# Patient Record
Sex: FEMALE | Race: WHITE | NOT HISPANIC OR LATINO | Employment: OTHER | URBAN - METROPOLITAN AREA
[De-identification: names, ages, dates, MRNs, and addresses within clinical notes are randomized per-mention and may not be internally consistent; named-entity substitution may affect disease eponyms.]

---

## 2017-01-12 ENCOUNTER — TELEPHONE (OUTPATIENT)
Dept: CARDIOLOGY | Facility: MEDICAL CENTER | Age: 82
End: 2017-01-12

## 2017-01-12 DIAGNOSIS — I25.119 CORONARY ARTERY DISEASE INVOLVING NATIVE CORONARY ARTERY OF NATIVE HEART WITH ANGINA PECTORIS (HCC): ICD-10-CM

## 2017-01-12 DIAGNOSIS — Z95.1 S/P CABG X 3: ICD-10-CM

## 2017-01-12 RX ORDER — METOPROLOL TARTRATE 50 MG/1
50 TABLET, FILM COATED ORAL 2 TIMES DAILY
Qty: 135 TAB | Refills: 3 | OUTPATIENT
Start: 2017-01-12 | End: 2017-09-24

## 2017-01-12 NOTE — TELEPHONE ENCOUNTER
Called and s/w pt to clarify mychart message sent about metoprolol. She needs the correct Rx sent into [facility]. It appears that [facility] did not get the refill from [facility] 11/21 and pts PCP had given her a 7 day supply. Clarified with pt that she is taking  (25mg) .5 tabs in the AM and (50mg) 1 tab at night. Called in rx to [facility]

## 2017-01-15 RX ORDER — ALLOPURINOL 100 MG/1
200 TABLET ORAL DAILY
Qty: 180 TAB | Refills: 3 | Status: SHIPPED | OUTPATIENT
Start: 2017-01-15 | End: 2018-03-23 | Stop reason: SDUPTHER

## 2017-01-25 ENCOUNTER — RX ONLY (OUTPATIENT)
Age: 82
Setting detail: RX ONLY
End: 2017-01-25

## 2017-02-27 ENCOUNTER — TELEPHONE (OUTPATIENT)
Dept: MEDICAL GROUP | Facility: MEDICAL CENTER | Age: 82
End: 2017-02-27

## 2017-02-27 NOTE — TELEPHONE ENCOUNTER
Pt called stating she needs rx for allopurinol 2 tabs po qday. Pt notified this was done on 1/15/17. Confirmed with pharmacy.

## 2017-03-07 ENCOUNTER — TELEPHONE (OUTPATIENT)
Dept: CARDIOLOGY | Facility: MEDICAL CENTER | Age: 82
End: 2017-03-07

## 2017-03-07 NOTE — TELEPHONE ENCOUNTER
Advised Kiersten that pt does not need pre-meds. 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

## 2017-03-07 NOTE — TELEPHONE ENCOUNTER
----- Message from Eileen Price sent at 3/7/2017 11:04 AM PST -----  Regarding: Patient in chair, needs to know if needs pre-meds  SC/Rory Burch @ Dr. Atwood's office called and wants to know about pre-meds. Patient is in the chair now. She can be reached at [phone].

## 2017-03-09 NOTE — TELEPHONE ENCOUNTER
Notified pt. She understands and wants to do this the right way. She hasn't been able to s/w the dentist office yet as they were gone on lunch when she called but says she know what to tell them now.

## 2017-03-09 NOTE — TELEPHONE ENCOUNTER
Pt states she s/w SC about stopping her plavix and SC advised she needs to take the plavix for at least 6 months after the occlusion of her graft. However, pt wants to make sure with [facility] she is not able to stop it as she really needs the crown. Advised pt to also discuss this with her dentist whether or not they would do a crown on plavix.   She was in for a cleaning yesterday and that is why Kiersten called about pre-med but Kiersten did not mention pt needing a crown (see below).     To [facility].     Question about stopping Plavix for dental work  Received: Today       Angy Tripp, RRULA.                     SC/Rory     Patient needs to find out about stopping her Plavix for her dental work (crown). She can be reached at [phone].

## 2017-03-09 NOTE — TELEPHONE ENCOUNTER
I would just clarify with dentist if they need to stop plavix for crown. If so, she has to wait 6 months minimum. The longer the better, otherwise she risks the chance of the stent closing up. SC

## 2017-03-14 DIAGNOSIS — Z95.1 S/P CABG X 3: ICD-10-CM

## 2017-03-14 RX ORDER — CLOPIDOGREL BISULFATE 75 MG/1
75 TABLET ORAL DAILY
Qty: 90 TAB | Refills: 1 | Status: SHIPPED | OUTPATIENT
Start: 2017-03-14 | End: 2017-03-15

## 2017-03-15 RX ORDER — CLOPIDOGREL BISULFATE 75 MG/1
75 TABLET ORAL DAILY
Qty: 30 TAB | Refills: 6 | Status: SHIPPED | OUTPATIENT
Start: 2017-03-15 | End: 2017-03-30 | Stop reason: SDUPTHER

## 2017-03-23 ENCOUNTER — PATIENT OUTREACH (OUTPATIENT)
Dept: HEALTH INFORMATION MANAGEMENT | Facility: OTHER | Age: 82
End: 2017-03-23

## 2017-03-23 NOTE — PROGRESS NOTES
Attempt #:1    Verify PCP: yes    Communication Preference Obtained: yes     Annual Wellness Visit Scheduling  1. Scheduling Status:Scheduled          Care Gap Scheduling (Attempt to Schedule EACH Overdue Care Gap!)     There are no preventive care reminders to display for this patient.      [employer] Activation: already active  Hipt[employer] Basilia: no  Virtual Visits: no  Opt In to Text Messages: no

## 2017-03-26 PROBLEM — I10 HYPERTENSION: Chronic | Status: ACTIVE | Noted: 2017-03-26

## 2017-03-30 ENCOUNTER — OFFICE VISIT (OUTPATIENT)
Dept: MEDICAL GROUP | Facility: MEDICAL CENTER | Age: 82
End: 2017-03-30
Payer: MEDICARE

## 2017-03-30 VITALS
TEMPERATURE: 97.2 F | DIASTOLIC BLOOD PRESSURE: 78 MMHG | HEART RATE: 68 BPM | OXYGEN SATURATION: 96 % | WEIGHT: 124 LBS | HEIGHT: 61 IN | SYSTOLIC BLOOD PRESSURE: 106 MMHG | BODY MASS INDEX: 23.41 KG/M2

## 2017-03-30 DIAGNOSIS — Z90.710 S/P TAH (TOTAL ABDOMINAL HYSTERECTOMY): Chronic | ICD-10-CM

## 2017-03-30 DIAGNOSIS — Z95.1 S/P CABG X 3: ICD-10-CM

## 2017-03-30 DIAGNOSIS — Z87.39 HISTORY OF GOUT: Chronic | ICD-10-CM

## 2017-03-30 DIAGNOSIS — I10 ESSENTIAL HYPERTENSION: Chronic | ICD-10-CM

## 2017-03-30 DIAGNOSIS — I44.7 LBBB (LEFT BUNDLE BRANCH BLOCK): Chronic | ICD-10-CM

## 2017-03-30 DIAGNOSIS — D70.9 NEUTROPENIA, UNSPECIFIED TYPE (HCC): ICD-10-CM

## 2017-03-30 DIAGNOSIS — Z00.00 MEDICARE ANNUAL WELLNESS VISIT, SUBSEQUENT: ICD-10-CM

## 2017-03-30 DIAGNOSIS — E78.5 DYSLIPIDEMIA: Chronic | ICD-10-CM

## 2017-03-30 DIAGNOSIS — Z86.73 HX OF TRANSIENT ISCHEMIC ATTACK (TIA): Chronic | ICD-10-CM

## 2017-03-30 PROCEDURE — G0439 PPPS, SUBSEQ VISIT: HCPCS | Performed by: INTERNAL MEDICINE

## 2017-03-30 PROCEDURE — 1036F TOBACCO NON-USER: CPT | Performed by: INTERNAL MEDICINE

## 2017-03-30 RX ORDER — CLOPIDOGREL BISULFATE 75 MG/1
75 TABLET ORAL DAILY
Qty: 90 TAB | Refills: 3
Start: 2017-03-30 | End: 2017-04-10

## 2017-03-30 ASSESSMENT — PATIENT HEALTH QUESTIONNAIRE - PHQ9: CLINICAL INTERPRETATION OF PHQ2 SCORE: 0

## 2017-03-30 NOTE — MR AVS SNAPSHOT
"        Susie Rodebosch   3/30/2017 2:00 PM   Office Visit   MRN: [id number]    Department:  [facility]   Dept Phone:  [phone]    Description:  Female : 1935   Provider:  Ant Bronson M.D.; [facility]            Allergies as of 3/30/2017     Allergen Noted Reactions    Lipitor [Atorvastatin Calcium] 2011   Unspecified    Elevated LFT  RXN=long time ago      You were diagnosed with     S/P CABG x 3   [032806]       Dyslipidemia   [798064]       History of gout   [199513]       Hx of transient ischemic attack (TIA)   [816141]       LBBB (left bundle branch block)   [513451]       Essential hypertension   [5893804]       Neutropenia, unspecified type (CMS-HCC)   [6335394]       S/P KATY (total abdominal hysterectomy)   [619356]       Medicare annual wellness visit, subsequent   [117476]         Vital Signs     Blood Pressure Pulse Temperature Height Weight Body Mass Index    106/78 mmHg 68 36.2 °C (97.2 °F) 1.537 m (5' 0.5\") 56.246 kg (124 lb) 23.81 kg/m2    Oxygen Saturation Last Menstrual Period Smoking Status             96% 2011 Former Smoker         Basic Information     Date Of Birth Sex Race Ethnicity Preferred Language    1935 Female White Non- English      Your appointments     2017  9:30 AM   Adult Draw/Collection with LAB ATTILA   [facility] (--)    [address]  [place] [address]   [phone]            2017  1:30 PM   FOLLOW UP with Carla Goode M.D.   [facility] (--)    [address]  [place] [id number] [phone]              Problem List              ICD-10-CM Priority Class Noted - Resolved    S/P CABG x 3 Z95.1   2011 - Present    S/P KATY (total abdominal hysterectomy) (Chronic) Z90.710   2011 - Present    History of gout (Chronic) Z87.39   2011 - Present    Dyslipidemia (Chronic) E78.5   2011 - Present    Hx of transient " ischemic attack (TIA) (Chronic) Z86.73   7/26/2011 - Present    History of cataract Z86.69   7/26/2011 - Present    Preventative health care (Chronic) Z00.00   7/26/2011 - Present    LBBB (left bundle branch block) (Chronic) I44.7   7/27/2011 - Present    History of osteopenia Z87.39   9/10/2011 - Present    Insomnia (Chronic) G47.00   6/4/2012 - Present    MEDICAL HOME  Low  Unknown - Present    Neutropenia (HCC) D70.9   11/5/2015 - Present    Decreased GFR R94.4   11/5/2015 - Present    Hypertension (Chronic) I10   3/26/2017 - Present      Health Maintenance        Date Due Completion Dates    IMM PNEUMOCOCCAL 65+ (ADULT) LOW/MEDIUM RISK SERIES (2 of 2 - PPSV23) 3/23/2018 (Originally 5/8/2016) 5/8/2015    IMM DTaP/Tdap/Td Vaccine (1 - Tdap) 3/23/2018 (Originally 9/27/2011) 9/26/2011    COLONOSCOPY 5/27/2018 5/27/2008    BONE DENSITY 8/14/2018 8/14/2013, 9/9/2011, 1/18/2008            Current Immunizations     13-VALENT PCV PREVNAR 5/8/2015    Influenza TIV (IM) 12/5/2012    Influenza Vaccine Adult HD 10/15/2016, 10/11/2016  9:32 AM, 10/21/2014    Influenza Vaccine Quad Inj (Preserved) 11/5/2015    SHINGLES VACCINE 6/4/2012    TD Vaccine 9/26/2011    Tuberculin Skin Test 10/2/2013 12:35 PM, 11/6/2012  9:35 AM, 10/30/2012  9:35 AM      Below and/or attached are the medications your provider expects you to take. Review all of your home medications and newly ordered medications with your provider and/or pharmacist. Follow medication instructions as directed by your provider and/or pharmacist. Please keep your medication list with you and share with your provider. Update the information when medications are discontinued, doses are changed, or new medications (including over-the-counter products) are added; and carry medication information at all times in the event of emergency situations     Allergies:  LIPITOR - Unspecified               Medications  Valid as of: March 30, 2017 -  4:26 PM    Generic Name Brand Name  Tablet Size Instructions for use    Allopurinol (Tab) ZYLOPRIM 100 MG Take 2 Tabs by mouth every day.        Aspirin (Tablet Delayed Response) ECOTRIN 81 MG Take 81 mg by mouth every evening.        Clopidogrel Bisulfate (Tab) PLAVIX 75 MG Take 1 Tab by mouth every day.        Lisinopril (Tab) PRINIVIL 5 MG Take 1 Tab by mouth every day.        Metoprolol Tartrate (Tab) LOPRESSOR 50 MG Take 1 Tab by mouth 2 times a day.        Rosuvastatin Calcium (Tab) CRESTOR 40 MG Take 1 Tab by mouth every day.        Zolpidem Tartrate (Tab) AMBIEN 5 MG Take 0.5 Tabs by mouth at bedtime as needed for Sleep.        .                 Medicines prescribed today were sent to:     [id number] - [place], [place] - [address] [place] [address]    Phone: [phone] Fax: [phone]    Open 24 Hours?: No      Medication refill instructions:       If your prescription bottle indicates you have medication refills left, it is not necessary to call your provider’s office. Please contact your pharmacy and they will refill your medication.    If your prescription bottle indicates you do not have any refills left, you may request refills at any time through one of the following ways: The online TouchLocal system (except Urgent Care), by calling your provider’s office, or by asking your pharmacy to contact your provider’s office with a refill request. Medication refills are processed only during regular business hours and may not be available until the next business day. Your provider may request additional information or to have a follow-up visit with you prior to refilling your medication.   *Please Note: Medication refills are assigned a new Rx number when refilled electronically. Your pharmacy may indicate that no refills were authorized even though a new prescription for the same medication is available at the pharmacy. Please request the medicine by name with the pharmacy before contacting your provider  for a refill.        Your To Do List     Future Labs/Procedures Complete By Expires    COMP METABOLIC PANEL  As directed 3/31/2018    CREATINE KINASE  As directed 3/31/2018    LIPID PROFILE  As directed 3/31/2018      Other Notes About Your Plan     ?pneumovax               MyChart Access Code: Activation code not generated  Current MyChart Status: Active

## 2017-03-30 NOTE — PROGRESS NOTES
Wellness     HPI:  Susie is a 81 y.o. female here for Medicare Annual Wellness Visit  Sees cardiology  Eye exam annually  Teeth cleaning twice per year  No hearing loss  No falls      Patient Active Problem List    Diagnosis Date Noted   • MEDICAL HOME      Priority: Low   • Hypertension 03/26/2017   • Neutropenia (HCC) 11/05/2015   • Decreased GFR 11/05/2015   • Insomnia 06/04/2012   • History of osteopenia 09/10/2011   • LBBB (left bundle branch block) 07/27/2011   • S/P KATY (total abdominal hysterectomy) 07/26/2011   • History of gout 07/26/2011   • Dyslipidemia 07/26/2011   • Hx of transient ischemic attack (TIA) 07/26/2011   • History of cataract 07/26/2011   • Preventative health care 07/26/2011   • S/P CABG x 3 07/06/2011       Current Outpatient Prescriptions   Medication Sig Dispense Refill   • clopidogrel (PLAVIX) 75 MG Tab Take 1 Tab by mouth every day. 30 Tab 6   • allopurinol (ZYLOPRIM) 100 MG Tab Take 2 Tabs by mouth every day. 180 Tab 3   • metoprolol (LOPRESSOR) 50 MG Tab Take 1 Tab by mouth 2 times a day. 135 Tab 3   • zolpidem (AMBIEN) 5 MG Tab Take 0.5 Tabs by mouth at bedtime as needed for Sleep. 30 Tab 5   • rosuvastatin (CRESTOR) 40 MG tablet Take 1 Tab by mouth every day. 30 Tab 11   • lisinopril (PRINIVIL) 5 MG Tab Take 1 Tab by mouth every day. 90 Tab 3   • aspirin EC (ECOTRIN) 81 MG TBEC Take 81 mg by mouth every evening.     • rosuvastatin (CRESTOR) 20 MG Tab        No current facility-administered medications for this visit.        Patient is taking medications as noted in medication list.  Current supplements as per medication list.   Chronic narcotic pain medicines: no    Allergies: Lipitor    Current social contact/activities: goes to friends house for dinner, out to dinner,volunteer      Is patient current with immunizations?  Yes.     She  reports that she quit smoking about 28 years ago. Her smoking use included Cigarettes. She has a 16 pack-year smoking history. She has never used  smokeless tobacco. She reports that she drinks about 0.5 oz of alcohol per week. She reports that she does not use illicit drugs.  Counseling given: Yes        Status post KATY  Started premarin in her 50s; now on estradiol 0.5 mg daily  9/26/11 on estradiol 0.5 mg qod  6/4/12 on estradiol rec trying to go to MWF  7/16/13 on estadiol MW  7/18/14 taper estradiol down to two days per week if possible  4/24/15 estradiol down to 1/2 tab three days per week    Status post CABG  1985 PTCA  1997 PTCA  7/11 EKG LBBB  9/30/14 echo normal LV function, EF 70%, mild MR, mild to moderate tricuspid regurgitation, RVSP 43 to 48 mild to moderate pulmonary hypertension  10/17/14 holter monitor normal sinus rhythm with episode of sinus tachycardia, no symptoms documented, no arrhythmia or pauses, minimal heart rate 48, maximum heart rate 135, one PVC, supraventricular ectopic activity 20 beats, mostly all single PACs  11/29/15 echo normal LV function, septal hypokinesis and paradoxical motion, grade 1 diastolic dysfunction, mild MR and TR  9/12/16 persantine thallium small area of ischemia inferolateral apex, possible nontransmural distal anterior wall myocardial infarct, normal ejection fraction  10/25/16 cardiology note discussed medical therapy versus coronary arteriography, patient elects to proceed with cardiac catheterization  11/2/16 cardiac catheterization proximal ostial LAD appears to have stent, high-grade 95% stenosis, beyond stent bifurcation diagonal branch LAD focal 95% stenosis, remainder widely patent, circumflex first marginal branch diffuse 75% stenosis, RCA distal smooth eccentric 60-70% stenosis, EF 68%, 3 vessel CAD with diffuse ostial, proximal and bifurcation LAD disease, referred to cardiac surgery for consideration CABG  11/5/16  cardiovascular surgery, CABG ×3 LIMA to LAD, reverse saphenous to second diagonal and first obtuse marginal  11/16/16 cardiac catheterization ejection fraction 56%, mid  anterior wall hypokinesis/akinesis, left main normal, LAD ostial 50% long calcific stenosis, proximal LAD and diagonal bifurcation 95% stenosis, diagonal ostium 95% stenosis, mid LAD has 2 serial 50% stenosis with kinking and tenting from retraction of LIMA bypass graft anastomosis, complete MARCIN 2 antegrade flow in the distal LAD, circumflex mid OMB one insertion site saphenous vein graft to this vessel 90% stenosis with MARCIN 3 antegrade flow, second marginal widely patent, RCA distal 75% calcified stenosis,MARCIN 3 flow, this vessel was not bypassed, LIMA bypass graft angiography markedly tapering distal anastomosis, no additional stenosis; saphenous vein bypass graft angiography widely patent; conservative therapy recommended  11/21/16 cardiology note, CAD with recent CABG and SVG occlusion, continue aspirin, plavix, crestor, metoprolol (increase to 25 mg qam and 50 mg apm, lisinopril, Lasix as needed plus potassium for edema, referral cardiac rehabilitation, follow-up 3 months  12/14/16 echo mildly reduced left ventricular systolic function, EF 50%, grade 2 diastolic dysfunction  12/22/16 cardiology note stable on metoprolol and lisinopril, crestor, aspirin and plavix follow-up 3 months     Right shoulder pain  8/4/15 decrease crestor to 20 mg and repeat labs    8/4/15 right shoulder pain x-ray and physical therapy pending, no benefit consider injection or MRI  8/5/15 xray right shoulders no acute fracture identified,mild degenerative changes  9/3/15 wbc 3.6(42%N,43%L)  9/3/15 bun 24,creat 0.9,GFR 54  9/3/15 ESR 13,CRP 0.1,CPK 81  9/3/15 completed physical therapy with symptoms improved    Preventative health  5/27/08 colon DHA told normal repeat 10 yrs  2009 pneumovax no webiz  9/26/11 td vaccine  6/4/12 zoster vaccine    8/14/13 dexa LS +1.2,hip -0.7  8/14/13 mammogram  7/29/14 vit d 28 increase to 4000 umits  10/10/14 declines mammogram  5/8/15 prevnar    Neutropenia  10/26/11 wbc 3.2 (47%N,38%L)  6/22/12 wbc  4.5(53%N,34%L)  9/23/13 wbc 4.5(53%N,35%L)  7/29/14 wbc 3.9(48%N,37%L)  4/30/15 wbc 4.3  9/3/15 wbc 3.6(42%N,43%L)  11/5/15 wbc 3.7(47%N,40%L)  12/9/16 wbc 5.0    Left bundle-branch block  7/11 EKG LBBB  9/30/14 echo normal LV function, EF 70%, mild MR, mild to moderate tricuspid regurgitation, RVSP 43 to 48 mild to moderate pulmonary hypertension  10/7/14 holter monitor normal sinus rhythm with episode of sinus tachycardia, no symptoms documented, no arrhythmia or pauses,minimal rate 48, maximum heart rate 135, one PVC, supraventricular ectopic activity 20 beats, mostly all single PACs  11/29/15 echo normal LV function, septal hypokinesis and paradoxical motion, grade 1 diastolic dysfunction, mild MR and TR  12/14/16 echo mildly reduced left ventricular systolic function, EF 50%, grade 2 diastolic dysfunction    Insomnia on Ambien    Hypertension  1985 PTCA  1997 PTCA  10/06 persantine thallium no ischemia, EF > 70%  10/08 urine mac 1.0  1/09 off hctz due to gout  7/11 EKG LBBB  8/11 [facility] carotid u/s left and right <40%  8/11 on metoprolol 50 am and 25 mg pm,and lisinopril 10 bid  8/12  cardiology note  7/29/14 urine mac 28, on metoprolol 50 mg qam and 25 mg qpm and lisinopril 10 mg bid  9/22/14 decrease metoprolol to 25 mg bid and cont lisinopril 10 mg bid due to postural lightheadedness  9/25/14 decrease lisinopril to 10 mg qday and continue metoprolol 50 mg 1/2 bid (sbp 97-99 when stand, sbp 110-120 sitting)  9/30/14 echo normal LV function, EF 70%, mild MR, mild to moderate tricuspid regurgitation, RVSP 43 to 48 mild to moderate pulmonary hypertension  10/2/14 stop metoprolol due to sbp  at times, continue lisinopril 10 mg    10/3/14 restart metoprolol 50 mg 1/2 tab per day and cont lisinopril 10 mg (HR )  10/6/14 seen ER lightheadedness workup negative more orthostatic symptoms, continue metorpolol 50 mg 1/2 qday (tapering off caused HR to increase) and decrease lisinopril to 10 mg 1/2 tab  per day  10/17/14 holter monitor normal sinus rhythm with episode of sinus tachycardia, no symptoms documented, no arrhythmia or pauses, minimal rate 48, maximum heart rate 135, one PVC, supraventricular ectopic activity 20 beats, mostly all single PACs  4/24/15 on metoprolol 50 mg 1/2 tab per day, lisinopril 10 mg 1/2 tab per day,asa  11/29/15 echo normal LV function, septal hypokinesis and paradoxical motion, grade 1 diastolic dysfunction, mild MR and TR  12/14/16 echo mildly reduced left ventricular systolic function, EF 50%, grade 2 diastolic dysfunction    History TIA  7/11 seen in ER [place] ; EKG LBBB, CT head non contrast negative  7/11 started on plavix  8/11 [facility] carotid ultrasound left and right <40%  6/12 on aspirin a day, intolerant to plavix  9/30/14 echo normal LV function, EF 70%, mild MR, mild to moderate tricuspid regurgitation, RVSP 43 to 48 mild to moderate pulmonary hypertension  8/2/15 on asa    11/29/15 CT head negative  11/9/15 MRI brain without; age-appropriate atrophy mild chronic microvascular ischemic disease  11/29/15 carotid ultrasound negative  12/14/16 echo mildly reduced left ventricular systolic function, EF 50%, grade 2 diastolic dysfunction    History osteopenia  1/08 dexa LS +0.2,hip +1.1  9/11 dexa LS +0.5, hip -1.0  10/11 vit d 30    8/14/13 dexa LS +1.2,hip -0.7  7/29/14 vit d 28    History of gout  1/09 started on allopurinol for uric acid 8.3, off hctz  1/11 uric acid 3.7 on allopurinol 200 mg daily  10/11 uric acid 4.6 on allopurinol 200 mg  3/12 colchicine prn gout flare up (do not take crestor with colchicine)  3/13 uric 4.6 on allopurinol 200 mg  7/29/14 uric 3.7 on allopurinol 200 mg  11/5/15 uric 3.9 on allopurinol 200 mg    History of cataract    Dyslipidemia  intolerant of lipitor  4/11 chol 200,trig 79,hdl 82,ldl 102 on lipitor 80 with elevated LFT (,, hep panel negative)  5/11 chol 280,trig 137,hdl 70,ldl 183 off statin (AST 21,ALT 26)    10/11 chol  219,trig 65,hdl 65,ldl 141 on zocor 40 mg; monitored by [facility] (AST 24,ALT 24)  11/2/11 [facility] change zocor 40 mg to crestor 40 mg  1/12 chol 161,trig 80,hdl 70,ldl 75 on crestor 40 mg  8/12 chol 170,trig 57,hdl 66,ldl 93 on crestor 40 mg per [facility], consider zetia in the future (intolerant lipitor)  3/13 chol 193,trig 46,hdl 90,ldl 94,LDL-P 923,HDL-P 51,LP-IR 37 on crestor 40 mg  9/23/13 chol 180,trig 92,hdl 70,ldl 92 on crestor 40 mg    10/1/13 samples zetia 10 mg, cont crestor 40 mg repeat labs 4 weeks  7/29/14 chol 186,trig 76,hdl 71,ldl 100; on crestor 40 mg consider decreasing dose  4/30/15 chol 182,trig 69,hdl 76,ldl 92 on crestor 40 mg  8/4/15 decrease crestor to 20 mg and repeat labs    9/3/15 ESR 13,CRP 0.1,CPK 81, crestor has been decreased to 20 mg  11/5/15 chol 191,trig 77,hdl 71,ldl 105 on crestor 20 mg (40 mg tab 1/2 per day)  12/29/16 chol 136,trig 82,hdl 46,ldl 74,cpk 53 on crestor 40 mg 1/2 per day   (intolerant of lipitor)    Decreased GFR  10/26/11 bun 12,creat 0.8,GFR>60  6/22/12 bun 17,creat 0.9,GFR 59  3/28/13 bun 18,creat 1.3,GFR 39  9/23/13 bun 19,creat 1.0,GFR 51  7/29/14 bun 24,creat 1.2,GFR 42  9/3/15 bun 24,creat 0.9,GFR 54  11/29/15 bun 16,creat 1.0,GFR 52  12/29/16 bun 15,creat 0.8,GFR 55          DPA/Advanced Directive:  Patient has Advanced Directive, but it is not on file. Instructed to bring in a copy to scan into their chart.    ROS:    Gait: Uses no assistive device    Ostomy: no    Other tubes: no    Amputations: no    Chronic oxygen use no    Last eye exam 2015    Wears hearing aids: no    : Denies incontinence     Depression Screening    Little interest or pleasure in doing things?  0 - not at all  Feeling down, depressed, or hopeless?  0 - not at all  Patient Health Questionnaire Score: 0  If depressive symptoms identified deferred to follow up visit unless specifically addressed in assessment and plan.    Screening for Cognitive Impairment    Three Minute Recall (banana,  sunrise, fence)  2/3 Apple, ancelmo, table  Draw clock face with all 12 numbers set to the hand to show 10 minutes past 11 o'clock  1 1/5  If cognitive concerns identified deferred to follow up visit unless specifically addressed in assessment and plan.    Fall Risk Assessment    Has the patient had two or more falls in the last year or any fall with injury in the last year?  No  If Fall Risk identified deferred to follow up visit unless specifically addressed in assessment and plan.    Safety Assessment    Throw rugs on floor.  Yes  Handrails on all stairs.  Yes  Good lighting in all hallways.  Yes  Difficulty hearing.  No  Patient counseled about all safety risks that were identified.    Functional Assessment ADLs    Are there any barriers preventing you from cooking for yourself or meeting nutritional needs?  No.    Are there any barriers preventing you from driving safely or obtaining transportation?  No.    Are there any barriers preventing you from using a telephone or calling for help?  No.    Are there any barriers preventing you from shopping?  No.    Are there any barriers preventing you from taking care of your own finances?  No.  takes care of finances.  Are there any barriers preventing you from managing your medications?  No.    Are currently engaging any exercise or physical activity?  Yes.  Care for . Goes for walks.    Health Maintenance Summary                IMM PNEUMOCOCCAL 65+ (ADULT) LOW/MEDIUM RISK SERIES Postponed 3/23/2018 Originally 5/8/2016. Patient Refused     Done 5/8/2015 Imm Admin: Pneumococcal Conjugate Vaccine (Prevnar/PCV-13)    IMM DTaP/Tdap/Td Vaccine Postponed 3/23/2018 Originally 9/27/2011. Patient Refused     Done 9/26/2011 Imm Admin: TD Vaccine    Annual Wellness Visit Next Due 8/24/2017      Done 8/23/2016 Visit Dx: Medicare annual wellness visit, subsequent     Patient has more history with this topic...    COLONOSCOPY Next Due 5/27/2018      Done 5/27/2008 AMB  REFERRAL TO GI FOR COLONOSCOPY    BONE DENSITY Next Due 2018      Done 2013 DS-BONE DENSITY STUDY (DEXA)     Patient has more history with this topic...          Patient Care Team:  Ant Bronson M.D. as PCP - General  BERRY Grider as Mid Level Provider (Cardiology)  Carla Goode M.D. as Consulting Physician (Cardiology)  [facility] as Consulting Physician (Optometry)    Social History   Substance Use Topics   • Smoking status: Former Smoker -- 0.50 packs/day for 32 years     Types: Cigarettes     Quit date: 1988   • Smokeless tobacco: Never Used      Comment: quit , approx 30pyh   • Alcohol Use: 0.5 oz/week     1 Glasses of wine per week     Family History   Problem Relation Age of Onset   • Cancer Mother      uterine   • Arthritis Mother      gout   • Heart Disease Father      MI age 50s   • Heart Disease Brother       40 MI   • Hyperlipidemia Sister      She  has a past medical history of Indigestion; Arthritis; Hypertension; MEDICAL HOME; Heart burn; High cholesterol; Breath shortness; Cataract; Gout; Back pain; CAD (coronary artery disease); and Stroke (CMS-[facility]).   Past Surgical History   Procedure Laterality Date   • Angioplasty  85, 97   • Other       c-sections x4   • Cataract phaco with iol  2009     Performed by SOLOMON THOMAS at SURGERY SAME DAY [facility] ORS   • Cataract phaco with iol  2009     Performed by SOLOMON THOMAS at SURGERY SAME DAY [facility] ORS   • Gyn surgery       C Section x4   • Gyn surgery       Hysterectomy during last C Section    • Multiple coronary artery bypass endo vein harvest  2016     Procedure: MULTIPLE CORONARY ARTERY BYPASS ENDO VEIN HARVEST;  Surgeon: Jeff Naranjo M.D.;  Location: SURGERY [facility];  Service:    • Reza  2016     Procedure: REZA;  Surgeon: Jeff Naranjo M.D.;  Location: SURGERY [facility];  Service:    • Cardiac cath             Exam:     Blood pressure  "106/78, pulse 68, temperature 36.2 °C (97.2 °F), height 1.537 m (5' 0.5\"), weight 56.246 kg (124 lb), last menstrual period 07/06/2011, SpO2 96 %. Body mass index is 23.81 kg/(m^2).    Hearing fair  Dentition fair  Alert, oriented in no acute distress.  Eye contact is good, speech goal directed, affect calm  Lungs clear  Cv s1s2  Extremities no edema    Assessment and Plan. The following treatment and monitoring plan is recommended:     Assessment  #1 wellness assessment    #2 Status post CABG performed on 11/5/16  cardiovascular surgery, CABG ×3 LIMA to LAD, reverse saphenous to second diagonal and first obtuse marginal, no recurrent chest pain, shortness of breath, lightheadedness, most recent cardiology follow-up appointment 12/22/16 cardiology note stable on metoprolol and lisinopril, crestor, aspirin and plavix follow-up 3 months     #3 hypertension stable on metoprolol and lisinopril with no lightheadedness or dizziness, no orthostatic symptoms or changes with position    #4 dyslipidemia on Crestor 40 mg per day no muscle pain or muscle aches most recent lipid panel which I believe was on half a pill a day 12/29/16 chol 136,trig 82,hdl 46,ldl 74,cpk 53 on crestor 40 mg 1/2 per day    #5 history neutropenia with no recurrent infections, most recent WBC 5.0 normal differential    #6 left bundle branch block 12/14/16 echo mildly reduced left ventricular systolic function, EF 50%, grade 2 diastolic dysfunction    #7 Insomnia on Ambien without daytime drowsiness or hangover effect    #8 history of TIA 2011 no history of stroke, no recurrent symptoms, remains on Plavix    #9 Preventative health  5/27/08 colon DHA told normal repeat 10 yrs  2009 pneumovax no webiz  9/26/11 td vaccine  6/4/12 zoster vaccine    8/14/13 dexa LS +1.2,hip -0.7  8/14/13 mammogram  7/29/14 vit d 28 increase to 4000 umits  10/10/14 declines mammogram  5/8/15 prevnar    #10 history of gout on allopurinol no flareup      Services " suggested:    Health Care Screening recommendations as per orders if indicated.  Referrals offered: PT/OT/Nutrition counseling/Behavioral Health/Smoking cessation as per orders if indicated.    Discussion today about general wellness and lifestyle habits:    · Prevent falls and reduce trip hazards; Cautioned about securing or removing rugs.  · Have a working fire alarm and carbon monoxide detector;   · Engage in regular physical activity and social activities       Follow-up:   Plan  #! Health maintenance reviewed and updated    #2 no mammogram, patient declines further mammograms screening    #3 dexa ordered    #4 no records, has received pneumococcal 23 previously, no records of that, has also received pneumococcal 13, shingles, tetanus vaccine    #5 declines hearing test, physical therapy, nutrition assessment    #6 declines cardiac rehab due to cost    #7 follow up cardiology     #8 blood pressure periodically and record    #9 sleep hygiene discussed, limited use of Ambien if possible, long-term use may contribute to drowsiness, sedation, memory loss, confusion, falls    #10 exercise 30 minutes daily    #11 annual influenza vaccine    #12 recheck labs on higher dose Crestor for cardiology appointment, lab slip provided fasting    #13 follow up with myself 6 months

## 2017-04-03 ENCOUNTER — HOSPITAL ENCOUNTER (OUTPATIENT)
Dept: LAB | Facility: MEDICAL CENTER | Age: 82
End: 2017-04-03
Attending: INTERNAL MEDICINE
Payer: MEDICARE

## 2017-04-03 ENCOUNTER — TELEPHONE (OUTPATIENT)
Dept: MEDICAL GROUP | Facility: MEDICAL CENTER | Age: 82
End: 2017-04-03

## 2017-04-03 DIAGNOSIS — E78.5 DYSLIPIDEMIA: Chronic | ICD-10-CM

## 2017-04-03 LAB
ALBUMIN SERPL BCP-MCNC: 4.4 G/DL (ref 3.2–4.9)
ALBUMIN/GLOB SERPL: 1.4 G/DL
ALP SERPL-CCNC: 63 U/L (ref 30–99)
ALT SERPL-CCNC: 20 U/L (ref 2–50)
ANION GAP SERPL CALC-SCNC: 8 MMOL/L (ref 0–11.9)
AST SERPL-CCNC: 20 U/L (ref 12–45)
BILIRUB SERPL-MCNC: 0.6 MG/DL (ref 0.1–1.5)
BUN SERPL-MCNC: 20 MG/DL (ref 8–22)
CALCIUM SERPL-MCNC: 9.7 MG/DL (ref 8.5–10.5)
CHLORIDE SERPL-SCNC: 106 MMOL/L (ref 96–112)
CHOLEST SERPL-MCNC: 152 MG/DL (ref 100–199)
CK SERPL-CCNC: 53 U/L (ref 0–154)
CO2 SERPL-SCNC: 25 MMOL/L (ref 20–33)
CREAT SERPL-MCNC: 1.13 MG/DL (ref 0.5–1.4)
GFR SERPL CREATININE-BSD FRML MDRD: 46 ML/MIN/1.73 M 2
GLOBULIN SER CALC-MCNC: 3.2 G/DL (ref 1.9–3.5)
GLUCOSE SERPL-MCNC: 97 MG/DL (ref 65–99)
HDLC SERPL-MCNC: 62 MG/DL
LDLC SERPL CALC-MCNC: 73 MG/DL
POTASSIUM SERPL-SCNC: 4.5 MMOL/L (ref 3.6–5.5)
PROT SERPL-MCNC: 7.6 G/DL (ref 6–8.2)
SODIUM SERPL-SCNC: 139 MMOL/L (ref 135–145)
TRIGL SERPL-MCNC: 86 MG/DL (ref 0–149)

## 2017-04-03 PROCEDURE — 80061 LIPID PANEL: CPT

## 2017-04-03 PROCEDURE — 80053 COMPREHEN METABOLIC PANEL: CPT

## 2017-04-03 PROCEDURE — 36415 COLL VENOUS BLD VENIPUNCTURE: CPT

## 2017-04-03 PROCEDURE — 82550 ASSAY OF CK (CPK): CPT

## 2017-04-06 ENCOUNTER — OFFICE VISIT (OUTPATIENT)
Dept: CARDIOLOGY | Facility: MEDICAL CENTER | Age: 82
End: 2017-04-06
Payer: MEDICARE

## 2017-04-06 VITALS
HEIGHT: 60 IN | OXYGEN SATURATION: 94 % | BODY MASS INDEX: 24.15 KG/M2 | DIASTOLIC BLOOD PRESSURE: 66 MMHG | SYSTOLIC BLOOD PRESSURE: 114 MMHG | WEIGHT: 123 LBS | HEART RATE: 66 BPM

## 2017-04-06 DIAGNOSIS — Z95.1 S/P CABG X 3: ICD-10-CM

## 2017-04-06 PROCEDURE — G8420 CALC BMI NORM PARAMETERS: HCPCS | Performed by: INTERNAL MEDICINE

## 2017-04-06 PROCEDURE — G8432 DEP SCR NOT DOC, RNG: HCPCS | Performed by: INTERNAL MEDICINE

## 2017-04-06 PROCEDURE — 1101F PT FALLS ASSESS-DOCD LE1/YR: CPT | Performed by: INTERNAL MEDICINE

## 2017-04-06 PROCEDURE — 1036F TOBACCO NON-USER: CPT | Performed by: INTERNAL MEDICINE

## 2017-04-06 PROCEDURE — G8598 ASA/ANTIPLAT THER USED: HCPCS | Performed by: INTERNAL MEDICINE

## 2017-04-06 PROCEDURE — 99214 OFFICE O/P EST MOD 30 MIN: CPT | Performed by: INTERNAL MEDICINE

## 2017-04-06 PROCEDURE — 4040F PNEUMOC VAC/ADMIN/RCVD: CPT | Performed by: INTERNAL MEDICINE

## 2017-04-06 ASSESSMENT — ENCOUNTER SYMPTOMS
SHORTNESS OF BREATH: 0
EYES NEGATIVE: 1
FALLS: 0
COUGH: 0
NERVOUS/ANXIOUS: 1
DIZZINESS: 0
MEMORY LOSS: 1
DEPRESSION: 0
GASTROINTESTINAL NEGATIVE: 1
PALPITATIONS: 0
INSOMNIA: 1
MUSCULOSKELETAL NEGATIVE: 1
WEIGHT LOSS: 0
LOSS OF CONSCIOUSNESS: 0

## 2017-04-06 NOTE — MR AVS SNAPSHOT
Susie Kendalloscelvis   2017 1:30 PM   Office Visit   MRN: [id number]    Department:  [facility]   Dept Phone:  [phone]    Description:  Female : 1935   Provider:  Carla Goode M.D.           Reason for Visit     Follow-Up           Allergies as of 2017     Allergen Noted Reactions    Lipitor [Atorvastatin Calcium] 2011   Unspecified    Elevated LFT  RXN=long time ago      You were diagnosed with     S/P CABG x 3   [832398]         Vital Signs     Blood Pressure Pulse Height Weight Body Mass Index Oxygen Saturation    114/66 mmHg 66 1.524 m (5') 55.792 kg (123 lb) 24.02 kg/m2 94%    Last Menstrual Period Smoking Status                2011 Former Smoker          Basic Information     Date Of Birth Sex Race Ethnicity Preferred Language    1935 Female White Non- English      Your appointments     Oct 11, 2017 10:45 AM   FOLLOW UP with Carla Goode M.D.   [facility] (--)    [address], [address]  [facility] [address]   [phone]              Problem List              ICD-10-CM Priority Class Noted - Resolved    S/P CABG x 3 Z95.1   2011 - Present    S/P KATY (total abdominal hysterectomy) (Chronic) Z90.710   2011 - Present    History of gout (Chronic) Z87.39   2011 - Present    Dyslipidemia (Chronic) E78.5   2011 - Present    Hx of transient ischemic attack (TIA) (Chronic) Z86.73   2011 - Present    History of cataract Z86.69   2011 - Present    Preventative health care (Chronic) Z00.00   2011 - Present    LBBB (left bundle branch block) (Chronic) I44.7   2011 - Present    History of osteopenia Z87.39   9/10/2011 - Present    Insomnia (Chronic) G47.00   2012 - Present    MEDICAL HOME  Low  Unknown - Present    Neutropenia (HCC) D70.9   2015 - Present    Decreased GFR R94.4   2015 - Present    Hypertension (Chronic) I10   3/26/2017 - Present      Health  Maintenance        Date Due Completion Dates    IMM PNEUMOCOCCAL 65+ (ADULT) LOW/MEDIUM RISK SERIES (2 of 2 - PPSV23) 3/23/2018 (Originally 5/8/2016) 5/8/2015    IMM DTaP/Tdap/Td Vaccine (1 - Tdap) 3/23/2018 (Originally 9/27/2011) 9/26/2011    COLONOSCOPY 5/27/2018 5/27/2008    BONE DENSITY 8/14/2018 8/14/2013, 9/9/2011, 1/18/2008            Current Immunizations     13-VALENT PCV PREVNAR 5/8/2015    Influenza TIV (IM) 12/5/2012    Influenza Vaccine Adult HD 10/15/2016, 10/11/2016  9:32 AM, 10/21/2014    Influenza Vaccine Quad Inj (Preserved) 11/5/2015    SHINGLES VACCINE 6/4/2012    TD Vaccine 9/26/2011    Tuberculin Skin Test 10/2/2013 12:35 PM, 11/6/2012  9:35 AM, 10/30/2012  9:35 AM      Below and/or attached are the medications your provider expects you to take. Review all of your home medications and newly ordered medications with your provider and/or pharmacist. Follow medication instructions as directed by your provider and/or pharmacist. Please keep your medication list with you and share with your provider. Update the information when medications are discontinued, doses are changed, or new medications (including over-the-counter products) are added; and carry medication information at all times in the event of emergency situations     Allergies:  LIPITOR - Unspecified               Medications  Valid as of: April 06, 2017 -  2:17 PM    Generic Name Brand Name Tablet Size Instructions for use    Allopurinol (Tab) ZYLOPRIM 100 MG Take 2 Tabs by mouth every day.        Aspirin (Tablet Delayed Response) ECOTRIN 81 MG Take 81 mg by mouth every evening.        Clopidogrel Bisulfate (Tab) PLAVIX 75 MG Take 1 Tab by mouth every day.        Lisinopril (Tab) PRINIVIL 5 MG Take 1 Tab by mouth every day.        Metoprolol Tartrate (Tab) LOPRESSOR 50 MG Take 1 Tab by mouth 2 times a day.        Rosuvastatin Calcium (Tab) CRESTOR 40 MG Take 1 Tab by mouth every day.        Zolpidem Tartrate (Tab) AMBIEN 5 MG Take 0.5  Tabs by mouth at bedtime as needed for Sleep.        .                 Medicines prescribed today were sent to:     [id number] - [place], [place] - [address]    [address] [place] [address]    Phone: [phone] Fax: [phone]    Open 24 Hours?: No      Medication refill instructions:       If your prescription bottle indicates you have medication refills left, it is not necessary to call your provider’s office. Please contact your pharmacy and they will refill your medication.    If your prescription bottle indicates you do not have any refills left, you may request refills at any time through one of the following ways: The online [email or web address] system (except Urgent Care), by calling your provider’s office, or by asking your pharmacy to contact your provider’s office with a refill request. Medication refills are processed only during regular business hours and may not be available until the next business day. Your provider may request additional information or to have a follow-up visit with you prior to refilling your medication.   *Please Note: Medication refills are assigned a new Rx number when refilled electronically. Your pharmacy may indicate that no refills were authorized even though a new prescription for the same medication is available at the pharmacy. Please request the medicine by name with the pharmacy before contacting your provider for a refill.        Other Notes About Your Plan     ?pneumovax               [employer] Access Code: Activation code not generated  Current [email or web address] Status: Active

## 2017-04-06 NOTE — PROGRESS NOTES
Subjective:   Susie Craig is a 81 y.o. female who presents today in follow-up in regards to her coronary arterial bypass surgery last fall in the setting of hypertension and hyperlipidemia     She is switching her care from another cardiologist who is my partner  She's had a tough time with her 's memory loss and falling. She is really working hard. She has no chest discomfort or symptoms of early exertion she does. She cares for her own house as well    She admits she needs to be more relaxed and tries to do meditation. She is taking her Crestor as directed.    Past Medical History   Diagnosis Date   • Indigestion    • Arthritis    • Hypertension    • MEDICAL HOME    • Heart burn    • High cholesterol    • Breath shortness      with exertion   • Cataract      bilat IOL    • Gout    • Back pain    • CAD (coronary artery disease)      CABG X3 in '16 with graft occlusion post-op   • Stroke (CMS-HCC)      TIA      Past Surgical History   Procedure Laterality Date   • Angioplasty  85, 97   • Other       c-sections x4   • Cataract phaco with iol  2009     Performed by SOLOMON THOMAS at SURGERY SAME DAY [facility]   • Cataract phaco with iol  2009     Performed by SOLOMON THOMAS at SURGERY SAME DAY [facility]   • Gyn surgery       C Section x4   • Gyn surgery       Hysterectomy during last C Section    • Multiple coronary artery bypass endo vein harvest  2016     Procedure: MULTIPLE CORONARY ARTERY BYPASS ENDO VEIN HARVEST;  Surgeon: Jeff Naranjo M.D.;  Location: SURGERY [facility];  Service:    • Reza  2016     Procedure: REZA;  Surgeon: Jeff Naranjo M.D.;  Location: SURGERY [facility];  Service:    • Cardiac cath       Family History   Problem Relation Age of Onset   • Cancer Mother      uterine   • Arthritis Mother      gout   • Heart Disease Father      MI age 50s   • Heart Disease Brother       40 MI   • Hyperlipidemia Sister      History    Smoking status   • Former Smoker -- 0.50 packs/day for 32 years   • Types: Cigarettes   • Quit date: 08/31/1988   Smokeless tobacco   • Never Used     Comment: quit 1988, approx 30pyh     Allergies   Allergen Reactions   • Lipitor [Atorvastatin Calcium] Unspecified     Elevated LFT  RXN=long time ago     Outpatient Encounter Prescriptions as of 4/6/2017   Medication Sig Dispense Refill   • clopidogrel (PLAVIX) 75 MG Tab Take 1 Tab by mouth every day. 90 Tab 3   • allopurinol (ZYLOPRIM) 100 MG Tab Take 2 Tabs by mouth every day. 180 Tab 3   • metoprolol (LOPRESSOR) 50 MG Tab Take 1 Tab by mouth 2 times a day. 135 Tab 3   • zolpidem (AMBIEN) 5 MG Tab Take 0.5 Tabs by mouth at bedtime as needed for Sleep. 30 Tab 5   • rosuvastatin (CRESTOR) 40 MG tablet Take 1 Tab by mouth every day. 30 Tab 11   • lisinopril (PRINIVIL) 5 MG Tab Take 1 Tab by mouth every day. 90 Tab 3   • aspirin EC (ECOTRIN) 81 MG TBEC Take 81 mg by mouth every evening.       No facility-administered encounter medications on file as of 4/6/2017.     Review of Systems   Constitutional: Negative for weight loss and malaise/fatigue.   Eyes: Negative.    Respiratory: Negative for cough and shortness of breath.    Cardiovascular: Negative for chest pain, palpitations and leg swelling.   Gastrointestinal: Negative.    Genitourinary: Negative.    Musculoskeletal: Negative.  Negative for falls.   Neurological: Negative for dizziness and loss of consciousness.   Endo/Heme/Allergies: Negative.    Psychiatric/Behavioral: Positive for memory loss. Negative for depression. The patient is nervous/anxious and has insomnia.    All other systems reviewed and are negative.       Objective:   /66 mmHg  Pulse 66  Ht 1.524 m (5')  Wt 55.792 kg (123 lb)  BMI 24.02 kg/m2  SpO2 94%  LMP 07/06/2011    Physical Exam   Constitutional: She is oriented to person, place, and time. She appears well-nourished.   HENT:   Head: Normocephalic.   Mouth/Throat: No  oropharyngeal exudate.   Eyes: EOM are normal. Pupils are equal, round, and reactive to light. No scleral icterus.   Neck: No JVD present. No thyromegaly present.   Cardiovascular: Regular rhythm and intact distal pulses.  Exam reveals no friction rub.    No murmur heard.  Well-healed sternal scar, approximated   Pulmonary/Chest: Breath sounds normal.   Abdominal: Soft. Bowel sounds are normal.   Musculoskeletal: She exhibits no edema.   Neurological: She is alert and oriented to person, place, and time. She exhibits normal muscle tone.   Skin: Skin is dry. No rash noted.       Assessment:     1. S/P CABG x 3         Medical Decision Making:  Today's Assessment / Status / Plan:     Coronary artery disease, recent open-heart surgery. She is on Plavix and aspirin. No side effects. The usual course of therapy would be a year, I will confirm this with her surgeons because recently they have started doing this for 3 months. Statin and aspirin. Labs reviewed which are excellent and at guidelines    Anxiety and situational stress. Talked at length about relaxation and therapy. She will continue to work on this. She feels safe at home    Lipids as above  Hypertension, good control and we reviewed her logs, no complaints    RTC 6 months, lab work at that time, echocardiogram in one calendar year

## 2017-05-03 RX ORDER — ZOLPIDEM TARTRATE 5 MG/1
2.5 TABLET ORAL NIGHTLY PRN
Qty: 30 TAB | Refills: 5 | Status: SHIPPED
Start: 2017-05-03 | End: 2017-09-24

## 2017-05-12 ENCOUNTER — PATIENT MESSAGE (OUTPATIENT)
Dept: MEDICAL GROUP | Facility: MEDICAL CENTER | Age: 82
End: 2017-05-12

## 2017-05-12 ENCOUNTER — HOSPITAL ENCOUNTER (EMERGENCY)
Facility: MEDICAL CENTER | Age: 82
End: 2017-05-12
Attending: EMERGENCY MEDICINE
Payer: MEDICARE

## 2017-05-12 ENCOUNTER — TELEPHONE (OUTPATIENT)
Dept: MEDICAL GROUP | Facility: MEDICAL CENTER | Age: 82
End: 2017-05-12

## 2017-05-12 ENCOUNTER — APPOINTMENT (OUTPATIENT)
Dept: RADIOLOGY | Facility: MEDICAL CENTER | Age: 82
End: 2017-05-12
Attending: EMERGENCY MEDICINE
Payer: MEDICARE

## 2017-05-12 VITALS
OXYGEN SATURATION: 88 % | RESPIRATION RATE: 18 BRPM | DIASTOLIC BLOOD PRESSURE: 76 MMHG | TEMPERATURE: 97.3 F | BODY MASS INDEX: 23.75 KG/M2 | HEART RATE: 62 BPM | WEIGHT: 121 LBS | HEIGHT: 60 IN | SYSTOLIC BLOOD PRESSURE: 155 MMHG

## 2017-05-12 DIAGNOSIS — R91.8 LUNG MASS: ICD-10-CM

## 2017-05-12 DIAGNOSIS — R51.9 NONINTRACTABLE HEADACHE, UNSPECIFIED CHRONICITY PATTERN, UNSPECIFIED HEADACHE TYPE: ICD-10-CM

## 2017-05-12 LAB
ALBUMIN SERPL BCP-MCNC: 4.1 G/DL (ref 3.2–4.9)
ALBUMIN/GLOB SERPL: 1.6 G/DL
ALP SERPL-CCNC: 60 U/L (ref 30–99)
ALT SERPL-CCNC: 28 U/L (ref 2–50)
ANION GAP SERPL CALC-SCNC: 9 MMOL/L (ref 0–11.9)
APPEARANCE UR: CLEAR
AST SERPL-CCNC: 22 U/L (ref 12–45)
BASOPHILS # BLD AUTO: 0.5 % (ref 0–1.8)
BASOPHILS # BLD: 0.02 K/UL (ref 0–0.12)
BILIRUB SERPL-MCNC: 0.6 MG/DL (ref 0.1–1.5)
BUN SERPL-MCNC: 17 MG/DL (ref 8–22)
CALCIUM SERPL-MCNC: 9.4 MG/DL (ref 8.5–10.5)
CHLORIDE SERPL-SCNC: 111 MMOL/L (ref 96–112)
CO2 SERPL-SCNC: 22 MMOL/L (ref 20–33)
COLOR UR AUTO: YELLOW
CREAT SERPL-MCNC: 0.97 MG/DL (ref 0.5–1.4)
EOSINOPHIL # BLD AUTO: 0.17 K/UL (ref 0–0.51)
EOSINOPHIL NFR BLD: 4.3 % (ref 0–6.9)
ERYTHROCYTE [DISTWIDTH] IN BLOOD BY AUTOMATED COUNT: 52.5 FL (ref 35.9–50)
ERYTHROCYTE [SEDIMENTATION RATE] IN BLOOD BY WESTERGREN METHOD: 16 MM/HOUR (ref 0–30)
GFR SERPL CREATININE-BSD FRML MDRD: 55 ML/MIN/1.73 M 2
GLOBULIN SER CALC-MCNC: 2.5 G/DL (ref 1.9–3.5)
GLUCOSE SERPL-MCNC: 98 MG/DL (ref 65–99)
GLUCOSE UR QL STRIP.AUTO: NEGATIVE MG/DL
HCT VFR BLD AUTO: 39.3 % (ref 37–47)
HGB BLD-MCNC: 13 G/DL (ref 12–16)
IMM GRANULOCYTES # BLD AUTO: 0.01 K/UL (ref 0–0.11)
IMM GRANULOCYTES NFR BLD AUTO: 0.3 % (ref 0–0.9)
INR PPP: 1.01 (ref 0.87–1.13)
KETONES UR QL STRIP.AUTO: NEGATIVE MG/DL
LEUKOCYTE ESTERASE UR QL STRIP.AUTO: ABNORMAL
LYMPHOCYTES # BLD AUTO: 1.38 K/UL (ref 1–4.8)
LYMPHOCYTES NFR BLD: 34.9 % (ref 22–41)
MCH RBC QN AUTO: 30.9 PG (ref 27–33)
MCHC RBC AUTO-ENTMCNC: 33.1 G/DL (ref 33.6–35)
MCV RBC AUTO: 93.3 FL (ref 81.4–97.8)
MONOCYTES # BLD AUTO: 0.34 K/UL (ref 0–0.85)
MONOCYTES NFR BLD AUTO: 8.6 % (ref 0–13.4)
NEUTROPHILS # BLD AUTO: 2.03 K/UL (ref 2–7.15)
NEUTROPHILS NFR BLD: 51.4 % (ref 44–72)
NITRITE UR QL STRIP.AUTO: NEGATIVE
NRBC # BLD AUTO: 0.02 K/UL
NRBC BLD AUTO-RTO: 0.5 /100 WBC
PH UR STRIP.AUTO: 7 [PH]
PLATELET # BLD AUTO: 141 K/UL (ref 164–446)
PMV BLD AUTO: 10.1 FL (ref 9–12.9)
POTASSIUM SERPL-SCNC: 4.1 MMOL/L (ref 3.6–5.5)
PROT SERPL-MCNC: 6.6 G/DL (ref 6–8.2)
PROT UR QL STRIP: ABNORMAL MG/DL
PROTHROMBIN TIME: 13.6 SEC (ref 12–14.6)
RBC # BLD AUTO: 4.21 M/UL (ref 4.2–5.4)
RBC UR QL AUTO: ABNORMAL
SODIUM SERPL-SCNC: 142 MMOL/L (ref 135–145)
SP GR UR: 1.01
WBC # BLD AUTO: 4 K/UL (ref 4.8–10.8)

## 2017-05-12 PROCEDURE — 70496 CT ANGIOGRAPHY HEAD: CPT

## 2017-05-12 PROCEDURE — 80053 COMPREHEN METABOLIC PANEL: CPT

## 2017-05-12 PROCEDURE — 85025 COMPLETE CBC W/AUTO DIFF WBC: CPT

## 2017-05-12 PROCEDURE — 36415 COLL VENOUS BLD VENIPUNCTURE: CPT

## 2017-05-12 PROCEDURE — 93005 ELECTROCARDIOGRAM TRACING: CPT | Performed by: EMERGENCY MEDICINE

## 2017-05-12 PROCEDURE — 81002 URINALYSIS NONAUTO W/O SCOPE: CPT

## 2017-05-12 PROCEDURE — 700117 HCHG RX CONTRAST REV CODE 255: Performed by: EMERGENCY MEDICINE

## 2017-05-12 PROCEDURE — 70498 CT ANGIOGRAPHY NECK: CPT

## 2017-05-12 PROCEDURE — 99284 EMERGENCY DEPT VISIT MOD MDM: CPT

## 2017-05-12 PROCEDURE — 85652 RBC SED RATE AUTOMATED: CPT

## 2017-05-12 PROCEDURE — 85610 PROTHROMBIN TIME: CPT

## 2017-05-12 PROCEDURE — 71010 DX-CHEST-PORTABLE (1 VIEW): CPT

## 2017-05-12 PROCEDURE — 700105 HCHG RX REV CODE 258: Performed by: EMERGENCY MEDICINE

## 2017-05-12 RX ORDER — SODIUM CHLORIDE 9 MG/ML
INJECTION, SOLUTION INTRAVENOUS CONTINUOUS
Status: DISCONTINUED | OUTPATIENT
Start: 2017-05-12 | End: 2017-05-12 | Stop reason: HOSPADM

## 2017-05-12 RX ORDER — HYDROCODONE BITARTRATE AND ACETAMINOPHEN 5; 325 MG/1; MG/1
1-2 TABLET ORAL EVERY 6 HOURS PRN
Qty: 10 TAB | Refills: 0 | Status: SHIPPED | OUTPATIENT
Start: 2017-05-12 | End: 2017-05-16

## 2017-05-12 RX ADMIN — SODIUM CHLORIDE: 9 INJECTION, SOLUTION INTRAVENOUS at 09:30

## 2017-05-12 RX ADMIN — IOHEXOL 100 ML: 350 INJECTION, SOLUTION INTRAVENOUS at 10:36

## 2017-05-12 ASSESSMENT — LIFESTYLE VARIABLES
HAVE PEOPLE ANNOYED YOU BY CRITICIZING YOUR DRINKING: NO
CONSUMPTION TOTAL: INCOMPLETE
TOTAL SCORE: 0
EVER HAD A DRINK FIRST THING IN THE MORNING TO STEADY YOUR NERVES TO GET RID OF A HANGOVER: NO
TOTAL SCORE: 0
HAVE YOU EVER FELT YOU SHOULD CUT DOWN ON YOUR DRINKING: NO
EVER FELT BAD OR GUILTY ABOUT YOUR DRINKING: NO
TOTAL SCORE: 0
DO YOU DRINK ALCOHOL: YES

## 2017-05-12 NOTE — ED AVS SNAPSHOT
5/12/2017    Susie Kendalloscelvis  [address]  [facility] [address]    Dear Susie:    [facility] wants to ensure your discharge home is safe and you or your loved ones have had all of your questions answered regarding your care after you leave the hospital.    Below is a list of resources and contact information should you have any questions regarding your hospital stay, follow-up instructions, or active medical symptoms.    Questions or Concerns Regarding… Contact   Medical Questions Related to Your Discharge  (7 days a week, 8am-5pm) Contact a Nurse Care Coordinator   [phone]   Medical Questions Not Related to Your Discharge  (24 hours a day / 7 days a week)  Contact the Nurse Health Line   [phone]    Medications or Discharge Instructions Refer to your discharge packet   or contact your [facility] Primary Care Provider   [phone]   Follow-up Appointment(s) Schedule your appointment via [employer]   or contact Scheduling [phone]   Billing Review your statement via [employer]  or contact Billing [phone]   Medical Records Review your records via [employer]   or contact Medical Records [phone]     You may receive a telephone call within two days of discharge. This call is to make certain you understand your discharge instructions and have the opportunity to have any questions answered. You can also easily access your medical information, test results and upcoming appointments via the [employer] free online health management tool. You can learn more and sign up at [employer]. For assistance setting up your [employer] account, please call [phone].    Once again, we want to ensure your discharge home is safe and that you have a clear understanding of any next steps in your care. If you have any questions or concerns, please do not hesitate to contact us, we are here for you. Thank you for choosing [facility] for your healthcare needs.    Sincerely,    Your [facility] Healthcare Team

## 2017-05-12 NOTE — ED NOTES
Pt c/o headache to left side of head since yesterday. Pt with hx of TIA. No neuro deficits noted. Pt concerned that pain has increased since yesterday.

## 2017-05-12 NOTE — DISCHARGE INSTRUCTIONS
"Headaches, Frequently Asked Questions  MIGRAINE HEADACHES  Q: What is migraine? What causes it? How can I treat it?  A: Generally, migraine headaches begin as a dull ache. Then they develop into a constant, throbbing, and pulsating pain. You may experience pain at the temples. You may experience pain at the front or back of one or both sides of the head. The pain is usually accompanied by a combination of:  · Nausea.   · Vomiting.   · Sensitivity to light and noise.   Some people (about 15%) experience an aura (see below) before an attack. The cause of migraine is believed to be chemical reactions in the brain. Treatment for migraine may include over-the-counter or prescription medications. It may also include self-help techniques. These include relaxation training and biofeedback.   Q: What is an aura?  A: About 15% of people with migraine get an \"aura\". This is a sign of neurological symptoms that occur before a migraine headache. You may see wavy or jagged lines, dots, or flashing lights. You might experience tunnel vision or blind spots in one or both eyes. The aura can include visual or auditory hallucinations (something imagined). It may include disruptions in smell (such as strange odors), taste or touch. Other symptoms include:  · Numbness.   · A \"pins and needles\" sensation.   · Difficulty in recalling or speaking the correct word.   These neurological events may last as long as 60 minutes. These symptoms will fade as the headache begins.  Q: What is a trigger?  A: Certain physical or environmental factors can lead to or \"trigger\" a migraine. These include:  · Foods.   · Hormonal changes.   · Weather.   · Stress.   It is important to remember that triggers are different for everyone. To help prevent migraine attacks, you need to figure out which triggers affect you. Keep a headache diary. This is a good way to track triggers. The diary will help you talk to your healthcare professional about your " "condition.  Q: Does weather affect migraines?  A: Bright sunshine, hot, humid conditions, and drastic changes in barometric pressure may lead to, or \"trigger,\" a migraine attack in some people. But studies have shown that weather does not act as a trigger for everyone with migraines.  Q: What is the link between migraine and hormones?  A: Hormones start and regulate many of your body's functions. Hormones keep your body in balance within a constantly changing environment. The levels of hormones in your body are unbalanced at times. Examples are during menstruation, pregnancy, or menopause. That can lead to a migraine attack. In fact, about three quarters of all women with migraine report that their attacks are related to the menstrual cycle.   Q: Is there an increased risk of stroke for migraine sufferers?  A: The likelihood of a migraine attack causing a stroke is very remote. That is not to say that migraine sufferers cannot have a stroke associated with their migraines. In persons under age 40, the most common associated factor for stroke is migraine headache. But over the course of a person's normal life span, the occurrence of migraine headache may actually be associated with a reduced risk of dying from cerebrovascular disease due to stroke.   Q: What are acute medications for migraine?  A: Acute medications are used to treat the pain of the headache after it has started. Examples over-the-counter medications, NSAIDs, ergots, and triptans.   Q: What are the triptans?  A: Triptans are the newest class of abortive medications. They are specifically targeted to treat migraine. Triptans are vasoconstrictors. They moderate some chemical reactions in the brain. The triptans work on receptors in your brain. Triptans help to restore the balance of a neurotransmitter called serotonin. Fluctuations in levels of serotonin are thought to be a main cause of migraine.   Q: Are over-the-counter medications for migraine " "effective?  A: Over-the-counter, or \"OTC,\" medications may be effective in relieving mild to moderate pain and associated symptoms of migraine. But you should see your caregiver before beginning any treatment regimen for migraine.   Q: What are preventive medications for migraine?  A: Preventive medications for migraine are sometimes referred to as \"prophylactic\" treatments. They are used to reduce the frequency, severity, and length of migraine attacks. Examples of preventive medications include antiepileptic medications, antidepressants, beta-blockers, calcium channel blockers, and NSAIDs (nonsteroidal anti-inflammatory drugs).  Q: Why are anticonvulsants used to treat migraine?  A: During the past few years, there has been an increased interest in antiepileptic drugs for the prevention of migraine. They are sometimes referred to as \"anticonvulsants\". Both epilepsy and migraine may be caused by similar reactions in the brain.   Q: Why are antidepressants used to treat migraine?  A: Antidepressants are typically used to treat people with depression. They may reduce migraine frequency by regulating chemical levels, such as serotonin, in the brain.   Q: What alternative therapies are used to treat migraine?  A: The term \"alternative therapies\" is often used to describe treatments considered outside the scope of conventional Western medicine. Examples of alternative therapy include acupuncture, acupressure, and yoga. Another common alternative treatment is herbal therapy. Some herbs are believed to relieve headache pain. Always discuss alternative therapies with your caregiver before proceeding. Some herbal products contain arsenic and other toxins.  TENSION HEADACHES  Q: What is a tension-type headache? What causes it? How can I treat it?  A: Tension-type headaches occur randomly. They are often the result of temporary stress, anxiety, fatigue, or anger. Symptoms include soreness in your temples, a tightening " "band-like sensation around your head (a \"vice-like\" ache). Symptoms can also include a pulling feeling, pressure sensations, and karissa head and neck muscles. The headache begins in your forehead, temples, or the back of your head and neck. Treatment for tension-type headache may include over-the-counter or prescription medications. Treatment may also include self-help techniques such as relaxation training and biofeedback.  CLUSTER HEADACHES  Q: What is a cluster headache? What causes it? How can I treat it?  A: Cluster headache gets its name because the attacks come in groups. The pain arrives with little, if any, warning. It is usually on one side of the head. A tearing or bloodshot eye and a runny nose on the same side of the headache may also accompany the pain. Cluster headaches are believed to be caused by chemical reactions in the brain. They have been described as the most severe and intense of any headache type. Treatment for cluster headache includes prescription medication and oxygen.  SINUS HEADACHES  Q: What is a sinus headache? What causes it? How can I treat it?  A: When a cavity in the bones of the face and skull (a sinus) becomes inflamed, the inflammation will cause localized pain. This condition is usually the result of an allergic reaction, a tumor, or an infection. If your headache is caused by a sinus blockage, such as an infection, you will probably have a fever. An x-ray will confirm a sinus blockage. Your caregiver's treatment might include antibiotics for the infection, as well as antihistamines or decongestants.   REBOUND HEADACHES  Q: What is a rebound headache? What causes it? How can I treat it?  A: A pattern of taking acute headache medications too often can lead to a condition known as \"rebound headache.\" A pattern of taking too much headache medication includes taking it more than 2 days per week or in excessive amounts. That means more than the label or a caregiver advises. " With rebound headaches, your medications not only stop relieving pain, they actually begin to cause headaches. Doctors treat rebound headache by tapering the medication that is being overused. Sometimes your caregiver will gradually substitute a different type of treatment or medication. Stopping may be a challenge. Regularly overusing a medication increases the potential for serious side effects. Consult a caregiver if you regularly use headache medications more than 2 days per week or more than the label advises.  ADDITIONAL QUESTIONS AND ANSWERS  Q: What is biofeedback?  A: Biofeedback is a self-help treatment. Biofeedback uses special equipment to monitor your body's involuntary physical responses. Biofeedback monitors:  · Breathing.   · Pulse.   · Heart rate.   · Temperature.   · Muscle tension.   · Brain activity.   Biofeedback helps you refine and perfect your relaxation exercises. You learn to control the physical responses that are related to stress. Once the technique has been mastered, you do not need the equipment any more.  Q: Are headaches hereditary?  A: Four out of five (80%) of people that suffer report a family history of migraine. Scientists are not sure if this is genetic or a family predisposition. Despite the uncertainty, a child has a 50% chance of having migraine if one parent suffers. The child has a 75% chance if both parents suffer.   Q: Can children get headaches?  A: By the time they reach high school, most young people have experienced some type of headache. Many safe and effective approaches or medications can prevent a headache from occurring or stop it after it has begun.   Q: What type of doctor should I see to diagnose and treat my headache?  A: Start with your primary caregiver. Discuss his or her experience and approach to headaches. Discuss methods of classification, diagnosis, and treatment. Your caregiver may decide to recommend you to a headache specialist, depending upon  your symptoms or other physical conditions. Having diabetes, allergies, etc., may require a more comprehensive and inclusive approach to your headache. The [employer] will provide, upon request, a list of NHF physician members in your state.  Document Released: 03/09/2005 Document Revised: 03/11/2013 Document Reviewed: 08/17/2009  [employer]® Patient Information ©2013 [employer].  Pulmonary Nodule  A pulmonary nodule is a small, round growth of tissue in the lung. Pulmonary nodules can range in size from less than 1/5 inch (4 mm) to a little bigger than an inch (25 mm). Most pulmonary nodules are detected when imaging tests of the lung are being performed for a different problem. Pulmonary nodules are usually not cancerous (benign). However, some pulmonary nodules are cancerous (malignant). Follow-up treatment or testing is based on the size of the pulmonary nodule and your risk of getting lung cancer.   CAUSES  Benign pulmonary nodules can be caused by various things. Some of the causes include:   · Bacterial, fungal, or viral infections. This is usually an old infection that is no longer active, but it can sometimes be a current, active infection.  · A benign mass of tissue.  · Inflammation from conditions such as rheumatoid arthritis.    · Abnormal blood vessels in the lungs.  Malignant pulmonary nodules can result from lung cancer or from cancers that spread to the lung from other places in the body.  SIGNS AND SYMPTOMS  Pulmonary nodules usually do not cause symptoms.  DIAGNOSIS  Most often, pulmonary nodules are found incidentally when an X-ray or CT scan is performed to look for some other problem in the lung area. To help determine whether a pulmonary nodule is benign or malignant, your health care provider will take a medical history and order a variety of tests. Tests done may include:   · Blood tests.  · A skin test called a tuberculin test. This test is used to determine if you have  been exposed to the germ that causes tuberculosis.    · Chest X-rays. If possible, a new X-ray may be compared with X-rays you have had in the past.     · CT scan. This test shows smaller pulmonary nodules more clearly than an X-ray.    · Positron emission tomography (PET) scan. In this test, a safe amount of a radioactive substance is injected into the bloodstream. Then, the scan takes a picture of the pulmonary nodule. The radioactive substance is eliminated from your body in your urine.    · Biopsy. A tiny piece of the pulmonary nodule is removed so it can be checked under a microscope.  TREATMENT   Pulmonary nodules that are benign normally do not require any treatment because they usually do not cause symptoms or breathing problems. Your health care provider may want to monitor the pulmonary nodule through follow-up CT scans. The frequency of these CT scans will vary based on the size of the nodule and the risk factors for lung cancer. For example, CT scans will need to be done more frequently if the pulmonary nodule is larger and if you have a history of smoking and a family history of cancer. Further testing or biopsies may be done if any follow-up CT scan shows that the size of the pulmonary nodule has increased.  HOME CARE INSTRUCTIONS  · Only take over-the-counter or prescription medicines as directed by your health care provider.  · Keep all follow-up appointments with your health care provider.  SEEK MEDICAL CARE IF:  · You have trouble breathing when you are active.    · You feel sick or unusually tired.    · You do not feel like eating.    · You lose weight without trying to.    · You develop chills or night sweats.    SEEK IMMEDIATE MEDICAL CARE IF:  · You cannot catch your breath, or you begin wheezing.    · You cannot stop coughing.    · You cough up blood.    · You become dizzy or feel like you are going to pass out.    · You have sudden chest pain.    · You have a fever or persistent symptoms for  more than 2-3 days.    · You have a fever and your symptoms suddenly get worse.  MAKE SURE YOU:  · Understand these instructions.  · Will watch your condition.  · Will get help right away if you are not doing well or get worse.     This information is not intended to replace advice given to you by your health care provider. Make sure you discuss any questions you have with your health care provider.     Document Released: 10/15/2010 Document Revised: 08/20/2014 Document Reviewed: 06/09/2014  [employer] Patient Education ©2016 [employer]  Return if fever, vomiting or if no better in 12 hours.

## 2017-05-12 NOTE — ED AVS SNAPSHOT
Home Care Instructions                                                                                                                Susie Craig   MRN: [id number]    Department:  [facility], Emergency Dept   Date of Visit:  5/12/2017            [facility], Emergency Dept    [address]    [place] [address]    Phone:  [phone]      You were seen by     Naveed Jerome M.D.      Your Diagnosis Was     Nonintractable headache, unspecified chronicity pattern, unspecified headache type     R51       These are the medications you received during your hospitalization from 05/12/2017 0746 to 05/12/2017 1328     Date/Time Order Dose Route Action    05/12/2017 0930 NS infusion   Intravenous New Bag    05/12/2017 1036 iohexol (OMNIPAQUE) 350 mg/mL 100 mL Intravenous Given      Follow-up Information     1. Follow up with Ant Bronson M.D.. Schedule an appointment as soon as possible for a visit today.    Specialty:  Internal Medicine    Contact information    [id number]  [facility] [address] [phone]        Medication Information     Review all of your home medications and newly ordered medications with your primary doctor and/or pharmacist as soon as possible. Follow medication instructions as directed by your doctor and/or pharmacist.     Please keep your complete medication list with you and share with your physician. Update the information when medications are discontinued, doses are changed, or new medications (including over-the-counter products) are added; and carry medication information at all times in the event of emergency situations.               Medication List      START taking these medications        Instructions    Morning Afternoon Evening Bedtime    hydrocodone-acetaminophen 5-325 MG Tabs per tablet   Commonly known as:  NORCO        Take 1-2 Tabs by mouth every 6 hours as needed.   Dose:  1-2 Tab                          ASK your doctor about these medications        Instructions    Morning Afternoon Evening Bedtime    allopurinol 100 MG Tabs   Commonly known as:  ZYLOPRIM        Take 2 Tabs by mouth every day.   Dose:  200 mg                        aspirin EC 81 MG Tbec   Commonly known as:  ECOTRIN        Take 81 mg by mouth every evening.   Dose:  81 mg                        lisinopril 5 MG Tabs   Commonly known as:  PRINIVIL        Take 1 Tab by mouth every day.   Dose:  5 mg                        metoprolol 50 MG Tabs   Commonly known as:  LOPRESSOR        Doctor's comments:  25 mg AM, 50 mg PM   Take 1 Tab by mouth 2 times a day.   Dose:  50 mg                        rosuvastatin 40 MG tablet   Commonly known as:  CRESTOR        Take 1 Tab by mouth every day.   Dose:  40 mg                        zolpidem 5 MG Tabs   Commonly known as:  AMBIEN        Take 0.5 Tabs by mouth at bedtime as needed for Sleep.   Dose:  2.5 mg                             Where to Get Your Medications      You can get these medications from any pharmacy     Bring a paper prescription for each of these medications    - hydrocodone-acetaminophen 5-325 MG Tabs per tablet            Procedures and tests performed during your visit     CBC WITH DIFFERENTIAL    COMP METABOLIC PANEL    CT-CTA HEAD WITH & W/O-POST PROCESS    CT-CTA NECK WITH & W/O-POST PROCESSING    DX-CHEST-PORTABLE (1 VIEW)    EKG (NOW)    ESTIMATED GFR    POC UA    PROTHROMBIN TIME    WESTERGREN SED RATE        Discharge Instructions       Headaches, Frequently Asked Questions  MIGRAINE HEADACHES  Q: What is migraine? What causes it? How can I treat it?  A: Generally, migraine headaches begin as a dull ache. Then they develop into a constant, throbbing, and pulsating pain. You may experience pain at the temples. You may experience pain at the front or back of one or both sides of the head. The pain is usually accompanied by a combination of:  · Nausea.   · Vomiting.   · Sensitivity to  "light and noise.   Some people (about 15%) experience an aura (see below) before an attack. The cause of migraine is believed to be chemical reactions in the brain. Treatment for migraine may include over-the-counter or prescription medications. It may also include self-help techniques. These include relaxation training and biofeedback.   Q: What is an aura?  A: About 15% of people with migraine get an \"aura\". This is a sign of neurological symptoms that occur before a migraine headache. You may see wavy or jagged lines, dots, or flashing lights. You might experience tunnel vision or blind spots in one or both eyes. The aura can include visual or auditory hallucinations (something imagined). It may include disruptions in smell (such as strange odors), taste or touch. Other symptoms include:  · Numbness.   · A \"pins and needles\" sensation.   · Difficulty in recalling or speaking the correct word.   These neurological events may last as long as 60 minutes. These symptoms will fade as the headache begins.  Q: What is a trigger?  A: Certain physical or environmental factors can lead to or \"trigger\" a migraine. These include:  · Foods.   · Hormonal changes.   · Weather.   · Stress.   It is important to remember that triggers are different for everyone. To help prevent migraine attacks, you need to figure out which triggers affect you. Keep a headache diary. This is a good way to track triggers. The diary will help you talk to your healthcare professional about your condition.  Q: Does weather affect migraines?  A: Bright sunshine, hot, humid conditions, and drastic changes in barometric pressure may lead to, or \"trigger,\" a migraine attack in some people. But studies have shown that weather does not act as a trigger for everyone with migraines.  Q: What is the link between migraine and hormones?  A: Hormones start and regulate many of your body's functions. Hormones keep your body in balance within a constantly changing " "environment. The levels of hormones in your body are unbalanced at times. Examples are during menstruation, pregnancy, or menopause. That can lead to a migraine attack. In fact, about three quarters of all women with migraine report that their attacks are related to the menstrual cycle.   Q: Is there an increased risk of stroke for migraine sufferers?  A: The likelihood of a migraine attack causing a stroke is very remote. That is not to say that migraine sufferers cannot have a stroke associated with their migraines. In persons under age 40, the most common associated factor for stroke is migraine headache. But over the course of a person's normal life span, the occurrence of migraine headache may actually be associated with a reduced risk of dying from cerebrovascular disease due to stroke.   Q: What are acute medications for migraine?  A: Acute medications are used to treat the pain of the headache after it has started. Examples over-the-counter medications, NSAIDs, ergots, and triptans.   Q: What are the triptans?  A: Triptans are the newest class of abortive medications. They are specifically targeted to treat migraine. Triptans are vasoconstrictors. They moderate some chemical reactions in the brain. The triptans work on receptors in your brain. Triptans help to restore the balance of a neurotransmitter called serotonin. Fluctuations in levels of serotonin are thought to be a main cause of migraine.   Q: Are over-the-counter medications for migraine effective?  A: Over-the-counter, or \"OTC,\" medications may be effective in relieving mild to moderate pain and associated symptoms of migraine. But you should see your caregiver before beginning any treatment regimen for migraine.   Q: What are preventive medications for migraine?  A: Preventive medications for migraine are sometimes referred to as \"prophylactic\" treatments. They are used to reduce the frequency, severity, and length of migraine attacks. Examples " "of preventive medications include antiepileptic medications, antidepressants, beta-blockers, calcium channel blockers, and NSAIDs (nonsteroidal anti-inflammatory drugs).  Q: Why are anticonvulsants used to treat migraine?  A: During the past few years, there has been an increased interest in antiepileptic drugs for the prevention of migraine. They are sometimes referred to as \"anticonvulsants\". Both epilepsy and migraine may be caused by similar reactions in the brain.   Q: Why are antidepressants used to treat migraine?  A: Antidepressants are typically used to treat people with depression. They may reduce migraine frequency by regulating chemical levels, such as serotonin, in the brain.   Q: What alternative therapies are used to treat migraine?  A: The term \"alternative therapies\" is often used to describe treatments considered outside the scope of conventional Western medicine. Examples of alternative therapy include acupuncture, acupressure, and yoga. Another common alternative treatment is herbal therapy. Some herbs are believed to relieve headache pain. Always discuss alternative therapies with your caregiver before proceeding. Some herbal products contain arsenic and other toxins.  TENSION HEADACHES  Q: What is a tension-type headache? What causes it? How can I treat it?  A: Tension-type headaches occur randomly. They are often the result of temporary stress, anxiety, fatigue, or anger. Symptoms include soreness in your temples, a tightening band-like sensation around your head (a \"vice-like\" ache). Symptoms can also include a pulling feeling, pressure sensations, and karissa head and neck muscles. The headache begins in your forehead, temples, or the back of your head and neck. Treatment for tension-type headache may include over-the-counter or prescription medications. Treatment may also include self-help techniques such as relaxation training and biofeedback.  CLUSTER HEADACHES  Q: What is a cluster " "headache? What causes it? How can I treat it?  A: Cluster headache gets its name because the attacks come in groups. The pain arrives with little, if any, warning. It is usually on one side of the head. A tearing or bloodshot eye and a runny nose on the same side of the headache may also accompany the pain. Cluster headaches are believed to be caused by chemical reactions in the brain. They have been described as the most severe and intense of any headache type. Treatment for cluster headache includes prescription medication and oxygen.  SINUS HEADACHES  Q: What is a sinus headache? What causes it? How can I treat it?  A: When a cavity in the bones of the face and skull (a sinus) becomes inflamed, the inflammation will cause localized pain. This condition is usually the result of an allergic reaction, a tumor, or an infection. If your headache is caused by a sinus blockage, such as an infection, you will probably have a fever. An x-ray will confirm a sinus blockage. Your caregiver's treatment might include antibiotics for the infection, as well as antihistamines or decongestants.   REBOUND HEADACHES  Q: What is a rebound headache? What causes it? How can I treat it?  A: A pattern of taking acute headache medications too often can lead to a condition known as \"rebound headache.\" A pattern of taking too much headache medication includes taking it more than 2 days per week or in excessive amounts. That means more than the label or a caregiver advises. With rebound headaches, your medications not only stop relieving pain, they actually begin to cause headaches. Doctors treat rebound headache by tapering the medication that is being overused. Sometimes your caregiver will gradually substitute a different type of treatment or medication. Stopping may be a challenge. Regularly overusing a medication increases the potential for serious side effects. Consult a caregiver if you regularly use headache medications more than 2 " days per week or more than the label advises.  ADDITIONAL QUESTIONS AND ANSWERS  Q: What is biofeedback?  A: Biofeedback is a self-help treatment. Biofeedback uses special equipment to monitor your body's involuntary physical responses. Biofeedback monitors:  · Breathing.   · Pulse.   · Heart rate.   · Temperature.   · Muscle tension.   · Brain activity.   Biofeedback helps you refine and perfect your relaxation exercises. You learn to control the physical responses that are related to stress. Once the technique has been mastered, you do not need the equipment any more.  Q: Are headaches hereditary?  A: Four out of five (80%) of people that suffer report a family history of migraine. Scientists are not sure if this is genetic or a family predisposition. Despite the uncertainty, a child has a 50% chance of having migraine if one parent suffers. The child has a 75% chance if both parents suffer.   Q: Can children get headaches?  A: By the time they reach high school, most young people have experienced some type of headache. Many safe and effective approaches or medications can prevent a headache from occurring or stop it after it has begun.   Q: What type of doctor should I see to diagnose and treat my headache?  A: Start with your primary caregiver. Discuss his or her experience and approach to headaches. Discuss methods of classification, diagnosis, and treatment. Your caregiver may decide to recommend you to a headache specialist, depending upon your symptoms or other physical conditions. Having diabetes, allergies, etc., may require a more comprehensive and inclusive approach to your headache. The National Headache Foundation will provide, upon request, a list of NHF physician members in your state.  Document Released: 03/09/2005 Document Revised: 03/11/2013 Document Reviewed: 08/17/2009  [employer]® Patient Information ©2013 [employer].  Pulmonary Nodule  A pulmonary nodule is a small, round growth of tissue in  the lung. Pulmonary nodules can range in size from less than 1/5 inch (4 mm) to a little bigger than an inch (25 mm). Most pulmonary nodules are detected when imaging tests of the lung are being performed for a different problem. Pulmonary nodules are usually not cancerous (benign). However, some pulmonary nodules are cancerous (malignant). Follow-up treatment or testing is based on the size of the pulmonary nodule and your risk of getting lung cancer.   CAUSES  Benign pulmonary nodules can be caused by various things. Some of the causes include:   · Bacterial, fungal, or viral infections. This is usually an old infection that is no longer active, but it can sometimes be a current, active infection.  · A benign mass of tissue.  · Inflammation from conditions such as rheumatoid arthritis.    · Abnormal blood vessels in the lungs.  Malignant pulmonary nodules can result from lung cancer or from cancers that spread to the lung from other places in the body.  SIGNS AND SYMPTOMS  Pulmonary nodules usually do not cause symptoms.  DIAGNOSIS  Most often, pulmonary nodules are found incidentally when an X-ray or CT scan is performed to look for some other problem in the lung area. To help determine whether a pulmonary nodule is benign or malignant, your health care provider will take a medical history and order a variety of tests. Tests done may include:   · Blood tests.  · A skin test called a tuberculin test. This test is used to determine if you have been exposed to the germ that causes tuberculosis.    · Chest X-rays. If possible, a new X-ray may be compared with X-rays you have had in the past.     · CT scan. This test shows smaller pulmonary nodules more clearly than an X-ray.    · Positron emission tomography (PET) scan. In this test, a safe amount of a radioactive substance is injected into the bloodstream. Then, the scan takes a picture of the pulmonary nodule. The radioactive substance is eliminated from your body  in your urine.    · Biopsy. A tiny piece of the pulmonary nodule is removed so it can be checked under a microscope.  TREATMENT   Pulmonary nodules that are benign normally do not require any treatment because they usually do not cause symptoms or breathing problems. Your health care provider may want to monitor the pulmonary nodule through follow-up CT scans. The frequency of these CT scans will vary based on the size of the nodule and the risk factors for lung cancer. For example, CT scans will need to be done more frequently if the pulmonary nodule is larger and if you have a history of smoking and a family history of cancer. Further testing or biopsies may be done if any follow-up CT scan shows that the size of the pulmonary nodule has increased.  HOME CARE INSTRUCTIONS  · Only take over-the-counter or prescription medicines as directed by your health care provider.  · Keep all follow-up appointments with your health care provider.  SEEK MEDICAL CARE IF:  · You have trouble breathing when you are active.    · You feel sick or unusually tired.    · You do not feel like eating.    · You lose weight without trying to.    · You develop chills or night sweats.    SEEK IMMEDIATE MEDICAL CARE IF:  · You cannot catch your breath, or you begin wheezing.    · You cannot stop coughing.    · You cough up blood.    · You become dizzy or feel like you are going to pass out.    · You have sudden chest pain.    · You have a fever or persistent symptoms for more than 2-3 days.    · You have a fever and your symptoms suddenly get worse.  MAKE SURE YOU:  · Understand these instructions.  · Will watch your condition.  · Will get help right away if you are not doing well or get worse.     This information is not intended to replace advice given to you by your health care provider. Make sure you discuss any questions you have with your health care provider.     Document Released: 10/15/2010 Document Revised: 08/20/2014 Document  Reviewed: 06/09/2014  [employer] Patient Education ©2016 [employer]  Return if fever, vomiting or if no better in 12 hours.          Patient Information     Patient Information    Following emergency treatment: all patient requiring follow-up care must return either to a private physician or a clinic if your condition worsens before you are able to obtain further medical attention, please return to the emergency room.     Billing Information    At [facility], we work to make the billing process streamlined for our patients.  Our Representatives are here to answer any questions you may have regarding your hospital bill.  If you have insurance coverage and have supplied your insurance information to us, we will submit a claim to your insurer on your behalf.  Should you have any questions regarding your bill, we can be reached online or by phone as follows:  Online: You are able pay your bills online or live chat with our representatives about any billing questions you may have. We are here to help Monday - Friday from 8:00am to 7:30pm and 9:00am - 12:00pm on Saturdays.  Please visit [email or web address]  for more information.   Phone:  [phone] or [phone]    Please note that your emergency physician, surgeon, pathologist, radiologist, anesthesiologist, and other specialists are not employed by [facility] and will therefore bill separately for their services.  Please contact them directly for any questions concerning their bills at the numbers below:     Emergency Physician Services:  [phone]  [facility]:  [phone]  [facility]:  [phone]  [facility]:  [phone]    1. Your final bill may vary from the amount quoted upon discharge if all procedures are not complete at that time, or if your doctor has additional procedures of which we are not aware. You will receive an additional bill if you return  to the Emergency Department at [facility] for suture removal regardless of the facility of which the sutures were placed.     2. Please arrange for settlement of this account at the emergency registration.    3. All self-pay accounts are due in full at the time of treatment.  If you are unable to meet this obligation then payment is expected within 4-5 days.     4. If you have had radiology studies (CT, X-ray, Ultrasound, MRI), you have received a preliminary result during your emergency department visit. Please contact the radiology department [phone] to receive a copy of your final result. Please discuss the Final result with your primary physician or with the follow up physician provided.     Crisis Hotline:  [place] Crisis Hotline:  6-528-UHAELKH or 1-979.106.4137  [place] Crisis Hotline:    1-956.562.8605 or 929-548-1786         ED Discharge Follow Up Questions    1. In order to provide you with very good care, we would like to follow up with a phone call in the next few days.  May we have your permission to contact you?     YES /  NO    2. What is the best phone number to call you? (       )_____-__________    3. What is the best time to call you?      Morning  /  Afternoon  /  Evening                   Patient Signature:  ____________________________________________________________    Date:  ____________________________________________________________      Your appointments     Oct 11, 2017 10:45 AM   FOLLOW UP with Carla Goode M.D.   [facility] (--)    [address], [address]  [place] [id number] [phone]

## 2017-05-12 NOTE — ED NOTES
".  Chief Complaint   Patient presents with   • Headache   + left sided head pain starting yesterday after short flight. Pt reports striking her head on kitchen cabinet door approximately last Friday. 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 No extremity deficits. Equal , good push/pulls.   "

## 2017-05-12 NOTE — ED PROVIDER NOTES
ED Provider Note    CHIEF COMPLAINT  Chief Complaint   Patient presents with   • Headache       HPI  Susie Craig is a 82 y.o. female who presents with pain, and her left scalp this morning, around 6 AM, pain lasts for several minutes then disappears. Pain is on the left parietal scalp, on and off, when on May last several minutes. No gait problems no speech problems no vision problems. Similar pain last night but not as severe.    REVIEW OF SYSTEMS  See HPI for further details. History of TIA, possible carotid artery disease, coronary artery bypass graft in November. Myocardial infarction cardiovascular stents, cataracts stroke hypertensionDenies other G.I., G.U.. endrocine, cardiovascular, respriatory or neurological problems.  All other systems are negative.     PAST MEDICAL HISTORY  Past Medical History   Diagnosis Date   • Indigestion    • Arthritis    • Hypertension    • MEDICAL HOME    • Heart burn    • High cholesterol    • Breath shortness      with exertion   • Cataract      bilat IOL    • Gout    • Back pain    • CAD (coronary artery disease)      CABG X3 in  with graft occlusion post-op   • Stroke (CMS-HCC)      TIA        FAMILY HISTORY  Family History   Problem Relation Age of Onset   • Cancer Mother      uterine   • Arthritis Mother      gout   • Heart Disease Father      MI age 50s   • Heart Disease Brother       40 MI   • Hyperlipidemia Sister        SOCIAL HISTORY  Social History     Social History   • Marital Status:      Spouse Name: N/A   • Number of Children: N/A   • Years of Education: N/A     Occupational History   • 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     Social History Main Topics   • Smoking status: Former Smoker -- 0.50 packs/day for 32 years     Types: Cigarettes     Quit date: 1988   • Smokeless tobacco: Never Used      Comment: quit , approx 30pyh   • Alcohol Use: 0.5 oz/week     1 Glasses of wine per week   • Drug Use: No   • Sexual Activity: No     Other  Topics Concern   • None     Social History Narrative       SURGICAL HISTORY  Past Surgical History   Procedure Laterality Date   • Angioplasty  85, 97   • Other       c-sections x4   • Cataract phaco with iol  8/20/2009     Performed by SOLOMON THOMAS at SURGERY SAME DAY [facility] ORS   • Cataract phaco with iol  8/31/2009     Performed by SOLOMON THOMAS at SURGERY SAME DAY [facility] ORS   • Gyn surgery       C Section x4   • Gyn surgery  1953     Hysterectomy during last C Section    • Multiple coronary artery bypass endo vein harvest  11/6/2016     Procedure: MULTIPLE CORONARY ARTERY BYPASS ENDO VEIN HARVEST;  Surgeon: Jeff Naranjo M.D.;  Location: SURGERY [facility];  Service:    • Reza  11/6/2016     Procedure: REZA;  Surgeon: Jeff Naranjo M.D.;  Location: SURGERY [facility];  Service:    • Cardiac cath         CURRENT MEDICATIONS  Home Medications     **Home medications have not yet been reviewed for this encounter**          ALLERGIES  Allergies   Allergen Reactions   • Lipitor [Atorvastatin Calcium] Unspecified     Elevated LFT  RXN=long time ago       PHYSICAL EXAM  VITAL SIGNS: /76 mmHg  Pulse 78  Temp(Src) 36.3 °C (97.3 °F)  Resp 14  Ht 1.524 m (5')  Wt 54.885 kg (121 lb)  BMI 23.63 kg/m2  SpO2 97%  LMP 07/06/2011  Constitutional: Well developed, Well nourished, No acute distress, Non-toxic appearance.   HENT: Normocephalic, Atraumatic, Bilateral external ears normal, Oropharynx moist, No oral exudates, Nose normal. Scalp is nontender  Eyes: PERRL, EOMI, Conjunctiva normal, No discharge.   Neck: Normal range of motion, No tenderness, Supple, No stridor.   Lymphatic: No lymphadenopathy noted.   Cardiovascular: Normal heart rate, Normal rhythm, No murmurs, No rubs, No gallops.   Thorax & Lungs: Normal breath sounds, No respiratory distress, No wheezing, No chest tenderness.   Abdomen:  No tenderness, no guarding no rigidity and the abdomen is soft.  No masses, No pulsatile  masses.  Skin: Warm, Dry, No erythema, No rash.   Back: No tenderness, No CVA tenderness.   Extremities: Intact distal pulses, No edema, No tenderness, No cyanosis, No clubbing.   Musculoskeletal: Good range of motion in all major joints. No tenderness to palpation or major deformities noted.   Neurologic: Alert & oriented x 3, Normal motor function, Normal sensory function, No focal deficits noted. Gait is normal speech is normal vision is normal  Psychiatric: Affect normal, Judgment normal, Mood normal.   EKG Interpretation    Interpreted by me    Rhythm: Sinus bradycardia     Rate: 52 Axis: normal  Ectopy: none  Conduction: Left bundle branch block  ST Segments: no acute change  T Waves: no acute change  Q Waves: none    Clinical Impression: I do have an old EKG to compare to and I do not see significant change      RADIOLOGY/PROCEDURES  CT-CTA NECK WITH & W/O-POST PROCESSING   Final Result      CT angiogram of the neck within normal limits for age.      CT-CTA HEAD WITH & W/O-POST PROCESS   Final Result      CT angiogram of the [place] within normal limits.      DX-CHEST-PORTABLE (1 VIEW)   Final Result      Stable enlargement of the cardiomediastinal silhouette without definite acute cardiopulmonary abnormality.       head scan did demonstrate some possible scarring right upper lobe, right lung however cannot rule out malignancy.    COURSE & MEDICAL DECISION MAKING  Pertinent Labs & Imaging studies reviewed. (See chart for details) urinalysis, small leukocyte esterase  , Electrolytes normal renal function and normal liver function normal white count 4.0 hematocrit normal. Platelets, 141. Rest and a sed rate negative      She has had a headache this started last night, somewhat worse this morning, on and off, does not sound like a true hemorrhage however there was some question of the pain as about carotid artery disease so extensive workup was done. She has had a TIA in the past was quite concerned  about the TIA and came to the emergency room.    Extensive workup here in the emergency department demonstrates no acute abnormalities. There is some scarring, possible tumor right upper lobe but I have told her needs to be followed up by her primary care physician. Westergren sed rate is negative, no evidence of temporal arteritis.. She does have a history of possible carotid artery disease however there is no evidence of carotid artery disease on her CTA and will be given Norco for pain. I have checked with PMD  FINAL IMPRESSION  1.   1. Nonintractable headache, unspecified chronicity pattern, unspecified headache type        2. Lung mass  3.     Disposition  Discharge instructions are understood. This patient is to return if fever vomiting or no better in 12 hours. Follow up with the [facility] clinic or private physician. Information sheets on headache and lung mass  Electronically signed by: Naveed Jerome, 5/12/2017 8:50 AM

## 2017-05-12 NOTE — ED AVS SNAPSHOT
GetAFive Access Code: Activation code not generated  Current GetAFive Status: Active    "[employer]hart  A secure, online tool to manage your health information     [employer]’s GetAFive® is a secure, online tool that connects you to your personalized health information from the privacy of your home -- day or night - making it very easy for you to manage your healthcare. Once the activation process is completed, you can even access your medical information using the GetAFive basilia, which is available for free in the [employer] Basilia store or Google Play store.     GetAFive provides the following levels of access (as shown below):   My Chart Features   [facility] Primary Care Doctor [facility]  Specialists [facility]  Urgent  Care Non-[facility]  Primary Care  Doctor   Email your healthcare team securely and privately 24/7 X X X X   Manage appointments: schedule your next appointment; view details of past/upcoming appointments X      Request prescription refills. X      View recent personal medical records, including lab and immunizations X X X X   View health record, including health history, allergies, medications X X X X   Read reports about your outpatient visits, procedures, consult and ER notes X X X X   See your discharge summary, which is a recap of your hospital and/or ER visit that includes your diagnosis, lab results, and care plan. X X       How to register for GetAFive:  1. Go to  [email or web address].  2. Click on the Sign Up Now box, which takes you to the New Member Sign Up page. You will need to provide the following information:  a. Enter your GetAFive Access Code exactly as it appears at the top of this page. (You will not need to use this code after you’ve completed the sign-up process. If you do not sign up before the expiration date, you must request a new code.)   b. Enter your date of birth.   c. Enter your home email address.   d. Click Submit, and follow the next screen’s instructions.  3. Create a GetAFive ID. This will  be your [employer] login ID and cannot be changed, so think of one that is secure and easy to remember.  4. Create a [employer] password. You can change your password at any time.  5. Enter your Password Reset Question and Answer. This can be used at a later time if you forget your password.   6. Enter your e-mail address. This allows you to receive e-mail notifications when new information is available in [employer].  7. Click Sign Up. You can now view your health information.    For assistance activating your [employer] account, call [phone]

## 2017-05-12 NOTE — TELEPHONE ENCOUNTER
1. Caller Name: Susie Craig                        Call Back Number: [phone] (home)       2. Message: Pt called to schedule a hospital follow up. I gave her a Thursday appt because that was the first 40 min appt that was avail. Pt seems to be very unhappy that she has to wait that long. Hung up on me when I offered her an appt with Dr Jon. Please advise.       3. Patient approves office to leave a detailed voicemail/MyChart message: no

## 2017-05-12 NOTE — ED NOTES
All lines and monitors disconnected.  Discharge instructions reviewed, questions answered.  Pt to [facility], escorted by RN.  Instructed not to drive after pain meds, pt verbalizes understanding.  Pt states all belongings in possession.

## 2017-05-13 NOTE — TELEPHONE ENCOUNTER
Reviewed hospital records, called patient, I will order CT thorax follow-up possible nodule seen on CT CTA neck, she agrees

## 2017-05-15 LAB — EKG IMPRESSION: NORMAL

## 2017-05-15 NOTE — TELEPHONE ENCOUNTER
Did you actually schedule an appointment for thursday? I do not see one for her. There is a Friday appointment that is scheduled.  I went ahead and scheduled a 900 tuesday appointment for her (tomorrow), please verify with the patient that the tuesday appointment at 90 will work and if so, we can cancel the Friday appointment

## 2017-05-15 NOTE — TELEPHONE ENCOUNTER
I reviewed the records and spoke with her on Friday, Thursday is fine for a follow-up appointment.

## 2017-05-16 ENCOUNTER — TELEPHONE (OUTPATIENT)
Dept: MEDICAL GROUP | Facility: MEDICAL CENTER | Age: 82
End: 2017-05-16

## 2017-05-16 ENCOUNTER — HOSPITAL ENCOUNTER (OUTPATIENT)
Dept: RADIOLOGY | Facility: MEDICAL CENTER | Age: 82
End: 2017-05-16
Attending: INTERNAL MEDICINE
Payer: MEDICARE

## 2017-05-16 ENCOUNTER — OFFICE VISIT (OUTPATIENT)
Dept: MEDICAL GROUP | Facility: MEDICAL CENTER | Age: 82
End: 2017-05-16
Payer: MEDICARE

## 2017-05-16 VITALS
OXYGEN SATURATION: 96 % | TEMPERATURE: 98.2 F | DIASTOLIC BLOOD PRESSURE: 66 MMHG | HEIGHT: 60 IN | WEIGHT: 127 LBS | BODY MASS INDEX: 24.94 KG/M2 | SYSTOLIC BLOOD PRESSURE: 102 MMHG | HEART RATE: 89 BPM

## 2017-05-16 DIAGNOSIS — Z86.73 HX OF TRANSIENT ISCHEMIC ATTACK (TIA): Chronic | ICD-10-CM

## 2017-05-16 DIAGNOSIS — R91.8 LUNG MASS: ICD-10-CM

## 2017-05-16 DIAGNOSIS — R51.9 NEW ONSET OF HEADACHES: ICD-10-CM

## 2017-05-16 DIAGNOSIS — I10 ESSENTIAL HYPERTENSION: Chronic | ICD-10-CM

## 2017-05-16 PROBLEM — R91.1 PULMONARY NODULE: Chronic | Status: ACTIVE | Noted: 2017-05-16

## 2017-05-16 PROBLEM — R91.1 PULMONARY NODULE: Status: ACTIVE | Noted: 2017-05-16

## 2017-05-16 PROCEDURE — 4040F PNEUMOC VAC/ADMIN/RCVD: CPT | Performed by: INTERNAL MEDICINE

## 2017-05-16 PROCEDURE — 99214 OFFICE O/P EST MOD 30 MIN: CPT | Performed by: INTERNAL MEDICINE

## 2017-05-16 PROCEDURE — G8598 ASA/ANTIPLAT THER USED: HCPCS | Performed by: INTERNAL MEDICINE

## 2017-05-16 PROCEDURE — 1036F TOBACCO NON-USER: CPT | Performed by: INTERNAL MEDICINE

## 2017-05-16 PROCEDURE — 1101F PT FALLS ASSESS-DOCD LE1/YR: CPT | Performed by: INTERNAL MEDICINE

## 2017-05-16 PROCEDURE — 71250 CT THORAX DX C-: CPT

## 2017-05-16 PROCEDURE — G8420 CALC BMI NORM PARAMETERS: HCPCS | Performed by: INTERNAL MEDICINE

## 2017-05-16 PROCEDURE — G8432 DEP SCR NOT DOC, RNG: HCPCS | Performed by: INTERNAL MEDICINE

## 2017-05-16 NOTE — MR AVS SNAPSHOT
Susie Kendalloscelvis   2017 9:00 AM   Office Visit   MRN: [id number]    Department:  [facility]   Dept Phone:  [phone]    Description:  Female : 1935   Provider:  Ant Bronson M.D.           Allergies as of 2017     Allergen Noted Reactions    Lipitor [Atorvastatin Calcium] 2011   Unspecified    Elevated LFT  RXN=long time ago      You were diagnosed with     Essential hypertension   [7300884]       Hx of transient ischemic attack (TIA)   [189642]       New onset of headaches   [946726]       Lung mass   [948335]         Vital Signs     Blood Pressure Pulse Temperature Height Weight Body Mass Index    102/66 mmHg 89 36.8 °C (98.2 °F) 1.524 m (5') 57.607 kg (127 lb) 24.80 kg/m2    Oxygen Saturation Last Menstrual Period Smoking Status             96% 2011 Former Smoker         Basic Information     Date Of Birth Sex Race Ethnicity Preferred Language    1935 Female White Non- English      Your appointments     May 16, 2017  3:00 PM   CT BODY WO 30 with [facility] CT 2   [facility] - [place] - [place] ([place])    [address]  [place] [id number] [phone]           Taking medications as regularly scheduled is strongly encouraged.            Oct 11, 2017 10:45 AM   FOLLOW UP with Carla Goode M.D.   [facility] (--)    [address], [facility] [address]  [place] [id number] [phone]              Problem List              ICD-10-CM Priority Class Noted - Resolved    S/P CABG x 3 Z95.1   2011 - Present    S/P KATY (total abdominal hysterectomy) (Chronic) Z90.710   2011 - Present    History of gout (Chronic) Z87.39   2011 - Present    Dyslipidemia (Chronic) E78.5   2011 - Present    Hx of transient ischemic attack (TIA) (Chronic) Z86.73   2011 - Present    History of cataract Z86.69   2011 - Present    Preventative health care (Chronic) Z00.00   2011 - Present    LBBB (left bundle branch block) (Chronic) I44.7   7/27/2011 - Present    History of osteopenia Z87.39   9/10/2011 - Present    Insomnia (Chronic) G47.00   6/4/2012 - Present    MEDICAL HOME  Low  Unknown - Present    Neutropenia (HCC) D70.9   11/5/2015 - Present    Decreased GFR R94.4   11/5/2015 - Present    Hypertension (Chronic) I10   3/26/2017 - Present      Health Maintenance        Date Due Completion Dates    IMM PNEUMOCOCCAL 65+ (ADULT) LOW/MEDIUM RISK SERIES (2 of 2 - PPSV23) 3/23/2018 (Originally 5/8/2016) 5/8/2015    IMM DTaP/Tdap/Td Vaccine (1 - Tdap) 3/23/2018 (Originally 9/27/2011) 9/26/2011    COLONOSCOPY 5/27/2018 5/27/2008    BONE DENSITY 8/14/2018 8/14/2013, 9/9/2011, 1/18/2008            Current Immunizations     13-VALENT PCV PREVNAR 5/8/2015    Influenza TIV (IM) 12/5/2012    Influenza Vaccine Adult HD 10/15/2016, 10/11/2016  9:32 AM, 10/21/2014    Influenza Vaccine Quad Inj (Preserved) 11/5/2015    SHINGLES VACCINE 6/4/2012    TD Vaccine 9/26/2011    Tuberculin Skin Test 10/2/2013 12:35 PM, 11/6/2012  9:35 AM, 10/30/2012  9:35 AM      Below and/or attached are the medications your provider expects you to take. Review all of your home medications and newly ordered medications with your provider and/or pharmacist. Follow medication instructions as directed by your provider and/or pharmacist. Please keep your medication list with you and share with your provider. Update the information when medications are discontinued, doses are changed, or new medications (including over-the-counter products) are added; and carry medication information at all times in the event of emergency situations     Allergies:  LIPITOR - Unspecified               Medications  Valid as of: May 16, 2017 -  9:43 AM    Generic Name Brand Name Tablet Size Instructions for use    Allopurinol (Tab) ZYLOPRIM 100 MG Take 2 Tabs by mouth every day.        Aspirin (Tablet Delayed Response) ECOTRIN 81 MG Take 81 mg by mouth every  evening.        Metoprolol Tartrate (Tab) LOPRESSOR 50 MG Take 1 Tab by mouth 2 times a day.        Rosuvastatin Calcium (Tab) CRESTOR 40 MG Take 1 Tab by mouth every day.        Zolpidem Tartrate (Tab) AMBIEN 5 MG Take 0.5 Tabs by mouth at bedtime as needed for Sleep.        .                 Medicines prescribed today were sent to:     [id number] - [place], [place] - [address]    [address] [place] [address]    Phone: [phone] Fax: [phone]    Open 24 Hours?: No      Medication refill instructions:       If your prescription bottle indicates you have medication refills left, it is not necessary to call your provider’s office. Please contact your pharmacy and they will refill your medication.    If your prescription bottle indicates you do not have any refills left, you may request refills at any time through one of the following ways: The online [employer] system (except Urgent Care), by calling your provider’s office, or by asking your pharmacy to contact your provider’s office with a refill request. Medication refills are processed only during regular business hours and may not be available until the next business day. Your provider may request additional information or to have a follow-up visit with you prior to refilling your medication.   *Please Note: Medication refills are assigned a new Rx number when refilled electronically. Your pharmacy may indicate that no refills were authorized even though a new prescription for the same medication is available at the pharmacy. Please request the medicine by name with the pharmacy before contacting your provider for a refill.        Other Notes About Your Plan     ?pneumovax  5/12/17 CT-CTA neck with and without postprocessing normal; incidental finding patchy nodular opacities anterior aspect right upper lobe 1.3 cm could be secondary to scarring, follow-up recommended  5/12/17 CT thorax without ordered    5/16/17 on lisinopril 5 mg and  metroprolol 50 mg taking 1 am and 1/2 pm; stop lisinopril due to low blood pressure and continue metoprolol              MyChart Access Code: Activation code not generated  Current MyChart Status: Active

## 2017-05-16 NOTE — PROGRESS NOTES
Subjective:      Susie Craig is a 82 y.o. female who presents with follow up ER       HPI     On monday in [place] noticed left temporal region and scalp tender to touch worse with touching area, burning sharp pain over the left temple and above the left area would happen intermittently lasting a second and going away, no vision changes,no vision loss, no change in hearing, not worse with speaking or swallowing , no recent sinus congestion or infection, no tinnitus, no fever or chills, no changes in taste or smell, no difficulty with balance or coordination, no decrease in strength, no sensory changes face or arms, no neck stiffness. Since seen in ER 5/12/17 not painful with showering or brushing hair, no recurrent pain since friday, no rashes, no hearing loss, no tinnitus, no tooth grinding, no TMJ, history of temporal arteritis, no history of trigeminal neuralgia   Symptoms have essentially resolved  No current sinus pressure or headache, no fevers or chills, no sore throat, no cough   Able to go on treadmill and went yesterdayand does with exercise or activity   Hit left top of head on [place] about two weeks prior to that, no loss of consciousness   history TIA, hypertension, CABG, left bundle-branch block followed by cardiology remains on lisinopril and metoprolol, blood pressures at home systolic running less than 110, occasional lightheadedness when going from sitting or standing, no falls, no syncope, no chest pain, short of breath, palpitations  Emergency room records reviewed   5/12/17 CT-CTA neck with and without postprocessing normal; incidental finding patchy nodular opacities anterior aspect right upper lobe 1.3 cm could be secondary to scarring, follow-up recommended  5/12/17 CT thorax without ordered  No current cough, hemoptysis, fevers, chills, short, no tobacco, no history of asthma COPD, no history of asbestos exposure          Current Outpatient Prescriptions   Medication Sig  Dispense Refill   • hydrocodone-acetaminophen (NORCO) 5-325 MG Tab per tablet Take 1-2 Tabs by mouth every 6 hours as needed. 10 Tab 0   • zolpidem (AMBIEN) 5 MG Tab Take 0.5 Tabs by mouth at bedtime as needed for Sleep. 30 Tab 5   • allopurinol (ZYLOPRIM) 100 MG Tab Take 2 Tabs by mouth every day. 180 Tab 3   • metoprolol (LOPRESSOR) 50 MG Tab Take 1 Tab by mouth 2 times a day. 135 Tab 3   • rosuvastatin (CRESTOR) 40 MG tablet Take 1 Tab by mouth every day. 30 Tab 11   • lisinopril (PRINIVIL) 5 MG Tab Take 1 Tab by mouth every day. 90 Tab 3   • aspirin EC (ECOTRIN) 81 MG TBEC Take 81 mg by mouth every evening.       No current facility-administered medications for this visit.     Patient Active Problem List    Diagnosis Date Noted   • MEDICAL HOME      Priority: Low   • Hypertension 03/26/2017   • Neutropenia (HCC) 11/05/2015   • Decreased GFR 11/05/2015   • Insomnia 06/04/2012   • History of osteopenia 09/10/2011   • LBBB (left bundle branch block) 07/27/2011   • S/P KATY (total abdominal hysterectomy) 07/26/2011   • History of gout 07/26/2011   • Dyslipidemia 07/26/2011   • Hx of transient ischemic attack (TIA) 07/26/2011   • History of cataract 07/26/2011   • Preventative health care 07/26/2011   • S/P CABG x 3 07/06/2011          Status post KATY  Started premarin in her 50s; now on estradiol 0.5 mg daily  9/26/11 on estradiol 0.5 mg qod  6/4/12 on estradiol rec trying to go to [facility]  7/16/13 on estadiol [facility]  7/18/14 taper estradiol down to two days per week if possible  4/24/15 estradiol down to 1/2 tab three days per week  8/23/16 still taking estrace half a tablet 3 times week, declines other medication such as gabapentin or SSRI, will continue to work on taper understands risks for breast cancer  3/30/17 off HRT     Status post CABG  1985 PTCA  1997 PTCA  7/11 EKG LBBB  9/30/14 echo normal LV function, EF 70%, mild MR, mild to moderate tricuspid regurgitation, RVSP 43 to 48 mild to moderate pulmonary  hypertension  10/17/14 holter monitor normal sinus rhythm with episode of sinus tachycardia, no symptoms documented, no arrhythmia or pauses, minimal heart rate 48, maximum heart rate 135, one PVC, supraventricular ectopic activity 20 beats, mostly all single PACs  11/29/15 echo normal LV function, septal hypokinesis and paradoxical motion, grade 1 diastolic dysfunction, mild MR and TR  9/12/16 persantine thallium small area of ischemia inferolateral apex, possible nontransmural distal anterior wall myocardial infarct, normal ejection fraction  10/25/16 cardiology note discussed medical therapy versus coronary arteriography, patient elects to proceed with cardiac catheterization  11/2/16 cardiac catheterization proximal ostial LAD appears to have stent, high-grade 95% stenosis, beyond stent bifurcation diagonal branch LAD focal 95% stenosis, remainder widely patent, circumflex first marginal branch diffuse 75% stenosis, RCA distal smooth eccentric 60-70% stenosis, EF 68%, 3 vessel CAD with diffuse ostial, proximal and bifurcation LAD disease, referred to cardiac surgery for consideration CABG  11/5/16  cardiovascular surgery, CABG ×3 LIMA to LAD, reverse saphenous to second diagonal and first obtuse marginal  11/16/16 cardiac catheterization ejection fraction 56%, mid anterior wall hypokinesis/akinesis, left main normal, LAD ostial 50% long calcific stenosis, proximal LAD and diagonal bifurcation 95% stenosis, diagonal ostium 95% stenosis, mid LAD has 2 serial 50% stenosis with kinking and tenting from retraction of LIMA bypass graft anastomosis, complete MARCIN 2 antegrade flow in the distal LAD, circumflex mid OMB one insertion site saphenous vein graft to this vessel 90% stenosis with MARCIN 3 antegrade flow, second marginal widely patent, RCA distal 75% calcified stenosis,MARCIN 3 flow, this vessel was not bypassed, LIMA bypass graft angiography markedly tapering distal anastomosis, no additional stenosis;  saphenous vein bypass graft angiography widely patent; conservative therapy recommended  11/21/16 cardiology note, CAD with recent CABG and SVG occlusion, continue aspirin, plavix, crestor, metoprolol (increase to 25 mg qam and 50 mg apm, lisinopril, Lasix as needed plus potassium for edema, referral cardiac rehabilitation, follow-up 3 months  12/14/16 echo mildly reduced left ventricular systolic function, EF 50%, grade 2 diastolic dysfunction  12/22/16 cardiology note stable on metoprolol and lisinopril, crestor, aspirin and plavix follow-up 3 months      Right shoulder pain  8/4/15 decrease crestor to 20 mg and repeat labs    8/4/15 right shoulder pain x-ray and physical therapy pending, no benefit consider injection or MRI  8/5/15 xray right shoulders no acute fracture identified,mild degenerative changes  9/3/15 wbc 3.6(42%N,43%L)  9/3/15 bun 24,creat 0.9,GFR 54  9/3/15 ESR 13,CRP 0.1,CPK 81  9/3/15 completed physical therapy with symptoms improved    Preventative health  5/27/08 colon DHA told normal repeat 10 yrs  2009 pneumovax no webiz  9/26/11 td vaccine  6/4/12 zoster vaccine    8/14/13 dexa LS +1.2,hip -0.7  8/14/13 mammogram  7/29/14 vit d 28 increase to 4000 umits  10/10/14 declines mammogram  5/8/15 prevnar    Neutropenia  10/26/11 wbc 3.2 (47%N,38%L)  6/22/12 wbc 4.5(53%N,34%L)  9/23/13 wbc 4.5(53%N,35%L)  7/29/14 wbc 3.9(48%N,37%L)  4/30/15 wbc 4.3  9/3/15 wbc 3.6(42%N,43%L)  11/5/15 wbc 3.7(47%N,40%L)  12/9/16 wbc 5.0    Left bundle-branch block  7/11 EKG LBBB  9/30/14 echo normal LV function, EF 70%, mild MR, mild to moderate tricuspid regurgitation, RVSP 43 to 48 mild to moderate pulmonary hypertension  10/7/14 holter monitor normal sinus rhythm with episode of sinus tachycardia, no symptoms documented, no arrhythmia or pauses,minimal rate 48, maximum heart rate 135, one PVC, supraventricular ectopic activity 20 beats, mostly all single PACs  11/29/15 echo normal LV function, septal hypokinesis  and paradoxical motion, grade 1 diastolic dysfunction, mild MR and TR  12/14/16 echo mildly reduced left ventricular systolic function, EF 50%, grade 2 diastolic dysfunction    Insomnia on Ambien    Hypertension  1985 PTCA  1997 PTCA  10/06 persantine thallium no ischemia, EF > 70%  10/08 urine mac 1.0  1/09 off hctz due to gout  7/11 EKG LBBB  8/11 [facility] carotid u/s left and right <40%  8/11 on metoprolol 50 am and 25 mg pm,and lisinopril 10 bid  8/12  cardiology note  7/29/14 urine mac 28, on metoprolol 50 mg qam and 25 mg qpm and lisinopril 10 mg bid  9/22/14 decrease metoprolol to 25 mg bid and cont lisinopril 10 mg bid due to postural lightheadedness  9/25/14 decrease lisinopril to 10 mg qday and continue metoprolol 50 mg 1/2 bid (sbp 97-99 when stand, sbp 110-120 sitting)  9/30/14 echo normal LV function, EF 70%, mild MR, mild to moderate tricuspid regurgitation, RVSP 43 to 48 mild to moderate pulmonary hypertension  10/2/14 stop metoprolol due to sbp  at times, continue lisinopril 10 mg    10/3/14 restart metoprolol 50 mg 1/2 tab per day and cont lisinopril 10 mg (HR )  10/6/14 seen ER lightheadedness workup negative more orthostatic symptoms, continue metorpolol 50 mg 1/2 qday (tapering off caused HR to increase) and decrease lisinopril to 10 mg 1/2 tab per day  10/17/14 holter monitor normal sinus rhythm with episode of sinus tachycardia, no symptoms documented, no arrhythmia or pauses, minimal rate 48, maximum heart rate 135, one PVC, supraventricular ectopic activity 20 beats, mostly all single PACs  4/24/15 on metoprolol 50 mg 1/2 tab per day, lisinopril 10 mg 1/2 tab per day,asa  11/29/15 echo normal LV function, septal hypokinesis and paradoxical motion, grade 1 diastolic dysfunction, mild MR and TR  12/14/16 echo mildly reduced left ventricular systolic function, EF 50%, grade 2 diastolic dysfunction  4/17/17  eye exam no vascular pathology    History TIA  7/11 seen in ER  sajan ; EKG LBBB, CT head non contrast negative  7/11 started on plavix  8/11 [facility] carotid ultrasound left and right <40%  6/12 on aspirin a day, intolerant to plavix  9/30/14 echo normal LV function, EF 70%, mild MR, mild to moderate tricuspid regurgitation, RVSP 43 to 48 mild to moderate pulmonary hypertension  8/2/15 on asa    11/29/15 CT head negative  11/9/15 MRI brain without; age-appropriate atrophy mild chronic microvascular ischemic disease  11/29/15 carotid ultrasound negative  12/14/16 echo mildly reduced left ventricular systolic function, EF 50%, grade 2 diastolic dysfunction  4/17/17  eye exam no vascular pathology  5/12/17 CT-CTA neck with and without postprocessing normal  5/12/17 CT-CTA head with and without postprocessing normal [place] [facility]    History osteopenia  1/08 dexa LS +0.2,hip +1.1  9/11 dexa LS +0.5, hip -1.0  10/11 vit d 30    8/14/13 dexa LS +1.2,hip -0.7  7/29/14 vit d 28    History of gout  1/09 started on allopurinol for uric acid 8.3, off hctz  1/11 uric acid 3.7 on allopurinol 200 mg daily  10/11 uric acid 4.6 on allopurinol 200 mg  3/12 colchicine prn gout flare up (do not take crestor with colchicine)  3/13 uric 4.6 on allopurinol 200 mg  7/29/14 uric 3.7 on allopurinol 200 mg  11/5/15 uric 3.9 on allopurinol 200 mg    History of cataract    Dyslipidemia  intolerant of lipitor  4/11 chol 200,trig 79,hdl 82,ldl 102 on lipitor 80 with elevated LFT (,, hep panel negative)  5/11 chol 280,trig 137,hdl 70,ldl 183 off statin (AST 21,ALT 26)    10/11 chol 219,trig 65,hdl 65,ldl 141 on zocor 40 mg; monitored by [facility] (AST 24,ALT 24)  11/2/11 [facility] change zocor 40 mg to crestor 40 mg  1/12 chol 161,trig 80,hdl 70,ldl 75 on crestor 40 mg  8/12 chol 170,trig 57,hdl 66,ldl 93 on crestor 40 mg per [facility], consider zetia in the future (intolerant lipitor)  3/13 chol 193,trig 46,hdl 90,ldl 94,LDL-P 923,HDL-P 51,LP-IR 37 on crestor 40 mg  9/23/13 chol 180,trig 92,hdl 70,ldl  92 on crestor 40 mg    10/1/13 samples zetia 10 mg, cont crestor 40 mg repeat labs 4 weeks  7/29/14 chol 186,trig 76,hdl 71,ldl 100; on crestor 40 mg consider decreasing dose  4/30/15 chol 182,trig 69,hdl 76,ldl 92 on crestor 40 mg  8/4/15 decrease crestor to 20 mg and repeat labs    9/3/15 ESR 13,CRP 0.1,CPK 81, crestor has been decreased to 20 mg  11/5/15 chol 191,trig 77,hdl 71,ldl 105 on crestor 20 mg (40 mg tab 1/2 per day)  12/29/16 chol 136,trig 82,hdl 46,ldl 74,cpk 53 on crestor 40 mg 1/2 per day  4/3/17 chol 152,trig 86,hdl 62,ldl 73,cpk 53 on crestor 40 mg     (intolerant of lipitor)    Decreased GFR  10/26/11 bun 12,creat 0.8,GFR>60  6/22/12 bun 17,creat 0.9,GFR 59  3/28/13 bun 18,creat 1.3,GFR 39  9/23/13 bun 19,creat 1.0,GFR 51  7/29/14 bun 24,creat 1.2,GFR 42  9/3/15 bun 24,creat 0.9,GFR 54  11/29/15 bun 16,creat 1.0,GFR 52  12/29/16 bun 15,creat 0.8,GFR 55  4/3/17 bun 20,creat 1.1,GFR 46          ROS       Objective:     /66 mmHg  Pulse 89  Temp(Src) 36.8 °C (98.2 °F)  Ht 1.524 m (5')  Wt 57.607 kg (127 lb)  BMI 24.80 kg/m2  SpO2 96%  LMP 07/06/2011     Physical Exam   Constitutional: She appears well-developed and well-nourished. No distress.   HENT:   Head: Normocephalic and atraumatic.   Right Ear: External ear normal.   Left Ear: External ear normal.   Mouth/Throat: Oropharynx is clear and moist.   Eyes: Conjunctivae are normal. Right eye exhibits no discharge. Left eye exhibits no discharge.   Neck: Neck supple. No JVD present. No thyromegaly present.   Cardiovascular: Normal rate and regular rhythm.    No murmur heard.  Pulmonary/Chest: Effort normal and breath sounds normal. No respiratory distress.   Abdominal: She exhibits no distension.   Neurological: She is alert.   Skin: Skin is warm. She is not diaphoretic.   Psychiatric: She has a normal mood and affect. Her behavior is normal.   Nursing note and vitals reviewed.       Cranial nerves intact, normal finger-to-nose, no  nystagmus, visual to confrontation normal, normal cervical range of motion  Normal strength upper and lower extremities, normal reflexes, negative Romberg  Normal gait, no ataxia  No neck masses or adenopathy, no sinus tenderness  No evidence of shingles  No external ear lesions, no TMJ tenderness to palpation, no palpable temporal artery or bruit          Assessment/Plan:     Assessment  #1  recent ER visit left scalp tenderness to palpation with sharp pains over the every question neuralgia, no evidence of temporal arteritis or polymyalgia rheumatica history, symptoms have resolved, CT done no intracranial process, no evidence of TIA, stroke, sinusitis, otitis, temporal arteritis, trigeminal neuralgia, TMJ, no deficits on exam    #2 history of TIA no evidence of recurrence  5/12/17 CT-CTA neck with and without postprocessing normal; incidental finding patchy nodular opacities anterior aspect right upper lobe 1.3 cm could be secondary to scarring, follow-up recommended    #3 hypertension on lisinopril 5 mg and metoprolol 50 mg a.m., 25 mg p.m., lower blood pressure, some slight lightheadedness when going from sitting to standing likely orthostatic changes related to low blood pressure, exercising daily without symptoms    #4 history of CABG no recurrent symptoms stable followed by cardiology    #5 abnormal CT CTA neck right upper lobe lesion, patient does not smoke, no cough, fevers, chills, hemoptysis by history, CT follow-up thorax pending today      Plan  #1 CT thorax today as above follow-up lesion incidentally found on CT CTA neck    #2 discontinue lisinopril due to lower blood pressures, continue metoprolol 50 mg a.m., 25 mg p.m., check blood pressure and heart rate daily and record, ensure adequate hydration, orthostatic precautions    #3 no further evaluation necessary for neuralgic type pain since resolved, if symptoms recur consider further evaluation or trial of treatment for trigeminal neuralgia or  evaluation for temporal arteritis    #4 emergency room records reviewed from May 12    #5 nutrition and exercise discussed    #6 follow up cardiology    #7 follow-up myself 3 months, sooner if symptoms recur

## 2017-07-17 ENCOUNTER — TELEPHONE (OUTPATIENT)
Dept: CARDIOLOGY | Facility: MEDICAL CENTER | Age: 82
End: 2017-07-17

## 2017-07-17 NOTE — TELEPHONE ENCOUNTER
Called patient regarding her [employer] message (below). Patient would like to know what her maximum heart rate during exercise should be.    Forwarded to ANNEL VILLAR, please advise. Thank you.    BILL YADAV    Previous Messages       ----- Message -----      From: Susie Foster Estellaelvis      Sent: 7/14/2017   7:22 PM        To: Jazlyn Thomas   Subject: Non-Urgent Medical Question                       on a treadmill how high should I let my heartbeat be - [facility] now I am doing 3.1 and heartbeat goes up to 100 to 130 depending   on if I go up to a 1/2 a level grade.

## 2017-09-11 ENCOUNTER — NON-PROVIDER VISIT (OUTPATIENT)
Dept: MEDICAL GROUP | Facility: MEDICAL CENTER | Age: 82
End: 2017-09-11
Payer: MEDICARE

## 2017-09-11 DIAGNOSIS — Z23 NEEDS FLU SHOT: ICD-10-CM

## 2017-09-11 PROCEDURE — 90662 IIV NO PRSV INCREASED AG IM: CPT | Performed by: INTERNAL MEDICINE

## 2017-09-11 PROCEDURE — G0008 ADMIN INFLUENZA VIRUS VAC: HCPCS | Performed by: INTERNAL MEDICINE

## 2017-09-11 NOTE — PROGRESS NOTES
"Susie Craig is a 82 y.o. female here for a non-provider visit for:   FLU    Reason for immunization: Annual Flu Vaccine  Immunization records indicate need for vaccine: Yes, confirmed with Epic  Minimum interval has been met for this vaccine: Yes  ABN completed: Not Indicated    Order and dose verified by: Dr. Aiyana ELIZABETH Dated  8/7/15 was given to patient: Yes  All IAC Questionnaire questions were answered \"No.\"    Patient tolerated injection and no adverse effects were observed or reported: Yes    Pt scheduled for next dose in series: Not Indicated    "

## 2017-09-11 NOTE — PROGRESS NOTES
Subjective:      Susie Craig is a 82 y.o. female who presents with flu        HPI flu vaccine  ROS       Objective:     LMP 07/06/2011      Physical Exam            Assessment/Plan:

## 2017-09-24 ENCOUNTER — RESOLUTE PROFESSIONAL BILLING HOSPITAL PROF FEE (OUTPATIENT)
Dept: HOSPITALIST | Facility: MEDICAL CENTER | Age: 82
End: 2017-09-24
Payer: MEDICARE

## 2017-09-24 ENCOUNTER — APPOINTMENT (OUTPATIENT)
Dept: RADIOLOGY | Facility: MEDICAL CENTER | Age: 82
End: 2017-09-24
Attending: EMERGENCY MEDICINE
Payer: MEDICARE

## 2017-09-24 ENCOUNTER — HOSPITAL ENCOUNTER (OUTPATIENT)
Facility: MEDICAL CENTER | Age: 82
End: 2017-09-25
Attending: EMERGENCY MEDICINE | Admitting: HOSPITALIST
Payer: MEDICARE

## 2017-09-24 DIAGNOSIS — E78.5 DYSLIPIDEMIA: Chronic | ICD-10-CM

## 2017-09-24 DIAGNOSIS — I44.7 LBBB (LEFT BUNDLE BRANCH BLOCK): Chronic | ICD-10-CM

## 2017-09-24 DIAGNOSIS — R27.0 ATAXIA: ICD-10-CM

## 2017-09-24 DIAGNOSIS — Z95.1 S/P CABG X 3: ICD-10-CM

## 2017-09-24 PROBLEM — Z86.79 HISTORY OF LEFT BUNDLE BRANCH BLOCK (LBBB): Status: ACTIVE | Noted: 2017-09-24

## 2017-09-24 PROBLEM — I25.2 CORONARY ARTERY DISEASE WITH HISTORY OF MYOCARDIAL INFARCTION WITHOUT HISTORY OF CABG: Status: ACTIVE | Noted: 2017-09-24

## 2017-09-24 PROBLEM — R12 HEART BURN: Status: ACTIVE | Noted: 2017-09-24

## 2017-09-24 PROBLEM — Z86.73 HISTORY OF TIA (TRANSIENT ISCHEMIC ATTACK): Status: ACTIVE | Noted: 2017-09-24

## 2017-09-24 PROBLEM — I25.10 CORONARY ARTERY DISEASE WITH HISTORY OF MYOCARDIAL INFARCTION WITHOUT HISTORY OF CABG: Status: ACTIVE | Noted: 2017-09-24

## 2017-09-24 PROBLEM — E78.00 HIGH CHOLESTEROL: Status: ACTIVE | Noted: 2017-09-24

## 2017-09-24 PROBLEM — I10 HTN (HYPERTENSION): Status: ACTIVE | Noted: 2017-09-24

## 2017-09-24 PROBLEM — R42 VERTIGO: Status: ACTIVE | Noted: 2017-09-24

## 2017-09-24 LAB
ALBUMIN SERPL BCP-MCNC: 4 G/DL (ref 3.2–4.9)
ALBUMIN/GLOB SERPL: 1.5 G/DL
ALP SERPL-CCNC: 67 U/L (ref 30–99)
ALT SERPL-CCNC: 25 U/L (ref 2–50)
ANION GAP SERPL CALC-SCNC: 8 MMOL/L (ref 0–11.9)
APPEARANCE UR: CLEAR
AST SERPL-CCNC: 24 U/L (ref 12–45)
BACTERIA #/AREA URNS HPF: ABNORMAL /HPF
BASOPHILS # BLD AUTO: 0.2 % (ref 0–1.8)
BASOPHILS # BLD: 0.01 K/UL (ref 0–0.12)
BILIRUB SERPL-MCNC: 0.6 MG/DL (ref 0.1–1.5)
BILIRUB UR QL STRIP.AUTO: NEGATIVE
BNP SERPL-MCNC: 178 PG/ML (ref 0–100)
BUN SERPL-MCNC: 19 MG/DL (ref 8–22)
CALCIUM SERPL-MCNC: 9.8 MG/DL (ref 8.5–10.5)
CHLORIDE SERPL-SCNC: 110 MMOL/L (ref 96–112)
CO2 SERPL-SCNC: 23 MMOL/L (ref 20–33)
COLOR UR: YELLOW
CREAT SERPL-MCNC: 0.94 MG/DL (ref 0.5–1.4)
EKG IMPRESSION: NORMAL
EOSINOPHIL # BLD AUTO: 0.12 K/UL (ref 0–0.51)
EOSINOPHIL NFR BLD: 2.7 % (ref 0–6.9)
EPI CELLS #/AREA URNS HPF: ABNORMAL /HPF
ERYTHROCYTE [DISTWIDTH] IN BLOOD BY AUTOMATED COUNT: 46.5 FL (ref 35.9–50)
EST. AVERAGE GLUCOSE BLD GHB EST-MCNC: 123 MG/DL
GFR SERPL CREATININE-BSD FRML MDRD: 57 ML/MIN/1.73 M 2
GLOBULIN SER CALC-MCNC: 2.6 G/DL (ref 1.9–3.5)
GLUCOSE SERPL-MCNC: 109 MG/DL (ref 65–99)
GLUCOSE UR STRIP.AUTO-MCNC: NEGATIVE MG/DL
HBA1C MFR BLD: 5.9 % (ref 0–5.6)
HCT VFR BLD AUTO: 41.7 % (ref 37–47)
HGB BLD-MCNC: 13.9 G/DL (ref 12–16)
HYALINE CASTS #/AREA URNS LPF: ABNORMAL /LPF
IMM GRANULOCYTES # BLD AUTO: 0.01 K/UL (ref 0–0.11)
IMM GRANULOCYTES NFR BLD AUTO: 0.2 % (ref 0–0.9)
KETONES UR STRIP.AUTO-MCNC: NEGATIVE MG/DL
LEUKOCYTE ESTERASE UR QL STRIP.AUTO: ABNORMAL
LV EJECT FRACT  99904: 60
LV EJECT FRACT MOD 2C 99903: 76.02
LV EJECT FRACT MOD 4C 99902: 57.33
LV EJECT FRACT MOD BP 99901: 69.02
LYMPHOCYTES # BLD AUTO: 1.5 K/UL (ref 1–4.8)
LYMPHOCYTES NFR BLD: 33.3 % (ref 22–41)
MAGNESIUM SERPL-MCNC: 1.9 MG/DL (ref 1.5–2.5)
MCH RBC QN AUTO: 31.4 PG (ref 27–33)
MCHC RBC AUTO-ENTMCNC: 33.3 G/DL (ref 33.6–35)
MCV RBC AUTO: 94.1 FL (ref 81.4–97.8)
MICRO URNS: ABNORMAL
MONOCYTES # BLD AUTO: 0.48 K/UL (ref 0–0.85)
MONOCYTES NFR BLD AUTO: 10.6 % (ref 0–13.4)
NEUTROPHILS # BLD AUTO: 2.39 K/UL (ref 2–7.15)
NEUTROPHILS NFR BLD: 53 % (ref 44–72)
NITRITE UR QL STRIP.AUTO: NEGATIVE
NRBC # BLD AUTO: 0 K/UL
NRBC BLD AUTO-RTO: 0 /100 WBC
PH UR STRIP.AUTO: 7 [PH]
PLATELET # BLD AUTO: 155 K/UL (ref 164–446)
PMV BLD AUTO: 9.5 FL (ref 9–12.9)
POTASSIUM SERPL-SCNC: 3.8 MMOL/L (ref 3.6–5.5)
PROT SERPL-MCNC: 6.6 G/DL (ref 6–8.2)
PROT UR QL STRIP: NEGATIVE MG/DL
RBC # BLD AUTO: 4.43 M/UL (ref 4.2–5.4)
RBC # URNS HPF: ABNORMAL /HPF
RBC UR QL AUTO: ABNORMAL
SODIUM SERPL-SCNC: 141 MMOL/L (ref 135–145)
SP GR UR STRIP.AUTO: 1
TROPONIN I SERPL-MCNC: <0.01 NG/ML (ref 0–0.04)
UROBILINOGEN UR STRIP.AUTO-MCNC: 0.2 MG/DL
WBC # BLD AUTO: 4.5 K/UL (ref 4.8–10.8)
WBC #/AREA URNS HPF: ABNORMAL /HPF

## 2017-09-24 PROCEDURE — 84484 ASSAY OF TROPONIN QUANT: CPT

## 2017-09-24 PROCEDURE — 80053 COMPREHEN METABOLIC PANEL: CPT

## 2017-09-24 PROCEDURE — 99285 EMERGENCY DEPT VISIT HI MDM: CPT

## 2017-09-24 PROCEDURE — 93306 TTE W/DOPPLER COMPLETE: CPT

## 2017-09-24 PROCEDURE — G0378 HOSPITAL OBSERVATION PER HR: HCPCS

## 2017-09-24 PROCEDURE — A9270 NON-COVERED ITEM OR SERVICE: HCPCS | Performed by: EMERGENCY MEDICINE

## 2017-09-24 PROCEDURE — 700105 HCHG RX REV CODE 258: Performed by: HOSPITALIST

## 2017-09-24 PROCEDURE — 93005 ELECTROCARDIOGRAM TRACING: CPT

## 2017-09-24 PROCEDURE — 93005 ELECTROCARDIOGRAM TRACING: CPT | Performed by: EMERGENCY MEDICINE

## 2017-09-24 PROCEDURE — 70551 MRI BRAIN STEM W/O DYE: CPT

## 2017-09-24 PROCEDURE — 36415 COLL VENOUS BLD VENIPUNCTURE: CPT

## 2017-09-24 PROCEDURE — 81001 URINALYSIS AUTO W/SCOPE: CPT

## 2017-09-24 PROCEDURE — 83036 HEMOGLOBIN GLYCOSYLATED A1C: CPT

## 2017-09-24 PROCEDURE — 85025 COMPLETE CBC W/AUTO DIFF WBC: CPT

## 2017-09-24 PROCEDURE — A9270 NON-COVERED ITEM OR SERVICE: HCPCS | Performed by: HOSPITALIST

## 2017-09-24 PROCEDURE — 83880 ASSAY OF NATRIURETIC PEPTIDE: CPT

## 2017-09-24 PROCEDURE — 700102 HCHG RX REV CODE 250 W/ 637 OVERRIDE(OP): Performed by: EMERGENCY MEDICINE

## 2017-09-24 PROCEDURE — 83735 ASSAY OF MAGNESIUM: CPT

## 2017-09-24 PROCEDURE — 700102 HCHG RX REV CODE 250 W/ 637 OVERRIDE(OP): Performed by: HOSPITALIST

## 2017-09-24 PROCEDURE — 99220 PR INITIAL OBSERVATION CARE,LEVL III: CPT | Performed by: HOSPITALIST

## 2017-09-24 PROCEDURE — 71010 DX-CHEST-PORTABLE (1 VIEW): CPT

## 2017-09-24 PROCEDURE — 94760 N-INVAS EAR/PLS OXIMETRY 1: CPT

## 2017-09-24 PROCEDURE — 93306 TTE W/DOPPLER COMPLETE: CPT | Mod: 26 | Performed by: INTERNAL MEDICINE

## 2017-09-24 PROCEDURE — 70450 CT HEAD/BRAIN W/O DYE: CPT

## 2017-09-24 RX ORDER — POLYETHYLENE GLYCOL 3350 17 G/17G
1 POWDER, FOR SOLUTION ORAL
Status: DISCONTINUED | OUTPATIENT
Start: 2017-09-24 | End: 2017-09-25 | Stop reason: HOSPADM

## 2017-09-24 RX ORDER — ZOLPIDEM TARTRATE 5 MG/1
2.5 TABLET ORAL
COMMUNITY
End: 2017-12-07 | Stop reason: SDUPTHER

## 2017-09-24 RX ORDER — ASPIRIN 300 MG/1
300 SUPPOSITORY RECTAL DAILY
Status: DISCONTINUED | OUTPATIENT
Start: 2017-09-25 | End: 2017-09-25 | Stop reason: HOSPADM

## 2017-09-24 RX ORDER — ASPIRIN 81 MG/1
324 TABLET, CHEWABLE ORAL DAILY
Status: DISCONTINUED | OUTPATIENT
Start: 2017-09-25 | End: 2017-09-25 | Stop reason: HOSPADM

## 2017-09-24 RX ORDER — BISACODYL 10 MG
10 SUPPOSITORY, RECTAL RECTAL
Status: DISCONTINUED | OUTPATIENT
Start: 2017-09-24 | End: 2017-09-25 | Stop reason: HOSPADM

## 2017-09-24 RX ORDER — METOPROLOL TARTRATE 50 MG/1
25-50 TABLET, FILM COATED ORAL 2 TIMES DAILY
COMMUNITY
End: 2018-01-12 | Stop reason: SDUPTHER

## 2017-09-24 RX ORDER — ASPIRIN 325 MG
325 TABLET ORAL DAILY
Status: DISCONTINUED | OUTPATIENT
Start: 2017-09-25 | End: 2017-09-25 | Stop reason: HOSPADM

## 2017-09-24 RX ORDER — AMOXICILLIN 250 MG
2 CAPSULE ORAL 2 TIMES DAILY
Status: DISCONTINUED | OUTPATIENT
Start: 2017-09-24 | End: 2017-09-25 | Stop reason: HOSPADM

## 2017-09-24 RX ORDER — ROSUVASTATIN CALCIUM 20 MG/1
40 TABLET, COATED ORAL
Status: DISCONTINUED | OUTPATIENT
Start: 2017-09-24 | End: 2017-09-25 | Stop reason: HOSPADM

## 2017-09-24 RX ORDER — ZOLPIDEM TARTRATE 5 MG/1
2.5 TABLET ORAL
Status: DISCONTINUED | OUTPATIENT
Start: 2017-09-24 | End: 2017-09-25 | Stop reason: HOSPADM

## 2017-09-24 RX ORDER — ASPIRIN 81 MG/1
162 TABLET, CHEWABLE ORAL ONCE
Status: COMPLETED | OUTPATIENT
Start: 2017-09-24 | End: 2017-09-24

## 2017-09-24 RX ORDER — SODIUM CHLORIDE 9 MG/ML
INJECTION, SOLUTION INTRAVENOUS CONTINUOUS
Status: DISCONTINUED | OUTPATIENT
Start: 2017-09-24 | End: 2017-09-25 | Stop reason: HOSPADM

## 2017-09-24 RX ORDER — ALLOPURINOL 100 MG/1
200 TABLET ORAL DAILY
Status: DISCONTINUED | OUTPATIENT
Start: 2017-09-25 | End: 2017-09-25 | Stop reason: HOSPADM

## 2017-09-24 RX ADMIN — ROSUVASTATIN CALCIUM 40 MG: 20 TABLET, FILM COATED ORAL at 21:42

## 2017-09-24 RX ADMIN — ZOLPIDEM TARTRATE 2.5 MG: 5 TABLET, FILM COATED ORAL at 21:42

## 2017-09-24 RX ADMIN — SODIUM CHLORIDE: 9 INJECTION, SOLUTION INTRAVENOUS at 16:51

## 2017-09-24 RX ADMIN — ASPIRIN 162 MG: 81 TABLET, CHEWABLE ORAL at 12:38

## 2017-09-24 ASSESSMENT — PATIENT HEALTH QUESTIONNAIRE - PHQ9
SUM OF ALL RESPONSES TO PHQ QUESTIONS 1-9: 0
SUM OF ALL RESPONSES TO PHQ9 QUESTIONS 1 AND 2: 0
2. FEELING DOWN, DEPRESSED, IRRITABLE, OR HOPELESS: NOT AT ALL
1. LITTLE INTEREST OR PLEASURE IN DOING THINGS: NOT AT ALL

## 2017-09-24 ASSESSMENT — LIFESTYLE VARIABLES
AVERAGE NUMBER OF DAYS PER WEEK YOU HAVE A DRINK CONTAINING ALCOHOL: 7
EVER_SMOKED: YES
TOTAL SCORE: 0
EVER HAD A DRINK FIRST THING IN THE MORNING TO STEADY YOUR NERVES TO GET RID OF A HANGOVER: NO
HAVE PEOPLE ANNOYED YOU BY CRITICIZING YOUR DRINKING: NO
TOTAL SCORE: 0
ON A TYPICAL DAY WHEN YOU DRINK ALCOHOL HOW MANY DRINKS DO YOU HAVE: 1
TOTAL SCORE: 0
HOW MANY TIMES IN THE PAST YEAR HAVE YOU HAD 5 OR MORE DRINKS IN A DAY: 0
HAVE YOU EVER FELT YOU SHOULD CUT DOWN ON YOUR DRINKING: NO
CONSUMPTION TOTAL: NEGATIVE
EVER FELT BAD OR GUILTY ABOUT YOUR DRINKING: NO
DO YOU DRINK ALCOHOL: YES

## 2017-09-24 ASSESSMENT — ENCOUNTER SYMPTOMS
COUGH: 0
MYALGIAS: 0
MEMORY LOSS: 0
TINGLING: 0
VOMITING: 0
BLOOD IN STOOL: 0
ORTHOPNEA: 0
SENSORY CHANGE: 0
SPEECH CHANGE: 0
DOUBLE VISION: 0
SPUTUM PRODUCTION: 0
FEVER: 0
NAUSEA: 0
CHILLS: 0
BACK PAIN: 0
PHOTOPHOBIA: 0
SHORTNESS OF BREATH: 0
EYE PAIN: 0
BLURRED VISION: 0
STRIDOR: 0
NECK PAIN: 0
WEAKNESS: 1
DEPRESSION: 0
HEARTBURN: 0
NERVOUS/ANXIOUS: 0
CLAUDICATION: 0
HEADACHES: 0
SORE THROAT: 0
HEMOPTYSIS: 0
PND: 0
PALPITATIONS: 0
CONSTIPATION: 0
DIZZINESS: 0
TREMORS: 0

## 2017-09-24 ASSESSMENT — PAIN SCALES - GENERAL
PAINLEVEL_OUTOF10: 0

## 2017-09-24 NOTE — ASSESSMENT & PLAN NOTE
At this time, her history does not sound like a TIA, she recently had echo in dec 2016 which showed preserved LVEF.   Check Orthostatics  Check MRI of brain  Check echocardiogram, as she has not had an echo since her CABG  Last year.   IV fluids will be provided  Monitor closely on telemetry to r/o any arrhythmias.

## 2017-09-24 NOTE — PROGRESS NOTES
Pt arrived to unit via gurney from main ER. Assessment completed. Pt A&O. Pt denies pain at this time. Dizziness with movement, unsteady on feet. Neuro intact. Pt oriented to unit routine and call light. Plan of care reviewed with patient, verbalized understating. Monitors applied. Will continue to monitor, call light within reach. Needs met.  at bedside.

## 2017-09-24 NOTE — H&P
Hospital Medicine History and Physical    Date of Service  9/24/2017    Chief Complaint  Chief Complaint   Patient presents with   • Dizziness     hx TIA, CABG       History of Presenting Illness  82 y.o. female with a past medical history ofPrevious TIAs in the past with a recent coronary arterial bypass in 2016 presented 9/24/2017 with dizziness which started about 7 AM this morning. Patient says that she has had histories of dizziness in the past however says that her symptoms are much different. She feels like her head is heavy she gets dizzy specially when she gets up from sitting. He feels off balance when walking and has to hold onto the counter. When she is lying down flat she denies having any vertigo when she shakes her head she denies having any vertigo. Patient denies having any weakness on any of her extremities numbness or tingling. She did mention that she is under a lot of stress which may be a contributing factor. She does not have any evidence of facial droop or any weakness to be suspicious for an acute CVA embolus we will rule her out for a TIA. Denies having any chest pain shortness of breath or nausea vomiting at this time.      Primary Care Physician  Ant Bronson M.D.    Consultants  None    Code Status  Code: Full code    Review of Systems  Review of Systems   Constitutional: Positive for malaise/fatigue. Negative for chills and fever.   HENT: Negative for congestion, hearing loss, sore throat and tinnitus.    Eyes: Negative for blurred vision, double vision, photophobia and pain.   Respiratory: Negative for cough, hemoptysis, sputum production, shortness of breath and stridor.    Cardiovascular: Negative for chest pain, palpitations, orthopnea, claudication and PND.   Gastrointestinal: Negative for blood in stool, constipation, heartburn, melena, nausea and vomiting.   Genitourinary: Negative for dysuria, frequency and urgency.   Musculoskeletal: Negative for back pain, myalgias and  neck pain.   Neurological: Positive for weakness. Negative for dizziness, tingling, tremors, sensory change, speech change and headaches.   Psychiatric/Behavioral: Negative for depression, memory loss and suicidal ideas. The patient is not nervous/anxious.           Past Medical History  1.Hypertension  2. History of TIA in the past  3. History of coronary arterial bypass 2016  4. Dyslipidemia      Surgical History  Past Surgical History:   Procedure Laterality Date   • MULTIPLE CORONARY ARTERY BYPASS ENDO VEIN HARVEST  2016    Procedure: MULTIPLE CORONARY ARTERY BYPASS ENDO VEIN HARVEST;  Surgeon: Jeff Naranjo M.D.;  Location: SURGERY [facility];  Service:    • ABIODUN  2016    Procedure: ABIODUN;  Surgeon: Jeff Naranjo M.D.;  Location: SURGERY [facility];  Service:    • CATARACT PHACO WITH IOL  2009    Performed by SOLOMON THOMAS at SURGERY SAME DAY [facility] ORS   • CATARACT PHACO WITH IOL  2009    Performed by SOLOMON THOMAS at SURGERY SAME DAY [facility] ORS   • GYN SURGERY      Hysterectomy during last C Section    • ANGIOPLASTY  85, 97   • CARDIAC CATH     • GYN SURGERY      C Section x4   • OTHER      c-sections x4       Medications  No current facility-administered medications on file prior to encounter.      Current Outpatient Prescriptions on File Prior to Encounter   Medication Sig Dispense Refill   • allopurinol (ZYLOPRIM) 100 MG Tab Take 2 Tabs by mouth every day. 180 Tab 3   • rosuvastatin (CRESTOR) 40 MG tablet Take 1 Tab by mouth every day. 30 Tab 11   • aspirin EC (ECOTRIN) 81 MG TBEC Take 81 mg by mouth every evening.         Family History  Family History   Problem Relation Age of Onset   • Cancer Mother      uterine   • Arthritis Mother      gout   • Heart Disease Father      MI age 50s   • Heart Disease Brother       40 MI   • Hyperlipidemia Sister        Social History  Social History   Substance Use Topics   • Smoking status: Former Smoker     Packs/day:  0.50     Years: 32.00     Types: Cigarettes     Quit date: 1988   • Smokeless tobacco: Never Used      Comment: quit , approx 30pyh   • Alcohol use 0.5 oz/week     1 Glasses of wine per week       Allergies  Allergies   Allergen Reactions   • Lipitor [Atorvastatin Calcium] Unspecified     Elevated LFT  RXN=long time ago        Physical Exam  Laboratory   Hemodynamics  Temp (24hrs), Av.4 °C (97.6 °F), Min:36.4 °C (97.6 °F), Max:36.4 °C (97.6 °F)   Temperature: 36.4 °C (97.6 °F)  Pulse  Av.1  Min: 52  Max: 85 Heart Rate (Monitored): 60  Blood Pressure : (!) 175/74, NIBP: 129/71      Respiratory      Respiration: 14, Pulse Oximetry: 99 %             Physical Exam   Constitutional: She is oriented to person, place, and time. She appears well-developed and well-nourished.   HENT:   Head: Normocephalic and atraumatic.   Mouth/Throat: No oropharyngeal exudate.   Eyes: Conjunctivae are normal. Pupils are equal, round, and reactive to light. Right eye exhibits no discharge. No scleral icterus.   Neck: Neck supple. No JVD present. No thyromegaly present.   Cardiovascular: Intact distal pulses.    No murmur heard.  Pulmonary/Chest: Effort normal and breath sounds normal. No stridor. No respiratory distress. She has no wheezes. She has no rales.   Abdominal: Soft. Bowel sounds are normal. She exhibits no distension. There is no tenderness. There is no rebound.   Musculoskeletal: Normal range of motion. She exhibits no edema.   Neurological: She is alert and oriented to person, place, and time. No cranial nerve deficit.   Skin: Skin is warm. No erythema.   Psychiatric: She has a normal mood and affect. Her behavior is normal. Thought content normal.         Assessment/Plan  * Vertigo- (present on admission)   Assessment & Plan    At this time, her history does not sound like a TIA, she recently had echo in dec 2016 which showed preserved LVEF.   Check Orthostatics  Check MRI of brain  Check echocardiogram, as  she has not had an echo since her CABG  Last year.   IV fluids will be provided  Monitor closely on telemetry to r/o any arrhythmias.        Heart burn- (present on admission)   Assessment & Plan    Continue omeprazole         High cholesterol- (present on admission)   Assessment & Plan    Continue crestor.  Check lipid panel         History of left bundle branch block (LBBB)- (present on admission)   Assessment & Plan    No changes from previous EKG        History of TIA (transient ischemic attack)- (present on admission)   Assessment & Plan    History in the past continue aspirin and statin for neuro protective measures        Coronary artery disease with history of myocardial infarction without history of CABG- (present on admission)   Assessment & Plan    In 2016,  Follows regularly with cardiology.          HTN (hypertension)- (present on admission)   Assessment & Plan    Continue metoprolol, I will not hold this medication for permissive hypertension as my suspicion is low for a TIA,             I anticipate this patient will require at least two midnights for appropriate medical management, necessitating inpatient admission.    Prophylaxis: sc heparin    Recent Labs      09/24/17   1240   WBC  4.5*   RBC  4.43   HEMOGLOBIN  13.9   HEMATOCRIT  41.7   MCV  94.1   MCH  31.4   MCHC  33.3*   RDW  46.5   PLATELETCT  155*   MPV  9.5     Recent Labs      09/24/17   1240   SODIUM  141   POTASSIUM  3.8   CHLORIDE  110   CO2  23   GLUCOSE  109*   BUN  19   CREATININE  0.94   CALCIUM  9.8     Recent Labs      09/24/17   1240   ALTSGPT  25   ASTSGOT  24   ALKPHOSPHAT  67   TBILIRUBIN  0.6   GLUCOSE  109*         Recent Labs      09/24/17   1240   BNPBTYPENAT  178*         Lab Results   Component Value Date    TROPONINI <0.01 09/24/2017       Imaging  MR-BRAIN-W/O   Final Result      MRI of the brain without contrast within normal limits for age with mild atrophy and mild white matter changes.      CT-HEAD W/O   Final  Result      1.  No acute intracranial abnormality identified.      2.  Chronic findings include white matter change, cerebral volume loss, and atherosclerotic plaque      DX-CHEST-PORTABLE (1 VIEW)   Final Result      No acute cardiopulmonary abnormality identified.      ECHOCARDIOGRAM COMP W/O CONT    (Results Pending)   MR-BRAIN-W/O    (Results Pending)

## 2017-09-24 NOTE — ED PROVIDER NOTES
"ED Provider Note    Scribed for Ayana Pierce M.D. by Ihsan Wright. 9/24/2017, 12:06 PM.    Primary care provider: Ant Bronson M.D.  Means of arrival: Walk in  History obtained from: Patient  History limited by: None    CHIEF COMPLAINT  Chief Complaint   Patient presents with   • Dizziness     hx TIA, CABG       HPI  Susie Craig is a 82 y.o. female who presents to the Emergency Department complaining of dizziness which started at approximately 7 AM this morning. Patient has a history of triple bypass surgery and TIA. Patient states she had the triple bypass surgery in November 2016. The following week after the surgery, she was back in the hospital for the TIA. Patient states she had two angiograms in which she describes having a coronary blockage. She states this morning, her symptoms started with associated increased tiredness. Patient notes experiencing dizziness each time she got up after laying down. She reports exacerbation of the dizziness when walking. She reports intermittently becoming off-balance in which she would have to hold onto the counter if standing or walking. She notes her PCP advised her to visit the ED for any new signs or symptoms including dizziness, prompting her to come in today. She reports having dizziness triggered when bending over in the past but notes today's symptoms are different. Patient reports an associated \"heaviness\" sensation in her head. She denies vertigo, lightheadedness, headache, chest pain, shortness of breath, nausea, numbness or tingling. Patient notes her dizziness is currently improved on exam. She thinks recent stress may be a contributing factor. Patient states when she had a TIA in the past, she was watching TV when her friend suddenly noticed she had fallen over and was \"out of it and unconscious.\"  She mentions her blood pressure has been low. Patient has been taking baby aspirin regularly for \"years.\" Patient had a baby aspirin last " "night. She notes family history of cardiac problems.    REVIEW OF SYSTEMS  Pertinent positives include dizziness, off-balance sensation, increased tiredness, \"heaviness\" sensation to head. Pertinent negatives include no vertigo, lightheadedness, headache, chest pain, shortness of breath, nausea, numbness, tingling. As above, all other systems reviewed and are negative.   See HPI for further details.   C    PAST MEDICAL HISTORY  Past Medical History:   Diagnosis Date   • Arthritis    • Back pain    • Breath shortness     with exertion   • CAD (coronary artery disease)     CABG X3 in '16 with graft occlusion post-op   • Cataract     bilat IOL    • Gout    • Heart burn    • High cholesterol    • Hypertension    • Indigestion    • MEDICAL HOME    • Stroke ([facility])     TIA        SURGICAL HISTORY  Past Surgical History:   Procedure Laterality Date   • MULTIPLE CORONARY ARTERY BYPASS ENDO VEIN HARVEST  11/6/2016    Procedure: MULTIPLE CORONARY ARTERY BYPASS ENDO VEIN HARVEST;  Surgeon: Jeff Naranjo M.D.;  Location: SURGERY [facility];  Service:    • ABIODUN  11/6/2016    Procedure: ABIODUN;  Surgeon: Jeff Naranjo M.D.;  Location: SURGERY [facility];  Service:    • CATARACT PHACO WITH IOL  8/31/2009    Performed by SOLOMON THOMAS at SURGERY SAME DAY [facility] ORS   • CATARACT PHACO WITH IOL  8/20/2009    Performed by SOLOMON THOMAS at SURGERY SAME DAY [facility] ORS   • GYN SURGERY  1953    Hysterectomy during last C Section    • ANGIOPLASTY  85, 97   • CARDIAC CATH     • GYN SURGERY      C Section x4   • OTHER      c-sections x4       SOCIAL HISTORY  Social History   Substance Use Topics   • Smoking status: Former Smoker     Packs/day: 0.50     Years: 32.00     Types: Cigarettes     Quit date: 8/31/1988   • Smokeless tobacco: Never Used      Comment: quit 1988, approx 30pyh   • Alcohol use 0.5 oz/week     1 Glasses of wine per week      History   Drug Use No       FAMILY HISTORY  Family History   Problem " "Relation Age of Onset   • Cancer Mother      uterine   • Arthritis Mother      gout   • Heart Disease Father      MI age 50s   • Heart Disease Brother       40 MI   • Hyperlipidemia Sister        CURRENT MEDICATIONS  No current facility-administered medications on file prior to encounter.      Current Outpatient Prescriptions on File Prior to Encounter   Medication Sig Dispense Refill   • allopurinol (ZYLOPRIM) 100 MG Tab Take 2 Tabs by mouth every day. 180 Tab 3   • rosuvastatin (CRESTOR) 40 MG tablet Take 1 Tab by mouth every day. 30 Tab 11   • aspirin EC (ECOTRIN) 81 MG TBEC Take 81 mg by mouth every evening.        ALLERGIES  Allergies   Allergen Reactions   • Lipitor [Atorvastatin Calcium] Unspecified     Elevated LFT  RXN=long time ago       PHYSICAL EXAM  VITAL SIGNS: BP (!) 163/95   Pulse 85   Temp 36.4 °C (97.6 °F)   Resp 16   Ht 1.537 m (5' 0.5\")   Wt 55 kg (121 lb 4.1 oz)   LMP 2011   SpO2 97%   BMI 23.29 kg/m²   Vitals reviewed.  Consitutional: Well-developed, well-nourished. Negative for: distress.  HENT: Normocephalic, right external ear normal, left external ear normal, oropharynx clear and moist.  Eyes: Conjunctivae normal, extraocular movements normal, PERRLA at 2. Negative for: discharge in right and left eye, icterus.  Neck: Range of motion normal, supple. Negative for cervical adenopathy.  Cardiovascular: Normal rate, regular rhythm, soft distant heart sounds, intact distal pulses. Negative for: murmur, rub, gallop.  Pulmonary/Chest Wall: Effort normal, breath sounds normal. Negative for: respiratory distress, wheezes, rales, rhonchi.   Abdominal: Soft, bowel sounds normal. Negative for: distention, tenderness, rebound, guarding.  Musculoskeletal: Normal range of motion. Negative for edema.  Neurological: Alert and oriented x3. No focal deficits. CN and cerebellar exams are normal including symmetric hand roll. Sensation intact to hands, feet, and face.  Skin: Warm, dry. " Negative for rash.  Psych: Mood/affect normal, behavior normal, judgment normal.    DIAGNOSTIC STUDIES / PROCEDURES    LABS  Results for orders placed or performed during the hospital encounter of 09/24/17   CBC WITH DIFFERENTIAL   Result Value Ref Range    WBC 4.5 (L) 4.8 - 10.8 K/uL    RBC 4.43 4.20 - 5.40 M/uL    Hemoglobin 13.9 12.0 - 16.0 g/dL    Hematocrit 41.7 37.0 - 47.0 %    MCV 94.1 81.4 - 97.8 fL    MCH 31.4 27.0 - 33.0 pg    MCHC 33.3 (L) 33.6 - 35.0 g/dL    RDW 46.5 35.9 - 50.0 fL    Platelet Count 155 (L) 164 - 446 K/uL    MPV 9.5 9.0 - 12.9 fL    Neutrophils-Polys 53.00 44.00 - 72.00 %    Lymphocytes 33.30 22.00 - 41.00 %    Monocytes 10.60 0.00 - 13.40 %    Eosinophils 2.70 0.00 - 6.90 %    Basophils 0.20 0.00 - 1.80 %    Immature Granulocytes 0.20 0.00 - 0.90 %    Nucleated RBC 0.00 /100 WBC    Neutrophils (Absolute) 2.39 2.00 - 7.15 K/uL    Lymphs (Absolute) 1.50 1.00 - 4.80 K/uL    Monos (Absolute) 0.48 0.00 - 0.85 K/uL    Eos (Absolute) 0.12 0.00 - 0.51 K/uL    Baso (Absolute) 0.01 0.00 - 0.12 K/uL    Immature Granulocytes (abs) 0.01 0.00 - 0.11 K/uL    NRBC (Absolute) 0.00 K/uL   COMP METABOLIC PANEL   Result Value Ref Range    Sodium 141 135 - 145 mmol/L    Potassium 3.8 3.6 - 5.5 mmol/L    Chloride 110 96 - 112 mmol/L    Co2 23 20 - 33 mmol/L    Anion Gap 8.0 0.0 - 11.9    Glucose 109 (H) 65 - 99 mg/dL    Bun 19 8 - 22 mg/dL    Creatinine 0.94 0.50 - 1.40 mg/dL    Calcium 9.8 8.5 - 10.5 mg/dL    AST(SGOT) 24 12 - 45 U/L    ALT(SGPT) 25 2 - 50 U/L    Alkaline Phosphatase 67 30 - 99 U/L    Total Bilirubin 0.6 0.1 - 1.5 mg/dL    Albumin 4.0 3.2 - 4.9 g/dL    Total Protein 6.6 6.0 - 8.2 g/dL    Globulin 2.6 1.9 - 3.5 g/dL    A-G Ratio 1.5 g/dL   TROPONIN   Result Value Ref Range    Troponin I <0.01 0.00 - 0.04 ng/mL   BTYPE NATRIURETIC PEPTIDE   Result Value Ref Range    B Natriuretic Peptide 178 (H) 0 - 100 pg/mL   URINALYSIS (UA)   Result Value Ref Range    Color Yellow     Character Clear      Specific Gravity 1.004 <1.035    Ph 7.0 5.0 - 8.0    Glucose Negative Negative mg/dL    Ketones Negative Negative mg/dL    Protein Negative Negative mg/dL    Bilirubin Negative Negative    Urobilinogen, Urine 0.2 Negative    Nitrite Negative Negative    Leukocyte Esterase Large (A) Negative    Occult Blood Trace (A) Negative    Micro Urine Req Microscopic    MAGNESIUM   Result Value Ref Range    Magnesium 1.9 1.5 - 2.5 mg/dL   URINE MICROSCOPIC (W/UA)   Result Value Ref Range    WBC 20-50 (A) /hpf    RBC 0-2 /hpf    Bacteria Few (A) None /hpf    Epithelial Cells Few /hpf    Hyaline Cast 0-2 /lpf   ESTIMATED GFR   Result Value Ref Range    GFR If African American >60 >60 mL/min/1.73 m 2    GFR If Non  57 (A) >60 mL/min/1.73 m 2   EKG (NOW)   Result Value Ref Range    Report       [facility] Emergency Dept.    Test Date:  2017  Pt Name:    WILVER PRETTY              Department: ER  MRN:        [id number]                      Room:       [facility]  Gender:     F                            Technician: [id number]  :        [id number]                   Requested By:ER TRIAGE PROTOCOL  Order #:    [id number]                    Reading MD: JEANETTE MCCABE MD    Measurements  Intervals                                Axis  Rate:       68                           P:          58  IL:         168                          QRS:        51  QRSD:       126                          T:          175  QT:         408  QTc:        434    Interpretive Statements  SINUS RHYTHM at 68  LEFT BUNDLE BRANCH BLOCK  Biphasic T waves  No ST segment changes to indicate ischemia or infarct  Compared to ECG 2017 09:19:39  Sinus bradycardia no longer present    Electronically Signed On 2017 16:04:54 PDT by JEAENTTE MCCABE MD        All labs reviewed by me.    EKG Interpretation:  12-lead EKG by my interpretation shows:SINUS RHYTHM at 68  LEFT BUNDLE BRANCH BLOCK  Biphasic T waves  No ST segment changes to  indicate ischemia or infarct  Compared to ECG 05/12/2017 09:19:39  Sinus bradycardia no longer present    RADIOLOGY  MR-BRAIN-W/O   Final Result      MRI of the brain without contrast within normal limits for age with mild atrophy and mild white matter changes.      CT-HEAD W/O   Final Result      1.  No acute intracranial abnormality identified.      2.  Chronic findings include white matter change, cerebral volume loss, and atherosclerotic plaque      DX-CHEST-PORTABLE (1 VIEW)   Final Result      No acute cardiopulmonary abnormality identified.      ECHOCARDIOGRAM COMP W/O CONT    (Results Pending)   MR-BRAIN-W/O    (Results Pending)     The radiologist's interpretation of all radiological studies have been reviewed by me.    COURSE & MEDICAL DECISION MAKING  Nursing notes, VS, PMSFHx reviewed in chart.    Obtained and reviewed past medical records from 4/6/17 with Dr Goode, cardiology which had the following summary:Susie Craig is a 81 y.o. female who presents today in follow-up in regards to her coronary arterial bypass surgery last fall in the setting of hypertension and hyperlipidemia      She is switching her care from another cardiologist who is my partner  She's had a tough time with her 's memory loss and falling. She is really working hard. She has no chest discomfort or symptoms of early exertion she does. She cares for her own house as well     She admits she needs to be more relaxed and tries to do meditation. She is taking her Crestor as directed.    12:12 PM Patient seen and examined at bedside. Discussed plan of care which includes radiology tests, labs, and EKG. She understands and agrees. The patient presents with dizziness and the differential diagnosis includes but is not limited to cardiac arrhythmia, TIA, CVA, UTI. Ordered DX chest, CT head, magnesium, CBC, CMP, troponin, BNP, urinalysis, EKG. Patient will be treated with aspirin 162 mg for her symptoms.      1:35 PM - I  discussed the patient's case and the above findings with Dr. Kerr (hospitalist) who will admit the patient.     2:25 PM Patient reevaluated at bedside. Discussed lab and radiology results as seen above. Discussed further plan of care which includes admission to the hospitalist for further evaluation and care. Patient understands and agrees.     DISPOSITION:  Patient will be admitted to Dr. Kerr in guarded condition.     FINAL IMPRESSION  1. Ataxia    2. LBBB (left bundle branch block)    3. Dyslipidemia    4. S/P CABG x 3          Ihsan RAM (Scribe), am scribing for, and in the presence of, Ayana Pierce M.D..    Electronically signed by: Ihsan Wright (Scribe), 9/24/2017    Ayana RAM M.D. personally performed the services described in this documentation, as scribed by Ihsan Wright in my presence, and it is both accurate and complete.    The note accurately reflects work and decisions made by me.  Ayana Pierce  9/24/2017  4:05 PM

## 2017-09-24 NOTE — ASSESSMENT & PLAN NOTE
Continue metoprolol, I will not hold this medication for permissive hypertension as my suspicion is low for a TIA,

## 2017-09-25 ENCOUNTER — PATIENT OUTREACH (OUTPATIENT)
Dept: HEALTH INFORMATION MANAGEMENT | Facility: OTHER | Age: 82
End: 2017-09-25

## 2017-09-25 ENCOUNTER — APPOINTMENT (OUTPATIENT)
Dept: RADIOLOGY | Facility: MEDICAL CENTER | Age: 82
End: 2017-09-25
Attending: HOSPITALIST
Payer: MEDICARE

## 2017-09-25 VITALS
SYSTOLIC BLOOD PRESSURE: 137 MMHG | HEART RATE: 64 BPM | HEIGHT: 61 IN | WEIGHT: 119.27 LBS | OXYGEN SATURATION: 94 % | DIASTOLIC BLOOD PRESSURE: 73 MMHG | TEMPERATURE: 97.6 F | RESPIRATION RATE: 13 BRPM | BODY MASS INDEX: 22.52 KG/M2

## 2017-09-25 PROBLEM — R79.89 ELEVATED TSH: Status: ACTIVE | Noted: 2017-09-25

## 2017-09-25 LAB
ALBUMIN SERPL BCP-MCNC: 3.4 G/DL (ref 3.2–4.9)
ALBUMIN/GLOB SERPL: 1.6 G/DL
ALP SERPL-CCNC: 54 U/L (ref 30–99)
ALT SERPL-CCNC: 22 U/L (ref 2–50)
ANION GAP SERPL CALC-SCNC: 6 MMOL/L (ref 0–11.9)
AST SERPL-CCNC: 21 U/L (ref 12–45)
BASOPHILS # BLD AUTO: 0.4 % (ref 0–1.8)
BASOPHILS # BLD: 0.02 K/UL (ref 0–0.12)
BILIRUB SERPL-MCNC: 0.4 MG/DL (ref 0.1–1.5)
BUN SERPL-MCNC: 19 MG/DL (ref 8–22)
CALCIUM SERPL-MCNC: 8.5 MG/DL (ref 8.5–10.5)
CHLORIDE SERPL-SCNC: 109 MMOL/L (ref 96–112)
CHOLEST SERPL-MCNC: 116 MG/DL (ref 100–199)
CO2 SERPL-SCNC: 23 MMOL/L (ref 20–33)
CORTIS SERPL-MCNC: 5 UG/DL (ref 0–23)
CREAT SERPL-MCNC: 1.01 MG/DL (ref 0.5–1.4)
EOSINOPHIL # BLD AUTO: 0.16 K/UL (ref 0–0.51)
EOSINOPHIL NFR BLD: 3.2 % (ref 0–6.9)
ERYTHROCYTE [DISTWIDTH] IN BLOOD BY AUTOMATED COUNT: 46.5 FL (ref 35.9–50)
GFR SERPL CREATININE-BSD FRML MDRD: 53 ML/MIN/1.73 M 2
GLOBULIN SER CALC-MCNC: 2.1 G/DL (ref 1.9–3.5)
GLUCOSE SERPL-MCNC: 100 MG/DL (ref 65–99)
HCT VFR BLD AUTO: 38.1 % (ref 37–47)
HDLC SERPL-MCNC: 48 MG/DL
HGB BLD-MCNC: 12.4 G/DL (ref 12–16)
IMM GRANULOCYTES # BLD AUTO: 0.01 K/UL (ref 0–0.11)
IMM GRANULOCYTES NFR BLD AUTO: 0.2 % (ref 0–0.9)
LDLC SERPL CALC-MCNC: 54 MG/DL
LYMPHOCYTES # BLD AUTO: 2.02 K/UL (ref 1–4.8)
LYMPHOCYTES NFR BLD: 40.8 % (ref 22–41)
MAGNESIUM SERPL-MCNC: 1.8 MG/DL (ref 1.5–2.5)
MCH RBC QN AUTO: 30.6 PG (ref 27–33)
MCHC RBC AUTO-ENTMCNC: 32.5 G/DL (ref 33.6–35)
MCV RBC AUTO: 94.1 FL (ref 81.4–97.8)
MONOCYTES # BLD AUTO: 0.45 K/UL (ref 0–0.85)
MONOCYTES NFR BLD AUTO: 9.1 % (ref 0–13.4)
NEUTROPHILS # BLD AUTO: 2.29 K/UL (ref 2–7.15)
NEUTROPHILS NFR BLD: 46.3 % (ref 44–72)
NRBC # BLD AUTO: 0 K/UL
NRBC BLD AUTO-RTO: 0 /100 WBC
PLATELET # BLD AUTO: 139 K/UL (ref 164–446)
PMV BLD AUTO: 9.6 FL (ref 9–12.9)
POTASSIUM SERPL-SCNC: 3.9 MMOL/L (ref 3.6–5.5)
PROT SERPL-MCNC: 5.5 G/DL (ref 6–8.2)
RBC # BLD AUTO: 4.05 M/UL (ref 4.2–5.4)
SODIUM SERPL-SCNC: 138 MMOL/L (ref 135–145)
T4 FREE SERPL-MCNC: 0.99 NG/DL (ref 0.53–1.43)
TRIGL SERPL-MCNC: 68 MG/DL (ref 0–149)
TSH SERPL DL<=0.005 MIU/L-ACNC: 4.39 UIU/ML (ref 0.3–3.7)
WBC # BLD AUTO: 5 K/UL (ref 4.8–10.8)

## 2017-09-25 PROCEDURE — G8978 MOBILITY CURRENT STATUS: HCPCS | Mod: CI

## 2017-09-25 PROCEDURE — 97535 SELF CARE MNGMENT TRAINING: CPT

## 2017-09-25 PROCEDURE — 99217 PR OBSERVATION CARE DISCHARGE: CPT | Performed by: HOSPITALIST

## 2017-09-25 PROCEDURE — 80053 COMPREHEN METABOLIC PANEL: CPT

## 2017-09-25 PROCEDURE — 96365 THER/PROPH/DIAG IV INF INIT: CPT

## 2017-09-25 PROCEDURE — 97162 PT EVAL MOD COMPLEX 30 MIN: CPT

## 2017-09-25 PROCEDURE — 36415 COLL VENOUS BLD VENIPUNCTURE: CPT

## 2017-09-25 PROCEDURE — 85025 COMPLETE CBC W/AUTO DIFF WBC: CPT

## 2017-09-25 PROCEDURE — G8980 MOBILITY D/C STATUS: HCPCS | Mod: CI

## 2017-09-25 PROCEDURE — G0378 HOSPITAL OBSERVATION PER HR: HCPCS

## 2017-09-25 PROCEDURE — 80061 LIPID PANEL: CPT

## 2017-09-25 PROCEDURE — 84439 ASSAY OF FREE THYROXINE: CPT

## 2017-09-25 PROCEDURE — G8979 MOBILITY GOAL STATUS: HCPCS | Mod: CI

## 2017-09-25 PROCEDURE — A9270 NON-COVERED ITEM OR SERVICE: HCPCS | Performed by: HOSPITALIST

## 2017-09-25 PROCEDURE — 82533 TOTAL CORTISOL: CPT

## 2017-09-25 PROCEDURE — 700105 HCHG RX REV CODE 258: Performed by: HOSPITALIST

## 2017-09-25 PROCEDURE — 83735 ASSAY OF MAGNESIUM: CPT

## 2017-09-25 PROCEDURE — 700111 HCHG RX REV CODE 636 W/ 250 OVERRIDE (IP): Performed by: HOSPITALIST

## 2017-09-25 PROCEDURE — 700102 HCHG RX REV CODE 250 W/ 637 OVERRIDE(OP): Performed by: HOSPITALIST

## 2017-09-25 PROCEDURE — 84443 ASSAY THYROID STIM HORMONE: CPT

## 2017-09-25 RX ORDER — MAGNESIUM SULFATE 1 G/100ML
1 INJECTION INTRAVENOUS ONCE
Status: COMPLETED | OUTPATIENT
Start: 2017-09-25 | End: 2017-09-25

## 2017-09-25 RX ADMIN — MAGNESIUM SULFATE IN DEXTROSE 1 G: 10 INJECTION, SOLUTION INTRAVENOUS at 09:38

## 2017-09-25 RX ADMIN — SODIUM CHLORIDE: 9 INJECTION, SOLUTION INTRAVENOUS at 01:30

## 2017-09-25 RX ADMIN — ASPIRIN 324 MG: 81 TABLET, CHEWABLE ORAL at 09:39

## 2017-09-25 RX ADMIN — ALLOPURINOL 200 MG: 100 TABLET ORAL at 09:40

## 2017-09-25 RX ADMIN — METOPROLOL TARTRATE 25 MG: 25 TABLET ORAL at 09:40

## 2017-09-25 ASSESSMENT — COGNITIVE AND FUNCTIONAL STATUS - GENERAL
SUGGESTED CMS G CODE MODIFIER MOBILITY: CH
MOBILITY SCORE: 24

## 2017-09-25 ASSESSMENT — PAIN SCALES - GENERAL
PAINLEVEL_OUTOF10: 0

## 2017-09-25 ASSESSMENT — GAIT ASSESSMENTS
DISTANCE (FEET): 200
ASSISTIVE DEVICE: SINGLE POINT CANE
GAIT LEVEL OF ASSIST: SUPERVISED

## 2017-09-25 NOTE — PROGRESS NOTES
Assessment per flow chart. Pt on tele monitor,SR with BBB noted. Pt A&Ox4, denies pain, dizziness.  Left A/C IV site- c,d,i.  Pt's SpO2-97% on R/A.  Discussed POC with pt, pt verbalized understanding and agreement.  Pt instructed to call for assistance, pt's belonging's and call light within reach. Will continue to monitor.

## 2017-09-25 NOTE — DISCHARGE INSTRUCTIONS
Discharge Instructions    Discharged to home by car with relative. Discharged via wheelchair, hospital escort: Yes.  Special equipment needed: Not Applicable    Be sure to schedule a follow-up appointment with your primary care doctor or any specialists as instructed.     Discharge Plan:   Diet Plan: Discussed (Regular)  Activity Level: Discussed (As tolerated)  Confirmed Follow up Appointment: Patient to Call and Schedule Appointment  Confirmed Symptoms Management: Discussed  Medication Reconciliation Updated: Yes  Influenza Vaccine Indication: Not indicated: Previously immunized this influenza season and > 8 years of age    I understand that a diet low in cholesterol, fat, and sodium is recommended for good health. Unless I have been given specific instructions below for another diet, I accept this instruction as my diet prescription.   Other diet: Regular    Special Instructions: None    · Is patient discharged on Warfarin / Coumadin?   No     · Is patient Post Blood Transfusion?  No    Depression / Suicide Risk    As you are discharged from this [facility] facility, it is important to learn how to keep safe from harming yourself.    Recognize the warning signs:  · Abrupt changes in personality, positive or negative- including increase in energy   · Giving away possessions  · Change in eating patterns- significant weight changes-  positive or negative  · Change in sleeping patterns- unable to sleep or sleeping all the time   · Unwillingness or inability to communicate  · Depression  · Unusual sadness, discouragement and loneliness  · Talk of wanting to die  · Neglect of personal appearance   · Rebelliousness- reckless behavior  · Withdrawal from people/activities they love  · Confusion- inability to concentrate     If you or a loved one observes any of these behaviors or has concerns about self-harm, here's what you can do:  · Talk about it- your feelings and reasons for harming yourself  · Remove any means  that you might use to hurt yourself (examples: pills, rope, extension cords, firearm)  · Get professional help from the community (Mental Health, Substance Abuse, psychological counseling)  · Do not be alone:Call your Safe Contact- someone whom you trust who will be there for you.  · Call your local CRISIS HOTLINE 387-7737 or 759-628-3957  · Call your local Children's Mobile Crisis Response Team [place] (785) 035-0326 or www.[place]  · Call the toll free National Suicide Prevention Hotlines   · National Suicide Prevention Lifeline 389-397-XYZU (3059)  · National Hope Line Network 800-SUICIDE (590-7415)

## 2017-09-25 NOTE — DISCHARGE SUMMARY
Hospital Medicine Discharge Note     Admit Date:  9/24/2017       Discharge Date:   9/25/2017    Attending Physician:  Galdino Gupta     Diagnoses (includes active and resolved):   Principal Problem:    Vertigo POA: Yes  Active Problems:    HTN (hypertension) POA: Yes    Coronary artery disease with history of myocardial infarction without history of CABG POA: Yes    History of TIA (transient ischemic attack) POA: Yes    History of left bundle branch block (LBBB) POA: Yes    High cholesterol POA: Yes    Heart burn POA: Yes    Elevated TSH POA: Yes      Overview: Pt prefers to hold off on meds for now and f/u w/ PCP  Resolved Problems:    * No resolved hospital problems. *      Hospital Summary (Brief Narrative):       82 y.o. Female w/h/o TIAs in the past s/p coronary arterial bypass in 2016 p/w dizziness.   She was admitted for workup. She had a unremarkable MRI brain as well as CT head. Chest x-ray was also unremarkable. Echo was also unremarkable. She did have elevated TSH which was discussed with her. She preferred not to start any medication at this time and will follow-up with her PCP. She worked with PT and was stable and did not want to wait for OT evaluation. Thus she was discharged home.     Consultants:      None    Procedures:        None    Discharge Medications:           Medication List      CONTINUE taking these medications      Instructions   allopurinol 100 MG Tabs  Commonly known as:  ZYLOPRIM   Take 2 Tabs by mouth every day.  Dose:  200 mg     aspirin EC 81 MG Tbec  Commonly known as:  ECOTRIN   Take 81 mg by mouth every evening.  Dose:  81 mg     metoprolol 50 MG Tabs  Commonly known as:  LOPRESSOR   Take 25-50 mg by mouth 2 times a day. 25 mg in the AM and 50 mg at HS  Dose:  25-50 mg     rosuvastatin 40 MG tablet  Commonly known as:  CRESTOR   Take 1 Tab by mouth every day.  Dose:  40 mg     zolpidem 5 MG Tabs  Commonly known as:  AMBIEN   Take 2.5 mg by mouth every bedtime.  Dose:  2.5 mg           Disposition:   Discharge home    Diet:   Regular    Activity:   As tolerated    Code status:   Full code    Primary Care Provider:    Ant Bronson M.D.    Follow up appointment details :      PCP in 2 weeks  No follow-up provider specified.  Future Appointments  Date Time Provider Department Center   9/29/2017 2:20 PM CHIDI Morfin [facility]   10/11/2017 10:45 AM Carla Goode M.D. [facility] None       Pending Studies:        None    #################################################    Interval history/exam for day of discharge:    Vitals:    09/24/17 2357 09/25/17 0400 09/25/17 0813 09/25/17 1229   BP: 126/65 138/73 150/86 137/73   Pulse: 68 76 83 64   Resp: 18 16 17 13   Temp: 36.3 °C (97.4 °F) 36.7 °C (98 °F) 36.2 °C (97.2 °F) 36.4 °C (97.6 °F)   SpO2: 97% 97% 95% 94%   Weight:       Height:         Weight/BMI: Body mass index is 22.91 kg/m².  Pulse Oximetry: 94 %, O2 (LPM): 0, O2 Delivery: None (Room Air)    General:  NAD  CVS:  RRR  PULM:  CTAB, no respiratory distress    Most Recent Labs:    Lab Results   Component Value Date/Time    WBC 5.0 09/25/2017 01:25 AM    RBC 4.05 (L) 09/25/2017 01:25 AM    HEMOGLOBIN 12.4 09/25/2017 01:25 AM    HEMATOCRIT 38.1 09/25/2017 01:25 AM    MCV 94.1 09/25/2017 01:25 AM    MCH 30.6 09/25/2017 01:25 AM    MCHC 32.5 (L) 09/25/2017 01:25 AM    MPV 9.6 09/25/2017 01:25 AM    NEUTSPOLYS 46.30 09/25/2017 01:25 AM    LYMPHOCYTES 40.80 09/25/2017 01:25 AM    MONOCYTES 9.10 09/25/2017 01:25 AM    EOSINOPHILS 3.20 09/25/2017 01:25 AM    BASOPHILS 0.40 09/25/2017 01:25 AM      Lab Results   Component Value Date/Time    SODIUM 138 09/25/2017 01:25 AM    POTASSIUM 3.9 09/25/2017 01:25 AM    CHLORIDE 109 09/25/2017 01:25 AM    CO2 23 09/25/2017 01:25 AM    GLUCOSE 100 (H) 09/25/2017 01:25 AM    BUN 19 09/25/2017 01:25 AM    CREATININE 1.01 09/25/2017 01:25 AM      Lab Results   Component Value Date/Time    ALTSGPT 22 09/25/2017 01:25 AM    ASTSGOT 21 09/25/2017  01:25 AM    ALKPHOSPHAT 54 09/25/2017 01:25 AM    TBILIRUBIN 0.4 09/25/2017 01:25 AM    DBILIRUBIN <0.1 08/24/2012 08:45 AM    LIPASE 78 11/16/2016 01:11 AM    ALBUMIN 3.4 09/25/2017 01:25 AM    ALBUMIN 4.52 04/30/2015 07:54 AM    GLOBULIN 2.1 09/25/2017 01:25 AM    INR 1.01 05/12/2017 09:09 AM     Lab Results   Component Value Date/Time    PROTHROMBTM 13.6 05/12/2017 09:09 AM    INR 1.01 05/12/2017 09:09 AM        Imaging/ Testing:      ECHOCARDIOGRAM COMP W/O CONT   Final Result      MR-BRAIN-W/O   Final Result      MRI of the brain without contrast within normal limits for age with mild atrophy and mild white matter changes.      CT-HEAD W/O   Final Result      1.  No acute intracranial abnormality identified.      2.  Chronic findings include white matter change, cerebral volume loss, and atherosclerotic plaque      DX-CHEST-PORTABLE (1 VIEW)   Final Result      No acute cardiopulmonary abnormality identified.          Instructions:      The patient was instructed to return to the ER in the event of worsening symptoms. I have counseled the patient on the importance of compliance and the patient has agreed to proceed with all medical recommendations and follow up plan indicated above.   The patient understands that all medications come with benefits and risks. Risks may include permanent injury or death and these risks can be minimized with close reassessment and monitoring.

## 2017-09-25 NOTE — THERAPY
"Physical Therapy Evaluation completed.   Bed Mobility:  Supine to Sit: Supervised  Transfers: Sit to Stand: Supervised  Gait: Level Of Assist: Supervised with Single Point Cane       Plan of Care: Patient with no further skilled PT needs in the acute care setting at this time  Discharge Recommendations: Equipment: No Equipment Needed. Patient to fup with MD as outpatient.  Pt presents with reports of dizziness that improves with activity. Pt had increased dizziness with ambulation while turning head, but this was inconsistent. Pt did not have loss of balance with this, and was able to slow her pace or stop to allow it to clear and continue ambulation without issue. Pt reports that overall, this feels better today than it did yesterday. Pt has help as needed from spouse at home. Pt was educated in use of SPC until dizziness clears. Pt agreeable. No further inpt PT needs.     See \"Rehab Therapy-Acute\" Patient Summary Report for complete documentation.     "

## 2017-09-25 NOTE — DISCHARGE PLANNING
Discussed with bedside RN and hospitalist during POC rounds,  pt assessed for any discharge needs.  Pt has support at home and verbalized discharge plans to bedside RN.  No needs identified at this time.  Will continue to follow for any further needs. Appt for f/u made by schedulers.

## 2017-09-25 NOTE — PROGRESS NOTES
Report received, assumed patient care.  Pt A&OX4.  Family at bedside.  Assessment completed.  Call light within reach, personal belongings available, bed in lowest position, pt calling for assistance.  Pt reports no pain.  +dizziness.  Communication board updated, POC discussed.  Monitors reapplied, VSS.  No additional needs at this time.

## 2017-09-25 NOTE — PROGRESS NOTES
Stroke awareness and education provided to patient, patient verbalized understanding. No questions at this time. Stroke booklet provided.

## 2017-09-25 NOTE — PROGRESS NOTES
Pt resting calmly, with eyes closed, is stable with arousal, c/o dizziness with ambulation. Denies pain or needs at this time. Call light and personal possessions within reach, will continue to monitor.

## 2017-09-27 PROBLEM — I10 HTN (HYPERTENSION): Status: RESOLVED | Noted: 2017-09-24 | Resolved: 2017-09-27

## 2017-09-27 PROBLEM — I25.10 CORONARY ARTERY DISEASE WITH HISTORY OF MYOCARDIAL INFARCTION WITHOUT HISTORY OF CABG: Status: RESOLVED | Noted: 2017-09-24 | Resolved: 2017-09-27

## 2017-09-27 PROBLEM — E78.00 HIGH CHOLESTEROL: Status: RESOLVED | Noted: 2017-09-24 | Resolved: 2017-09-27

## 2017-09-27 PROBLEM — R42 VERTIGO: Status: RESOLVED | Noted: 2017-09-24 | Resolved: 2017-09-27

## 2017-09-27 PROBLEM — R79.89 ELEVATED TSH: Status: RESOLVED | Noted: 2017-09-25 | Resolved: 2017-09-27

## 2017-09-27 PROBLEM — R12 HEART BURN: Status: RESOLVED | Noted: 2017-09-24 | Resolved: 2017-09-27

## 2017-09-27 PROBLEM — I25.2 CORONARY ARTERY DISEASE WITH HISTORY OF MYOCARDIAL INFARCTION WITHOUT HISTORY OF CABG: Status: RESOLVED | Noted: 2017-09-24 | Resolved: 2017-09-27

## 2017-09-27 PROBLEM — Z86.73 HISTORY OF TIA (TRANSIENT ISCHEMIC ATTACK): Status: RESOLVED | Noted: 2017-09-24 | Resolved: 2017-09-27

## 2017-09-27 PROBLEM — Z86.79 HISTORY OF LEFT BUNDLE BRANCH BLOCK (LBBB): Status: RESOLVED | Noted: 2017-09-24 | Resolved: 2017-09-27

## 2017-09-29 ENCOUNTER — OFFICE VISIT (OUTPATIENT)
Dept: MEDICAL GROUP | Facility: MEDICAL CENTER | Age: 82
End: 2017-09-29
Payer: MEDICARE

## 2017-09-29 VITALS
HEART RATE: 72 BPM | DIASTOLIC BLOOD PRESSURE: 60 MMHG | OXYGEN SATURATION: 98 % | TEMPERATURE: 96.5 F | SYSTOLIC BLOOD PRESSURE: 130 MMHG | WEIGHT: 120 LBS | HEIGHT: 60 IN | BODY MASS INDEX: 23.56 KG/M2

## 2017-09-29 DIAGNOSIS — I10 ESSENTIAL HYPERTENSION: Chronic | ICD-10-CM

## 2017-09-29 DIAGNOSIS — Z86.73 HX OF TRANSIENT ISCHEMIC ATTACK (TIA): Chronic | ICD-10-CM

## 2017-09-29 DIAGNOSIS — E03.8 SUBCLINICAL HYPOTHYROIDISM: ICD-10-CM

## 2017-09-29 DIAGNOSIS — R26.89 BALANCE PROBLEM: ICD-10-CM

## 2017-09-29 DIAGNOSIS — E78.5 DYSLIPIDEMIA: Chronic | ICD-10-CM

## 2017-09-29 PROCEDURE — 99214 OFFICE O/P EST MOD 30 MIN: CPT | Performed by: INTERNAL MEDICINE

## 2017-09-29 NOTE — PROGRESS NOTES
Subjective:      Susie Craig is a 82 y.o. female who presents with  Hospital follow-up dizziness        HPI     Seen in ER on sunday for dizziness and admit to the hospital. Could not sleep and went to the couch to sleep. Got up next morning and got up and had difficulty with balance, no headache, no nausea, no emesis, no dizziness or room spinning sensation,No difficulty with speech or swallowing, no weakness of arms or legs, no slurring speech, no falls, no vision changes, no blurry vision or double vision, no incontinence of bowel or bladder.  Did have some head pressure but not a significant headache.  No history of migraine.  Patient with history of TIA.  Did not notice any palpitations.  No associated chest pain.  Since discharge feels better, no falls, balance has improved, No difficulty with coordination, no decreased strength upper or lower extremities.  No anxiety, no depression, mood unchanged.  Hypertension has been stable on metoprolol, off lisinopril secondary to hypotension.  No current headache, no tinnitus, no vertigo, no hearing loss  No history of positional vertigo  Hospital records reveiwed  9/24/17 hospital admission, 9/25/17 hospital discharge admitted for dizziness, CT head, MRI brain negative, echo unremarkable, discharged home  9/25/17 wbc 5.0  9/24/17 echo LV ejection fraction estimated 50-55%, normal diastolic function  9/24/17 CT head no acute abnormalities  9/24/17 MRI brain without negative, mild atrophy age-related  9/25/17 bun 19,creat 1.0,GFR 53  9/25/17 chol 116,trig 68,hdl 48,ldl 54  9/25/17 A1c 5.9%  9/25/17 tsh 4.3    Blood pressure runs 110-130s/70 and HR 60-70 on metoprolol off lisinopril and checks it on a regular basis, no chest pain or sob          Current Outpatient Prescriptions   Medication Sig Dispense Refill   • metoprolol (LOPRESSOR) 50 MG Tab Take 25-50 mg by mouth 2 times a day. 25 mg in the AM and 50 mg at HS     • zolpidem (AMBIEN) 5 MG Tab Take 2.5 mg  by mouth every bedtime.     • allopurinol (ZYLOPRIM) 100 MG Tab Take 2 Tabs by mouth every day. 180 Tab 3   • rosuvastatin (CRESTOR) 40 MG tablet Take 1 Tab by mouth every day. 30 Tab 11   • aspirin EC (ECOTRIN) 81 MG TBEC Take 81 mg by mouth every evening.       No current facility-administered medications for this visit.      Patient Active Problem List    Diagnosis Date Noted   • MEDICAL HOME      Priority: Low   • Pulmonary nodule 05/16/2017   • Hypertension 03/26/2017   • Neutropenia (HCC) 11/05/2015   • Decreased GFR 11/05/2015   • Insomnia 06/04/2012   • History of osteopenia 09/10/2011   • LBBB (left bundle branch block) 07/27/2011   • S/P KATY (total abdominal hysterectomy) 07/26/2011   • History of gout 07/26/2011   • Dyslipidemia 07/26/2011   • Hx of transient ischemic attack (TIA) 07/26/2011   • History of cataract 07/26/2011   • Preventative health care 07/26/2011   • S/P CABG x 3 07/06/2011          Status post KATY  Started premarin in her 50s; now on estradiol 0.5 mg daily  9/26/11 on estradiol 0.5 mg qod  6/4/12 on estradiol rec trying to go to [facility]  7/16/13 on estadiol [facility]  7/18/14 taper estradiol down to two days per week if possible  4/24/15 estradiol down to 1/2 tab three days per week  8/23/16 still taking estrace half a tablet 3 times week, declines other medication such as gabapentin or SSRI, will continue to work on taper understands risks for breast cancer  3/30/17 off HRT      Status post CABG  1985 PTCA  1997 PTCA  7/11 EKG LBBB  9/30/14 echo normal LV function, EF 70%, mild MR, mild to moderate tricuspid regurgitation, RVSP 43 to 48 mild to moderate pulmonary hypertension  10/17/14 holter monitor normal sinus rhythm with episode of sinus tachycardia, no symptoms documented, no arrhythmia or pauses, minimal heart rate 48, maximum heart rate 135, one PVC, supraventricular ectopic activity 20 beats, mostly all single PACs  11/29/15 echo normal LV function, septal hypokinesis and paradoxical  motion, grade 1 diastolic dysfunction, mild MR and TR  9/12/16 persantine thallium small area of ischemia inferolateral apex, possible nontransmural distal anterior wall myocardial infarct, normal ejection fraction  10/25/16 cardiology note discussed medical therapy versus coronary arteriography, patient elects to proceed with cardiac catheterization  11/2/16 cardiac catheterization proximal ostial LAD appears to have stent, high-grade 95% stenosis, beyond stent bifurcation diagonal branch LAD focal 95% stenosis, remainder widely patent, circumflex first marginal branch diffuse 75% stenosis, RCA distal smooth eccentric 60-70% stenosis, EF 68%, 3 vessel CAD with diffuse ostial, proximal and bifurcation LAD disease, referred to cardiac surgery for consideration CABG  11/5/16  cardiovascular surgery, CABG ×3 LIMA to LAD, reverse saphenous to second diagonal and first obtuse marginal  11/16/16 cardiac catheterization ejection fraction 56%, mid anterior wall hypokinesis/akinesis, left main normal, LAD ostial 50% long calcific stenosis, proximal LAD and diagonal bifurcation 95% stenosis, diagonal ostium 95% stenosis, mid LAD has 2 serial 50% stenosis with kinking and tenting from retraction of LIMA bypass graft anastomosis, complete MARCIN 2 antegrade flow in the distal LAD, circumflex mid OMB one insertion site saphenous vein graft to this vessel 90% stenosis with MARCIN 3 antegrade flow, second marginal widely patent, RCA distal 75% calcified stenosis,MARCIN 3 flow, this vessel was not bypassed, LIMA bypass graft angiography markedly tapering distal anastomosis, no additional stenosis; saphenous vein bypass graft angiography widely patent; conservative therapy recommended  11/21/16 cardiology note, CAD with recent CABG and SVG occlusion, continue aspirin, plavix, crestor, metoprolol (increase to 25 mg qam and 50 mg apm, lisinopril, Lasix as needed plus potassium for edema, referral cardiac rehabilitation, follow-up 3  months  12/14/16 echo mildly reduced left ventricular systolic function, EF 50%, grade 2 diastolic dysfunction  12/22/16 cardiology note stable on metoprolol and lisinopril, crestor, aspirin and plavix follow-up 3 months    9/24/17 echo LV ejection fraction estimated 50-55%, normal diastolic function      Right shoulder pain  8/4/15 decrease crestor to 20 mg and repeat labs    8/4/15 right shoulder pain x-ray and physical therapy pending, no benefit consider injection or MRI  8/5/15 xray right shoulders no acute fracture identified,mild degenerative changes  9/3/15 wbc 3.6(42%N,43%L)  9/3/15 bun 24,creat 0.9,GFR 54  9/3/15 ESR 13,CRP 0.1,CPK 81  9/3/15 completed physical therapy with symptoms improved     Preventative health  5/27/08 colon DHA told normal repeat 10 yrs  2009 pneumovax no webiz  9/26/11 td vaccine  6/4/12 zoster vaccine    8/14/13 dexa LS +1.2,hip -0.7  8/14/13 mammogram  7/29/14 vit d 28 increase to 4000 umits  10/10/14 declines mammogram  5/8/15 prevnar     Neutropenia  10/26/11 wbc 3.2 (47%N,38%L)  6/22/12 wbc 4.5(53%N,34%L)  9/23/13 wbc 4.5(53%N,35%L)  7/29/14 wbc 3.9(48%N,37%L)  4/30/15 wbc 4.3  9/3/15 wbc 3.6(42%N,43%L)  11/5/15 wbc 3.7(47%N,40%L)  12/9/16 wbc 5.0  9/25/17 wbc 5.0     Left bundle-branch block  7/11 EKG LBBB  9/30/14 echo normal LV function, EF 70%, mild MR, mild to moderate tricuspid regurgitation, RVSP 43 to 48 mild to moderate pulmonary hypertension  10/7/14 holter monitor normal sinus rhythm with episode of sinus tachycardia, no symptoms documented, no arrhythmia or pauses,minimal rate 48, maximum heart rate 135, one PVC, supraventricular ectopic activity 20 beats, mostly all single PACs  11/29/15 echo normal LV function, septal hypokinesis and paradoxical motion, grade 1 diastolic dysfunction, mild MR and TR  12/14/16 echo mildly reduced left ventricular systolic function, EF 50%, grade 2 diastolic dysfunction  9/24/17 echo LV ejection fraction estimated 50-55%, normal  diastolic function     Insomnia on Ambien     Hypertension  1985 PTCA  1997 PTCA  10/06 persantine thallium no ischemia, EF > 70%  10/08 urine mac 1.0  1/09 off hctz due to gout  7/11 EKG LBBB  8/11 [facility] carotid u/s left and right <40%  8/11 on metoprolol 50 am and 25 mg pm,and lisinopril 10 bid  8/12  cardiology note  7/29/14 urine mac 28, on metoprolol 50 mg qam and 25 mg qpm and lisinopril 10 mg bid  9/22/14 decrease metoprolol to 25 mg bid and cont lisinopril 10 mg bid due to postural lightheadedness  9/25/14 decrease lisinopril to 10 mg qday and continue metoprolol 50 mg 1/2 bid (sbp 97-99 when stand, sbp 110-120 sitting)  9/30/14 echo normal LV function, EF 70%, mild MR, mild to moderate tricuspid regurgitation, RVSP 43 to 48 mild to moderate pulmonary hypertension  10/2/14 stop metoprolol due to sbp  at times, continue lisinopril 10 mg    10/3/14 restart metoprolol 50 mg 1/2 tab per day and cont lisinopril 10 mg (HR )  10/6/14 seen ER lightheadedness workup negative more orthostatic symptoms, continue metorpolol 50 mg 1/2 qday (tapering off caused HR to increase) and decrease lisinopril to 10 mg 1/2 tab per day  10/17/14 holter monitor normal sinus rhythm with episode of sinus tachycardia, no symptoms documented, no arrhythmia or pauses, minimal rate 48, maximum heart rate 135, one PVC, supraventricular ectopic activity 20 beats, mostly all single PACs  4/24/15 on metoprolol 50 mg 1/2 tab per day, lisinopril 10 mg 1/2 tab per day,asa  11/29/15 echo normal LV function, septal hypokinesis and paradoxical motion, grade 1 diastolic dysfunction, mild MR and TR  12/14/16 echo mildly reduced left ventricular systolic function, EF 50%, grade 2 diastolic dysfunction  4/17/17  eye exam no vascular pathology  9/24/17 echo LV ejection fraction estimated 50-55%, normal diastolic function     History TIA  7/11 seen in ER [place] ; EKG LBBB, CT head non contrast negative  7/11 started on  plavix  8/11 [facility] carotid ultrasound left and right <40%  6/12 on aspirin a day, intolerant to plavix  9/30/14 echo normal LV function, EF 70%, mild MR, mild to moderate tricuspid regurgitation, RVSP 43 to 48 mild to moderate pulmonary hypertension  8/2/15 on asa    11/29/15 CT head negative  11/9/15 MRI brain without; age-appropriate atrophy mild chronic microvascular ischemic disease  11/29/15 carotid ultrasound negative  12/14/16 echo mildly reduced left ventricular systolic function, EF 50%, grade 2 diastolic dysfunction  4/17/17  eye exam no vascular pathology  5/12/17 CT-CTA neck with and without postprocessing normal  5/12/17 CT-CTA head with and without postprocessing normal [place]  9/24/17 CT head no acute abnormalities  9/24/17 MRI brain without negative, mild atrophy age-related     History osteopenia  1/08 dexa LS +0.2,hip +1.1  9/11 dexa LS +0.5, hip -1.0  10/11 vit d 30    8/14/13 dexa LS +1.2,hip -0.7  7/29/14 vit d 28     History of gout  1/09 started on allopurinol for uric acid 8.3, off hctz  1/11 uric acid 3.7 on allopurinol 200 mg daily  10/11 uric acid 4.6 on allopurinol 200 mg  3/12 colchicine prn gout flare up (do not take crestor with colchicine)  3/13 uric 4.6 on allopurinol 200 mg  7/29/14 uric 3.7 on allopurinol 200 mg  11/5/15 uric 3.9 on allopurinol 200 mg     History of cataract     Dyslipidemia  intolerant of lipitor  4/11 chol 200,trig 79,hdl 82,ldl 102 on lipitor 80 with elevated LFT (,, hep panel negative)  5/11 chol 280,trig 137,hdl 70,ldl 183 off statin (AST 21,ALT 26)    10/11 chol 219,trig 65,hdl 65,ldl 141 on zocor 40 mg; monitored by snca (AST 24,ALT 24)  11/2/11 [facility] change zocor 40 mg to crestor 40 mg  1/12 chol 161,trig 80,hdl 70,ldl 75 on crestor 40 mg  8/12 chol 170,trig 57,hdl 66,ldl 93 on crestor 40 mg per [facility], consider zetia in the future (intolerant lipitor)  3/13 chol 193,trig 46,hdl 90,ldl 94,LDL-P 923,HDL-P 51,LP-IR 37 on crestor 40  mg  9/23/13 chol 180,trig 92,hdl 70,ldl 92 on crestor 40 mg    10/1/13 samples zetia 10 mg, cont crestor 40 mg repeat labs 4 weeks  7/29/14 chol 186,trig 76,hdl 71,ldl 100; on crestor 40 mg consider decreasing dose  4/30/15 chol 182,trig 69,hdl 76,ldl 92 on crestor 40 mg  8/4/15 decrease crestor to 20 mg and repeat labs    9/3/15 ESR 13,CRP 0.1,CPK 81, crestor has been decreased to 20 mg  11/5/15 chol 191,trig 77,hdl 71,ldl 105 on crestor 20 mg (40 mg tab 1/2 per day)  12/29/16 chol 136,trig 82,hdl 46,ldl 74,cpk 53 on crestor 40 mg 1/2 per day  4/3/17 chol 152,trig 86,hdl 62,ldl 73,cpk 53 on crestor 40 mg    9/25/17 chol 116,trig 68,hdl 48,ldl 54 on crestor 40 mg   (intolerant of lipitor)     Decreased GFR  10/26/11 bun 12,creat 0.8,GFR>60  6/22/12 bun 17,creat 0.9,GFR 59  3/28/13 bun 18,creat 1.3,GFR 39  9/23/13 bun 19,creat 1.0,GFR 51  7/29/14 bun 24,creat 1.2,GFR 42  9/3/15 bun 24,creat 0.9,GFR 54  11/29/15 bun 16,creat 1.0,GFR 52  12/29/16 bun 15,creat 0.8,GFR 55  4/3/17 bun 20,creat 1.1,GFR 46  9/25/17 bun 19,creat 1.0,GFR 53             ROS       Objective:     LMP 07/06/2011      Physical Exam   Constitutional: She appears well-developed and well-nourished. No distress.   HENT:   Head: Normocephalic and atraumatic.   Right Ear: External ear normal.   Left Ear: External ear normal.   Eyes: Conjunctivae are normal. Right eye exhibits no discharge. Left eye exhibits no discharge.   Neck: Neck supple. No JVD present. No thyromegaly present.   Cardiovascular: Normal rate and regular rhythm.    Pulmonary/Chest: Effort normal and breath sounds normal. No respiratory distress. She has no wheezes.   Abdominal: She exhibits no distension.   Musculoskeletal: She exhibits no edema.   Neurological: She is alert.   Skin: Skin is warm. She is not diaphoretic.   Psychiatric: She has a normal mood and affect. Her behavior is normal.   Nursing note and vitals reviewed.    Cranial nerves II-12 intact  Normal finger-to-nose, no  tremor, no nystagmus  Normal strength upper and lower extremity bilateral  Deep tendon reflexes +1 patellar bilateral  Negative Romberg  Normal cervical range of motion, normal gait  No lower extremity edema              Assessment/Plan:     Assessment  #! Recent hospital admission difficulty with balance, not positional or related to head movement, no associated tinnitus, vertigo, vision changes or visual loss, no evidence of TIA or stroke on imaging studies or by symptoms. Question vestibular neuronitis, atypical migraine, symptoms have resolved    #2 history of TIA nor recurrence, on aspirin unable to tolerate Plavix    #3 hypertension stable on metoprolol    #4 history CABG stable no recurrent symptoms    #5 dyslipidemia 9/25/17 chol 116,trig 68,hdl 48,ldl 54 stable on Crestor no muscle pain or muscle aches    #6 impaired fasting blood sugar 9/25/17 A1c 5.9% diet-controlled    #7 subclinical hypothyroid 9/25/17 tsh 4.3 no medications necessary    #8 decreased GFR 9/25/17 bun 19,creat 1.0,GFR 53, stable no evidence of fluid overload, no regular NSAIDs        Plan  #1 reassurance, symptoms are improving, no further workup necessary, notify us for recurrent symptoms    #2 hospital records reviewed    #3 has had influenza vaccine    #4 continue aspirin    #5 check blood pressure on metoprolol, orthostatic precautions    #6 continue hydration status, avoid alcohol, caffeine    #7 declines physical therapy    #8 monitor TSH    #9 lifestyle modification, nutrition, exercise discussed    #10 follow-up 4 weeks

## 2017-10-11 ENCOUNTER — OFFICE VISIT (OUTPATIENT)
Dept: CARDIOLOGY | Facility: MEDICAL CENTER | Age: 82
End: 2017-10-11
Payer: MEDICARE

## 2017-10-11 VITALS
SYSTOLIC BLOOD PRESSURE: 124 MMHG | HEART RATE: 72 BPM | OXYGEN SATURATION: 93 % | WEIGHT: 121.8 LBS | HEIGHT: 60 IN | DIASTOLIC BLOOD PRESSURE: 80 MMHG | BODY MASS INDEX: 23.91 KG/M2

## 2017-10-11 DIAGNOSIS — I25.10 CORONARY ARTERY DISEASE INVOLVING NATIVE CORONARY ARTERY OF NATIVE HEART WITHOUT ANGINA PECTORIS: ICD-10-CM

## 2017-10-11 DIAGNOSIS — Z95.1 S/P CABG X 3: ICD-10-CM

## 2017-10-11 PROCEDURE — 99214 OFFICE O/P EST MOD 30 MIN: CPT | Performed by: INTERNAL MEDICINE

## 2017-10-11 ASSESSMENT — ENCOUNTER SYMPTOMS
COUGH: 0
WEIGHT LOSS: 0
MUSCULOSKELETAL NEGATIVE: 1
GASTROINTESTINAL NEGATIVE: 1
EYES NEGATIVE: 1
SHORTNESS OF BREATH: 0
FALLS: 0
DEPRESSION: 0
DIZZINESS: 0
MEMORY LOSS: 0
NERVOUS/ANXIOUS: 0
INSOMNIA: 0
LOSS OF CONSCIOUSNESS: 0
PALPITATIONS: 0

## 2017-10-11 NOTE — PROGRESS NOTES
Subjective:   Susie Craig is a 82 y.o. female who presents today in follow-up in regards to her coronary arterial bypass surgery last fall in the setting of hypertension and hyperlipidemia     In with her   Much more settled, medications as directed      Past Medical History:   Diagnosis Date   • Arthritis    • Back pain    • Breath shortness     with exertion   • CAD (coronary artery disease)     CABG X3 in '16 with graft occlusion post-op   • Cataract     bilat IOL    • Gout    • Heart burn    • High cholesterol    • Hypertension    • Indigestion    • MEDICAL HOME    • Stroke (CMS-HCC)     TIA      Past Surgical History:   Procedure Laterality Date   • MULTIPLE CORONARY ARTERY BYPASS ENDO VEIN HARVEST  2016    Procedure: MULTIPLE CORONARY ARTERY BYPASS ENDO VEIN HARVEST;  Surgeon: Jeff Naranjo M.D.;  Location: SURGERY [facility];  Service:    • ABIODUN  2016    Procedure: ABIODUN;  Surgeon: Jeff Naranjo M.D.;  Location: SURGERY [facility];  Service:    • CATARACT PHACO WITH IOL  2009    Performed by SOLOMON THOMAS at SURGERY SAME DAY [facility] ORS   • CATARACT PHACO WITH IOL  2009    Performed by SOLOMON THOMAS at SURGERY SAME DAY [facility] ORS   • GYN SURGERY  1953    Hysterectomy during last C Section    • ANGIOPLASTY  87, 89   • CARDIAC CATH     • GYN SURGERY      C Section x4   • OTHER      c-sections x4     Family History   Problem Relation Age of Onset   • Cancer Mother      uterine   • Arthritis Mother      gout   • Heart Disease Father      MI age 50s   • Heart Disease Brother       40 MI   • Hyperlipidemia Sister      History   Smoking Status   • Former Smoker   • Packs/day: 0.50   • Years: 32.00   • Types: Cigarettes   • Quit date: 1988   Smokeless Tobacco   • Never Used     Comment: quit , approx 30pyh     Allergies   Allergen Reactions   • Lipitor [Atorvastatin Calcium] Unspecified     Elevated LFT  RXN=long time ago     Outpatient  Encounter Prescriptions as of 10/11/2017   Medication Sig Dispense Refill   • metoprolol (LOPRESSOR) 50 MG Tab Take 25-50 mg by mouth 2 times a day. 25 mg in the AM and 50 mg at HS     • zolpidem (AMBIEN) 5 MG Tab Take 2.5 mg by mouth every bedtime.     • allopurinol (ZYLOPRIM) 100 MG Tab Take 2 Tabs by mouth every day. 180 Tab 3   • rosuvastatin (CRESTOR) 40 MG tablet Take 1 Tab by mouth every day. 30 Tab 11   • aspirin EC (ECOTRIN) 81 MG TBEC Take 81 mg by mouth every evening.       No facility-administered encounter medications on file as of 10/11/2017.      Review of Systems   Constitutional: Negative for malaise/fatigue and weight loss.   Eyes: Negative.    Respiratory: Negative for cough and shortness of breath.    Cardiovascular: Negative for chest pain, palpitations and leg swelling.   Gastrointestinal: Negative.    Genitourinary: Negative.    Musculoskeletal: Negative.  Negative for falls.   Neurological: Negative for dizziness and loss of consciousness.   Endo/Heme/Allergies: Negative.    Psychiatric/Behavioral: Negative for depression and memory loss. The patient is not nervous/anxious and does not have insomnia.    All other systems reviewed and are negative.       Objective:   /80   Pulse 72   Ht 1.524 m (5')   Wt 55.2 kg (121 lb 12.8 oz)   LMP 07/06/2011   SpO2 93%   BMI 23.79 kg/m²     Physical Exam   Constitutional: She is oriented to person, place, and time. She appears well-nourished.   HENT:   Head: Normocephalic and atraumatic.   Mouth/Throat: No oropharyngeal exudate.   Eyes: EOM are normal. Pupils are equal, round, and reactive to light. No scleral icterus.   Neck: No JVD present. No thyromegaly present.   Cardiovascular: Regular rhythm and intact distal pulses.  Exam reveals no friction rub.    No murmur heard.  Well-healed sternal scar   Pulmonary/Chest: Breath sounds normal. No respiratory distress.   Abdominal: Soft. Bowel sounds are normal. She exhibits no distension.    Musculoskeletal: She exhibits no edema.   Neurological: She is alert and oriented to person, place, and time. No cranial nerve deficit. She exhibits normal muscle tone.   Skin: Skin is dry. No rash noted.   Psychiatric: She has a normal mood and affect. Her behavior is normal.       Assessment:     1. S/P CABG x 3     2. Coronary artery disease involving native coronary artery of native heart without angina pectoris         Medical Decision Making:  Today's Assessment / Status / Plan:     Coronary artery disease,2016 open-heart surgery. She is on statin and aspirin. Recent lab work normal and reassuring. Echo last year reviewed as well as carotids. Both low risk and normal     Anxiety and situational stress. Resolved,  has memory loss    Lipids as above  Hypertension, good control and we reviewed her logs, no complaints    RTC 6 months, discuss labs and exercise. Not interested in cardiac rehab

## 2017-10-26 ENCOUNTER — PATIENT OUTREACH (OUTPATIENT)
Dept: HEALTH INFORMATION MANAGEMENT | Facility: OTHER | Age: 82
End: 2017-10-26

## 2017-10-26 NOTE — PROGRESS NOTES
Attempt #:1    WebIZ Checked & Epic Updated: Epic matches WebIZ  · WebIZ Recommendations: Patient is up to date on all vaccines  · Is patient due for Tdap? NO  · Is patient due for Shingles? NO  HealthConnect Verified: yes  Verify PCP: yes    Communication Preference Obtained: yes     Review Care Team: yes    Annual Wellness Visit Scheduling  1. Scheduling Status:Scheduled     Care Gap Scheduling (Attempt to Schedule EACH Overdue Care Gap!)     There are no preventive care reminders to display for this patient.     Scheduled patient for Annual Wellness Visit       Startups Activation: already active  Startups Basilia: no  Virtual Visits: no  Opt In to Text Messages: no

## 2017-10-26 NOTE — PROGRESS NOTES
Outcome: Left Message    Please transfer to Patient Outreach Team at [phone] when patient returns call.    WebIZ Checked & Epic Updated:  no    HealthConnect Verified: yes    Attempt # 1

## 2017-11-02 ENCOUNTER — TELEPHONE (OUTPATIENT)
Dept: MEDICAL GROUP | Facility: MEDICAL CENTER | Age: 82
End: 2017-11-02

## 2017-11-02 NOTE — TELEPHONE ENCOUNTER
"ANNUAL WELLNESS VISIT PRE-VISIT PLANNING     1.  Reviewed note from last office visit with PCP: YES    2.  If any orders were placed at last visit, do we have Results/Consult Notes?        •  Labs - {PVP LABS:}   Note: If patient appointment is for lab review and patient did not complete labs,                check with provider if OK to reschedule patient until labs completed.       •  Imaging - {PVP IMAGIN}       •  Referrals - {PVP REFERRAL:}    3.  Immunizations were updated in Epic using WebIZ?: {PVP WEBIZ:}       •  WebIZ Recommendations: {MA VISIT IZ NO SERIES ADULT:58699}       •  Is patient due for Tdap? {PVP YES/NO COPAY:}       •  Is patient due for Shingles? {PVP YES/NO COPAY:}     4.  Patient is due for the following Health Maintenance Topics:   There are no preventive care reminders to display for this patient.    {PVP HM DUE:}    5.  Reviewed/Updated the following with patient:       •   Preferred Pharmacy? {PVP YES/NO (YES DEF):::\"YES\"}       •   Preferred Lab? {PVP YES/NO (YES DEF):::\"YES\"}       •   Preferred Communication? {PVP YES/NO (YES DEF):::\"YES\"}       •   Allergies? {PVP YES/NO (YES DEF):::\"YES\"}       •   Medications? {PVP ABSTRACT UPDTD:}       •   Social History? {PVP ABSTRACT UPDTD:}       •   Family History (document living status of immediate family members and if + hx of cancer, diabetes, hypertension, hyperlipidemia, heart attack, stroke) {PVP ABSTRACT UPDTD:}    6.  Care Team Updated:       •   DME Company (gait device, O2, CPAP, etc.): {PVP YES/NO/NA:}       •   Other Specialists (eye doctor, derm, GYN, cardiology, endo, etc): {PVP YES/NO/NA:}    7.  Patient has the following Care Path diagnoses on Problem List:  {PVP CAREPATH DX:}    8.  Specialty Comments was updated with diagnosis information provided by [facility]: {PVP YES/NO/NA:}    9.  Patient {WAS/WAS NOT:38} advised: “This is a free " wellness visit. The provider will screen for medical conditions to help you stay healthy. If you have other concerns to address you may be asked to discuss these at a separate visit or there may be an additional fee.”     6.  Patient {PVP WAS/WAS NOT CAP:64294} informed to arrive 15 min prior to their scheduled appointment and bring in their medication bottles.

## 2017-11-28 ENCOUNTER — TELEPHONE (OUTPATIENT)
Dept: MEDICAL GROUP | Facility: MEDICAL CENTER | Age: 82
End: 2017-11-28

## 2017-11-28 ENCOUNTER — OFFICE VISIT (OUTPATIENT)
Dept: MEDICAL GROUP | Facility: MEDICAL CENTER | Age: 82
End: 2017-11-28
Payer: MEDICARE

## 2017-11-28 ENCOUNTER — HOSPITAL ENCOUNTER (OUTPATIENT)
Facility: MEDICAL CENTER | Age: 82
End: 2017-11-28
Attending: INTERNAL MEDICINE
Payer: MEDICARE

## 2017-11-28 VITALS
OXYGEN SATURATION: 96 % | WEIGHT: 121 LBS | DIASTOLIC BLOOD PRESSURE: 60 MMHG | SYSTOLIC BLOOD PRESSURE: 130 MMHG | TEMPERATURE: 96.8 F | HEART RATE: 80 BPM | HEIGHT: 60 IN | BODY MASS INDEX: 23.75 KG/M2

## 2017-11-28 DIAGNOSIS — I44.7 LBBB (LEFT BUNDLE BRANCH BLOCK): Chronic | ICD-10-CM

## 2017-11-28 DIAGNOSIS — M81.0 POSTMENOPAUSAL BONE LOSS: ICD-10-CM

## 2017-11-28 DIAGNOSIS — I10 ESSENTIAL HYPERTENSION: Chronic | ICD-10-CM

## 2017-11-28 DIAGNOSIS — Z95.1 S/P CABG X 3: ICD-10-CM

## 2017-11-28 DIAGNOSIS — R35.0 URINARY FREQUENCY: ICD-10-CM

## 2017-11-28 LAB
APPEARANCE UR: ABNORMAL
BACTERIA #/AREA URNS HPF: NEGATIVE /HPF
BILIRUB UR QL STRIP.AUTO: NEGATIVE
CAOX CRY #/AREA URNS HPF: NORMAL /HPF
COLOR UR: YELLOW
CULTURE IF INDICATED INDCX: YES UA CULTURE
EPI CELLS #/AREA URNS HPF: NEGATIVE /HPF
GLUCOSE UR STRIP.AUTO-MCNC: NEGATIVE MG/DL
HYALINE CASTS #/AREA URNS LPF: NORMAL /LPF
KETONES UR STRIP.AUTO-MCNC: NEGATIVE MG/DL
LEUKOCYTE ESTERASE UR QL STRIP.AUTO: ABNORMAL
MICRO URNS: ABNORMAL
NITRITE UR QL STRIP.AUTO: NEGATIVE
PH UR STRIP.AUTO: 5.5 [PH]
PROT UR QL STRIP: 100 MG/DL
RBC # URNS HPF: NORMAL /HPF
RBC UR QL AUTO: NEGATIVE
SP GR UR STRIP.AUTO: 1.02
UROBILINOGEN UR STRIP.AUTO-MCNC: 0.2 MG/DL
WBC #/AREA URNS HPF: NORMAL /HPF

## 2017-11-28 PROCEDURE — 81001 URINALYSIS AUTO W/SCOPE: CPT

## 2017-11-28 PROCEDURE — 87086 URINE CULTURE/COLONY COUNT: CPT

## 2017-11-28 PROCEDURE — 99213 OFFICE O/P EST LOW 20 MIN: CPT | Performed by: INTERNAL MEDICINE

## 2017-11-28 NOTE — PROGRESS NOTES
Subjective:      Susie Craig is a 82 y.o. female who presents with Other (Urinary issue)            HPI   Has three weeks of urinary symptoms worse recently, more frequency, some burning, no blood in urine, no change in color or odor, no flank pain, no fever or chills, no did have more urgency, no nausea or emesis, no recurrent UTI or urinary stones, some nocturia   Symptoms have actually improved now, the symptoms, no burning with urination occasional frequency, no flank pain, no abdominal pain  History cabg on lopressor, crestor and asa   No tobacco, no alcohol  Medications and allergies reviewed  No chest pain, no palpitations   Pulmonary nodule, no tobacco, follow-up CT scan pending on Friday, no cough, no hemoptysis, no fevers or chills      Current Outpatient Prescriptions   Medication Sig Dispense Refill   • metoprolol (LOPRESSOR) 50 MG Tab Take 25-50 mg by mouth 2 times a day. 25 mg in the AM and 50 mg at HS     • zolpidem (AMBIEN) 5 MG Tab Take 2.5 mg by mouth every bedtime.     • allopurinol (ZYLOPRIM) 100 MG Tab Take 2 Tabs by mouth every day. 180 Tab 3   • rosuvastatin (CRESTOR) 40 MG tablet Take 1 Tab by mouth every day. 30 Tab 11   • aspirin EC (ECOTRIN) 81 MG TBEC Take 81 mg by mouth every evening.       No current facility-administered medications for this visit.      Patient Active Problem List   Diagnosis   • S/P CABG x 3   • S/P KATY (total abdominal hysterectomy)   • History of gout   • Dyslipidemia   • Hx of transient ischemic attack (TIA)   • History of cataract   • Preventative health care   • LBBB (left bundle branch block)   • History of osteopenia   • Insomnia   • MEDICAL HOME   • Neutropenia (HCC)   • Decreased GFR   • Coronary artery disease involving native coronary artery of native heart without angina pectoris   • Hypertension   • Pulmonary nodule   • Subclinical hypothyroidism       Subclinical hypothyroid  7/29/14 tsh 3.9  10/6/14 tsh 4.3   4/30/15 tsh 4.9  11/3/15 tsh  4.8  11/16/16 tsh 9.0  9/25/17 tsh 4.3     Status post KATY  Started premarin in her 50s; now on estradiol 0.5 mg daily  9/26/11 on estradiol 0.5 mg qod  6/4/12 on estradiol rec trying to go to MW  7/16/13 on estadiol MW  7/18/14 taper estradiol down to two days per week if possible  4/24/15 estradiol down to 1/2 tab three days per week  8/23/16 still taking estrace half a tablet 3 times week, declines other medication such as gabapentin or SSRI, will continue to work on taper understands risks for breast cancer  3/30/17 off HRT      Status post CABG  1985 PTCA  1997 PTCA  7/11 EKG LBBB  9/30/14 echo normal LV function, EF 70%, mild MR, mild to moderate tricuspid regurgitation, RVSP 43 to 48 mild to moderate pulmonary hypertension  10/17/14 holter monitor normal sinus rhythm with episode of sinus tachycardia, no symptoms documented, no arrhythmia or pauses, minimal heart rate 48, maximum heart rate 135, one PVC, supraventricular ectopic activity 20 beats, mostly all single PACs  11/29/15 echo normal LV function, septal hypokinesis and paradoxical motion, grade 1 diastolic dysfunction, mild MR and TR  9/12/16 persantine thallium small area of ischemia inferolateral apex, possible nontransmural distal anterior wall myocardial infarct, normal ejection fraction  10/25/16 cardiology note discussed medical therapy versus coronary arteriography, patient elects to proceed with cardiac catheterization  11/2/16 cardiac catheterization proximal ostial LAD appears to have stent, high-grade 95% stenosis, beyond stent bifurcation diagonal branch LAD focal 95% stenosis, remainder widely patent, circumflex first marginal branch diffuse 75% stenosis, RCA distal smooth eccentric 60-70% stenosis, EF 68%, 3 vessel CAD with diffuse ostial, proximal and bifurcation LAD disease, referred to cardiac surgery for consideration CABG  11/5/16  cardiovascular surgery, CABG ×3 LIMA to LAD, reverse saphenous to second diagonal and first  obtuse marginal  11/16/16 cardiac catheterization ejection fraction 56%, mid anterior wall hypokinesis/akinesis, left main normal, LAD ostial 50% long calcific stenosis, proximal LAD and diagonal bifurcation 95% stenosis, diagonal ostium 95% stenosis, mid LAD has 2 serial 50% stenosis with kinking and tenting from retraction of LIMA bypass graft anastomosis, complete MARCIN 2 antegrade flow in the distal LAD, circumflex mid OMB one insertion site saphenous vein graft to this vessel 90% stenosis with MARCIN 3 antegrade flow, second marginal widely patent, RCA distal 75% calcified stenosis,MARCIN 3 flow, this vessel was not bypassed, LIMA bypass graft angiography markedly tapering distal anastomosis, no additional stenosis; saphenous vein bypass graft angiography widely patent; conservative therapy recommended  11/21/16 cardiology note, CAD with recent CABG and SVG occlusion, continue aspirin, plavix, crestor, metoprolol (increase to 25 mg qam and 50 mg apm, lisinopril, Lasix as needed plus potassium for edema, referral cardiac rehabilitation, follow-up 3 months  12/14/16 echo mildly reduced left ventricular systolic function, EF 50%, grade 2 diastolic dysfunction  12/22/16 cardiology note stable on metoprolol and lisinopril, crestor, aspirin and plavix follow-up 3 months    9/24/17 echo LV ejection fraction estimated 50-55%, normal diastolic function    Right shoulder pain  8/4/15 decrease crestor to 20 mg and repeat labs    8/4/15 right shoulder pain x-ray and physical therapy pending, no benefit consider injection or MRI  8/5/15 xray right shoulders no acute fracture identified,mild degenerative changes  9/3/15 wbc 3.6(42%N,43%L)  9/3/15 bun 24,creat 0.9,GFR 54  9/3/15 ESR 13,CRP 0.1,CPK 81  9/3/15 completed physical therapy with symptoms improved    Pulmonary nodule  5/12/17 CT-CTA neck with and without postprocessing normal; incidental finding patchy nodular opacities anterior aspect right upper lobe 1.3 cm could be  secondary to scarring, follow-up recommended  5/16/17 CT thorax small group of focal opacification slightly irregular borders right upper lobe unchanged compared to previous neck CT, largest 1.3 cm likely inflammatory or fibrotic lesions, consider follow-up CT 6 months, spherical 6.6 mm noncalcified nodule right lower lobe, additional 3 mm nodules each lower lobe, follow-up recommended 6 months, mild dilation thoracic descending aorta 3.8 cm      Preventative health  5/27/08 colon D[facility] told normal repeat 10 yrs  2009 pneumovax no webiz  9/26/11 td vaccine  6/4/12 zoster vaccine    8/14/13 dexa LS +1.2,hip -0.7  8/14/13 mammogram  7/29/14 vit d 28 increase to 4000 umits  10/10/14 declines mammogram  5/8/15 prevnar     Neutropenia  10/26/11 wbc 3.2 (47%N,38%L)  6/22/12 wbc 4.5(53%N,34%L)  9/23/13 wbc 4.5(53%N,35%L)  7/29/14 wbc 3.9(48%N,37%L)  4/30/15 wbc 4.3  9/3/15 wbc 3.6(42%N,43%L)  11/5/15 wbc 3.7(47%N,40%L)  12/9/16 wbc 5.0  9/25/17 wbc 5.0     Left bundle-branch block  7/11 EKG LBBB  9/30/14 echo normal LV function, EF 70%, mild MR, mild to moderate tricuspid regurgitation, RVSP 43 to 48 mild to moderate pulmonary hypertension  10/7/14 holter monitor normal sinus rhythm with episode of sinus tachycardia, no symptoms documented, no arrhythmia or pauses,minimal rate 48, maximum heart rate 135, one PVC, supraventricular ectopic activity 20 beats, mostly all single PACs  11/29/15 echo normal LV function, septal hypokinesis and paradoxical motion, grade 1 diastolic dysfunction, mild MR and TR  9/12/16 persantine thallium small area of ischemia inferolateral apex, possible nontransmural distal anterior wall myocardial infarct, normal ejection fraction  10/25/16 cardiology note discussed medical therapy versus coronary arteriography, patient elects to proceed with cardiac catheterization  11/2/16 cardiac catheterization proximal ostial LAD appears to have stent, high-grade 95% stenosis, beyond stent bifurcation  diagonal branch LAD focal 95% stenosis, remainder widely patent, circumflex first marginal branch diffuse 75% stenosis, RCA distal smooth eccentric 60-70% stenosis, EF 68%, 3 vessel CAD with diffuse ostial, proximal and bifurcation LAD disease, referred to cardiac surgery for consideration CABG  11/5/16  cardiovascular surgery, CABG ×3 LIMA to LAD, reverse saphenous to second diagonal and first obtuse marginal  11/16/16 cardiac catheterization ejection fraction 56%, mid anterior wall hypokinesis/akinesis, left main normal, LAD ostial 50% long calcific stenosis, proximal LAD and diagonal bifurcation 95% stenosis, diagonal ostium 95% stenosis, mid LAD has 2 serial 50% stenosis with kinking and tenting from retraction of LIMA bypass graft anastomosis, complete MARCIN 2 antegrade flow in the distal LAD, circumflex mid OMB one insertion site saphenous vein graft to this vessel 90% stenosis with MARCIN 3 antegrade flow, second marginal widely patent, RCA distal 75% calcified stenosis,MARCIN 3 flow, this vessel was not bypassed, LIMA bypass graft angiography markedly tapering distal anastomosis, no additional stenosis; saphenous vein bypass graft angiography widely patent; conservative therapy recommended  11/21/16 cardiology note, CAD with recent CABG and SVG occlusion, continue aspirin, plavix, crestor, metoprolol (increase to 25 mg qam and 50 mg apm, lisinopril, Lasix as needed plus potassium for edema, referral cardiac rehabilitation, follow-up 3 months  12/14/16 echo mildly reduced left ventricular systolic function, EF 50%, grade 2 diastolic dysfunction  12/22/16 cardiology note stable on metoprolol and lisinopril, crestor, aspirin and plavix follow-up 3 months    9/24/17 echo LV ejection fraction estimated 50-55%, normal diastolic function     Insomnia on Ambien     Hypertension  1985 PTCA  1997 PTCA  10/06 persantine thallium no ischemia, EF > 70%  10/08 urine mac 1.0  1/09 off hctz due to gout  7/11 EKG  LBBB  8/11 [facility] carotid u/s left and right <40%  8/11 on metoprolol 50 am and 25 mg pm,and lisinopril 10 bid  8/12  cardiology note  7/29/14 urine mac 28, on metoprolol 50 mg qam and 25 mg qpm and lisinopril 10 mg bid  9/22/14 decrease metoprolol to 25 mg bid and cont lisinopril 10 mg bid due to postural lightheadedness  9/25/14 decrease lisinopril to 10 mg qday and continue metoprolol 50 mg 1/2 bid (sbp 97-99 when stand, sbp 110-120 sitting)  9/30/14 echo normal LV function, EF 70%, mild MR, mild to moderate tricuspid regurgitation, RVSP 43 to 48 mild to moderate pulmonary hypertension  10/2/14 stop metoprolol due to sbp  at times, continue lisinopril 10 mg    10/3/14 restart metoprolol 50 mg 1/2 tab per day and cont lisinopril 10 mg (HR )  10/6/14 seen ER lightheadedness workup negative more orthostatic symptoms, continue metorpolol 50 mg 1/2 qday (tapering off caused HR to increase) and decrease lisinopril to 10 mg 1/2 tab per day  10/17/14 holter monitor normal sinus rhythm with episode of sinus tachycardia, no symptoms documented, no arrhythmia or pauses, minimal rate 48, maximum heart rate 135, one PVC, supraventricular ectopic activity 20 beats, mostly all single PACs  4/24/15 on metoprolol 50 mg 1/2 tab per day, lisinopril 10 mg 1/2 tab per day,asa  11/29/15 echo normal LV function, septal hypokinesis and paradoxical motion, grade 1 diastolic dysfunction, mild MR and TR  12/14/16 echo mildly reduced left ventricular systolic function, EF 50%, grade 2 diastolic dysfunction  4/17/17  eye exam no vascular pathology  9/24/17 echo LV ejection fraction estimated 50-55%, normal diastolic function     History TIA  7/11 seen in ER [place] ; EKG LBBB, CT head non contrast negative  7/11 started on plavix  8/11 [facility] carotid ultrasound left and right <40%  6/12 on aspirin a day, intolerant to plavix  9/30/14 echo normal LV function, EF 70%, mild MR, mild to moderate tricuspid regurgitation,  RVSP 43 to 48 mild to moderate pulmonary hypertension  8/2/15 on asa    11/29/15 CT head negative  11/9/15 MRI brain without; age-appropriate atrophy mild chronic microvascular ischemic disease  11/29/15 carotid ultrasound negative  12/14/16 echo mildly reduced left ventricular systolic function, EF 50%, grade 2 diastolic dysfunction  4/17/17  eye exam no vascular pathology  5/12/17 CT-CTA neck with and without postprocessing normal  5/12/17 CT-CTA head with and without postprocessing normal [place] [facility] [place]  9/24/17 CT head no acute abnormalities  9/24/17 MRI brain without negative, mild atrophy age-related     History osteopenia  1/08 dexa LS +0.2,hip +1.1  9/11 dexa LS +0.5, hip -1.0  10/11 vit d 30    8/14/13 dexa LS +1.2,hip -0.7  7/29/14 vit d 28     History of gout  1/09 started on allopurinol for uric acid 8.3, off hctz  1/11 uric acid 3.7 on allopurinol 200 mg daily  10/11 uric acid 4.6 on allopurinol 200 mg  3/12 colchicine prn gout flare up (do not take crestor with colchicine)  3/13 uric 4.6 on allopurinol 200 mg  7/29/14 uric 3.7 on allopurinol 200 mg  11/5/15 uric 3.9 on allopurinol 200 mg     History of cataract     Dyslipidemia  intolerant of lipitor  4/11 chol 200,trig 79,hdl 82,ldl 102 on lipitor 80 with elevated LFT (,, hep panel negative)  5/11 chol 280,trig 137,hdl 70,ldl 183 off statin (AST 21,ALT 26)    10/11 chol 219,trig 65,hdl 65,ldl 141 on zocor 40 mg; monitored by [facility] (AST 24,ALT 24)  11/2/11 [facility] change zocor 40 mg to crestor 40 mg  1/12 chol 161,trig 80,hdl 70,ldl 75 on crestor 40 mg  8/12 chol 170,trig 57,hdl 66,ldl 93 on crestor 40 mg per [facility], consider zetia in the future (intolerant lipitor)  3/13 chol 193,trig 46,hdl 90,ldl 94,LDL-P 923,HDL-P 51,LP-IR 37 on crestor 40 mg  9/23/13 chol 180,trig 92,hdl 70,ldl 92 on crestor 40 mg    10/1/13 samples zetia 10 mg, cont crestor 40 mg repeat labs 4 weeks  7/29/14 chol 186,trig 76,hdl 71,ldl 100; on crestor 40 mg  consider decreasing dose  4/30/15 chol 182,trig 69,hdl 76,ldl 92 on crestor 40 mg  8/4/15 decrease crestor to 20 mg and repeat labs    9/3/15 ESR 13,CRP 0.1,CPK 81, crestor has been decreased to 20 mg  11/5/15 chol 191,trig 77,hdl 71,ldl 105 on crestor 20 mg (40 mg tab 1/2 per day)  12/29/16 chol 136,trig 82,hdl 46,ldl 74,cpk 53 on crestor 40 mg 1/2 per day  4/3/17 chol 152,trig 86,hdl 62,ldl 73,cpk 53 on crestor 40 mg    9/25/17 chol 116,trig 68,hdl 48,ldl 54 on crestor 40 mg   (intolerant of lipitor)     Decreased GFR  10/26/11 bun 12,creat 0.8,GFR>60  6/22/12 bun 17,creat 0.9,GFR 59  3/28/13 bun 18,creat 1.3,GFR 39  9/23/13 bun 19,creat 1.0,GFR 51  7/29/14 bun 24,creat 1.2,GFR 42  9/3/15 bun 24,creat 0.9,GFR 54  11/29/15 bun 16,creat 1.0,GFR 52  12/29/16 bun 15,creat 0.8,GFR 55  4/3/17 bun 20,creat 1.1,GFR 46  9/25/17 bun 19,creat 1.0,GFR 53            ROS       Objective:     [facility] 07/06/2011      Physical Exam   Constitutional: She appears well-developed and well-nourished. No distress.   HENT:   Head: Normocephalic and atraumatic.   Eyes: Conjunctivae are normal. Right eye exhibits no discharge. Left eye exhibits no discharge.   Neck: No JVD present. No thyromegaly present.   Cardiovascular: Normal rate, regular rhythm and normal heart sounds.    Pulmonary/Chest: Effort normal and breath sounds normal. No respiratory distress. She has no wheezes.   Abdominal: She exhibits no distension.   Neurological: She is alert.   Skin: Skin is warm. She is not diaphoretic.   Psychiatric: She has a normal mood and affect. Her behavior is normal.   Nursing note and vitals reviewed.    No CVA tenderness          Assessment/Plan:     Assessment  #! Recent urinary urgency and frequency, symptoms have improved, no flank pain, afebrile    #2 history CABG, followed by cardiology, stable no changes by cardiology recently, remains on Lopressor, Crestor, aspirin    #3 pulmonary nodule on CT follow-up pending      Plan  #1  point-of-care urinalysis patient was unable to give specimen today, provided her a urine cup, she will go to the lab and drop off a specimen, no antibiotics pending urinalysis    #2 CT follow-up pulmonary nodule this Friday    #3 bone density    #4 follow-up cardiology    #5 lifestyle modification, nutrition, exercise discussed

## 2017-11-29 ENCOUNTER — TELEPHONE (OUTPATIENT)
Dept: MEDICAL GROUP | Facility: MEDICAL CENTER | Age: 82
End: 2017-11-29

## 2017-11-30 ENCOUNTER — TELEPHONE (OUTPATIENT)
Dept: MEDICAL GROUP | Facility: MEDICAL CENTER | Age: 82
End: 2017-11-30

## 2017-11-30 LAB
BACTERIA UR CULT: NORMAL
SIGNIFICANT IND 70042: NORMAL
SOURCE SOURCE: NORMAL

## 2017-11-30 NOTE — TELEPHONE ENCOUNTER
----- Message from Ant Bronson M.D. sent at 11/29/2017  5:23 PM PST -----  Please notify patient that her urine test is negative for infection

## 2017-12-01 ENCOUNTER — HOSPITAL ENCOUNTER (OUTPATIENT)
Dept: RADIOLOGY | Facility: MEDICAL CENTER | Age: 82
End: 2017-12-01
Attending: INTERNAL MEDICINE
Payer: MEDICARE

## 2017-12-01 ENCOUNTER — TELEPHONE (OUTPATIENT)
Dept: MEDICAL GROUP | Facility: MEDICAL CENTER | Age: 82
End: 2017-12-01

## 2017-12-01 DIAGNOSIS — R91.1 PULMONARY NODULE: ICD-10-CM

## 2017-12-01 PROCEDURE — 71250 CT THORAX DX C-: CPT

## 2017-12-07 RX ORDER — ZOLPIDEM TARTRATE 5 MG/1
2.5 TABLET ORAL
Qty: 30 TAB | Refills: 4 | Status: SHIPPED
Start: 2017-12-07 | End: 2018-01-01 | Stop reason: SDUPTHER

## 2017-12-08 DIAGNOSIS — E78.5 DYSLIPIDEMIA: Chronic | ICD-10-CM

## 2017-12-08 RX ORDER — ROSUVASTATIN CALCIUM 40 MG/1
TABLET, COATED ORAL
Qty: 90 TAB | Refills: 3 | Status: SHIPPED | OUTPATIENT
Start: 2017-12-08 | End: 2018-01-01 | Stop reason: SDUPTHER

## 2017-12-27 ENCOUNTER — TELEPHONE (OUTPATIENT)
Dept: MEDICAL GROUP | Facility: MEDICAL CENTER | Age: 82
End: 2017-12-27

## 2017-12-27 ENCOUNTER — HOSPITAL ENCOUNTER (OUTPATIENT)
Dept: RADIOLOGY | Facility: MEDICAL CENTER | Age: 82
End: 2017-12-27
Attending: INTERNAL MEDICINE
Payer: MEDICARE

## 2017-12-27 DIAGNOSIS — M81.0 POSTMENOPAUSAL BONE LOSS: ICD-10-CM

## 2017-12-27 PROBLEM — M85.80 OSTEOPENIA: Status: ACTIVE | Noted: 2017-12-27

## 2017-12-27 PROCEDURE — 77080 DXA BONE DENSITY AXIAL: CPT

## 2017-12-28 ENCOUNTER — TELEPHONE (OUTPATIENT)
Dept: MEDICAL GROUP | Facility: MEDICAL CENTER | Age: 82
End: 2017-12-28

## 2017-12-28 NOTE — TELEPHONE ENCOUNTER
----- Message from Ant Bronson M.D. sent at 12/27/2017  5:14 PM PST -----  Please notify patient that her bone density test shows:  (1) normal strength of her lumbar spine (low back)  (2) slightly decreased bone strength of her hip compared to her previous bone density test, she does not have osteoporosis, no medications are necessary  (3) continue weightbearing exercise and activity, continue to take vitamin D daily  (4) repeat bone density in 2 years

## 2018-01-01 ENCOUNTER — OFFICE VISIT (OUTPATIENT)
Dept: CARDIOLOGY | Facility: MEDICAL CENTER | Age: 83
End: 2018-01-01
Payer: MEDICARE

## 2018-01-01 ENCOUNTER — APPOINTMENT (OUTPATIENT)
Dept: PHYSICAL THERAPY | Facility: REHABILITATION | Age: 83
End: 2018-01-01
Attending: PHYSICAL MEDICINE & REHABILITATION
Payer: MEDICARE

## 2018-01-01 ENCOUNTER — APPOINTMENT (OUTPATIENT)
Dept: RADIOLOGY | Facility: MEDICAL CENTER | Age: 83
DRG: 480 | End: 2018-01-01
Attending: FAMILY MEDICINE
Payer: MEDICARE

## 2018-01-01 ENCOUNTER — HOSPITAL ENCOUNTER (OUTPATIENT)
Dept: LAB | Facility: MEDICAL CENTER | Age: 83
End: 2018-10-01
Attending: FAMILY MEDICINE
Payer: MEDICARE

## 2018-01-01 ENCOUNTER — APPOINTMENT (OUTPATIENT)
Dept: RADIOLOGY | Facility: MEDICAL CENTER | Age: 83
DRG: 480 | End: 2018-01-01
Attending: PHYSICAL MEDICINE & REHABILITATION
Payer: MEDICARE

## 2018-01-01 ENCOUNTER — APPOINTMENT (OUTPATIENT)
Dept: RADIOLOGY | Facility: MEDICAL CENTER | Age: 83
DRG: 480 | End: 2018-01-01
Attending: ORTHOPAEDIC SURGERY
Payer: MEDICARE

## 2018-01-01 ENCOUNTER — HOSPITAL ENCOUNTER (OUTPATIENT)
Facility: MEDICAL CENTER | Age: 83
End: 2018-12-16
Attending: FAMILY MEDICINE
Payer: MEDICARE

## 2018-01-01 ENCOUNTER — HOSPITAL ENCOUNTER (INPATIENT)
Facility: MEDICAL CENTER | Age: 83
LOS: 17 days | DRG: 480 | End: 2019-01-08
Attending: EMERGENCY MEDICINE | Admitting: ANESTHESIOLOGY
Payer: MEDICARE

## 2018-01-01 ENCOUNTER — OFFICE VISIT (OUTPATIENT)
Dept: MEDICAL GROUP | Facility: MEDICAL CENTER | Age: 83
End: 2018-01-01
Payer: MEDICARE

## 2018-01-01 ENCOUNTER — HOSPITAL ENCOUNTER (EMERGENCY)
Facility: MEDICAL CENTER | Age: 83
End: 2018-12-17
Attending: EMERGENCY MEDICINE
Payer: MEDICARE

## 2018-01-01 ENCOUNTER — APPOINTMENT (OUTPATIENT)
Dept: RADIOLOGY | Facility: MEDICAL CENTER | Age: 83
DRG: 480 | End: 2018-01-01
Attending: EMERGENCY MEDICINE
Payer: MEDICARE

## 2018-01-01 ENCOUNTER — TELEPHONE (OUTPATIENT)
Dept: INTERNAL MEDICINE | Facility: MEDICAL CENTER | Age: 83
End: 2018-01-01

## 2018-01-01 ENCOUNTER — OFFICE VISIT (OUTPATIENT)
Dept: INTERNAL MEDICINE | Facility: MEDICAL CENTER | Age: 83
End: 2018-01-01
Payer: MEDICARE

## 2018-01-01 ENCOUNTER — PATIENT MESSAGE (OUTPATIENT)
Dept: MEDICAL GROUP | Facility: MEDICAL CENTER | Age: 83
End: 2018-01-01

## 2018-01-01 ENCOUNTER — PATIENT OUTREACH (OUTPATIENT)
Dept: HEALTH INFORMATION MANAGEMENT | Facility: OTHER | Age: 83
End: 2018-01-01

## 2018-01-01 ENCOUNTER — TELEPHONE (OUTPATIENT)
Dept: MEDICAL GROUP | Facility: MEDICAL CENTER | Age: 83
End: 2018-01-01

## 2018-01-01 ENCOUNTER — APPOINTMENT (OUTPATIENT)
Dept: RADIOLOGY | Facility: MEDICAL CENTER | Age: 83
DRG: 480 | End: 2018-01-01
Attending: INTERNAL MEDICINE
Payer: MEDICARE

## 2018-01-01 ENCOUNTER — TELEPHONE (OUTPATIENT)
Dept: CARDIOLOGY | Facility: MEDICAL CENTER | Age: 83
End: 2018-01-01

## 2018-01-01 ENCOUNTER — NON-PROVIDER VISIT (OUTPATIENT)
Dept: INTERNAL MEDICINE | Facility: MEDICAL CENTER | Age: 83
End: 2018-01-01
Payer: MEDICARE

## 2018-01-01 ENCOUNTER — PATIENT MESSAGE (OUTPATIENT)
Dept: CARDIOLOGY | Facility: MEDICAL CENTER | Age: 83
End: 2018-01-01

## 2018-01-01 ENCOUNTER — NON-PROVIDER VISIT (OUTPATIENT)
Dept: MEDICAL GROUP | Facility: PHYSICIAN GROUP | Age: 83
End: 2018-01-01
Payer: MEDICARE

## 2018-01-01 VITALS
HEIGHT: 60 IN | SYSTOLIC BLOOD PRESSURE: 138 MMHG | HEART RATE: 70 BPM | DIASTOLIC BLOOD PRESSURE: 74 MMHG | OXYGEN SATURATION: 98 % | WEIGHT: 125 LBS | BODY MASS INDEX: 24.54 KG/M2 | TEMPERATURE: 97.6 F

## 2018-01-01 VITALS
HEIGHT: 60 IN | TEMPERATURE: 98.3 F | BODY MASS INDEX: 24.35 KG/M2 | SYSTOLIC BLOOD PRESSURE: 154 MMHG | HEART RATE: 68 BPM | OXYGEN SATURATION: 95 % | DIASTOLIC BLOOD PRESSURE: 83 MMHG | WEIGHT: 124 LBS

## 2018-01-01 VITALS
WEIGHT: 126 LBS | OXYGEN SATURATION: 95 % | SYSTOLIC BLOOD PRESSURE: 160 MMHG | TEMPERATURE: 98.7 F | HEART RATE: 65 BPM | BODY MASS INDEX: 24.74 KG/M2 | DIASTOLIC BLOOD PRESSURE: 109 MMHG | HEIGHT: 60 IN | RESPIRATION RATE: 16 BRPM

## 2018-01-01 VITALS
HEIGHT: 61 IN | DIASTOLIC BLOOD PRESSURE: 68 MMHG | BODY MASS INDEX: 22.66 KG/M2 | HEART RATE: 64 BPM | RESPIRATION RATE: 16 BRPM | TEMPERATURE: 98 F | SYSTOLIC BLOOD PRESSURE: 128 MMHG | WEIGHT: 120 LBS | OXYGEN SATURATION: 94 %

## 2018-01-01 VITALS
OXYGEN SATURATION: 94 % | BODY MASS INDEX: 24.15 KG/M2 | WEIGHT: 123 LBS | SYSTOLIC BLOOD PRESSURE: 146 MMHG | HEIGHT: 60 IN | HEART RATE: 74 BPM | DIASTOLIC BLOOD PRESSURE: 75 MMHG

## 2018-01-01 VITALS
WEIGHT: 121 LBS | TEMPERATURE: 98.1 F | DIASTOLIC BLOOD PRESSURE: 72 MMHG | HEIGHT: 60 IN | OXYGEN SATURATION: 94 % | RESPIRATION RATE: 20 BRPM | HEART RATE: 65 BPM | SYSTOLIC BLOOD PRESSURE: 124 MMHG | BODY MASS INDEX: 23.75 KG/M2

## 2018-01-01 DIAGNOSIS — Z00.00 MEDICARE ANNUAL WELLNESS VISIT, SUBSEQUENT: ICD-10-CM

## 2018-01-01 DIAGNOSIS — M51.36 DDD (DEGENERATIVE DISC DISEASE), LUMBAR: ICD-10-CM

## 2018-01-01 DIAGNOSIS — M16.10 HIP ARTHRITIS: ICD-10-CM

## 2018-01-01 DIAGNOSIS — D69.6 THROMBOCYTOPENIA (HCC): ICD-10-CM

## 2018-01-01 DIAGNOSIS — M16.11 UNILATERAL OSTEOARTHRITIS OF HIP, RIGHT: ICD-10-CM

## 2018-01-01 DIAGNOSIS — Z95.1 S/P CABG X 3: ICD-10-CM

## 2018-01-01 DIAGNOSIS — M54.50 LUMBAR PAIN: ICD-10-CM

## 2018-01-01 DIAGNOSIS — I44.7 LBBB (LEFT BUNDLE BRANCH BLOCK): Chronic | ICD-10-CM

## 2018-01-01 DIAGNOSIS — E78.5 DYSLIPIDEMIA: Chronic | ICD-10-CM

## 2018-01-01 DIAGNOSIS — I10 ESSENTIAL HYPERTENSION: ICD-10-CM

## 2018-01-01 DIAGNOSIS — Z23 NEED FOR VACCINATION: ICD-10-CM

## 2018-01-01 DIAGNOSIS — Z12.11 SCREENING FOR COLON CANCER: ICD-10-CM

## 2018-01-01 DIAGNOSIS — G89.29 CHRONIC RIGHT-SIDED LOW BACK PAIN WITH RIGHT-SIDED SCIATICA: ICD-10-CM

## 2018-01-01 DIAGNOSIS — F51.01 PRIMARY INSOMNIA: ICD-10-CM

## 2018-01-01 DIAGNOSIS — N28.9 RENAL INSUFFICIENCY: ICD-10-CM

## 2018-01-01 DIAGNOSIS — G89.4 CHRONIC PAIN SYNDROME: ICD-10-CM

## 2018-01-01 DIAGNOSIS — E03.8 SUBCLINICAL HYPOTHYROIDISM: ICD-10-CM

## 2018-01-01 DIAGNOSIS — M54.41 CHRONIC RIGHT-SIDED LOW BACK PAIN WITH RIGHT-SIDED SCIATICA: ICD-10-CM

## 2018-01-01 DIAGNOSIS — I10 ESSENTIAL HYPERTENSION: Chronic | ICD-10-CM

## 2018-01-01 DIAGNOSIS — M25.551 PAIN OF RIGHT HIP JOINT: ICD-10-CM

## 2018-01-01 DIAGNOSIS — N28.9 RENAL DYSFUNCTION: ICD-10-CM

## 2018-01-01 DIAGNOSIS — M10.9 GOUT, UNSPECIFIED CAUSE, UNSPECIFIED CHRONICITY, UNSPECIFIED SITE: ICD-10-CM

## 2018-01-01 DIAGNOSIS — Z87.39 HISTORY OF GOUT: Chronic | ICD-10-CM

## 2018-01-01 DIAGNOSIS — R91.1 PULMONARY NODULE: Chronic | ICD-10-CM

## 2018-01-01 DIAGNOSIS — H92.01 RIGHT EAR PAIN: ICD-10-CM

## 2018-01-01 DIAGNOSIS — F43.9 SITUATIONAL STRESS: ICD-10-CM

## 2018-01-01 DIAGNOSIS — M89.8X5 LYTIC BONE LESION OF HIP: ICD-10-CM

## 2018-01-01 DIAGNOSIS — M85.89 OSTEOPENIA OF MULTIPLE SITES: ICD-10-CM

## 2018-01-01 DIAGNOSIS — M54.40 ACUTE RIGHT-SIDED LOW BACK PAIN WITH SCIATICA, SCIATICA LATERALITY UNSPECIFIED: ICD-10-CM

## 2018-01-01 DIAGNOSIS — Z12.11 COLON CANCER SCREENING: ICD-10-CM

## 2018-01-01 DIAGNOSIS — C34.10 MALIGNANT NEOPLASM OF UPPER LOBE OF LUNG, UNSPECIFIED LATERALITY (HCC): ICD-10-CM

## 2018-01-01 DIAGNOSIS — I25.10 CARDIOVASCULAR DISEASE: ICD-10-CM

## 2018-01-01 DIAGNOSIS — R94.4 DECREASED GFR: ICD-10-CM

## 2018-01-01 DIAGNOSIS — E55.9 VITAMIN D DEFICIENCY: ICD-10-CM

## 2018-01-01 DIAGNOSIS — M54.31 SCIATICA OF RIGHT SIDE: ICD-10-CM

## 2018-01-01 DIAGNOSIS — M89.9 LYTIC BONE LESIONS ON XRAY: ICD-10-CM

## 2018-01-01 LAB
25(OH)D3 SERPL-MCNC: 35 NG/ML (ref 30–100)
ABO GROUP BLD: NORMAL
AFP-TM SERPL-MCNC: 2 NG/ML (ref 0–9)
ALBUMIN SERPL BCP-MCNC: 2.2 G/DL (ref 3.2–4.9)
ALBUMIN SERPL BCP-MCNC: 3 G/DL (ref 3.2–4.9)
ALBUMIN SERPL BCP-MCNC: 3.1 G/DL (ref 3.2–4.9)
ALBUMIN SERPL BCP-MCNC: 3.1 G/DL (ref 3.2–4.9)
ALBUMIN SERPL BCP-MCNC: 3.6 G/DL (ref 3.2–4.9)
ALBUMIN SERPL BCP-MCNC: 3.8 G/DL (ref 3.2–4.9)
ALBUMIN SERPL BCP-MCNC: 4.3 G/DL (ref 3.2–4.9)
ALBUMIN SERPL BCP-MCNC: 4.4 G/DL (ref 3.2–4.9)
ALBUMIN SERPL-MCNC: 3.49 G/DL (ref 3.75–5.01)
ALBUMIN/GLOB SERPL: 1.2 G/DL
ALBUMIN/GLOB SERPL: 1.3 G/DL
ALBUMIN/GLOB SERPL: 1.3 G/DL
ALBUMIN/GLOB SERPL: 1.4 G/DL
ALBUMIN/GLOB SERPL: 1.4 G/DL
ALBUMIN/GLOB SERPL: 1.5 G/DL
ALBUMIN/GLOB SERPL: 1.6 G/DL
ALBUMIN/GLOB SERPL: 1.7 G/DL
ALP SERPL-CCNC: 203 U/L (ref 30–99)
ALP SERPL-CCNC: 331 U/L (ref 30–99)
ALP SERPL-CCNC: 483 U/L (ref 30–99)
ALP SERPL-CCNC: 504 U/L (ref 30–99)
ALP SERPL-CCNC: 513 U/L (ref 30–99)
ALP SERPL-CCNC: 585 U/L (ref 30–99)
ALP SERPL-CCNC: 666 U/L (ref 30–99)
ALP SERPL-CCNC: 779 U/L (ref 30–99)
ALPHA1 GLOB SERPL ELPH-MCNC: 0.37 G/DL (ref 0.19–0.46)
ALPHA2 GLOB SERPL ELPH-MCNC: 0.86 G/DL (ref 0.48–1.05)
ALT SERPL-CCNC: 18 U/L (ref 2–50)
ALT SERPL-CCNC: 20 U/L (ref 2–50)
ALT SERPL-CCNC: 22 U/L (ref 2–50)
ALT SERPL-CCNC: 26 U/L (ref 2–50)
ALT SERPL-CCNC: 27 U/L (ref 2–50)
ALT SERPL-CCNC: 34 U/L (ref 2–50)
ALT SERPL-CCNC: 34 U/L (ref 2–50)
ALT SERPL-CCNC: 41 U/L (ref 2–50)
ANION GAP SERPL CALC-SCNC: 13 MMOL/L (ref 0–11.9)
ANION GAP SERPL CALC-SCNC: 4 MMOL/L (ref 0–11.9)
ANION GAP SERPL CALC-SCNC: 6 MMOL/L (ref 0–11.9)
ANION GAP SERPL CALC-SCNC: 7 MMOL/L (ref 0–11.9)
ANION GAP SERPL CALC-SCNC: 7 MMOL/L (ref 0–11.9)
ANION GAP SERPL CALC-SCNC: 8 MMOL/L (ref 0–11.9)
ANION GAP SERPL CALC-SCNC: 9 MMOL/L (ref 0–11.9)
APPEARANCE UR: ABNORMAL
APTT PPP: 25.8 SEC (ref 24.7–36)
AST SERPL-CCNC: 25 U/L (ref 12–45)
AST SERPL-CCNC: 30 U/L (ref 12–45)
AST SERPL-CCNC: 30 U/L (ref 12–45)
AST SERPL-CCNC: 31 U/L (ref 12–45)
AST SERPL-CCNC: 31 U/L (ref 12–45)
AST SERPL-CCNC: 33 U/L (ref 12–45)
AST SERPL-CCNC: 35 U/L (ref 12–45)
AST SERPL-CCNC: 43 U/L (ref 12–45)
B-GLOBULIN SERPL ELPH-MCNC: 0.65 G/DL (ref 0.48–1.1)
BACTERIA #/AREA URNS HPF: ABNORMAL /HPF
BARCODED ABORH UBTYP: 600
BARCODED ABORH UBTYP: 600
BARCODED ABORH UBTYP: 9500
BARCODED PRD CODE UBPRD: NORMAL
BARCODED UNIT NUM UBUNT: NORMAL
BASOPHILS # BLD AUTO: 0.1 % (ref 0–1.8)
BASOPHILS # BLD AUTO: 0.2 % (ref 0–1.8)
BASOPHILS # BLD AUTO: 0.2 % (ref 0–1.8)
BASOPHILS # BLD AUTO: 0.3 % (ref 0–1.8)
BASOPHILS # BLD AUTO: 0.4 % (ref 0–1.8)
BASOPHILS # BLD: 0.01 K/UL (ref 0–0.12)
BASOPHILS # BLD: 0.02 K/UL (ref 0–0.12)
BASOPHILS # BLD: 0.03 K/UL (ref 0–0.12)
BASOPHILS # BLD: 0.04 K/UL (ref 0–0.12)
BILIRUB SERPL-MCNC: 0.3 MG/DL (ref 0.1–1.5)
BILIRUB SERPL-MCNC: 0.4 MG/DL (ref 0.1–1.5)
BILIRUB SERPL-MCNC: 0.6 MG/DL (ref 0.1–1.5)
BILIRUB SERPL-MCNC: 0.6 MG/DL (ref 0.1–1.5)
BILIRUB SERPL-MCNC: 0.7 MG/DL (ref 0.1–1.5)
BILIRUB UR QL STRIP.AUTO: NEGATIVE
BLD GP AB SCN SERPL QL: NORMAL
BUN SERPL-MCNC: 21 MG/DL (ref 8–22)
BUN SERPL-MCNC: 22 MG/DL (ref 8–22)
BUN SERPL-MCNC: 24 MG/DL (ref 8–22)
BUN SERPL-MCNC: 25 MG/DL (ref 8–22)
BUN SERPL-MCNC: 27 MG/DL (ref 8–22)
BUN SERPL-MCNC: 28 MG/DL (ref 8–22)
BUN SERPL-MCNC: 30 MG/DL (ref 8–22)
CALCIUM SERPL-MCNC: 10 MG/DL (ref 8.5–10.5)
CALCIUM SERPL-MCNC: 7.8 MG/DL (ref 8.5–10.5)
CALCIUM SERPL-MCNC: 8.3 MG/DL (ref 8.5–10.5)
CALCIUM SERPL-MCNC: 8.6 MG/DL (ref 8.5–10.5)
CALCIUM SERPL-MCNC: 8.7 MG/DL (ref 8.5–10.5)
CALCIUM SERPL-MCNC: 8.9 MG/DL (ref 8.5–10.5)
CALCIUM SERPL-MCNC: 9.1 MG/DL (ref 8.5–10.5)
CALCIUM SERPL-MCNC: 9.2 MG/DL (ref 8.5–10.5)
CALCIUM SERPL-MCNC: 9.9 MG/DL (ref 8.5–10.5)
CANCER AG125 SERPL-ACNC: 1497.7 U/ML (ref 0–35)
CANCER AG19-9 SERPL-ACNC: 9 U/ML (ref 0–35)
CEA SERPL-MCNC: 337.3 NG/ML (ref 0–3)
CHLORIDE SERPL-SCNC: 106 MMOL/L (ref 96–112)
CHLORIDE SERPL-SCNC: 107 MMOL/L (ref 96–112)
CHLORIDE SERPL-SCNC: 108 MMOL/L (ref 96–112)
CHLORIDE SERPL-SCNC: 109 MMOL/L (ref 96–112)
CHLORIDE SERPL-SCNC: 109 MMOL/L (ref 96–112)
CHLORIDE SERPL-SCNC: 111 MMOL/L (ref 96–112)
CHLORIDE SERPL-SCNC: 112 MMOL/L (ref 96–112)
CHLORIDE SERPL-SCNC: 113 MMOL/L (ref 96–112)
CHLORIDE SERPL-SCNC: 113 MMOL/L (ref 96–112)
CHOLEST SERPL-MCNC: 147 MG/DL (ref 100–199)
CO2 SERPL-SCNC: 20 MMOL/L (ref 20–33)
CO2 SERPL-SCNC: 21 MMOL/L (ref 20–33)
CO2 SERPL-SCNC: 22 MMOL/L (ref 20–33)
CO2 SERPL-SCNC: 24 MMOL/L (ref 20–33)
CO2 SERPL-SCNC: 24 MMOL/L (ref 20–33)
CO2 SERPL-SCNC: 26 MMOL/L (ref 20–33)
CO2 SERPL-SCNC: 26 MMOL/L (ref 20–33)
COLOR UR: YELLOW
COMPONENT R 8504R: NORMAL
CREAT SERPL-MCNC: 0.8 MG/DL (ref 0.5–1.4)
CREAT SERPL-MCNC: 0.81 MG/DL (ref 0.5–1.4)
CREAT SERPL-MCNC: 0.82 MG/DL (ref 0.5–1.4)
CREAT SERPL-MCNC: 0.93 MG/DL (ref 0.5–1.4)
CREAT SERPL-MCNC: 0.95 MG/DL (ref 0.5–1.4)
CREAT SERPL-MCNC: 1.09 MG/DL (ref 0.5–1.4)
CREAT SERPL-MCNC: 1.11 MG/DL (ref 0.5–1.4)
CREAT SERPL-MCNC: 1.15 MG/DL (ref 0.5–1.4)
CREAT SERPL-MCNC: 1.16 MG/DL (ref 0.5–1.4)
CRP SERPL HS-MCNC: 4.28 MG/DL (ref 0–0.75)
EOSINOPHIL # BLD AUTO: 0 K/UL (ref 0–0.51)
EOSINOPHIL # BLD AUTO: 0.01 K/UL (ref 0–0.51)
EOSINOPHIL # BLD AUTO: 0.01 K/UL (ref 0–0.51)
EOSINOPHIL # BLD AUTO: 0.26 K/UL (ref 0–0.51)
EOSINOPHIL NFR BLD: 0 % (ref 0–6.9)
EOSINOPHIL NFR BLD: 0.1 % (ref 0–6.9)
EOSINOPHIL NFR BLD: 0.1 % (ref 0–6.9)
EOSINOPHIL NFR BLD: 5.5 % (ref 0–6.9)
EPI CELLS #/AREA URNS HPF: ABNORMAL /HPF
ERYTHROCYTE [DISTWIDTH] IN BLOOD BY AUTOMATED COUNT: 48.3 FL (ref 35.9–50)
ERYTHROCYTE [DISTWIDTH] IN BLOOD BY AUTOMATED COUNT: 48.7 FL (ref 35.9–50)
ERYTHROCYTE [DISTWIDTH] IN BLOOD BY AUTOMATED COUNT: 48.7 FL (ref 35.9–50)
ERYTHROCYTE [DISTWIDTH] IN BLOOD BY AUTOMATED COUNT: 48.9 FL (ref 35.9–50)
ERYTHROCYTE [DISTWIDTH] IN BLOOD BY AUTOMATED COUNT: 49.1 FL (ref 35.9–50)
ERYTHROCYTE [DISTWIDTH] IN BLOOD BY AUTOMATED COUNT: 49.7 FL (ref 35.9–50)
ERYTHROCYTE [DISTWIDTH] IN BLOOD BY AUTOMATED COUNT: 50 FL (ref 35.9–50)
ERYTHROCYTE [DISTWIDTH] IN BLOOD BY AUTOMATED COUNT: 50.3 FL (ref 35.9–50)
ERYTHROCYTE [DISTWIDTH] IN BLOOD BY AUTOMATED COUNT: 50.4 FL (ref 35.9–50)
ERYTHROCYTE [DISTWIDTH] IN BLOOD BY AUTOMATED COUNT: 50.5 FL (ref 35.9–50)
FASTING STATUS PATIENT QL REPORTED: NORMAL
GAMMA GLOB SERPL ELPH-MCNC: 0.63 G/DL (ref 0.62–1.51)
GLOBULIN SER CALC-MCNC: 1.6 G/DL (ref 1.9–3.5)
GLOBULIN SER CALC-MCNC: 2.2 G/DL (ref 1.9–3.5)
GLOBULIN SER CALC-MCNC: 2.3 G/DL (ref 1.9–3.5)
GLOBULIN SER CALC-MCNC: 2.4 G/DL (ref 1.9–3.5)
GLOBULIN SER CALC-MCNC: 2.4 G/DL (ref 1.9–3.5)
GLOBULIN SER CALC-MCNC: 2.5 G/DL (ref 1.9–3.5)
GLOBULIN SER CALC-MCNC: 2.8 G/DL (ref 1.9–3.5)
GLOBULIN SER CALC-MCNC: 3.1 G/DL (ref 1.9–3.5)
GLUCOSE SERPL-MCNC: 100 MG/DL (ref 65–99)
GLUCOSE SERPL-MCNC: 114 MG/DL (ref 65–99)
GLUCOSE SERPL-MCNC: 120 MG/DL (ref 65–99)
GLUCOSE SERPL-MCNC: 129 MG/DL (ref 65–99)
GLUCOSE SERPL-MCNC: 130 MG/DL (ref 65–99)
GLUCOSE SERPL-MCNC: 152 MG/DL (ref 65–99)
GLUCOSE SERPL-MCNC: 156 MG/DL (ref 65–99)
GLUCOSE SERPL-MCNC: 163 MG/DL (ref 65–99)
GLUCOSE SERPL-MCNC: 166 MG/DL (ref 65–99)
GLUCOSE UR STRIP.AUTO-MCNC: NEGATIVE MG/DL
HCT VFR BLD AUTO: 19.4 % (ref 37–47)
HCT VFR BLD AUTO: 25.2 % (ref 37–47)
HCT VFR BLD AUTO: 27 % (ref 37–47)
HCT VFR BLD AUTO: 37.1 % (ref 37–47)
HCT VFR BLD AUTO: 37.1 % (ref 37–47)
HCT VFR BLD AUTO: 37.9 % (ref 37–47)
HCT VFR BLD AUTO: 37.9 % (ref 37–47)
HCT VFR BLD AUTO: 38.4 % (ref 37–47)
HCT VFR BLD AUTO: 40.8 % (ref 37–47)
HCT VFR BLD AUTO: 42.2 % (ref 37–47)
HDLC SERPL-MCNC: 66 MG/DL
HEMOCCULT STL QL IA: NEGATIVE
HGB BLD-MCNC: 11.9 G/DL (ref 12–16)
HGB BLD-MCNC: 12.1 G/DL (ref 12–16)
HGB BLD-MCNC: 12.2 G/DL (ref 12–16)
HGB BLD-MCNC: 12.3 G/DL (ref 12–16)
HGB BLD-MCNC: 12.5 G/DL (ref 12–16)
HGB BLD-MCNC: 13.5 G/DL (ref 12–16)
HGB BLD-MCNC: 13.9 G/DL (ref 12–16)
HGB BLD-MCNC: 6.2 G/DL (ref 12–16)
HGB BLD-MCNC: 8.4 G/DL (ref 12–16)
HGB BLD-MCNC: 8.5 G/DL (ref 12–16)
HYALINE CASTS #/AREA URNS LPF: ABNORMAL /LPF
IGA SERPL-MCNC: 121 MG/DL (ref 68–408)
IGG SERPL-MCNC: 705 MG/DL (ref 768–1632)
IGM SERPL-MCNC: 34 MG/DL (ref 35–263)
IMM GRANULOCYTES # BLD AUTO: 0.01 K/UL (ref 0–0.11)
IMM GRANULOCYTES # BLD AUTO: 0.04 K/UL (ref 0–0.11)
IMM GRANULOCYTES # BLD AUTO: 0.05 K/UL (ref 0–0.11)
IMM GRANULOCYTES # BLD AUTO: 0.05 K/UL (ref 0–0.11)
IMM GRANULOCYTES # BLD AUTO: 0.07 K/UL (ref 0–0.11)
IMM GRANULOCYTES # BLD AUTO: 0.09 K/UL (ref 0–0.11)
IMM GRANULOCYTES # BLD AUTO: 0.11 K/UL (ref 0–0.11)
IMM GRANULOCYTES # BLD AUTO: 0.12 K/UL (ref 0–0.11)
IMM GRANULOCYTES # BLD AUTO: 0.14 K/UL (ref 0–0.11)
IMM GRANULOCYTES NFR BLD AUTO: 0.2 % (ref 0–0.9)
IMM GRANULOCYTES NFR BLD AUTO: 0.4 % (ref 0–0.9)
IMM GRANULOCYTES NFR BLD AUTO: 0.5 % (ref 0–0.9)
IMM GRANULOCYTES NFR BLD AUTO: 0.5 % (ref 0–0.9)
IMM GRANULOCYTES NFR BLD AUTO: 0.7 % (ref 0–0.9)
IMM GRANULOCYTES NFR BLD AUTO: 0.7 % (ref 0–0.9)
IMM GRANULOCYTES NFR BLD AUTO: 0.8 % (ref 0–0.9)
IMM GRANULOCYTES NFR BLD AUTO: 0.9 % (ref 0–0.9)
IMM GRANULOCYTES NFR BLD AUTO: 0.9 % (ref 0–0.9)
INR PPP: 1.27 (ref 0.87–1.13)
INR PPP: 1.28 (ref 0.87–1.13)
INTERPRETATION SERPL IFE-IMP: ABNORMAL
INTERPRETATION SERPL IFE-IMP: ABNORMAL
KETONES UR STRIP.AUTO-MCNC: NEGATIVE MG/DL
LDLC SERPL CALC-MCNC: 69 MG/DL
LEUKOCYTE ESTERASE UR QL STRIP.AUTO: NEGATIVE
LYMPHOCYTES # BLD AUTO: 0.78 K/UL (ref 1–4.8)
LYMPHOCYTES # BLD AUTO: 0.82 K/UL (ref 1–4.8)
LYMPHOCYTES # BLD AUTO: 0.85 K/UL (ref 1–4.8)
LYMPHOCYTES # BLD AUTO: 0.95 K/UL (ref 1–4.8)
LYMPHOCYTES # BLD AUTO: 1.11 K/UL (ref 1–4.8)
LYMPHOCYTES # BLD AUTO: 1.2 K/UL (ref 1–4.8)
LYMPHOCYTES # BLD AUTO: 1.33 K/UL (ref 1–4.8)
LYMPHOCYTES # BLD AUTO: 1.38 K/UL (ref 1–4.8)
LYMPHOCYTES # BLD AUTO: 2.04 K/UL (ref 1–4.8)
LYMPHOCYTES NFR BLD: 10.6 % (ref 22–41)
LYMPHOCYTES NFR BLD: 14 % (ref 22–41)
LYMPHOCYTES NFR BLD: 18.3 % (ref 22–41)
LYMPHOCYTES NFR BLD: 27.9 % (ref 22–41)
LYMPHOCYTES NFR BLD: 5.5 % (ref 22–41)
LYMPHOCYTES NFR BLD: 6.2 % (ref 22–41)
LYMPHOCYTES NFR BLD: 7.7 % (ref 22–41)
LYMPHOCYTES NFR BLD: 8.1 % (ref 22–41)
LYMPHOCYTES NFR BLD: 9.3 % (ref 22–41)
MCH RBC QN AUTO: 30.4 PG (ref 27–33)
MCH RBC QN AUTO: 30.5 PG (ref 27–33)
MCH RBC QN AUTO: 30.6 PG (ref 27–33)
MCH RBC QN AUTO: 30.7 PG (ref 27–33)
MCH RBC QN AUTO: 30.8 PG (ref 27–33)
MCH RBC QN AUTO: 31.1 PG (ref 27–33)
MCH RBC QN AUTO: 31.2 PG (ref 27–33)
MCH RBC QN AUTO: 31.3 PG (ref 27–33)
MCH RBC QN AUTO: 31.4 PG (ref 27–33)
MCH RBC QN AUTO: 31.7 PG (ref 27–33)
MCHC RBC AUTO-ENTMCNC: 31.3 G/DL (ref 33.6–35)
MCHC RBC AUTO-ENTMCNC: 31.9 G/DL (ref 33.6–35)
MCHC RBC AUTO-ENTMCNC: 32.1 G/DL (ref 33.6–35)
MCHC RBC AUTO-ENTMCNC: 32.1 G/DL (ref 33.6–35)
MCHC RBC AUTO-ENTMCNC: 32.5 G/DL (ref 33.6–35)
MCHC RBC AUTO-ENTMCNC: 32.6 G/DL (ref 33.6–35)
MCHC RBC AUTO-ENTMCNC: 32.9 G/DL (ref 33.6–35)
MCHC RBC AUTO-ENTMCNC: 32.9 G/DL (ref 33.6–35)
MCHC RBC AUTO-ENTMCNC: 33.1 G/DL (ref 33.6–35)
MCHC RBC AUTO-ENTMCNC: 33.3 G/DL (ref 33.6–35)
MCV RBC AUTO: 93.7 FL (ref 81.4–97.8)
MCV RBC AUTO: 94.1 FL (ref 81.4–97.8)
MCV RBC AUTO: 94.7 FL (ref 81.4–97.8)
MCV RBC AUTO: 95 FL (ref 81.4–97.8)
MCV RBC AUTO: 95.4 FL (ref 81.4–97.8)
MCV RBC AUTO: 95.5 FL (ref 81.4–97.8)
MCV RBC AUTO: 95.9 FL (ref 81.4–97.8)
MCV RBC AUTO: 96.3 FL (ref 81.4–97.8)
MCV RBC AUTO: 97.1 FL (ref 81.4–97.8)
MCV RBC AUTO: 97.1 FL (ref 81.4–97.8)
MICRO URNS: ABNORMAL
MONOCYTES # BLD AUTO: 0.52 K/UL (ref 0–0.85)
MONOCYTES # BLD AUTO: 0.62 K/UL (ref 0–0.85)
MONOCYTES # BLD AUTO: 0.64 K/UL (ref 0–0.85)
MONOCYTES # BLD AUTO: 0.79 K/UL (ref 0–0.85)
MONOCYTES # BLD AUTO: 0.98 K/UL (ref 0–0.85)
MONOCYTES # BLD AUTO: 1.03 K/UL (ref 0–0.85)
MONOCYTES # BLD AUTO: 1.19 K/UL (ref 0–0.85)
MONOCYTES # BLD AUTO: 1.24 K/UL (ref 0–0.85)
MONOCYTES # BLD AUTO: 1.25 K/UL (ref 0–0.85)
MONOCYTES NFR BLD AUTO: 10.3 % (ref 0–13.4)
MONOCYTES NFR BLD AUTO: 10.9 % (ref 0–13.4)
MONOCYTES NFR BLD AUTO: 11.2 % (ref 0–13.4)
MONOCYTES NFR BLD AUTO: 12 % (ref 0–13.4)
MONOCYTES NFR BLD AUTO: 5.1 % (ref 0–13.4)
MONOCYTES NFR BLD AUTO: 6.2 % (ref 0–13.4)
MONOCYTES NFR BLD AUTO: 6.3 % (ref 0–13.4)
MONOCYTES NFR BLD AUTO: 7.5 % (ref 0–13.4)
MONOCYTES NFR BLD AUTO: 7.8 % (ref 0–13.4)
NEUTROPHILS # BLD AUTO: 10.51 K/UL (ref 2–7.15)
NEUTROPHILS # BLD AUTO: 13 K/UL (ref 2–7.15)
NEUTROPHILS # BLD AUTO: 13.68 K/UL (ref 2–7.15)
NEUTROPHILS # BLD AUTO: 2.63 K/UL (ref 2–7.15)
NEUTROPHILS # BLD AUTO: 6.27 K/UL (ref 2–7.15)
NEUTROPHILS # BLD AUTO: 7.81 K/UL (ref 2–7.15)
NEUTROPHILS # BLD AUTO: 8.56 K/UL (ref 2–7.15)
NEUTROPHILS # BLD AUTO: 8.69 K/UL (ref 2–7.15)
NEUTROPHILS # BLD AUTO: 9.55 K/UL (ref 2–7.15)
NEUTROPHILS NFR BLD: 55.1 % (ref 44–72)
NEUTROPHILS NFR BLD: 69.9 % (ref 44–72)
NEUTROPHILS NFR BLD: 73.2 % (ref 44–72)
NEUTROPHILS NFR BLD: 79.5 % (ref 44–72)
NEUTROPHILS NFR BLD: 80.8 % (ref 44–72)
NEUTROPHILS NFR BLD: 84.9 % (ref 44–72)
NEUTROPHILS NFR BLD: 85 % (ref 44–72)
NEUTROPHILS NFR BLD: 85.4 % (ref 44–72)
NEUTROPHILS NFR BLD: 88.4 % (ref 44–72)
NITRITE UR QL STRIP.AUTO: NEGATIVE
NRBC # BLD AUTO: 0 K/UL
NRBC BLD-RTO: 0 /100 WBC
PATHOLOGY CONSULT NOTE: NORMAL
PATHOLOGY CONSULT NOTE: NORMAL
PATHOLOGY STUDY: ABNORMAL
PH UR STRIP.AUTO: 5 [PH]
PLATELET # BLD AUTO: 101 K/UL (ref 164–446)
PLATELET # BLD AUTO: 105 K/UL (ref 164–446)
PLATELET # BLD AUTO: 112 K/UL (ref 164–446)
PLATELET # BLD AUTO: 116 K/UL (ref 164–446)
PLATELET # BLD AUTO: 139 K/UL (ref 164–446)
PLATELET # BLD AUTO: 142 K/UL (ref 164–446)
PLATELET # BLD AUTO: 144 K/UL (ref 164–446)
PLATELET # BLD AUTO: 148 K/UL (ref 164–446)
PLATELET # BLD AUTO: 155 K/UL (ref 164–446)
PLATELET # BLD AUTO: 180 K/UL (ref 164–446)
PMV BLD AUTO: 10.4 FL (ref 9–12.9)
PMV BLD AUTO: 10.5 FL (ref 9–12.9)
PMV BLD AUTO: 10.7 FL (ref 9–12.9)
PMV BLD AUTO: 10.8 FL (ref 9–12.9)
PMV BLD AUTO: 10.9 FL (ref 9–12.9)
PMV BLD AUTO: 11 FL (ref 9–12.9)
PMV BLD AUTO: 11.1 FL (ref 9–12.9)
PMV BLD AUTO: 11.2 FL (ref 9–12.9)
POTASSIUM SERPL-SCNC: 3.7 MMOL/L (ref 3.6–5.5)
POTASSIUM SERPL-SCNC: 3.8 MMOL/L (ref 3.6–5.5)
POTASSIUM SERPL-SCNC: 4.3 MMOL/L (ref 3.6–5.5)
POTASSIUM SERPL-SCNC: 4.4 MMOL/L (ref 3.6–5.5)
POTASSIUM SERPL-SCNC: 4.5 MMOL/L (ref 3.6–5.5)
POTASSIUM SERPL-SCNC: 4.6 MMOL/L (ref 3.6–5.5)
POTASSIUM SERPL-SCNC: 4.7 MMOL/L (ref 3.6–5.5)
POTASSIUM SERPL-SCNC: 4.8 MMOL/L (ref 3.6–5.5)
POTASSIUM SERPL-SCNC: 4.9 MMOL/L (ref 3.6–5.5)
PRODUCT TYPE UPROD: NORMAL
PROT SERPL-MCNC: 3.8 G/DL (ref 6–8.2)
PROT SERPL-MCNC: 5.4 G/DL (ref 6–8.2)
PROT SERPL-MCNC: 5.5 G/DL (ref 6–8.2)
PROT SERPL-MCNC: 5.5 G/DL (ref 6–8.2)
PROT SERPL-MCNC: 6 G/DL (ref 6–8.2)
PROT SERPL-MCNC: 6 G/DL (ref 6–8.2)
PROT SERPL-MCNC: 6 G/DL (ref 6–8.3)
PROT SERPL-MCNC: 7.2 G/DL (ref 6–8.2)
PROT SERPL-MCNC: 7.4 G/DL (ref 6–8.2)
PROT UR QL STRIP: 100 MG/DL
PROTHROMBIN TIME: 16 SEC (ref 12–14.6)
PROTHROMBIN TIME: 16.1 SEC (ref 12–14.6)
RBC # BLD AUTO: 2.04 M/UL (ref 4.2–5.4)
RBC # BLD AUTO: 2.69 M/UL (ref 4.2–5.4)
RBC # BLD AUTO: 2.73 M/UL (ref 4.2–5.4)
RBC # BLD AUTO: 3.87 M/UL (ref 4.2–5.4)
RBC # BLD AUTO: 3.89 M/UL (ref 4.2–5.4)
RBC # BLD AUTO: 3.97 M/UL (ref 4.2–5.4)
RBC # BLD AUTO: 3.99 M/UL (ref 4.2–5.4)
RBC # BLD AUTO: 4.08 M/UL (ref 4.2–5.4)
RBC # BLD AUTO: 4.31 M/UL (ref 4.2–5.4)
RBC # BLD AUTO: 4.38 M/UL (ref 4.2–5.4)
RBC # URNS HPF: ABNORMAL /HPF
RBC UR QL AUTO: NEGATIVE
RH BLD: NORMAL
SODIUM SERPL-SCNC: 138 MMOL/L (ref 135–145)
SODIUM SERPL-SCNC: 139 MMOL/L (ref 135–145)
SODIUM SERPL-SCNC: 140 MMOL/L (ref 135–145)
SODIUM SERPL-SCNC: 141 MMOL/L (ref 135–145)
SODIUM SERPL-SCNC: 141 MMOL/L (ref 135–145)
SODIUM SERPL-SCNC: 142 MMOL/L (ref 135–145)
SP GR UR STRIP.AUTO: 1.02
T4 FREE SERPL-MCNC: 1.05 NG/DL (ref 0.53–1.43)
TRIGL SERPL-MCNC: 62 MG/DL (ref 0–149)
TSH SERPL DL<=0.005 MIU/L-ACNC: 5.44 UIU/ML (ref 0.38–5.33)
UNIT STATUS USTAT: NORMAL
URATE SERPL-MCNC: 3.5 MG/DL (ref 1.9–8.2)
URATE SERPL-MCNC: 4 MG/DL (ref 1.9–8.2)
UROBILINOGEN UR STRIP.AUTO-MCNC: 0.2 MG/DL
WBC # BLD AUTO: 10.1 K/UL (ref 4.8–10.8)
WBC # BLD AUTO: 10.2 K/UL (ref 4.8–10.8)
WBC # BLD AUTO: 11.2 K/UL (ref 4.8–10.8)
WBC # BLD AUTO: 12 K/UL (ref 4.8–10.8)
WBC # BLD AUTO: 13 K/UL (ref 4.8–10.8)
WBC # BLD AUTO: 13.6 K/UL (ref 4.8–10.8)
WBC # BLD AUTO: 15.3 K/UL (ref 4.8–10.8)
WBC # BLD AUTO: 15.5 K/UL (ref 4.8–10.8)
WBC # BLD AUTO: 4.8 K/UL (ref 4.8–10.8)
WBC # BLD AUTO: 8.6 K/UL (ref 4.8–10.8)
WBC #/AREA URNS HPF: ABNORMAL /HPF

## 2018-01-01 PROCEDURE — 88184 FLOWCYTOMETRY/ TC 1 MARKER: CPT | Mod: 91

## 2018-01-01 PROCEDURE — P9016 RBC LEUKOCYTES REDUCED: HCPCS

## 2018-01-01 PROCEDURE — 86140 C-REACTIVE PROTEIN: CPT

## 2018-01-01 PROCEDURE — 96375 TX/PRO/DX INJ NEW DRUG ADDON: CPT

## 2018-01-01 PROCEDURE — 700111 HCHG RX REV CODE 636 W/ 250 OVERRIDE (IP): Performed by: HOSPITALIST

## 2018-01-01 PROCEDURE — 700117 HCHG RX CONTRAST REV CODE 255: Performed by: FAMILY MEDICINE

## 2018-01-01 PROCEDURE — 96374 THER/PROPH/DIAG INJ IV PUSH: CPT

## 2018-01-01 PROCEDURE — 88313 SPECIAL STAINS GROUP 2: CPT

## 2018-01-01 PROCEDURE — 700111 HCHG RX REV CODE 636 W/ 250 OVERRIDE (IP): Performed by: ORTHOPAEDIC SURGERY

## 2018-01-01 PROCEDURE — 700102 HCHG RX REV CODE 250 W/ 637 OVERRIDE(OP): Performed by: FAMILY MEDICINE

## 2018-01-01 PROCEDURE — G8997 SWALLOW GOAL STATUS: HCPCS | Mod: CH

## 2018-01-01 PROCEDURE — 84160 ASSAY OF PROTEIN ANY SOURCE: CPT

## 2018-01-01 PROCEDURE — G8988 SELF CARE GOAL STATUS: HCPCS | Mod: CI

## 2018-01-01 PROCEDURE — 700111 HCHG RX REV CODE 636 W/ 250 OVERRIDE (IP): Performed by: EMERGENCY MEDICINE

## 2018-01-01 PROCEDURE — 700111 HCHG RX REV CODE 636 W/ 250 OVERRIDE (IP): Performed by: ANESTHESIOLOGY

## 2018-01-01 PROCEDURE — A9270 NON-COVERED ITEM OR SERVICE: HCPCS | Performed by: HOSPITALIST

## 2018-01-01 PROCEDURE — 770004 HCHG ROOM/CARE - ONCOLOGY PRIVATE *

## 2018-01-01 PROCEDURE — 97140 MANUAL THERAPY 1/> REGIONS: CPT | Mod: XU

## 2018-01-01 PROCEDURE — 700102 HCHG RX REV CODE 250 W/ 637 OVERRIDE(OP): Performed by: ORTHOPAEDIC SURGERY

## 2018-01-01 PROCEDURE — 86901 BLOOD TYPING SEROLOGIC RH(D): CPT

## 2018-01-01 PROCEDURE — A9270 NON-COVERED ITEM OR SERVICE: HCPCS | Performed by: ORTHOPAEDIC SURGERY

## 2018-01-01 PROCEDURE — 99233 SBSQ HOSP IP/OBS HIGH 50: CPT | Performed by: FAMILY MEDICINE

## 2018-01-01 PROCEDURE — 85025 COMPLETE CBC W/AUTO DIFF WBC: CPT

## 2018-01-01 PROCEDURE — 88237 TISSUE CULTURE BONE MARROW: CPT | Mod: 91

## 2018-01-01 PROCEDURE — 700105 HCHG RX REV CODE 258: Performed by: FAMILY MEDICINE

## 2018-01-01 PROCEDURE — A9270 NON-COVERED ITEM OR SERVICE: HCPCS | Performed by: INTERNAL MEDICINE

## 2018-01-01 PROCEDURE — 97110 THERAPEUTIC EXERCISES: CPT

## 2018-01-01 PROCEDURE — 86900 BLOOD TYPING SEROLOGIC ABO: CPT

## 2018-01-01 PROCEDURE — 85610 PROTHROMBIN TIME: CPT

## 2018-01-01 PROCEDURE — 99214 OFFICE O/P EST MOD 30 MIN: CPT | Performed by: FAMILY MEDICINE

## 2018-01-01 PROCEDURE — 99285 EMERGENCY DEPT VISIT HI MDM: CPT

## 2018-01-01 PROCEDURE — 99213 OFFICE O/P EST LOW 20 MIN: CPT | Performed by: INTERNAL MEDICINE

## 2018-01-01 PROCEDURE — 80053 COMPREHEN METABOLIC PANEL: CPT

## 2018-01-01 PROCEDURE — A9270 NON-COVERED ITEM OR SERVICE: HCPCS | Performed by: FAMILY MEDICINE

## 2018-01-01 PROCEDURE — A9270 NON-COVERED ITEM OR SERVICE: HCPCS | Performed by: NURSE PRACTITIONER

## 2018-01-01 PROCEDURE — 700111 HCHG RX REV CODE 636 W/ 250 OVERRIDE (IP): Performed by: INTERNAL MEDICINE

## 2018-01-01 PROCEDURE — 700102 HCHG RX REV CODE 250 W/ 637 OVERRIDE(OP): Performed by: INTERNAL MEDICINE

## 2018-01-01 PROCEDURE — 84439 ASSAY OF FREE THYROXINE: CPT

## 2018-01-01 PROCEDURE — 700111 HCHG RX REV CODE 636 W/ 250 OVERRIDE (IP): Performed by: NURSE PRACTITIONER

## 2018-01-01 PROCEDURE — 700102 HCHG RX REV CODE 250 W/ 637 OVERRIDE(OP): Performed by: HOSPITALIST

## 2018-01-01 PROCEDURE — 82784 ASSAY IGA/IGD/IGG/IGM EACH: CPT

## 2018-01-01 PROCEDURE — 302131 K PAD MOTOR: Performed by: FAMILY MEDICINE

## 2018-01-01 PROCEDURE — 99204 OFFICE O/P NEW MOD 45 MIN: CPT | Performed by: INTERNAL MEDICINE

## 2018-01-01 PROCEDURE — 160035 HCHG PACU - 1ST 60 MINS PHASE I: Performed by: ORTHOPAEDIC SURGERY

## 2018-01-01 PROCEDURE — 36415 COLL VENOUS BLD VENIPUNCTURE: CPT

## 2018-01-01 PROCEDURE — 700111 HCHG RX REV CODE 636 W/ 250 OVERRIDE (IP): Performed by: FAMILY MEDICINE

## 2018-01-01 PROCEDURE — 74177 CT ABD & PELVIS W/CONTRAST: CPT

## 2018-01-01 PROCEDURE — 160029 HCHG SURGERY MINUTES - 1ST 30 MINS LEVEL 4: Performed by: ORTHOPAEDIC SURGERY

## 2018-01-01 PROCEDURE — 700105 HCHG RX REV CODE 258: Performed by: INTERNAL MEDICINE

## 2018-01-01 PROCEDURE — G8978 MOBILITY CURRENT STATUS: HCPCS | Mod: CK

## 2018-01-01 PROCEDURE — 82378 CARCINOEMBRYONIC ANTIGEN: CPT

## 2018-01-01 PROCEDURE — 99152 MOD SED SAME PHYS/QHP 5/>YRS: CPT | Performed by: HOSPITALIST

## 2018-01-01 PROCEDURE — 99231 SBSQ HOSP IP/OBS SF/LOW 25: CPT | Mod: 25 | Performed by: NURSE PRACTITIONER

## 2018-01-01 PROCEDURE — 82105 ALPHA-FETOPROTEIN SERUM: CPT

## 2018-01-01 PROCEDURE — 92610 EVALUATE SWALLOWING FUNCTION: CPT

## 2018-01-01 PROCEDURE — 73502 X-RAY EXAM HIP UNI 2-3 VIEWS: CPT | Mod: RT

## 2018-01-01 PROCEDURE — 88342 IMHCHEM/IMCYTCHM 1ST ANTB: CPT

## 2018-01-01 PROCEDURE — 160041 HCHG SURGERY MINUTES - EA ADDL 1 MIN LEVEL 4: Performed by: ORTHOPAEDIC SURGERY

## 2018-01-01 PROCEDURE — 700102 HCHG RX REV CODE 250 W/ 637 OVERRIDE(OP): Performed by: NURSE PRACTITIONER

## 2018-01-01 PROCEDURE — 86301 IMMUNOASSAY TUMOR CA 19-9: CPT

## 2018-01-01 PROCEDURE — 88305 TISSUE EXAM BY PATHOLOGIST: CPT

## 2018-01-01 PROCEDURE — 80048 BASIC METABOLIC PNL TOTAL CA: CPT

## 2018-01-01 PROCEDURE — 99232 SBSQ HOSP IP/OBS MODERATE 35: CPT | Performed by: INTERNAL MEDICINE

## 2018-01-01 PROCEDURE — 302151 K-PAD 3X20: Performed by: FAMILY MEDICINE

## 2018-01-01 PROCEDURE — 82274 ASSAY TEST FOR BLOOD FECAL: CPT

## 2018-01-01 PROCEDURE — 86923 COMPATIBILITY TEST ELECTRIC: CPT

## 2018-01-01 PROCEDURE — 700112 HCHG RX REV CODE 229: Performed by: ORTHOPAEDIC SURGERY

## 2018-01-01 PROCEDURE — 71045 X-RAY EXAM CHEST 1 VIEW: CPT

## 2018-01-01 PROCEDURE — 84165 PROTEIN E-PHORESIS SERUM: CPT

## 2018-01-01 PROCEDURE — A9503 TC99M MEDRONATE: HCPCS

## 2018-01-01 PROCEDURE — G8987 SELF CARE CURRENT STATUS: HCPCS | Mod: CK

## 2018-01-01 PROCEDURE — A6402 STERILE GAUZE <= 16 SQ IN: HCPCS | Performed by: ORTHOPAEDIC SURGERY

## 2018-01-01 PROCEDURE — 81001 URINALYSIS AUTO W/SCOPE: CPT

## 2018-01-01 PROCEDURE — 77074 RADEX OSSEOUS SURVEY LMTD: CPT

## 2018-01-01 PROCEDURE — 99233 SBSQ HOSP IP/OBS HIGH 50: CPT | Performed by: INTERNAL MEDICINE

## 2018-01-01 PROCEDURE — 99497 ADVNCD CARE PLAN 30 MIN: CPT | Performed by: NURSE PRACTITIONER

## 2018-01-01 PROCEDURE — 96372 THER/PROPH/DIAG INJ SC/IM: CPT

## 2018-01-01 PROCEDURE — 97162 PT EVAL MOD COMPLEX 30 MIN: CPT

## 2018-01-01 PROCEDURE — 96376 TX/PRO/DX INJ SAME DRUG ADON: CPT

## 2018-01-01 PROCEDURE — 99222 1ST HOSP IP/OBS MODERATE 55: CPT | Performed by: NURSE PRACTITIONER

## 2018-01-01 PROCEDURE — 86850 RBC ANTIBODY SCREEN: CPT

## 2018-01-01 PROCEDURE — 500891 HCHG PACK, ORTHO MAJOR: Performed by: ORTHOPAEDIC SURGERY

## 2018-01-01 PROCEDURE — 700102 HCHG RX REV CODE 250 W/ 637 OVERRIDE(OP): Performed by: ANESTHESIOLOGY

## 2018-01-01 PROCEDURE — 99284 EMERGENCY DEPT VISIT MOD MDM: CPT

## 2018-01-01 PROCEDURE — 88264 CHROMOSOME ANALYSIS 20-25: CPT

## 2018-01-01 PROCEDURE — 97014 ELECTRIC STIMULATION THERAPY: CPT

## 2018-01-01 PROCEDURE — 80061 LIPID PANEL: CPT

## 2018-01-01 PROCEDURE — 160048 HCHG OR STATISTICAL LEVEL 1-5: Performed by: ORTHOPAEDIC SURGERY

## 2018-01-01 PROCEDURE — 501838 HCHG SUTURE GENERAL: Performed by: ORTHOPAEDIC SURGERY

## 2018-01-01 PROCEDURE — 110371 HCHG SHELL REV 272: Performed by: ORTHOPAEDIC SURGERY

## 2018-01-01 PROCEDURE — 97140 MANUAL THERAPY 1/> REGIONS: CPT

## 2018-01-01 PROCEDURE — 88341 IMHCHEM/IMCYTCHM EA ADD ANTB: CPT | Mod: 91

## 2018-01-01 PROCEDURE — 97166 OT EVAL MOD COMPLEX 45 MIN: CPT

## 2018-01-01 PROCEDURE — 700105 HCHG RX REV CODE 258: Performed by: HOSPITALIST

## 2018-01-01 PROCEDURE — 99214 OFFICE O/P EST MOD 30 MIN: CPT | Performed by: INTERNAL MEDICINE

## 2018-01-01 PROCEDURE — 84550 ASSAY OF BLOOD/URIC ACID: CPT

## 2018-01-01 PROCEDURE — C1713 ANCHOR/SCREW BN/BN,TIS/BN: HCPCS | Performed by: ORTHOPAEDIC SURGERY

## 2018-01-01 PROCEDURE — G0008 ADMIN INFLUENZA VIRUS VAC: HCPCS | Performed by: FAMILY MEDICINE

## 2018-01-01 PROCEDURE — 88311 DECALCIFY TISSUE: CPT

## 2018-01-01 PROCEDURE — 20225 BONE BIOPSY TROCAR/NDL DEEP: CPT

## 2018-01-01 PROCEDURE — 0QS606Z REPOSITION RIGHT UPPER FEMUR WITH INTRAMEDULLARY INTERNAL FIXATION DEVICE, OPEN APPROACH: ICD-10-PCS | Performed by: ORTHOPAEDIC SURGERY

## 2018-01-01 PROCEDURE — 36430 TRANSFUSION BLD/BLD COMPNT: CPT

## 2018-01-01 PROCEDURE — 97012 MECHANICAL TRACTION THERAPY: CPT

## 2018-01-01 PROCEDURE — 160009 HCHG ANES TIME/MIN: Performed by: ORTHOPAEDIC SURGERY

## 2018-01-01 PROCEDURE — 88341 IMHCHEM/IMCYTCHM EA ADD ANTB: CPT

## 2018-01-01 PROCEDURE — 88185 FLOWCYTOMETRY/TC ADD-ON: CPT | Mod: 91

## 2018-01-01 PROCEDURE — 86334 IMMUNOFIX E-PHORESIS SERUM: CPT

## 2018-01-01 PROCEDURE — 99218 PR INITIAL OBSERVATION CARE,LEVL I: CPT | Performed by: HOSPITALIST

## 2018-01-01 PROCEDURE — 99233 SBSQ HOSP IP/OBS HIGH 50: CPT | Performed by: NURSE PRACTITIONER

## 2018-01-01 PROCEDURE — 82306 VITAMIN D 25 HYDROXY: CPT

## 2018-01-01 PROCEDURE — G8996 SWALLOW CURRENT STATUS: HCPCS | Mod: CI

## 2018-01-01 PROCEDURE — 85027 COMPLETE CBC AUTOMATED: CPT

## 2018-01-01 PROCEDURE — G0378 HOSPITAL OBSERVATION PER HR: HCPCS

## 2018-01-01 PROCEDURE — A9585 GADOBUTROL INJECTION: HCPCS | Performed by: FAMILY MEDICINE

## 2018-01-01 PROCEDURE — 73700 CT LOWER EXTREMITY W/O DYE: CPT | Mod: RT

## 2018-01-01 PROCEDURE — 72131 CT LUMBAR SPINE W/O DYE: CPT

## 2018-01-01 PROCEDURE — 160002 HCHG RECOVERY MINUTES (STAT): Performed by: ORTHOPAEDIC SURGERY

## 2018-01-01 PROCEDURE — 86304 IMMUNOASSAY TUMOR CA 125: CPT

## 2018-01-01 PROCEDURE — 97535 SELF CARE MNGMENT TRAINING: CPT

## 2018-01-01 PROCEDURE — 85730 THROMBOPLASTIN TIME PARTIAL: CPT

## 2018-01-01 PROCEDURE — 88307 TISSUE EXAM BY PATHOLOGIST: CPT

## 2018-01-01 PROCEDURE — 70553 MRI BRAIN STEM W/O & W/DYE: CPT

## 2018-01-01 PROCEDURE — A9270 NON-COVERED ITEM OR SERVICE: HCPCS | Performed by: ANESTHESIOLOGY

## 2018-01-01 PROCEDURE — 88360 TUMOR IMMUNOHISTOCHEM/MANUAL: CPT

## 2018-01-01 PROCEDURE — 84443 ASSAY THYROID STIM HORMONE: CPT

## 2018-01-01 PROCEDURE — 700111 HCHG RX REV CODE 636 W/ 250 OVERRIDE (IP)

## 2018-01-01 PROCEDURE — 73522 X-RAY EXAM HIPS BI 3-4 VIEWS: CPT

## 2018-01-01 PROCEDURE — 99232 SBSQ HOSP IP/OBS MODERATE 35: CPT | Mod: 25 | Performed by: INTERNAL MEDICINE

## 2018-01-01 PROCEDURE — G8979 MOBILITY GOAL STATUS: HCPCS | Mod: CI

## 2018-01-01 PROCEDURE — 71260 CT THORAX DX C+: CPT

## 2018-01-01 PROCEDURE — 38222 DX BONE MARROW BX & ASPIR: CPT | Performed by: HOSPITALIST

## 2018-01-01 PROCEDURE — 07DR3ZX EXTRACTION OF ILIAC BONE MARROW, PERCUTANEOUS APPROACH, DIAGNOSTIC: ICD-10-PCS | Performed by: HOSPITALIST

## 2018-01-01 PROCEDURE — 90662 IIV NO PRSV INCREASED AG IM: CPT | Performed by: FAMILY MEDICINE

## 2018-01-01 PROCEDURE — 700101 HCHG RX REV CODE 250

## 2018-01-01 DEVICE — SCREW CROSS LOCK 5MM X 80MM (4TX5=20): Type: IMPLANTABLE DEVICE | Site: HIP | Status: FUNCTIONAL

## 2018-01-01 DEVICE — IMPLANTABLE DEVICE: Type: IMPLANTABLE DEVICE | Site: HIP | Status: FUNCTIONAL

## 2018-01-01 RX ORDER — ONDANSETRON 4 MG/1
4 TABLET, ORALLY DISINTEGRATING ORAL EVERY 4 HOURS PRN
Status: DISCONTINUED | OUTPATIENT
Start: 2018-01-01 | End: 2019-01-01 | Stop reason: HOSPADM

## 2018-01-01 RX ORDER — ONDANSETRON 2 MG/ML
4 INJECTION INTRAMUSCULAR; INTRAVENOUS EVERY 4 HOURS PRN
Status: DISCONTINUED | OUTPATIENT
Start: 2018-01-01 | End: 2019-01-01 | Stop reason: HOSPADM

## 2018-01-01 RX ORDER — GABAPENTIN 100 MG/1
200 CAPSULE ORAL 3 TIMES DAILY
Status: DISCONTINUED | OUTPATIENT
Start: 2018-01-01 | End: 2018-01-01

## 2018-01-01 RX ORDER — HYDROMORPHONE HYDROCHLORIDE 1 MG/ML
0.1 INJECTION, SOLUTION INTRAMUSCULAR; INTRAVENOUS; SUBCUTANEOUS
Status: DISCONTINUED | OUTPATIENT
Start: 2018-01-01 | End: 2018-01-01 | Stop reason: HOSPADM

## 2018-01-01 RX ORDER — MORPHINE SULFATE 15 MG/1
7.5 TABLET ORAL
Status: DISCONTINUED | OUTPATIENT
Start: 2018-01-01 | End: 2019-01-01 | Stop reason: HOSPADM

## 2018-01-01 RX ORDER — HYDROMORPHONE HYDROCHLORIDE 2 MG/ML
0.5 INJECTION, SOLUTION INTRAMUSCULAR; INTRAVENOUS; SUBCUTANEOUS
Status: DISCONTINUED | OUTPATIENT
Start: 2018-01-01 | End: 2019-01-01

## 2018-01-01 RX ORDER — POLYETHYLENE GLYCOL 3350 17 G/17G
1 POWDER, FOR SOLUTION ORAL 2 TIMES DAILY
Status: DISCONTINUED | OUTPATIENT
Start: 2018-01-01 | End: 2018-01-01

## 2018-01-01 RX ORDER — DEXAMETHASONE SODIUM PHOSPHATE 4 MG/ML
4 INJECTION, SOLUTION INTRA-ARTICULAR; INTRALESIONAL; INTRAMUSCULAR; INTRAVENOUS; SOFT TISSUE
Status: DISCONTINUED | OUTPATIENT
Start: 2018-01-01 | End: 2018-01-01

## 2018-01-01 RX ORDER — ROSUVASTATIN CALCIUM 20 MG/1
40 TABLET, COATED ORAL
Status: DISCONTINUED | OUTPATIENT
Start: 2018-01-01 | End: 2019-01-01 | Stop reason: HOSPADM

## 2018-01-01 RX ORDER — HYDROMORPHONE HYDROCHLORIDE 1 MG/ML
0.5 INJECTION, SOLUTION INTRAMUSCULAR; INTRAVENOUS; SUBCUTANEOUS EVERY 4 HOURS PRN
Status: DISCONTINUED | OUTPATIENT
Start: 2018-01-01 | End: 2018-01-01

## 2018-01-01 RX ORDER — DEXAMETHASONE SODIUM PHOSPHATE 4 MG/ML
4 INJECTION, SOLUTION INTRA-ARTICULAR; INTRALESIONAL; INTRAMUSCULAR; INTRAVENOUS; SOFT TISSUE EVERY 4 HOURS
Status: DISCONTINUED | OUTPATIENT
Start: 2018-01-01 | End: 2018-01-01

## 2018-01-01 RX ORDER — SODIUM CHLORIDE 9 MG/ML
INJECTION, SOLUTION INTRAVENOUS CONTINUOUS
Status: DISCONTINUED | OUTPATIENT
Start: 2018-01-01 | End: 2019-01-01

## 2018-01-01 RX ORDER — BISACODYL 10 MG
10 SUPPOSITORY, RECTAL RECTAL
Status: DISCONTINUED | OUTPATIENT
Start: 2018-01-01 | End: 2018-01-01

## 2018-01-01 RX ORDER — ZOLPIDEM TARTRATE 5 MG/1
2.5 TABLET ORAL
Qty: 30 TAB | Refills: 1 | Status: SHIPPED | OUTPATIENT
Start: 2018-01-01 | End: 2018-01-01

## 2018-01-01 RX ORDER — AMOXICILLIN 250 MG
2 CAPSULE ORAL 2 TIMES DAILY
Status: DISCONTINUED | OUTPATIENT
Start: 2018-01-01 | End: 2018-01-01

## 2018-01-01 RX ORDER — LIDOCAINE HYDROCHLORIDE 10 MG/ML
INJECTION, SOLUTION INFILTRATION; PERINEURAL
Status: DISPENSED
Start: 2018-01-01 | End: 2018-01-01

## 2018-01-01 RX ORDER — OXYCODONE HCL 5 MG/5 ML
10 SOLUTION, ORAL ORAL
Status: DISCONTINUED | OUTPATIENT
Start: 2018-01-01 | End: 2018-01-01 | Stop reason: HOSPADM

## 2018-01-01 RX ORDER — HYDROMORPHONE HYDROCHLORIDE 1 MG/ML
0.5 INJECTION, SOLUTION INTRAMUSCULAR; INTRAVENOUS; SUBCUTANEOUS ONCE
Status: COMPLETED | OUTPATIENT
Start: 2018-01-01 | End: 2018-01-01

## 2018-01-01 RX ORDER — OXYCODONE HCL 5 MG/5 ML
5 SOLUTION, ORAL ORAL
Status: DISCONTINUED | OUTPATIENT
Start: 2018-01-01 | End: 2018-01-01 | Stop reason: HOSPADM

## 2018-01-01 RX ORDER — HYDROCODONE BITARTRATE AND ACETAMINOPHEN 5; 325 MG/1; MG/1
1-2 TABLET ORAL EVERY 4 HOURS PRN
Qty: 20 TAB | Refills: 0 | Status: SHIPPED | OUTPATIENT
Start: 2018-01-01 | End: 2018-01-01 | Stop reason: CLARIF

## 2018-01-01 RX ORDER — SODIUM CHLORIDE 9 MG/ML
500 INJECTION, SOLUTION INTRAVENOUS
Status: ACTIVE | OUTPATIENT
Start: 2018-01-01 | End: 2018-01-01

## 2018-01-01 RX ORDER — POLYETHYLENE GLYCOL 3350 17 G/17G
1 POWDER, FOR SOLUTION ORAL 2 TIMES DAILY PRN
Status: DISCONTINUED | OUTPATIENT
Start: 2018-01-01 | End: 2018-01-01

## 2018-01-01 RX ORDER — AMOXICILLIN 250 MG
1 CAPSULE ORAL NIGHTLY
Status: DISCONTINUED | OUTPATIENT
Start: 2018-01-01 | End: 2018-01-01

## 2018-01-01 RX ORDER — ONDANSETRON 2 MG/ML
4 INJECTION INTRAMUSCULAR; INTRAVENOUS EVERY 4 HOURS PRN
Status: DISCONTINUED | OUTPATIENT
Start: 2018-01-01 | End: 2018-01-01

## 2018-01-01 RX ORDER — POLYETHYLENE GLYCOL 3350 17 G/17G
1 POWDER, FOR SOLUTION ORAL
Status: DISCONTINUED | OUTPATIENT
Start: 2018-01-01 | End: 2018-01-01

## 2018-01-01 RX ORDER — ROSUVASTATIN CALCIUM 40 MG/1
40 TABLET, COATED ORAL
Qty: 90 TAB | Refills: 3 | Status: SHIPPED | OUTPATIENT
Start: 2018-01-01 | End: 2018-01-01 | Stop reason: CLARIF

## 2018-01-01 RX ORDER — HYDROMORPHONE HYDROCHLORIDE 2 MG/ML
.25-.5 INJECTION, SOLUTION INTRAMUSCULAR; INTRAVENOUS; SUBCUTANEOUS
Status: DISCONTINUED | OUTPATIENT
Start: 2018-01-01 | End: 2018-01-01

## 2018-01-01 RX ORDER — GABAPENTIN 100 MG/1
200 CAPSULE ORAL 2 TIMES DAILY
Status: DISCONTINUED | OUTPATIENT
Start: 2019-01-01 | End: 2019-01-01 | Stop reason: HOSPADM

## 2018-01-01 RX ORDER — HYDROMORPHONE HYDROCHLORIDE 1 MG/ML
0.2 INJECTION, SOLUTION INTRAMUSCULAR; INTRAVENOUS; SUBCUTANEOUS
Status: DISCONTINUED | OUTPATIENT
Start: 2018-01-01 | End: 2018-01-01 | Stop reason: HOSPADM

## 2018-01-01 RX ORDER — DIPHENHYDRAMINE HYDROCHLORIDE 50 MG/ML
25 INJECTION INTRAMUSCULAR; INTRAVENOUS EVERY 6 HOURS PRN
Status: DISCONTINUED | OUTPATIENT
Start: 2018-01-01 | End: 2019-01-01 | Stop reason: HOSPADM

## 2018-01-01 RX ORDER — BISACODYL 10 MG
10 SUPPOSITORY, RECTAL RECTAL
Status: DISCONTINUED | OUTPATIENT
Start: 2018-01-01 | End: 2019-01-01 | Stop reason: HOSPADM

## 2018-01-01 RX ORDER — ACETAMINOPHEN 325 MG/1
650 TABLET ORAL EVERY 8 HOURS
Status: DISCONTINUED | OUTPATIENT
Start: 2018-01-01 | End: 2018-01-01

## 2018-01-01 RX ORDER — DEXAMETHASONE 4 MG/1
4 TABLET ORAL 2 TIMES DAILY
Status: DISCONTINUED | OUTPATIENT
Start: 2018-01-01 | End: 2018-01-01

## 2018-01-01 RX ORDER — KETOROLAC TROMETHAMINE 30 MG/ML
15 INJECTION, SOLUTION INTRAMUSCULAR; INTRAVENOUS ONCE
Status: COMPLETED | OUTPATIENT
Start: 2018-01-01 | End: 2018-01-01

## 2018-01-01 RX ORDER — METOPROLOL TARTRATE 1 MG/ML
1 INJECTION, SOLUTION INTRAVENOUS
Status: DISCONTINUED | OUTPATIENT
Start: 2018-01-01 | End: 2018-01-01 | Stop reason: HOSPADM

## 2018-01-01 RX ORDER — ALLOPURINOL 100 MG/1
TABLET ORAL
Qty: 180 TAB | Refills: 1 | Status: SHIPPED | OUTPATIENT
Start: 2018-01-01 | End: 2018-01-01

## 2018-01-01 RX ORDER — CEFAZOLIN SODIUM 2 G/100ML
2 INJECTION, SOLUTION INTRAVENOUS EVERY 8 HOURS
Status: COMPLETED | OUTPATIENT
Start: 2018-01-01 | End: 2018-01-01

## 2018-01-01 RX ORDER — MORPHINE SULFATE 4 MG/ML
3 INJECTION, SOLUTION INTRAMUSCULAR; INTRAVENOUS
Status: DISCONTINUED | OUTPATIENT
Start: 2018-01-01 | End: 2019-01-01 | Stop reason: HOSPADM

## 2018-01-01 RX ORDER — DEXAMETHASONE SODIUM PHOSPHATE 4 MG/ML
4 INJECTION, SOLUTION INTRA-ARTICULAR; INTRALESIONAL; INTRAMUSCULAR; INTRAVENOUS; SOFT TISSUE EVERY 6 HOURS
Status: DISCONTINUED | OUTPATIENT
Start: 2018-01-01 | End: 2018-01-01

## 2018-01-01 RX ORDER — HYDROMORPHONE HYDROCHLORIDE 1 MG/ML
0.5 INJECTION, SOLUTION INTRAMUSCULAR; INTRAVENOUS; SUBCUTANEOUS
Status: DISCONTINUED | OUTPATIENT
Start: 2018-01-01 | End: 2018-01-01

## 2018-01-01 RX ORDER — GABAPENTIN 300 MG/1
300 CAPSULE ORAL 3 TIMES DAILY
Status: DISCONTINUED | OUTPATIENT
Start: 2018-01-01 | End: 2018-01-01

## 2018-01-01 RX ORDER — PHYTONADIONE 5 MG/1
10 TABLET ORAL ONCE
Status: COMPLETED | OUTPATIENT
Start: 2018-01-01 | End: 2018-01-01

## 2018-01-01 RX ORDER — UBIDECARENONE 75 MG
100 CAPSULE ORAL DAILY
COMMUNITY
End: 2018-01-01 | Stop reason: CLARIF

## 2018-01-01 RX ORDER — HYDRALAZINE HYDROCHLORIDE 20 MG/ML
5 INJECTION INTRAMUSCULAR; INTRAVENOUS
Status: DISCONTINUED | OUTPATIENT
Start: 2018-01-01 | End: 2018-01-01 | Stop reason: HOSPADM

## 2018-01-01 RX ORDER — SIMETHICONE 80 MG
80 TABLET,CHEWABLE ORAL 3 TIMES DAILY PRN
Status: DISCONTINUED | OUTPATIENT
Start: 2018-01-01 | End: 2019-01-01 | Stop reason: HOSPADM

## 2018-01-01 RX ORDER — KETOROLAC TROMETHAMINE 30 MG/ML
15 INJECTION, SOLUTION INTRAMUSCULAR; INTRAVENOUS EVERY 6 HOURS
Status: COMPLETED | OUTPATIENT
Start: 2018-01-01 | End: 2019-01-01

## 2018-01-01 RX ORDER — METHYLPREDNISOLONE 4 MG/1
TABLET ORAL
Qty: 21 TAB | Refills: 0 | Status: ON HOLD | OUTPATIENT
Start: 2018-01-01 | End: 2019-01-01

## 2018-01-01 RX ORDER — HYDROMORPHONE HYDROCHLORIDE 2 MG/ML
.25-5 INJECTION, SOLUTION INTRAMUSCULAR; INTRAVENOUS; SUBCUTANEOUS
Status: DISCONTINUED | OUTPATIENT
Start: 2018-01-01 | End: 2018-01-01

## 2018-01-01 RX ORDER — HYDROCODONE BITARTRATE AND ACETAMINOPHEN 5; 325 MG/1; MG/1
1-2 TABLET ORAL EVERY 4 HOURS PRN
Status: ON HOLD | COMMUNITY
End: 2019-01-01

## 2018-01-01 RX ORDER — GADOBUTROL 604.72 MG/ML
6 INJECTION INTRAVENOUS ONCE
Status: COMPLETED | OUTPATIENT
Start: 2018-01-01 | End: 2018-01-01

## 2018-01-01 RX ORDER — DEXAMETHASONE SODIUM PHOSPHATE 4 MG/ML
2 INJECTION, SOLUTION INTRA-ARTICULAR; INTRALESIONAL; INTRAMUSCULAR; INTRAVENOUS; SOFT TISSUE EVERY 12 HOURS
Status: DISCONTINUED | OUTPATIENT
Start: 2018-01-01 | End: 2018-01-01

## 2018-01-01 RX ORDER — MORPHINE SULFATE 15 MG/1
15 TABLET, FILM COATED, EXTENDED RELEASE ORAL EVERY 12 HOURS
Status: DISCONTINUED | OUTPATIENT
Start: 2018-01-01 | End: 2019-01-01 | Stop reason: HOSPADM

## 2018-01-01 RX ORDER — AMOXICILLIN 250 MG
1 CAPSULE ORAL 2 TIMES DAILY
Status: DISCONTINUED | OUTPATIENT
Start: 2018-01-01 | End: 2019-01-01 | Stop reason: HOSPADM

## 2018-01-01 RX ORDER — GABAPENTIN 100 MG/1
100 CAPSULE ORAL 3 TIMES DAILY
Status: DISCONTINUED | OUTPATIENT
Start: 2018-01-01 | End: 2018-01-01

## 2018-01-01 RX ORDER — HYDROCODONE BITARTRATE AND ACETAMINOPHEN 10; 325 MG/1; MG/1
1 TABLET ORAL EVERY 6 HOURS PRN
Status: DISCONTINUED | OUTPATIENT
Start: 2018-01-01 | End: 2018-01-01

## 2018-01-01 RX ORDER — DOCUSATE SODIUM 100 MG/1
100 CAPSULE, LIQUID FILLED ORAL 2 TIMES DAILY
Status: DISCONTINUED | OUTPATIENT
Start: 2018-01-01 | End: 2018-01-01

## 2018-01-01 RX ORDER — GABAPENTIN 100 MG/1
100 CAPSULE ORAL 3 TIMES DAILY
Status: ON HOLD | COMMUNITY
End: 2019-01-01

## 2018-01-01 RX ORDER — DEXAMETHASONE 4 MG/1
2 TABLET ORAL DAILY
Status: DISCONTINUED | OUTPATIENT
Start: 2019-01-01 | End: 2019-01-01 | Stop reason: HOSPADM

## 2018-01-01 RX ORDER — MIDAZOLAM HYDROCHLORIDE 1 MG/ML
.5-2 INJECTION INTRAMUSCULAR; INTRAVENOUS PRN
Status: ACTIVE | OUTPATIENT
Start: 2018-01-01 | End: 2018-01-01

## 2018-01-01 RX ORDER — ALLOPURINOL 100 MG/1
200 TABLET ORAL EVERY MORNING
COMMUNITY
End: 2019-01-01

## 2018-01-01 RX ORDER — OFLOXACIN 3 MG/ML
10 SOLUTION AURICULAR (OTIC) DAILY
Qty: 10 ML | Refills: 0 | Status: SHIPPED | OUTPATIENT
Start: 2018-01-01 | End: 2018-01-01

## 2018-01-01 RX ORDER — ACETAMINOPHEN 325 MG/1
650 TABLET ORAL EVERY 6 HOURS PRN
Status: DISCONTINUED | OUTPATIENT
Start: 2018-01-01 | End: 2018-01-01

## 2018-01-01 RX ORDER — FOLIC ACID 1 MG/1
1 TABLET ORAL DAILY
COMMUNITY
End: 2018-01-01 | Stop reason: CLARIF

## 2018-01-01 RX ORDER — POLYETHYLENE GLYCOL 3350 17 G/17G
1 POWDER, FOR SOLUTION ORAL DAILY
Status: DISCONTINUED | OUTPATIENT
Start: 2018-01-01 | End: 2019-01-01 | Stop reason: HOSPADM

## 2018-01-01 RX ORDER — FENTANYL 12.5 UG/1
1 PATCH TRANSDERMAL
Status: DISCONTINUED | OUTPATIENT
Start: 2018-01-01 | End: 2018-01-01

## 2018-01-01 RX ORDER — ONDANSETRON 4 MG/1
4 TABLET, ORALLY DISINTEGRATING ORAL ONCE
Status: COMPLETED | OUTPATIENT
Start: 2018-01-01 | End: 2018-01-01

## 2018-01-01 RX ORDER — HEPARIN SODIUM 5000 [USP'U]/ML
5000 INJECTION, SOLUTION INTRAVENOUS; SUBCUTANEOUS EVERY 8 HOURS
Status: DISCONTINUED | OUTPATIENT
Start: 2018-01-01 | End: 2018-01-01

## 2018-01-01 RX ORDER — OXYCODONE HYDROCHLORIDE 5 MG/1
5 TABLET ORAL
Status: DISCONTINUED | OUTPATIENT
Start: 2018-01-01 | End: 2018-01-01

## 2018-01-01 RX ORDER — DEXAMETHASONE 4 MG/1
2 TABLET ORAL EVERY 12 HOURS
Status: DISCONTINUED | OUTPATIENT
Start: 2018-01-01 | End: 2018-01-01

## 2018-01-01 RX ORDER — MORPHINE SULFATE 30 MG/1
30 TABLET, FILM COATED, EXTENDED RELEASE ORAL EVERY 12 HOURS
Status: DISCONTINUED | OUTPATIENT
Start: 2018-01-01 | End: 2018-01-01

## 2018-01-01 RX ORDER — HALOPERIDOL 5 MG/ML
1 INJECTION INTRAMUSCULAR EVERY 6 HOURS PRN
Status: DISCONTINUED | OUTPATIENT
Start: 2018-01-01 | End: 2019-01-01 | Stop reason: HOSPADM

## 2018-01-01 RX ORDER — MIDAZOLAM HYDROCHLORIDE 1 MG/ML
INJECTION INTRAMUSCULAR; INTRAVENOUS
Status: COMPLETED
Start: 2018-01-01 | End: 2018-01-01

## 2018-01-01 RX ORDER — HYDROMORPHONE HYDROCHLORIDE 2 MG/ML
0.5 INJECTION, SOLUTION INTRAMUSCULAR; INTRAVENOUS; SUBCUTANEOUS
Status: DISCONTINUED | OUTPATIENT
Start: 2018-01-01 | End: 2018-01-01

## 2018-01-01 RX ORDER — MORPHINE SULFATE 10 MG/ML
4 INJECTION, SOLUTION INTRAMUSCULAR; INTRAVENOUS ONCE
Status: COMPLETED | OUTPATIENT
Start: 2018-01-01 | End: 2018-01-01

## 2018-01-01 RX ORDER — ALLOPURINOL 100 MG/1
200 TABLET ORAL DAILY
Status: DISCONTINUED | OUTPATIENT
Start: 2018-01-01 | End: 2019-01-01 | Stop reason: HOSPADM

## 2018-01-01 RX ORDER — ONDANSETRON 2 MG/ML
4 INJECTION INTRAMUSCULAR; INTRAVENOUS
Status: DISCONTINUED | OUTPATIENT
Start: 2018-01-01 | End: 2018-01-01 | Stop reason: HOSPADM

## 2018-01-01 RX ORDER — ZOLPIDEM TARTRATE 5 MG/1
5 TABLET ORAL NIGHTLY PRN
Status: DISCONTINUED | OUTPATIENT
Start: 2018-01-01 | End: 2018-01-01

## 2018-01-01 RX ORDER — ACETAMINOPHEN 325 MG/1
650 TABLET ORAL EVERY 6 HOURS
Status: DISPENSED | OUTPATIENT
Start: 2018-01-01 | End: 2019-01-01

## 2018-01-01 RX ORDER — ROSUVASTATIN CALCIUM 40 MG/1
40 TABLET, COATED ORAL EVERY EVENING
COMMUNITY
End: 2019-01-01 | Stop reason: SDUPTHER

## 2018-01-01 RX ORDER — TAMSULOSIN HYDROCHLORIDE 0.4 MG/1
0.4 CAPSULE ORAL
Status: DISCONTINUED | OUTPATIENT
Start: 2018-01-01 | End: 2018-01-01

## 2018-01-01 RX ORDER — AMOXICILLIN 250 MG
1 CAPSULE ORAL
Status: DISCONTINUED | OUTPATIENT
Start: 2018-01-01 | End: 2018-01-01

## 2018-01-01 RX ORDER — HYDROMORPHONE HYDROCHLORIDE 1 MG/ML
0.4 INJECTION, SOLUTION INTRAMUSCULAR; INTRAVENOUS; SUBCUTANEOUS
Status: DISCONTINUED | OUTPATIENT
Start: 2018-01-01 | End: 2018-01-01 | Stop reason: HOSPADM

## 2018-01-01 RX ORDER — ONDANSETRON 2 MG/ML
4 INJECTION INTRAMUSCULAR; INTRAVENOUS PRN
Status: ACTIVE | OUTPATIENT
Start: 2018-01-01 | End: 2018-01-01

## 2018-01-01 RX ORDER — MIDAZOLAM HYDROCHLORIDE 1 MG/ML
1-2 INJECTION INTRAMUSCULAR; INTRAVENOUS ONCE
Status: COMPLETED | OUTPATIENT
Start: 2018-01-01 | End: 2018-01-01

## 2018-01-01 RX ORDER — METOPROLOL TARTRATE 50 MG/1
25-50 TABLET, FILM COATED ORAL 2 TIMES DAILY
Status: ON HOLD | COMMUNITY
End: 2019-01-01

## 2018-01-01 RX ORDER — HALOPERIDOL 5 MG/ML
1 INJECTION INTRAMUSCULAR
Status: DISCONTINUED | OUTPATIENT
Start: 2018-01-01 | End: 2018-01-01 | Stop reason: HOSPADM

## 2018-01-01 RX ORDER — OXYCODONE HYDROCHLORIDE 10 MG/1
10 TABLET ORAL
Status: DISCONTINUED | OUTPATIENT
Start: 2018-01-01 | End: 2018-01-01

## 2018-01-01 RX ORDER — SCOLOPAMINE TRANSDERMAL SYSTEM 1 MG/1
1 PATCH, EXTENDED RELEASE TRANSDERMAL
Status: DISCONTINUED | OUTPATIENT
Start: 2018-01-01 | End: 2019-01-01 | Stop reason: HOSPADM

## 2018-01-01 RX ORDER — ZOLPIDEM TARTRATE 5 MG/1
TABLET ORAL
COMMUNITY
Start: 2018-01-01 | End: 2018-01-01 | Stop reason: CLARIF

## 2018-01-01 RX ORDER — ENEMA 19; 7 G/133ML; G/133ML
1 ENEMA RECTAL
Status: DISCONTINUED | OUTPATIENT
Start: 2018-01-01 | End: 2019-01-01 | Stop reason: HOSPADM

## 2018-01-01 RX ORDER — MEPERIDINE HYDROCHLORIDE 25 MG/ML
12.5 INJECTION INTRAMUSCULAR; INTRAVENOUS; SUBCUTANEOUS
Status: DISCONTINUED | OUTPATIENT
Start: 2018-01-01 | End: 2018-01-01 | Stop reason: HOSPADM

## 2018-01-01 RX ORDER — DEXAMETHASONE SODIUM PHOSPHATE 4 MG/ML
4 INJECTION, SOLUTION INTRA-ARTICULAR; INTRALESIONAL; INTRAMUSCULAR; INTRAVENOUS; SOFT TISSUE EVERY 12 HOURS
Status: DISCONTINUED | OUTPATIENT
Start: 2018-01-01 | End: 2018-01-01

## 2018-01-01 RX ORDER — COVID-19 ANTIGEN TEST
KIT MISCELLANEOUS
COMMUNITY
End: 2018-01-01 | Stop reason: CLARIF

## 2018-01-01 RX ADMIN — ASPIRIN 81 MG: 81 TABLET, COATED ORAL at 18:09

## 2018-01-01 RX ADMIN — CEFTRIAXONE SODIUM 2 G: 2 INJECTION, POWDER, FOR SOLUTION INTRAMUSCULAR; INTRAVENOUS at 02:05

## 2018-01-01 RX ADMIN — ROSUVASTATIN CALCIUM 40 MG: 20 TABLET ORAL at 20:15

## 2018-01-01 RX ADMIN — MORPHINE SULFATE 4 MG: 10 INJECTION INTRAVENOUS at 08:02

## 2018-01-01 RX ADMIN — DEXAMETHASONE 4 MG: 4 TABLET ORAL at 23:21

## 2018-01-01 RX ADMIN — ALLOPURINOL 200 MG: 100 TABLET ORAL at 06:51

## 2018-01-01 RX ADMIN — GABAPENTIN 200 MG: 100 CAPSULE ORAL at 18:45

## 2018-01-01 RX ADMIN — SODIUM CHLORIDE: 9 INJECTION, SOLUTION INTRAVENOUS at 23:30

## 2018-01-01 RX ADMIN — HYDROMORPHONE HYDROCHLORIDE 0.5 MG: 1 INJECTION, SOLUTION INTRAMUSCULAR; INTRAVENOUS; SUBCUTANEOUS at 23:18

## 2018-01-01 RX ADMIN — ACETAMINOPHEN 650 MG: 325 TABLET, FILM COATED ORAL at 20:40

## 2018-01-01 RX ADMIN — ACETAMINOPHEN 650 MG: 325 TABLET, FILM COATED ORAL at 11:47

## 2018-01-01 RX ADMIN — METOPROLOL TARTRATE 50 MG: 25 TABLET, FILM COATED ORAL at 18:50

## 2018-01-01 RX ADMIN — ACETAMINOPHEN 650 MG: 325 TABLET, FILM COATED ORAL at 05:16

## 2018-01-01 RX ADMIN — DOCUSATE SODIUM 100 MG: 100 CAPSULE, LIQUID FILLED ORAL at 22:40

## 2018-01-01 RX ADMIN — BISACODYL 10 MG: 10 SUPPOSITORY RECTAL at 20:40

## 2018-01-01 RX ADMIN — METOPROLOL TARTRATE 25 MG: 25 TABLET, FILM COATED ORAL at 18:44

## 2018-01-01 RX ADMIN — DEXAMETHASONE SODIUM PHOSPHATE 4 MG: 4 INJECTION, SOLUTION INTRA-ARTICULAR; INTRALESIONAL; INTRAMUSCULAR; INTRAVENOUS; SOFT TISSUE at 06:02

## 2018-01-01 RX ADMIN — DEXAMETHASONE SODIUM PHOSPHATE 4 MG: 4 INJECTION, SOLUTION INTRA-ARTICULAR; INTRALESIONAL; INTRAMUSCULAR; INTRAVENOUS; SOFT TISSUE at 05:52

## 2018-01-01 RX ADMIN — DEXAMETHASONE SODIUM PHOSPHATE 4 MG: 4 INJECTION, SOLUTION INTRA-ARTICULAR; INTRALESIONAL; INTRAMUSCULAR; INTRAVENOUS; SOFT TISSUE at 18:24

## 2018-01-01 RX ADMIN — HEPARIN SODIUM 5000 UNITS: 5000 INJECTION, SOLUTION INTRAVENOUS; SUBCUTANEOUS at 14:54

## 2018-01-01 RX ADMIN — HYDROMORPHONE HYDROCHLORIDE 0.5 MG: 2 INJECTION INTRAMUSCULAR; INTRAVENOUS; SUBCUTANEOUS at 04:58

## 2018-01-01 RX ADMIN — DOCUSATE SODIUM 100 MG: 100 CAPSULE, LIQUID FILLED ORAL at 16:38

## 2018-01-01 RX ADMIN — GABAPENTIN 200 MG: 100 CAPSULE ORAL at 05:42

## 2018-01-01 RX ADMIN — STANDARDIZED SENNA CONCENTRATE AND DOCUSATE SODIUM 2 TABLET: 8.6; 5 TABLET, FILM COATED ORAL at 16:47

## 2018-01-01 RX ADMIN — GABAPENTIN 300 MG: 300 CAPSULE ORAL at 06:01

## 2018-01-01 RX ADMIN — ROSUVASTATIN CALCIUM 40 MG: 20 TABLET ORAL at 21:30

## 2018-01-01 RX ADMIN — POLYETHYLENE GLYCOL 3350 1 PACKET: 17 POWDER, FOR SOLUTION ORAL at 17:47

## 2018-01-01 RX ADMIN — HEPARIN SODIUM 5000 UNITS: 5000 INJECTION, SOLUTION INTRAVENOUS; SUBCUTANEOUS at 13:22

## 2018-01-01 RX ADMIN — HYDROMORPHONE HYDROCHLORIDE 0.5 MG: 2 INJECTION INTRAMUSCULAR; INTRAVENOUS; SUBCUTANEOUS at 16:12

## 2018-01-01 RX ADMIN — DEXAMETHASONE SODIUM PHOSPHATE 4 MG: 4 INJECTION, SOLUTION INTRA-ARTICULAR; INTRALESIONAL; INTRAMUSCULAR; INTRAVENOUS; SOFT TISSUE at 16:49

## 2018-01-01 RX ADMIN — ASPIRIN 81 MG: 81 TABLET, COATED ORAL at 16:46

## 2018-01-01 RX ADMIN — HYDROMORPHONE HYDROCHLORIDE 0.25 MG: 2 INJECTION INTRAMUSCULAR; INTRAVENOUS; SUBCUTANEOUS at 16:59

## 2018-01-01 RX ADMIN — MORPHINE SULFATE 30 MG: 30 TABLET, EXTENDED RELEASE ORAL at 09:22

## 2018-01-01 RX ADMIN — METOPROLOL TARTRATE 25 MG: 25 TABLET, FILM COATED ORAL at 05:16

## 2018-01-01 RX ADMIN — ACETAMINOPHEN 650 MG: 325 TABLET, FILM COATED ORAL at 01:32

## 2018-01-01 RX ADMIN — DEXAMETHASONE SODIUM PHOSPHATE 4 MG: 4 INJECTION, SOLUTION INTRA-ARTICULAR; INTRALESIONAL; INTRAMUSCULAR; INTRAVENOUS; SOFT TISSUE at 18:50

## 2018-01-01 RX ADMIN — HYDROMORPHONE HYDROCHLORIDE 0.25 MG: 2 INJECTION INTRAMUSCULAR; INTRAVENOUS; SUBCUTANEOUS at 13:21

## 2018-01-01 RX ADMIN — DEXAMETHASONE SODIUM PHOSPHATE 4 MG: 4 INJECTION, SOLUTION INTRA-ARTICULAR; INTRALESIONAL; INTRAMUSCULAR; INTRAVENOUS; SOFT TISSUE at 13:55

## 2018-01-01 RX ADMIN — SODIUM CHLORIDE: 9 INJECTION, SOLUTION INTRAVENOUS at 00:57

## 2018-01-01 RX ADMIN — MORPHINE SULFATE 30 MG: 30 TABLET, EXTENDED RELEASE ORAL at 18:24

## 2018-01-01 RX ADMIN — BISACODYL 10 MG: 10 SUPPOSITORY RECTAL at 13:46

## 2018-01-01 RX ADMIN — ONDANSETRON 4 MG: 4 TABLET, ORALLY DISINTEGRATING ORAL at 08:03

## 2018-01-01 RX ADMIN — SIMETHICONE CHEW TAB 80 MG 80 MG: 80 TABLET ORAL at 17:44

## 2018-01-01 RX ADMIN — FENTANYL CITRATE 50 MCG: 50 INJECTION, SOLUTION INTRAMUSCULAR; INTRAVENOUS at 20:11

## 2018-01-01 RX ADMIN — IOHEXOL 100 ML: 350 INJECTION, SOLUTION INTRAVENOUS at 10:08

## 2018-01-01 RX ADMIN — DEXAMETHASONE SODIUM PHOSPHATE 4 MG: 4 INJECTION, SOLUTION INTRA-ARTICULAR; INTRALESIONAL; INTRAMUSCULAR; INTRAVENOUS; SOFT TISSUE at 00:07

## 2018-01-01 RX ADMIN — GABAPENTIN 300 MG: 300 CAPSULE ORAL at 14:00

## 2018-01-01 RX ADMIN — ASPIRIN 81 MG: 81 TABLET, COATED ORAL at 16:47

## 2018-01-01 RX ADMIN — METOPROLOL TARTRATE 50 MG: 25 TABLET, FILM COATED ORAL at 18:24

## 2018-01-01 RX ADMIN — METOPROLOL TARTRATE 25 MG: 25 TABLET, FILM COATED ORAL at 17:03

## 2018-01-01 RX ADMIN — GABAPENTIN 200 MG: 100 CAPSULE ORAL at 06:52

## 2018-01-01 RX ADMIN — POLYETHYLENE GLYCOL 3350 1 PACKET: 17 POWDER, FOR SOLUTION ORAL at 16:43

## 2018-01-01 RX ADMIN — HEPARIN SODIUM 5000 UNITS: 5000 INJECTION, SOLUTION INTRAVENOUS; SUBCUTANEOUS at 21:25

## 2018-01-01 RX ADMIN — ASPIRIN 81 MG: 81 TABLET, COATED ORAL at 18:23

## 2018-01-01 RX ADMIN — DEXAMETHASONE SODIUM PHOSPHATE 4 MG: 4 INJECTION, SOLUTION INTRA-ARTICULAR; INTRALESIONAL; INTRAMUSCULAR; INTRAVENOUS; SOFT TISSUE at 00:17

## 2018-01-01 RX ADMIN — ASPIRIN 81 MG: 81 TABLET, COATED ORAL at 17:47

## 2018-01-01 RX ADMIN — HYDROMORPHONE HYDROCHLORIDE 0.5 MG: 2 INJECTION INTRAMUSCULAR; INTRAVENOUS; SUBCUTANEOUS at 01:54

## 2018-01-01 RX ADMIN — HEPARIN SODIUM 5000 UNITS: 5000 INJECTION, SOLUTION INTRAVENOUS; SUBCUTANEOUS at 05:28

## 2018-01-01 RX ADMIN — HEPARIN SODIUM 5000 UNITS: 5000 INJECTION, SOLUTION INTRAVENOUS; SUBCUTANEOUS at 05:42

## 2018-01-01 RX ADMIN — DEXAMETHASONE SODIUM PHOSPHATE 4 MG: 4 INJECTION, SOLUTION INTRA-ARTICULAR; INTRALESIONAL; INTRAMUSCULAR; INTRAVENOUS; SOFT TISSUE at 06:51

## 2018-01-01 RX ADMIN — STANDARDIZED SENNA CONCENTRATE AND DOCUSATE SODIUM 1 TABLET: 8.6; 5 TABLET, FILM COATED ORAL at 22:15

## 2018-01-01 RX ADMIN — GABAPENTIN 200 MG: 100 CAPSULE ORAL at 18:09

## 2018-01-01 RX ADMIN — GABAPENTIN 200 MG: 100 CAPSULE ORAL at 13:25

## 2018-01-01 RX ADMIN — SODIUM CHLORIDE: 9 INJECTION, SOLUTION INTRAVENOUS at 21:31

## 2018-01-01 RX ADMIN — STANDARDIZED SENNA CONCENTRATE AND DOCUSATE SODIUM 1 TABLET: 8.6; 5 TABLET, FILM COATED ORAL at 21:08

## 2018-01-01 RX ADMIN — ACETAMINOPHEN 650 MG: 325 TABLET, FILM COATED ORAL at 13:25

## 2018-01-01 RX ADMIN — METOPROLOL TARTRATE 25 MG: 25 TABLET, FILM COATED ORAL at 16:39

## 2018-01-01 RX ADMIN — HEPARIN SODIUM 5000 UNITS: 5000 INJECTION, SOLUTION INTRAVENOUS; SUBCUTANEOUS at 05:52

## 2018-01-01 RX ADMIN — ALLOPURINOL 200 MG: 100 TABLET ORAL at 05:27

## 2018-01-01 RX ADMIN — MIDAZOLAM 2 MG: 1 INJECTION INTRAMUSCULAR; INTRAVENOUS at 11:40

## 2018-01-01 RX ADMIN — MORPHINE SULFATE 30 MG: 30 TABLET, EXTENDED RELEASE ORAL at 16:48

## 2018-01-01 RX ADMIN — ASPIRIN 81 MG: 81 TABLET, COATED ORAL at 18:45

## 2018-01-01 RX ADMIN — KETOROLAC TROMETHAMINE 15 MG: 30 INJECTION, SOLUTION INTRAMUSCULAR at 19:35

## 2018-01-01 RX ADMIN — ASPIRIN 81 MG: 81 TABLET, COATED ORAL at 17:45

## 2018-01-01 RX ADMIN — DOCUSATE SODIUM 100 MG: 100 CAPSULE, LIQUID FILLED ORAL at 05:26

## 2018-01-01 RX ADMIN — ALLOPURINOL 200 MG: 100 TABLET ORAL at 05:16

## 2018-01-01 RX ADMIN — STANDARDIZED SENNA CONCENTRATE AND DOCUSATE SODIUM 2 TABLET: 8.6; 5 TABLET, FILM COATED ORAL at 18:10

## 2018-01-01 RX ADMIN — METOPROLOL TARTRATE 25 MG: 25 TABLET, FILM COATED ORAL at 05:54

## 2018-01-01 RX ADMIN — HEPARIN SODIUM 5000 UNITS: 5000 INJECTION, SOLUTION INTRAVENOUS; SUBCUTANEOUS at 20:40

## 2018-01-01 RX ADMIN — GABAPENTIN 200 MG: 100 CAPSULE ORAL at 12:10

## 2018-01-01 RX ADMIN — ACETAMINOPHEN 650 MG: 325 TABLET, FILM COATED ORAL at 16:40

## 2018-01-01 RX ADMIN — STANDARDIZED SENNA CONCENTRATE AND DOCUSATE SODIUM 1 TABLET: 8.6; 5 TABLET, FILM COATED ORAL at 17:47

## 2018-01-01 RX ADMIN — DEXAMETHASONE SODIUM PHOSPHATE 4 MG: 4 INJECTION, SOLUTION INTRA-ARTICULAR; INTRALESIONAL; INTRAMUSCULAR; INTRAVENOUS; SOFT TISSUE at 09:42

## 2018-01-01 RX ADMIN — DEXAMETHASONE 2 MG: 4 TABLET ORAL at 16:38

## 2018-01-01 RX ADMIN — ACETAMINOPHEN 650 MG: 325 TABLET, FILM COATED ORAL at 05:53

## 2018-01-01 RX ADMIN — HYDROMORPHONE HYDROCHLORIDE 0.5 MG: 2 INJECTION INTRAMUSCULAR; INTRAVENOUS; SUBCUTANEOUS at 13:20

## 2018-01-01 RX ADMIN — DEXAMETHASONE SODIUM PHOSPHATE 4 MG: 4 INJECTION, SOLUTION INTRA-ARTICULAR; INTRALESIONAL; INTRAMUSCULAR; INTRAVENOUS; SOFT TISSUE at 06:22

## 2018-01-01 RX ADMIN — GABAPENTIN 200 MG: 100 CAPSULE ORAL at 16:42

## 2018-01-01 RX ADMIN — DEXAMETHASONE SODIUM PHOSPHATE 4 MG: 4 INJECTION, SOLUTION INTRA-ARTICULAR; INTRALESIONAL; INTRAMUSCULAR; INTRAVENOUS; SOFT TISSUE at 02:35

## 2018-01-01 RX ADMIN — METOPROLOL TARTRATE 50 MG: 25 TABLET, FILM COATED ORAL at 18:09

## 2018-01-01 RX ADMIN — GABAPENTIN 100 MG: 100 CAPSULE ORAL at 11:40

## 2018-01-01 RX ADMIN — PHYTONADIONE 10 MG: 5 TABLET ORAL at 23:28

## 2018-01-01 RX ADMIN — ENOXAPARIN SODIUM 40 MG: 100 INJECTION SUBCUTANEOUS at 09:34

## 2018-01-01 RX ADMIN — KETOROLAC TROMETHAMINE 15 MG: 30 INJECTION, SOLUTION INTRAMUSCULAR at 05:27

## 2018-01-01 RX ADMIN — HYDROMORPHONE HYDROCHLORIDE 0.5 MG: 1 INJECTION, SOLUTION INTRAMUSCULAR; INTRAVENOUS; SUBCUTANEOUS at 14:54

## 2018-01-01 RX ADMIN — METOPROLOL TARTRATE 25 MG: 25 TABLET, FILM COATED ORAL at 06:22

## 2018-01-01 RX ADMIN — GABAPENTIN 200 MG: 100 CAPSULE ORAL at 05:26

## 2018-01-01 RX ADMIN — HYDROMORPHONE HYDROCHLORIDE 0.5 MG: 1 INJECTION, SOLUTION INTRAMUSCULAR; INTRAVENOUS; SUBCUTANEOUS at 20:13

## 2018-01-01 RX ADMIN — GADOBUTROL 6 ML: 604.72 INJECTION INTRAVENOUS at 10:59

## 2018-01-01 RX ADMIN — CEFTRIAXONE SODIUM 2 G: 2 INJECTION, POWDER, FOR SOLUTION INTRAMUSCULAR; INTRAVENOUS at 20:39

## 2018-01-01 RX ADMIN — HYDROMORPHONE HYDROCHLORIDE 0.5 MG: 2 INJECTION INTRAMUSCULAR; INTRAVENOUS; SUBCUTANEOUS at 19:30

## 2018-01-01 RX ADMIN — DEXAMETHASONE SODIUM PHOSPHATE 4 MG: 4 INJECTION, SOLUTION INTRA-ARTICULAR; INTRALESIONAL; INTRAMUSCULAR; INTRAVENOUS; SOFT TISSUE at 05:00

## 2018-01-01 RX ADMIN — METOPROLOL TARTRATE 25 MG: 25 TABLET, FILM COATED ORAL at 17:47

## 2018-01-01 RX ADMIN — STANDARDIZED SENNA CONCENTRATE AND DOCUSATE SODIUM 2 TABLET: 8.6; 5 TABLET, FILM COATED ORAL at 23:21

## 2018-01-01 RX ADMIN — HYDROMORPHONE HYDROCHLORIDE 0.5 MG: 1 INJECTION, SOLUTION INTRAMUSCULAR; INTRAVENOUS; SUBCUTANEOUS at 17:59

## 2018-01-01 RX ADMIN — HEPARIN SODIUM 5000 UNITS: 5000 INJECTION, SOLUTION INTRAVENOUS; SUBCUTANEOUS at 05:02

## 2018-01-01 RX ADMIN — HEPARIN SODIUM 5000 UNITS: 5000 INJECTION, SOLUTION INTRAVENOUS; SUBCUTANEOUS at 13:21

## 2018-01-01 RX ADMIN — HYDROMORPHONE HYDROCHLORIDE 0.5 MG: 1 INJECTION, SOLUTION INTRAMUSCULAR; INTRAVENOUS; SUBCUTANEOUS at 06:40

## 2018-01-01 RX ADMIN — MORPHINE SULFATE 15 MG: 15 TABLET, EXTENDED RELEASE ORAL at 18:45

## 2018-01-01 RX ADMIN — POLYETHYLENE GLYCOL 3350 1 PACKET: 17 POWDER, FOR SOLUTION ORAL at 18:45

## 2018-01-01 RX ADMIN — HYDROMORPHONE HYDROCHLORIDE 0.5 MG: 2 INJECTION INTRAMUSCULAR; INTRAVENOUS; SUBCUTANEOUS at 20:01

## 2018-01-01 RX ADMIN — GABAPENTIN 300 MG: 300 CAPSULE ORAL at 18:08

## 2018-01-01 RX ADMIN — DOCUSATE SODIUM 100 MG: 100 CAPSULE, LIQUID FILLED ORAL at 05:15

## 2018-01-01 RX ADMIN — CALCIUM CARBONATE-CHOLECALCIFEROL TAB 250 MG-125 UNIT 1 TABLET: 250-125 TAB at 09:34

## 2018-01-01 RX ADMIN — KETOROLAC TROMETHAMINE 15 MG: 30 INJECTION, SOLUTION INTRAMUSCULAR at 22:41

## 2018-01-01 RX ADMIN — HYDROMORPHONE HYDROCHLORIDE 0.5 MG: 2 INJECTION INTRAMUSCULAR; INTRAVENOUS; SUBCUTANEOUS at 00:54

## 2018-01-01 RX ADMIN — DEXAMETHASONE SODIUM PHOSPHATE 4 MG: 4 INJECTION, SOLUTION INTRA-ARTICULAR; INTRALESIONAL; INTRAMUSCULAR; INTRAVENOUS; SOFT TISSUE at 17:45

## 2018-01-01 RX ADMIN — HYDROCODONE BITARTRATE AND ACETAMINOPHEN 1 TABLET: 10; 325 TABLET ORAL at 17:45

## 2018-01-01 RX ADMIN — GABAPENTIN 200 MG: 100 CAPSULE ORAL at 17:32

## 2018-01-01 RX ADMIN — ASPIRIN 81 MG: 81 TABLET, COATED ORAL at 18:48

## 2018-01-01 RX ADMIN — HEPARIN SODIUM 5000 UNITS: 5000 INJECTION, SOLUTION INTRAVENOUS; SUBCUTANEOUS at 21:09

## 2018-01-01 RX ADMIN — HYDROMORPHONE HYDROCHLORIDE 0.5 MG: 2 INJECTION INTRAMUSCULAR; INTRAVENOUS; SUBCUTANEOUS at 14:22

## 2018-01-01 RX ADMIN — GABAPENTIN 200 MG: 100 CAPSULE ORAL at 05:01

## 2018-01-01 RX ADMIN — MORPHINE SULFATE 15 MG: 15 TABLET, EXTENDED RELEASE ORAL at 05:53

## 2018-01-01 RX ADMIN — GABAPENTIN 200 MG: 100 CAPSULE ORAL at 06:21

## 2018-01-01 RX ADMIN — HYDROMORPHONE HYDROCHLORIDE 0.5 MG: 2 INJECTION INTRAMUSCULAR; INTRAVENOUS; SUBCUTANEOUS at 08:05

## 2018-01-01 RX ADMIN — GABAPENTIN 200 MG: 100 CAPSULE ORAL at 18:24

## 2018-01-01 RX ADMIN — HYDROMORPHONE HYDROCHLORIDE 0.5 MG: 1 INJECTION, SOLUTION INTRAMUSCULAR; INTRAVENOUS; SUBCUTANEOUS at 12:11

## 2018-01-01 RX ADMIN — HEPARIN SODIUM 5000 UNITS: 5000 INJECTION, SOLUTION INTRAVENOUS; SUBCUTANEOUS at 14:36

## 2018-01-01 RX ADMIN — CEFAZOLIN SODIUM 2 G: 2 INJECTION, SOLUTION INTRAVENOUS at 23:23

## 2018-01-01 RX ADMIN — STANDARDIZED SENNA CONCENTRATE AND DOCUSATE SODIUM 2 TABLET: 8.6; 5 TABLET, FILM COATED ORAL at 05:42

## 2018-01-01 RX ADMIN — DEXAMETHASONE SODIUM PHOSPHATE 4 MG: 4 INJECTION, SOLUTION INTRA-ARTICULAR; INTRALESIONAL; INTRAMUSCULAR; INTRAVENOUS; SOFT TISSUE at 18:45

## 2018-01-01 RX ADMIN — ACETAMINOPHEN 650 MG: 325 TABLET, FILM COATED ORAL at 05:41

## 2018-01-01 RX ADMIN — DEXAMETHASONE SODIUM PHOSPHATE 4 MG: 4 INJECTION, SOLUTION INTRA-ARTICULAR; INTRALESIONAL; INTRAMUSCULAR; INTRAVENOUS; SOFT TISSUE at 05:41

## 2018-01-01 RX ADMIN — FENTANYL 1 PATCH: 12 PATCH TRANSDERMAL at 11:50

## 2018-01-01 RX ADMIN — METOPROLOL TARTRATE 25 MG: 25 TABLET, FILM COATED ORAL at 05:26

## 2018-01-01 RX ADMIN — ALLOPURINOL 200 MG: 100 TABLET ORAL at 05:42

## 2018-01-01 RX ADMIN — HEPARIN SODIUM 5000 UNITS: 5000 INJECTION, SOLUTION INTRAVENOUS; SUBCUTANEOUS at 13:57

## 2018-01-01 RX ADMIN — CEFTRIAXONE SODIUM 2 G: 2 INJECTION, POWDER, FOR SOLUTION INTRAMUSCULAR; INTRAVENOUS at 21:24

## 2018-01-01 RX ADMIN — HYDROMORPHONE HYDROCHLORIDE 0.5 MG: 1 INJECTION, SOLUTION INTRAMUSCULAR; INTRAVENOUS; SUBCUTANEOUS at 05:23

## 2018-01-01 RX ADMIN — ACETAMINOPHEN 650 MG: 325 TABLET, FILM COATED ORAL at 05:25

## 2018-01-01 RX ADMIN — DEXAMETHASONE SODIUM PHOSPHATE 4 MG: 4 INJECTION, SOLUTION INTRA-ARTICULAR; INTRALESIONAL; INTRAMUSCULAR; INTRAVENOUS; SOFT TISSUE at 14:35

## 2018-01-01 RX ADMIN — MORPHINE SULFATE 15 MG: 15 TABLET, EXTENDED RELEASE ORAL at 17:32

## 2018-01-01 RX ADMIN — ACETAMINOPHEN 650 MG: 325 TABLET, FILM COATED ORAL at 16:42

## 2018-01-01 RX ADMIN — ALLOPURINOL 200 MG: 100 TABLET ORAL at 05:15

## 2018-01-01 RX ADMIN — STANDARDIZED SENNA CONCENTRATE AND DOCUSATE SODIUM 2 TABLET: 8.6; 5 TABLET, FILM COATED ORAL at 18:44

## 2018-01-01 RX ADMIN — ALLOPURINOL 200 MG: 100 TABLET ORAL at 06:21

## 2018-01-01 RX ADMIN — FENTANYL CITRATE 50 MCG: 50 INJECTION, SOLUTION INTRAMUSCULAR; INTRAVENOUS at 11:41

## 2018-01-01 RX ADMIN — METOPROLOL TARTRATE 25 MG: 25 TABLET, FILM COATED ORAL at 17:32

## 2018-01-01 RX ADMIN — ALLOPURINOL 200 MG: 100 TABLET ORAL at 05:53

## 2018-01-01 RX ADMIN — HEPARIN SODIUM 5000 UNITS: 5000 INJECTION, SOLUTION INTRAVENOUS; SUBCUTANEOUS at 13:55

## 2018-01-01 RX ADMIN — MORPHINE SULFATE 15 MG: 15 TABLET, EXTENDED RELEASE ORAL at 05:15

## 2018-01-01 RX ADMIN — GABAPENTIN 200 MG: 100 CAPSULE ORAL at 11:50

## 2018-01-01 RX ADMIN — HYDROMORPHONE HYDROCHLORIDE 0.5 MG: 1 INJECTION, SOLUTION INTRAMUSCULAR; INTRAVENOUS; SUBCUTANEOUS at 19:36

## 2018-01-01 RX ADMIN — CEFTRIAXONE SODIUM 2 G: 2 INJECTION, POWDER, FOR SOLUTION INTRAMUSCULAR; INTRAVENOUS at 22:30

## 2018-01-01 RX ADMIN — DEXAMETHASONE 2 MG: 4 TABLET ORAL at 05:15

## 2018-01-01 RX ADMIN — GABAPENTIN 200 MG: 100 CAPSULE ORAL at 13:14

## 2018-01-01 RX ADMIN — HEPARIN SODIUM 5000 UNITS: 5000 INJECTION, SOLUTION INTRAVENOUS; SUBCUTANEOUS at 06:21

## 2018-01-01 RX ADMIN — HYDROMORPHONE HYDROCHLORIDE 0.5 MG: 1 INJECTION, SOLUTION INTRAMUSCULAR; INTRAVENOUS; SUBCUTANEOUS at 08:31

## 2018-01-01 RX ADMIN — MORPHINE SULFATE 15 MG: 15 TABLET, EXTENDED RELEASE ORAL at 16:39

## 2018-01-01 RX ADMIN — METOPROLOL TARTRATE 50 MG: 25 TABLET, FILM COATED ORAL at 17:45

## 2018-01-01 RX ADMIN — ACETAMINOPHEN 650 MG: 325 TABLET, FILM COATED ORAL at 13:46

## 2018-01-01 RX ADMIN — METOPROLOL TARTRATE 25 MG: 25 TABLET, FILM COATED ORAL at 05:14

## 2018-01-01 RX ADMIN — MAGNESIUM HYDROXIDE 30 ML: 400 SUSPENSION ORAL at 16:38

## 2018-01-01 RX ADMIN — CEFTRIAXONE SODIUM 2 G: 2 INJECTION, POWDER, FOR SOLUTION INTRAMUSCULAR; INTRAVENOUS at 21:21

## 2018-01-01 RX ADMIN — METOPROLOL TARTRATE 50 MG: 25 TABLET, FILM COATED ORAL at 23:22

## 2018-01-01 RX ADMIN — ACETAMINOPHEN 650 MG: 325 TABLET, FILM COATED ORAL at 20:01

## 2018-01-01 RX ADMIN — DEXAMETHASONE 2 MG: 4 TABLET ORAL at 05:27

## 2018-01-01 RX ADMIN — KETOROLAC TROMETHAMINE 15 MG: 30 INJECTION, SOLUTION INTRAMUSCULAR at 21:05

## 2018-01-01 RX ADMIN — HYDROMORPHONE HYDROCHLORIDE 0.5 MG: 1 INJECTION, SOLUTION INTRAMUSCULAR; INTRAVENOUS; SUBCUTANEOUS at 03:44

## 2018-01-01 RX ADMIN — HYDROMORPHONE HYDROCHLORIDE 0.5 MG: 2 INJECTION INTRAMUSCULAR; INTRAVENOUS; SUBCUTANEOUS at 00:51

## 2018-01-01 RX ADMIN — HYDROMORPHONE HYDROCHLORIDE 0.5 MG: 1 INJECTION, SOLUTION INTRAMUSCULAR; INTRAVENOUS; SUBCUTANEOUS at 03:04

## 2018-01-01 RX ADMIN — GABAPENTIN 200 MG: 100 CAPSULE ORAL at 11:46

## 2018-01-01 RX ADMIN — KETOROLAC TROMETHAMINE 15 MG: 30 INJECTION, SOLUTION INTRAMUSCULAR at 10:13

## 2018-01-01 RX ADMIN — POLYETHYLENE GLYCOL 3350 1 PACKET: 17 POWDER, FOR SOLUTION ORAL at 18:25

## 2018-01-01 RX ADMIN — STANDARDIZED SENNA CONCENTRATE AND DOCUSATE SODIUM 2 TABLET: 8.6; 5 TABLET, FILM COATED ORAL at 05:54

## 2018-01-01 RX ADMIN — GABAPENTIN 200 MG: 100 CAPSULE ORAL at 05:14

## 2018-01-01 RX ADMIN — GABAPENTIN 200 MG: 100 CAPSULE ORAL at 16:46

## 2018-01-01 RX ADMIN — FENTANYL CITRATE 50 MCG: 50 INJECTION, SOLUTION INTRAMUSCULAR; INTRAVENOUS at 14:11

## 2018-01-01 RX ADMIN — DEXAMETHASONE SODIUM PHOSPHATE 4 MG: 4 INJECTION, SOLUTION INTRA-ARTICULAR; INTRALESIONAL; INTRAMUSCULAR; INTRAVENOUS; SOFT TISSUE at 22:22

## 2018-01-01 RX ADMIN — STANDARDIZED SENNA CONCENTRATE AND DOCUSATE SODIUM 2 TABLET: 8.6; 5 TABLET, FILM COATED ORAL at 17:44

## 2018-01-01 RX ADMIN — GABAPENTIN 300 MG: 300 CAPSULE ORAL at 18:48

## 2018-01-01 RX ADMIN — MIDAZOLAM HYDROCHLORIDE 2 MG: 1 INJECTION INTRAMUSCULAR; INTRAVENOUS at 14:11

## 2018-01-01 RX ADMIN — HYDROMORPHONE HYDROCHLORIDE 0.5 MG: 2 INJECTION INTRAMUSCULAR; INTRAVENOUS; SUBCUTANEOUS at 19:05

## 2018-01-01 RX ADMIN — SODIUM CHLORIDE: 9 INJECTION, SOLUTION INTRAVENOUS at 01:32

## 2018-01-01 RX ADMIN — ROSUVASTATIN CALCIUM 40 MG: 20 TABLET ORAL at 21:05

## 2018-01-01 RX ADMIN — GABAPENTIN 200 MG: 100 CAPSULE ORAL at 13:46

## 2018-01-01 RX ADMIN — DEXAMETHASONE 2 MG: 4 TABLET ORAL at 17:31

## 2018-01-01 RX ADMIN — DEXAMETHASONE SODIUM PHOSPHATE 4 MG: 4 INJECTION, SOLUTION INTRA-ARTICULAR; INTRALESIONAL; INTRAMUSCULAR; INTRAVENOUS; SOFT TISSUE at 14:54

## 2018-01-01 RX ADMIN — STANDARDIZED SENNA CONCENTRATE AND DOCUSATE SODIUM 2 TABLET: 8.6; 5 TABLET, FILM COATED ORAL at 18:23

## 2018-01-01 RX ADMIN — SIMETHICONE CHEW TAB 80 MG 80 MG: 80 TABLET ORAL at 13:57

## 2018-01-01 RX ADMIN — KETOROLAC TROMETHAMINE 15 MG: 30 INJECTION, SOLUTION INTRAMUSCULAR at 09:34

## 2018-01-01 RX ADMIN — DEXAMETHASONE SODIUM PHOSPHATE 4 MG: 4 INJECTION, SOLUTION INTRA-ARTICULAR; INTRALESIONAL; INTRAMUSCULAR; INTRAVENOUS; SOFT TISSUE at 18:08

## 2018-01-01 RX ADMIN — MIDAZOLAM 2 MG: 1 INJECTION INTRAMUSCULAR; INTRAVENOUS at 14:11

## 2018-01-01 RX ADMIN — DEXAMETHASONE SODIUM PHOSPHATE 4 MG: 4 INJECTION, SOLUTION INTRA-ARTICULAR; INTRALESIONAL; INTRAMUSCULAR; INTRAVENOUS; SOFT TISSUE at 18:09

## 2018-01-01 RX ADMIN — HEPARIN SODIUM 5000 UNITS: 5000 INJECTION, SOLUTION INTRAVENOUS; SUBCUTANEOUS at 13:25

## 2018-01-01 RX ADMIN — STANDARDIZED SENNA CONCENTRATE AND DOCUSATE SODIUM 2 TABLET: 8.6; 5 TABLET, FILM COATED ORAL at 05:01

## 2018-01-01 RX ADMIN — HYDROCODONE BITARTRATE AND ACETAMINOPHEN 1 TABLET: 10; 325 TABLET ORAL at 10:47

## 2018-01-01 RX ADMIN — KETOROLAC TROMETHAMINE 15 MG: 30 INJECTION, SOLUTION INTRAMUSCULAR at 05:16

## 2018-01-01 RX ADMIN — IOHEXOL 75 ML: 350 INJECTION, SOLUTION INTRAVENOUS at 17:39

## 2018-01-01 RX ADMIN — POLYETHYLENE GLYCOL 3350 1 PACKET: 17 POWDER, FOR SOLUTION ORAL at 05:54

## 2018-01-01 RX ADMIN — ACETAMINOPHEN 650 MG: 325 TABLET, FILM COATED ORAL at 21:23

## 2018-01-01 RX ADMIN — KETOROLAC TROMETHAMINE 15 MG: 30 INJECTION, SOLUTION INTRAMUSCULAR at 16:02

## 2018-01-01 RX ADMIN — STANDARDIZED SENNA CONCENTRATE AND DOCUSATE SODIUM 2 TABLET: 8.6; 5 TABLET, FILM COATED ORAL at 06:52

## 2018-01-01 RX ADMIN — MAGNESIUM HYDROXIDE 30 ML: 400 SUSPENSION ORAL at 15:29

## 2018-01-01 RX ADMIN — CEFAZOLIN SODIUM 2 G: 2 INJECTION, SOLUTION INTRAVENOUS at 06:15

## 2018-01-01 RX ADMIN — KETOROLAC TROMETHAMINE 15 MG: 30 INJECTION, SOLUTION INTRAMUSCULAR at 21:04

## 2018-01-01 RX ADMIN — METOPROLOL TARTRATE 25 MG: 25 TABLET, FILM COATED ORAL at 05:01

## 2018-01-01 RX ADMIN — GABAPENTIN 200 MG: 100 CAPSULE ORAL at 17:44

## 2018-01-01 RX ADMIN — MORPHINE SULFATE 15 MG: 15 TABLET, EXTENDED RELEASE ORAL at 17:47

## 2018-01-01 RX ADMIN — ALLOPURINOL 200 MG: 100 TABLET ORAL at 05:01

## 2018-01-01 RX ADMIN — STANDARDIZED SENNA CONCENTRATE AND DOCUSATE SODIUM 2 TABLET: 8.6; 5 TABLET, FILM COATED ORAL at 18:09

## 2018-01-01 RX ADMIN — KETOROLAC TROMETHAMINE 15 MG: 30 INJECTION, SOLUTION INTRAMUSCULAR at 16:41

## 2018-01-01 RX ADMIN — HEPARIN SODIUM 5000 UNITS: 5000 INJECTION, SOLUTION INTRAVENOUS; SUBCUTANEOUS at 21:03

## 2018-01-01 RX ADMIN — DEXAMETHASONE 4 MG: 4 TABLET ORAL at 05:16

## 2018-01-01 RX ADMIN — STANDARDIZED SENNA CONCENTRATE AND DOCUSATE SODIUM 2 TABLET: 8.6; 5 TABLET, FILM COATED ORAL at 06:21

## 2018-01-01 RX ADMIN — MORPHINE SULFATE 15 MG: 15 TABLET, EXTENDED RELEASE ORAL at 05:26

## 2018-01-01 RX ADMIN — HEPARIN SODIUM 5000 UNITS: 5000 INJECTION, SOLUTION INTRAVENOUS; SUBCUTANEOUS at 22:32

## 2018-01-01 RX ADMIN — ACETAMINOPHEN 650 MG: 325 TABLET, FILM COATED ORAL at 17:47

## 2018-01-01 RX ADMIN — GABAPENTIN 200 MG: 100 CAPSULE ORAL at 05:53

## 2018-01-01 RX ADMIN — GABAPENTIN 100 MG: 100 CAPSULE ORAL at 05:13

## 2018-01-01 RX ADMIN — STANDARDIZED SENNA CONCENTRATE AND DOCUSATE SODIUM 2 TABLET: 8.6; 5 TABLET, FILM COATED ORAL at 05:16

## 2018-01-01 RX ADMIN — HYDROMORPHONE HYDROCHLORIDE 0.5 MG: 1 INJECTION, SOLUTION INTRAMUSCULAR; INTRAVENOUS; SUBCUTANEOUS at 11:40

## 2018-01-01 RX ADMIN — ACETAMINOPHEN 650 MG: 325 TABLET, FILM COATED ORAL at 06:52

## 2018-01-01 RX ADMIN — HYDROMORPHONE HYDROCHLORIDE 0.5 MG: 2 INJECTION INTRAMUSCULAR; INTRAVENOUS; SUBCUTANEOUS at 05:59

## 2018-01-01 RX ADMIN — MORPHINE SULFATE 30 MG: 30 TABLET, EXTENDED RELEASE ORAL at 05:42

## 2018-01-01 ASSESSMENT — ENCOUNTER SYMPTOMS
HEARTBURN: 0
HEADACHES: 0
NECK PAIN: 0
DEPRESSION: 0
COUGH: 0
BACK PAIN: 1
DIARRHEA: 0
BLURRED VISION: 0
DIARRHEA: 0
PHOTOPHOBIA: 0
BACK PAIN: 1
BLURRED VISION: 0
BLURRED VISION: 0
SPUTUM PRODUCTION: 0
COUGH: 0
CHILLS: 0
SORE THROAT: 0
NECK PAIN: 0
COUGH: 0
MYALGIAS: 0
BLURRED VISION: 0
WHEEZING: 0
EXACERBATED BY: REPOSITIONING
WEIGHT LOSS: 0
MYALGIAS: 0
WEAKNESS: 1
BACK PAIN: 1
DIZZINESS: 0
CLAUDICATION: 0
SPEECH CHANGE: 0
DOUBLE VISION: 0
HEADACHES: 0
CHILLS: 0
COUGH: 0
HEARTBURN: 0
DEPRESSION: 0
TREMORS: 0
COUGH: 0
PHOTOPHOBIA: 0
INSOMNIA: 0
HEARTBURN: 0
HALLUCINATIONS: 0
HEMOPTYSIS: 0
SPEECH CHANGE: 0
SENSORY CHANGE: 0
DEPRESSION: 0
ORTHOPNEA: 0
DEPRESSION: 0
HEARTBURN: 0
CLAUDICATION: 0
HEADACHES: 0
DIZZINESS: 0
ORTHOPNEA: 0
SPUTUM PRODUCTION: 0
CHILLS: 0
PALPITATIONS: 0
BACK PAIN: 1
PHOTOPHOBIA: 0
FEVER: 0
CHILLS: 0
FEVER: 0
DEPRESSION: 0
SPUTUM PRODUCTION: 0
FEVER: 0
PHOTOPHOBIA: 0
WEIGHT LOSS: 0
CHILLS: 0
WEAKNESS: 1
TINGLING: 0
SPEECH CHANGE: 0
DIZZINESS: 0
CONSTIPATION: 0
TREMORS: 0
HEMOPTYSIS: 0
WEAKNESS: 0
DOUBLE VISION: 0
CLAUDICATION: 0
DIZZINESS: 0
NECK PAIN: 0
BACK PAIN: 1
WEAKNESS: 0
SPEECH CHANGE: 0
MYALGIAS: 0
PALPITATIONS: 0
VOMITING: 0
COUGH: 0
SENSORY CHANGE: 0
COUGH: 0
EYE PAIN: 0
EXACERBATED BY: SQUATTING
INSOMNIA: 0
HEARTBURN: 0
HALLUCINATIONS: 0
TREMORS: 0
NERVOUS/ANXIOUS: 0
DIARRHEA: 0
QUALITY: DEEP
BLURRED VISION: 0
GASTROINTESTINAL NEGATIVE: 1
NAUSEA: 0
SHORTNESS OF BREATH: 0
SORE THROAT: 0
HEADACHES: 0
PHOTOPHOBIA: 0
SORE THROAT: 0
BLURRED VISION: 0
COUGH: 0
SHORTNESS OF BREATH: 0
VOMITING: 0
FALLS: 0
FOCAL WEAKNESS: 0
ABDOMINAL PAIN: 0
HEMOPTYSIS: 0
BLURRED VISION: 0
VOMITING: 0
QUALITY: ACHING
GENERAL WELL-BEING: EXCELLENT
NAUSEA: 0
SENSORY CHANGE: 0
INSOMNIA: 0
WEIGHT LOSS: 1
NERVOUS/ANXIOUS: 0
HEADACHES: 0
HEADACHES: 0
HEARTBURN: 0
HEADACHES: 0
HALLUCINATIONS: 0
HEADACHES: 0
PALPITATIONS: 0
CHILLS: 0
NAUSEA: 0
WEAKNESS: 0
PHOTOPHOBIA: 0
BRUISES/BLEEDS EASILY: 0
FEVER: 0
HEARTBURN: 0
ORTHOPNEA: 0
CHILLS: 0
DEPRESSION: 0
DIAPHORESIS: 0
TINGLING: 0
CONSTIPATION: 0
SORE THROAT: 0
MYALGIAS: 0
ORTHOPNEA: 0
COUGH: 0
HEMOPTYSIS: 0
SHORTNESS OF BREATH: 0
SENSORY CHANGE: 0
WEAKNESS: 0
PALPITATIONS: 0
VOMITING: 0
WEAKNESS: 0
NAUSEA: 0
MYALGIAS: 0
COUGH: 0
HEARTBURN: 0
NAUSEA: 0
NERVOUS/ANXIOUS: 0
VOMITING: 0
FEVER: 0
EYES NEGATIVE: 1
DIZZINESS: 0
DOUBLE VISION: 0
TINGLING: 0
MEMORY LOSS: 0
SHORTNESS OF BREATH: 1
ORTHOPNEA: 0
ABDOMINAL PAIN: 0
BACK PAIN: 1
DIARRHEA: 0
DOUBLE VISION: 0
DEPRESSION: 0
NERVOUS/ANXIOUS: 0
SHORTNESS OF BREATH: 0
INSOMNIA: 0
NECK PAIN: 0
TINGLING: 0
SHORTNESS OF BREATH: 0
FEVER: 0
DOUBLE VISION: 0
DIZZINESS: 0
DEPRESSION: 0
VOMITING: 0
DIZZINESS: 0
COUGH: 0
SPEECH CHANGE: 0
NAUSEA: 0
EYE DISCHARGE: 0
MYALGIAS: 0
CLAUDICATION: 0
FEVER: 0
COUGH: 0
HEMOPTYSIS: 0
PHOTOPHOBIA: 0
DIZZINESS: 0
EXACERBATED BY: RAPID POSITION CHANGES
DOUBLE VISION: 0
EXACERBATED BY: WALKING
SPUTUM PRODUCTION: 0
DEPRESSION: 0
DEPRESSION: 0
FEVER: 0
DIAPHORESIS: 0
BACK PAIN: 1
DIAPHORESIS: 0
DOUBLE VISION: 0
DIAPHORESIS: 0
CHILLS: 0
CONSTIPATION: 0
PALPITATIONS: 0
ALLEVIATING FACTORS: HEATING PAD
NECK PAIN: 0
TINGLING: 0
SENSORY CHANGE: 0
MYALGIAS: 0
ABDOMINAL PAIN: 0
BACK PAIN: 1
PAIN TIMING: CONSTANT
CONSTIPATION: 1
NAUSEA: 0
HEMOPTYSIS: 0
VOMITING: 0
BACK PAIN: 1
PAIN SCALE: 8
PALPITATIONS: 0
INSOMNIA: 0
NAUSEA: 0
HEARTBURN: 0
HEADACHES: 0
BACK PAIN: 1
MYALGIAS: 0
MYALGIAS: 0
BLURRED VISION: 0
NECK PAIN: 0
PALPITATIONS: 0
PHOTOPHOBIA: 0
NAUSEA: 0
SENSORY CHANGE: 0
NERVOUS/ANXIOUS: 0
FEVER: 1
TINGLING: 0
NAUSEA: 0
SORE THROAT: 0
DIZZINESS: 0
CHILLS: 0
NERVOUS/ANXIOUS: 1
SHORTNESS OF BREATH: 0
BLURRED VISION: 0
PAIN SCALE AT HIGHEST: 10
NECK PAIN: 0
ABDOMINAL PAIN: 0
ORTHOPNEA: 0
FEVER: 0
TREMORS: 0
SPUTUM PRODUCTION: 0
DIARRHEA: 0
BACK PAIN: 1
MYALGIAS: 0
VOMITING: 0
TINGLING: 0
FEVER: 0
HALLUCINATIONS: 0
DEPRESSION: 0
HEADACHES: 0
CLAUDICATION: 0
WEIGHT LOSS: 1
CONSTIPATION: 1
ALLEVIATING FACTORS: POSITION CHANGE
EYE PAIN: 0
PHOTOPHOBIA: 0
CHILLS: 0
VOMITING: 0
VOMITING: 0
SENSORY CHANGE: 0
DIAPHORESIS: 0
DIZZINESS: 0
NERVOUS/ANXIOUS: 0
HEADACHES: 0
HEARTBURN: 0
LOSS OF CONSCIOUSNESS: 0
CONSTIPATION: 0
PAIN SCALE AT LOWEST: 2
BRUISES/BLEEDS EASILY: 0
BLURRED VISION: 0
MUSCULOSKELETAL NEGATIVE: 1
DIZZINESS: 0
ABDOMINAL PAIN: 0
CLAUDICATION: 0
HEMOPTYSIS: 0
NERVOUS/ANXIOUS: 0
WEIGHT LOSS: 0
PALPITATIONS: 0
ABDOMINAL PAIN: 0
HALLUCINATIONS: 0
ABDOMINAL PAIN: 0
DIARRHEA: 0

## 2018-01-01 ASSESSMENT — PAIN SCALES - GENERAL
PAINLEVEL_OUTOF10: 6
PAINLEVEL_OUTOF10: 4
PAINLEVEL_OUTOF10: 8
PAINLEVEL_OUTOF10: 10
PAINLEVEL_OUTOF10: 8
PAINLEVEL_OUTOF10: 7
PAINLEVEL_OUTOF10: 1
PAINLEVEL_OUTOF10: 10
PAINLEVEL_OUTOF10: 5
PAINLEVEL_OUTOF10: 2
PAINLEVEL_OUTOF10: 4
PAINLEVEL_OUTOF10: 0
PAINLEVEL_OUTOF10: 10
PAINLEVEL_OUTOF10: 9
PAINLEVEL_OUTOF10: 6
PAINLEVEL_OUTOF10: 5
PAINLEVEL_OUTOF10: 10
PAINLEVEL_OUTOF10: 1
PAINLEVEL_OUTOF10: 9
PAINLEVEL_OUTOF10: 8
PAINLEVEL_OUTOF10: 10
PAINLEVEL_OUTOF10: 0
PAINLEVEL_OUTOF10: 3
PAINLEVEL_OUTOF10: 0
PAINLEVEL_OUTOF10: 6
PAINLEVEL_OUTOF10: 10
PAINLEVEL_OUTOF10: 8
PAINLEVEL_OUTOF10: 5
PAINLEVEL_OUTOF10: 7
PAINLEVEL_OUTOF10: 5
PAINLEVEL_OUTOF10: 3
PAINLEVEL_OUTOF10: 0
PAINLEVEL_OUTOF10: 0
PAINLEVEL_OUTOF10: 10
PAINLEVEL_OUTOF10: 4
PAINLEVEL_OUTOF10: 9
PAINLEVEL_OUTOF10: 2
PAINLEVEL_OUTOF10: 2
PAINLEVEL_OUTOF10: 4
PAINLEVEL_OUTOF10: 0
PAINLEVEL: 7=MODERATE-SEVERE PAIN
PAINLEVEL_OUTOF10: 10
PAINLEVEL_OUTOF10: 8
PAINLEVEL_OUTOF10: 4
PAINLEVEL_OUTOF10: 0
PAINLEVEL_OUTOF10: 6
PAINLEVEL_OUTOF10: 9
PAINLEVEL_OUTOF10: 3
PAINLEVEL_OUTOF10: 7

## 2018-01-01 ASSESSMENT — COGNITIVE AND FUNCTIONAL STATUS - GENERAL
HELP NEEDED FOR BATHING: A LITTLE
MOBILITY SCORE: 20
MOBILITY SCORE: 15
WALKING IN HOSPITAL ROOM: A LOT
MOVING FROM LYING ON BACK TO SITTING ON SIDE OF FLAT BED: UNABLE
WALKING IN HOSPITAL ROOM: A LITTLE
PERSONAL GROOMING: A LITTLE
EATING MEALS: A LITTLE
SUGGESTED CMS G CODE MODIFIER DAILY ACTIVITY: CI
DRESSING REGULAR LOWER BODY CLOTHING: A LITTLE
SUGGESTED CMS G CODE MODIFIER MOBILITY: CJ
MOVING TO AND FROM BED TO CHAIR: A LITTLE
DAILY ACTIVITIY SCORE: 23
TOILETING: A LITTLE
DRESSING REGULAR UPPER BODY CLOTHING: A LITTLE
SUGGESTED CMS G CODE MODIFIER DAILY ACTIVITY: CK
DRESSING REGULAR LOWER BODY CLOTHING: A LOT
STANDING UP FROM CHAIR USING ARMS: A LITTLE
STANDING UP FROM CHAIR USING ARMS: A LITTLE
SUGGESTED CMS G CODE MODIFIER MOBILITY: CK
CLIMB 3 TO 5 STEPS WITH RAILING: A LITTLE
MOVING TO AND FROM BED TO CHAIR: A LITTLE
DAILY ACTIVITIY SCORE: 17
CLIMB 3 TO 5 STEPS WITH RAILING: A LOT

## 2018-01-01 ASSESSMENT — LIFESTYLE VARIABLES
HAVE YOU EVER FELT YOU SHOULD CUT DOWN ON YOUR DRINKING: NO
CONSUMPTION TOTAL: POSITIVE
SUBSTANCE_ABUSE: 0
ALCOHOL_USE: YES
EVER_SMOKED: YES
ON A TYPICAL DAY WHEN YOU DRINK ALCOHOL HOW MANY DRINKS DO YOU HAVE: 1
AVERAGE NUMBER OF DAYS PER WEEK YOU HAVE A DRINK CONTAINING ALCOHOL: 1
EVER FELT BAD OR GUILTY ABOUT YOUR DRINKING: NO
SUBSTANCE_ABUSE: 0
TOTAL SCORE: 0
SUBSTANCE_ABUSE: 0
TOTAL SCORE: 0
EVER HAD A DRINK FIRST THING IN THE MORNING TO STEADY YOUR NERVES TO GET RID OF A HANGOVER: NO
HOW MANY TIMES IN THE PAST YEAR HAVE YOU HAD 5 OR MORE DRINKS IN A DAY: 1
TOTAL SCORE: 0
HAVE PEOPLE ANNOYED YOU BY CRITICIZING YOUR DRINKING: NO
SUBSTANCE_ABUSE: 0

## 2018-01-01 ASSESSMENT — GAIT ASSESSMENTS
ASSISTIVE DEVICE: FRONT WHEEL WALKER
GAIT LEVEL OF ASSIST: MODERATE ASSIST
DISTANCE (FEET): 75
DEVIATION: ATAXIC

## 2018-01-01 ASSESSMENT — COPD QUESTIONNAIRES
HAVE YOU SMOKED AT LEAST 100 CIGARETTES IN YOUR ENTIRE LIFE: YES
DO YOU EVER COUGH UP ANY MUCUS OR PHLEGM?: NO/ONLY WITH OCCASIONAL COLDS OR INFECTIONS
DURING THE PAST 4 WEEKS HOW MUCH DID YOU FEEL SHORT OF BREATH: NONE/LITTLE OF THE TIME
IN THE PAST 12 MONTHS DO YOU DO LESS THAN YOU USED TO BECAUSE OF YOUR BREATHING PROBLEMS: DISAGREE/UNSURE
COPD SCREENING SCORE: 4

## 2018-01-01 ASSESSMENT — PATIENT HEALTH QUESTIONNAIRE - PHQ9
CLINICAL INTERPRETATION OF PHQ2 SCORE: 0
SUM OF ALL RESPONSES TO PHQ9 QUESTIONS 1 AND 2: 0
2. FEELING DOWN, DEPRESSED, IRRITABLE, OR HOPELESS: NOT AT ALL
1. LITTLE INTEREST OR PLEASURE IN DOING THINGS: NOT AT ALL
SUM OF ALL RESPONSES TO PHQ9 QUESTIONS 1 AND 2: 0
1. LITTLE INTEREST OR PLEASURE IN DOING THINGS: NOT AT ALL
2. FEELING DOWN, DEPRESSED, IRRITABLE, OR HOPELESS: NOT AT ALL

## 2018-01-01 ASSESSMENT — ACTIVITIES OF DAILY LIVING (ADL)
TOILETING: INDEPENDENT
BATHING_REQUIRES_ASSISTANCE: 0

## 2018-01-08 ENCOUNTER — OFFICE VISIT (OUTPATIENT)
Dept: MEDICAL GROUP | Facility: MEDICAL CENTER | Age: 83
End: 2018-01-08
Payer: MEDICARE

## 2018-01-08 VITALS
RESPIRATION RATE: 16 BRPM | HEIGHT: 60 IN | BODY MASS INDEX: 23.36 KG/M2 | HEART RATE: 55 BPM | OXYGEN SATURATION: 96 % | SYSTOLIC BLOOD PRESSURE: 120 MMHG | DIASTOLIC BLOOD PRESSURE: 76 MMHG | WEIGHT: 119 LBS | TEMPERATURE: 98 F

## 2018-01-08 DIAGNOSIS — I10 ESSENTIAL HYPERTENSION: Chronic | ICD-10-CM

## 2018-01-08 DIAGNOSIS — M25.551 PAIN OF RIGHT HIP JOINT: ICD-10-CM

## 2018-01-08 DIAGNOSIS — E78.5 DYSLIPIDEMIA: Chronic | ICD-10-CM

## 2018-01-08 DIAGNOSIS — R10.31 RIGHT INGUINAL PAIN: ICD-10-CM

## 2018-01-08 PROCEDURE — 99214 OFFICE O/P EST MOD 30 MIN: CPT | Performed by: INTERNAL MEDICINE

## 2018-01-08 NOTE — PROGRESS NOTES
Subjective:      Susie Craig is a 82 y.o. female who presents with Pain            HPI     New complaint  Has been having more right leg pain more in the groin,the pain is sharp with no radiation, mild to moderate in intensity, lasting a few minutes and resolving with rest and sitting down, worse with walking and bending at the hip, last november had pain the right buttock but that went away, no falls, no incontinence of bowel or bladder, has had difficulty walking for period of time due to right groin pain, no low back pain, no sensory changes legs, no falls, no trauma, no left hip pain, no leg swelling   No skin color changes, no open sores or lesions lower extremities  Tried ivon chi but had right knee pain and stopped   Blood pressure has been stable on lopressor 50 mg  Remains on asa  Dyslipidemia on crestor 40 mg with no previous muscle aches or pain related to statin therapy   Last week thursday episode of nausea and emesis one time with no diarrhea, dizziness that day and by next day resolved, no headache or visual loss, no difficulty with speech or swallowing , no sore throat, cough, headache, fevers or chills, symptoms resolved, bowel movements normal, no incontinence        Current Outpatient Prescriptions   Medication Sig Dispense Refill   • rosuvastatin (CRESTOR) 40 MG tablet TAKE ONE TABLET BY MOUTH EVERY DAY 90 Tab 3   • zolpidem (AMBIEN) 5 MG Tab Take 0.5 Tabs by mouth every bedtime. 30 Tab 4   • metoprolol (LOPRESSOR) 50 MG Tab Take 25-50 mg by mouth 2 times a day. 25 mg in the AM and 50 mg at HS     • allopurinol (ZYLOPRIM) 100 MG Tab Take 2 Tabs by mouth every day. 180 Tab 3   • aspirin EC (ECOTRIN) 81 MG TBEC Take 81 mg by mouth every evening.       No current facility-administered medications for this visit.      Patient Active Problem List   Diagnosis   • S/P CABG x 3   • S/P KATY (total abdominal hysterectomy)   • History of gout   • Dyslipidemia   • Hx of transient ischemic attack  (TIA)   • History of cataract   • Preventative health care   • LBBB (left bundle branch block)   • History of osteopenia   • Insomnia   • MEDICAL HOME   • Neutropenia (HCC)   • Decreased GFR   • Coronary artery disease involving native coronary artery of native heart without angina pectoris   • Hypertension   • Pulmonary nodule   • Subclinical hypothyroidism   • Osteopenia         Subclinical hypothyroid  7/29/14 tsh 3.9  10/6/14 tsh 4.3   4/30/15 tsh 4.9  11/3/15 tsh 4.8  11/16/16 tsh 9.0  9/25/17 tsh 4.3     Status post KATY  Started premarin in her 50s; now on estradiol 0.5 mg daily  9/26/11 on estradiol 0.5 mg qod  6/4/12 on estradiol rec trying to go to MWF  7/16/13 on estadiol MW  7/18/14 taper estradiol down to two days per week if possible  4/24/15 estradiol down to 1/2 tab three days per week  8/23/16 still taking estrace half a tablet 3 times week, declines other medication such as gabapentin or SSRI, will continue to work on taper understands risks for breast cancer  3/30/17 off HRT      Status post CABG  1985 PTCA  1997 PTCA  7/11 EKG LBBB  9/30/14 echo normal LV function, EF 70%, mild MR, mild to moderate tricuspid regurgitation, RVSP 43 to 48 mild to moderate pulmonary hypertension  10/17/14 holter monitor normal sinus rhythm with episode of sinus tachycardia, no symptoms documented, no arrhythmia or pauses, minimal heart rate 48, maximum heart rate 135, one PVC, supraventricular ectopic activity 20 beats, mostly all single PACs  11/29/15 echo normal LV function, septal hypokinesis and paradoxical motion, grade 1 diastolic dysfunction, mild MR and TR  9/12/16 persantine thallium small area of ischemia inferolateral apex, possible nontransmural distal anterior wall myocardial infarct, normal ejection fraction  10/25/16 cardiology note discussed medical therapy versus coronary arteriography, patient elects to proceed with cardiac catheterization  11/2/16 cardiac catheterization proximal ostial LAD  appears to have stent, high-grade 95% stenosis, beyond stent bifurcation diagonal branch LAD focal 95% stenosis, remainder widely patent, circumflex first marginal branch diffuse 75% stenosis, RCA distal smooth eccentric 60-70% stenosis, EF 68%, 3 vessel CAD with diffuse ostial, proximal and bifurcation LAD disease, referred to cardiac surgery for consideration CABG  11/5/16  cardiovascular surgery, CABG ×3 LIMA to LAD, reverse saphenous to second diagonal and first obtuse marginal  11/16/16 cardiac catheterization ejection fraction 56%, mid anterior wall hypokinesis/akinesis, left main normal, LAD ostial 50% long calcific stenosis, proximal LAD and diagonal bifurcation 95% stenosis, diagonal ostium 95% stenosis, mid LAD has 2 serial 50% stenosis with kinking and tenting from retraction of LIMA bypass graft anastomosis, complete MARCIN 2 antegrade flow in the distal LAD, circumflex mid OMB one insertion site saphenous vein graft to this vessel 90% stenosis with MARCIN 3 antegrade flow, second marginal widely patent, RCA distal 75% calcified stenosis,MARCIN 3 flow, this vessel was not bypassed, LIMA bypass graft angiography markedly tapering distal anastomosis, no additional stenosis; saphenous vein bypass graft angiography widely patent; conservative therapy recommended  11/21/16 cardiology note, CAD with recent CABG and SVG occlusion, continue aspirin, plavix, crestor, metoprolol (increase to 25 mg qam and 50 mg apm, lisinopril, Lasix as needed plus potassium for edema, referral cardiac rehabilitation, follow-up 3 months  12/14/16 echo mildly reduced left ventricular systolic function, EF 50%, grade 2 diastolic dysfunction  12/22/16 cardiology note stable on metoprolol and lisinopril, crestor, aspirin and plavix follow-up 3 months    9/24/17 echo LV ejection fraction estimated 50-55%, normal diastolic function  10/11/17 cardiology note no changes     Right shoulder pain  8/4/15 decrease crestor to 20 mg and  repeat labs    8/4/15 right shoulder pain x-ray and physical therapy pending, no benefit consider injection or MRI  8/5/15 xray right shoulders no acute fracture identified,mild degenerative changes  9/3/15 wbc 3.6(42%N,43%L)  9/3/15 bun 24,creat 0.9,GFR 54  9/3/15 ESR 13,CRP 0.1,CPK 81  9/3/15 completed physical therapy with symptoms improved     Pulmonary nodule  5/12/17 CT-CTA neck with and without postprocessing normal; incidental finding patchy nodular opacities anterior aspect right upper lobe 1.3 cm could be secondary to scarring, follow-up recommended  5/16/17 CT thorax small group of focal opacification slightly irregular borders right upper lobe unchanged compared to previous neck CT, largest 1.3 cm likely inflammatory or fibrotic lesions, consider follow-up CT 6 months, spherical 6.6 mm noncalcified nodule right lower lobe, additional 3 mm nodules each lower lobe, follow-up recommended 6 months, mild dilation thoracic descending aorta 3.8 cm  12/1/17 CT thorax without, stable with  reticular nodular opacity anterior RUL dominant nodule 1.2 cm, stable 7 mm nodule RLL, repeat CT 6 months     Preventative health  5/27/08 colon DHA told normal repeat 10 yrs  2009 pneumovax no webiz  9/26/11 td vaccine  6/4/12 zoster vaccine   8/14/13 mammogram  7/29/14 vit d 28 increase to 4000 umits  10/10/14 declines mammogram  5/8/15 prevnar  12/27/17 dexa LS+1.4,hip-1.3    osteopenia  8/14/13 dexa LS+1.2,hip-0.7  12/27/17 dexa LS+1.4,hip-1.3     Neutropenia  10/26/11 wbc 3.2 (47%N,38%L)  6/22/12 wbc 4.5(53%N,34%L)  9/23/13 wbc 4.5(53%N,35%L)  7/29/14 wbc 3.9(48%N,37%L)  4/30/15 wbc 4.3  9/3/15 wbc 3.6(42%N,43%L)  11/5/15 wbc 3.7(47%N,40%L)  12/9/16 wbc 5.0  9/25/17 wbc 5.0     Left bundle-branch block  7/11 EKG LBBB  9/30/14 echo normal LV function, EF 70%, mild MR, mild to moderate tricuspid regurgitation, RVSP 43 to 48 mild to moderate pulmonary hypertension  10/7/14 holter monitor normal sinus rhythm with episode of  sinus tachycardia, no symptoms documented, no arrhythmia or pauses,minimal rate 48, maximum heart rate 135, one PVC, supraventricular ectopic activity 20 beats, mostly all single PACs  11/29/15 echo normal LV function, septal hypokinesis and paradoxical motion, grade 1 diastolic dysfunction, mild MR and TR  9/12/16 persantine thallium small area of ischemia inferolateral apex, possible nontransmural distal anterior wall myocardial infarct, normal ejection fraction  10/25/16 cardiology note discussed medical therapy versus coronary arteriography, patient elects to proceed with cardiac catheterization  11/2/16 cardiac catheterization proximal ostial LAD appears to have stent, high-grade 95% stenosis, beyond stent bifurcation diagonal branch LAD focal 95% stenosis, remainder widely patent, circumflex first marginal branch diffuse 75% stenosis, RCA distal smooth eccentric 60-70% stenosis, EF 68%, 3 vessel CAD with diffuse ostial, proximal and bifurcation LAD disease, referred to cardiac surgery for consideration CABG  11/5/16  cardiovascular surgery, CABG ×3 LIMA to LAD, reverse saphenous to second diagonal and first obtuse marginal  11/16/16 cardiac catheterization ejection fraction 56%, mid anterior wall hypokinesis/akinesis, left main normal, LAD ostial 50% long calcific stenosis, proximal LAD and diagonal bifurcation 95% stenosis, diagonal ostium 95% stenosis, mid LAD has 2 serial 50% stenosis with kinking and tenting from retraction of LIMA bypass graft anastomosis, complete MARCIN 2 antegrade flow in the distal LAD, circumflex mid OMB one insertion site saphenous vein graft to this vessel 90% stenosis with MARCIN 3 antegrade flow, second marginal widely patent, RCA distal 75% calcified stenosis,MARCIN 3 flow, this vessel was not bypassed, LIMA bypass graft angiography markedly tapering distal anastomosis, no additional stenosis; saphenous vein bypass graft angiography widely patent; conservative therapy  recommended  11/21/16 cardiology note, CAD with recent CABG and SVG occlusion, continue aspirin, plavix, crestor, metoprolol (increase to 25 mg qam and 50 mg apm, lisinopril, Lasix as needed plus potassium for edema, referral cardiac rehabilitation, follow-up 3 months  12/14/16 echo mildly reduced left ventricular systolic function, EF 50%, grade 2 diastolic dysfunction  12/22/16 cardiology note stable on metoprolol and lisinopril, crestor, aspirin and plavix follow-up 3 months    9/24/17 echo LV ejection fraction estimated 50-55%, normal diastolic function  10/11/17 cardiology note no changes     Insomnia on Ambien     Hypertension  1985 PTCA  1997 PTCA  10/06 persantine thallium no ischemia, EF > 70%  10/08 urine mac 1.0  1/09 off hctz due to gout  7/11 EKG LBBB  8/11 [facility] carotid u/s left and right <40%  8/11 on metoprolol 50 am and 25 mg pm,and lisinopril 10 bid  8/12  cardiology note  7/29/14 urine mac 28, on metoprolol 50 mg qam and 25 mg qpm and lisinopril 10 mg bid  9/22/14 decrease metoprolol to 25 mg bid and cont lisinopril 10 mg bid due to postural lightheadedness  9/25/14 decrease lisinopril to 10 mg qday and continue metoprolol 50 mg 1/2 bid (sbp 97-99 when stand, sbp 110-120 sitting)  9/30/14 echo normal LV function, EF 70%, mild MR, mild to moderate tricuspid regurgitation, RVSP 43 to 48 mild to moderate pulmonary hypertension  10/2/14 stop metoprolol due to sbp  at times, continue lisinopril 10 mg    10/3/14 restart metoprolol 50 mg 1/2 tab per day and cont lisinopril 10 mg (HR )  10/6/14 seen ER lightheadedness workup negative more orthostatic symptoms, continue metorpolol 50 mg 1/2 qday (tapering off caused HR to increase) and decrease lisinopril to 10 mg 1/2 tab per day  10/17/14 holter monitor normal sinus rhythm with episode of sinus tachycardia, no symptoms documented, no arrhythmia or pauses, minimal rate 48, maximum heart rate 135, one PVC, supraventricular ectopic  activity 20 beats, mostly all single PACs  4/24/15 on metoprolol 50 mg 1/2 tab per day, lisinopril 10 mg 1/2 tab per day,asa  11/29/15 echo normal LV function, septal hypokinesis and paradoxical motion, grade 1 diastolic dysfunction, mild MR and TR  12/14/16 echo mildly reduced left ventricular systolic function, EF 50%, grade 2 diastolic dysfunction  4/17/17  eye exam no vascular pathology  9/24/17 echo LV ejection fraction estimated 50-55%, normal diastolic function  10/11/17 cardiology note no changes     History TIA  7/11 seen in ER [place] ; EKG LBBB, CT head non contrast negative  7/11 started on plavix  8/11 [facility] carotid ultrasound left and right <40%  6/12 on aspirin a day, intolerant to plavix  9/30/14 echo normal LV function, EF 70%, mild MR, mild to moderate tricuspid regurgitation, RVSP 43 to 48 mild to moderate pulmonary hypertension  8/2/15 on asa    11/29/15 CT head negative  11/9/15 MRI brain without; age-appropriate atrophy mild chronic microvascular ischemic disease  11/29/15 carotid ultrasound negative  12/14/16 echo mildly reduced left ventricular systolic function, EF 50%, grade 2 diastolic dysfunction  4/17/17  eye exam no vascular pathology  5/12/17 CT-CTA neck with and without postprocessing normal  5/12/17 CT-CTA head with and without postprocessing normal [place] of [place]  9/24/17 CT head no acute abnormalities  9/24/17 MRI brain without negative, mild atrophy age-related     History osteopenia  1/08 dexa LS +0.2,hip +1.1  9/11 dexa LS +0.5, hip -1.0  10/11 vit d 30    8/14/13 dexa LS +1.2,hip -0.7  7/29/14 vit d 28     History of gout  1/09 started on allopurinol for uric acid 8.3, off hctz  1/11 uric acid 3.7 on allopurinol 200 mg daily  10/11 uric acid 4.6 on allopurinol 200 mg  3/12 colchicine prn gout flare up (do not take crestor with colchicine)  3/13 uric 4.6 on allopurinol 200 mg  7/29/14 uric 3.7 on allopurinol 200 mg  11/5/15 uric 3.9 on allopurinol 200 mg      History of cataract     Dyslipidemia  intolerant of lipitor  4/11 chol 200,trig 79,hdl 82,ldl 102 on lipitor 80 with elevated LFT (,, hep panel negative)  5/11 chol 280,trig 137,hdl 70,ldl 183 off statin (AST 21,ALT 26)    10/11 chol 219,trig 65,hdl 65,ldl 141 on zocor 40 mg; monitored by [facility] (AST 24,ALT 24)  11/2/11 [facility] change zocor 40 mg to crestor 40 mg  1/12 chol 161,trig 80,hdl 70,ldl 75 on crestor 40 mg  8/12 chol 170,trig 57,hdl 66,ldl 93 on crestor 40 mg per [facility], consider zetia in the future (intolerant lipitor)  3/13 chol 193,trig 46,hdl 90,ldl 94,LDL-P 923,HDL-P 51,LP-IR 37 on crestor 40 mg  9/23/13 chol 180,trig 92,hdl 70,ldl 92 on crestor 40 mg    10/1/13 samples zetia 10 mg, cont crestor 40 mg repeat labs 4 weeks  7/29/14 chol 186,trig 76,hdl 71,ldl 100; on crestor 40 mg consider decreasing dose  4/30/15 chol 182,trig 69,hdl 76,ldl 92 on crestor 40 mg  8/4/15 decrease crestor to 20 mg and repeat labs    9/3/15 ESR 13,CRP 0.1,CPK 81, crestor has been decreased to 20 mg  11/5/15 chol 191,trig 77,hdl 71,ldl 105 on crestor 20 mg (40 mg tab 1/2 per day)  12/29/16 chol 136,trig 82,hdl 46,ldl 74,cpk 53 on crestor 40 mg 1/2 per day  4/3/17 chol 152,trig 86,hdl 62,ldl 73,cpk 53 on crestor 40 mg    9/25/17 chol 116,trig 68,hdl 48,ldl 54 on crestor 40 mg   (intolerant of lipitor)     Decreased GFR  10/26/11 bun 12,creat 0.8,GFR>60  6/22/12 bun 17,creat 0.9,GFR 59  3/28/13 bun 18,creat 1.3,GFR 39  9/23/13 bun 19,creat 1.0,GFR 51  7/29/14 bun 24,creat 1.2,GFR 42  9/3/15 bun 24,creat 0.9,GFR 54  11/29/15 bun 16,creat 1.0,GFR 52  12/29/16 bun 15,creat 0.8,GFR 55  4/3/17 bun 20,creat 1.1,GFR 46  9/25/17 bun 19,creat 1.0,GFR 53         Health Maintenance Summary                IMM PNEUMOCOCCAL 65+ (ADULT) LOW/MEDIUM RISK SERIES Postponed 3/23/2018 Originally 5/8/2016. Patient Refused     Done 5/8/2015 Imm Admin: Pneumococcal Conjugate Vaccine (Prevnar/PCV-13)    IMM DTaP/Tdap/Td Vaccine Postponed  3/23/2018 Originally 9/27/2011. Patient Refused     Done 9/26/2011 Imm Admin: TD Vaccine    Annual Wellness Visit Next Due 3/31/2018      Done 3/30/2017 Visit Dx: Medicare annual wellness visit, subsequent     Patient has more history with this topic...    COLONOSCOPY Next Due 5/27/2018      Done 5/27/2008 AMB REFERRAL TO GI FOR COLONOSCOPY    BONE DENSITY Next Due 12/27/2022      Done 12/27/2017 DS-BONE DENSITY STUDY (DEXA)     Patient has more history with this topic...          Patient Care Team:  nAt Bronson M.D. as PCP - General  BERRY Grider as Mid Level Provider (Cardiology)  Carla Goode M.D. as Consulting Physician (Cardiology)  [facility] as Consulting Physician (Optometry)      ROS       Objective:     /76   Pulse (!) 55   Temp 36.7 °C (98 °F)   Resp 16   Ht 1.524 m (5')   Wt 54 kg (119 lb)   LMP 07/06/2011   SpO2 96%   BMI 23.24 kg/m²      Physical Exam   Constitutional: She appears well-developed and well-nourished. No distress.   HENT:   Head: Normocephalic and atraumatic.   Right Ear: External ear normal.   Left Ear: External ear normal.   Mouth/Throat: Oropharynx is clear and moist.   Eyes: Conjunctivae are normal. Right eye exhibits no discharge. Left eye exhibits no discharge.   Neck: No JVD present.   Cardiovascular: Normal rate and regular rhythm.    Pulmonary/Chest: Effort normal and breath sounds normal. No respiratory distress. She has no wheezes.   Abdominal: Soft. She exhibits no distension and no mass. There is no tenderness.   Neurological: She is alert.   Skin: Skin is warm. She is not diaphoretic.   Psychiatric: She has a normal mood and affect. Her behavior is normal.   Nursing note and vitals reviewed.  No CVA tenderness  No spinal tenderness  Mild right SI joint tenderness  Mild right IT band tenderness  Right hip normal flexion, extension, internal and external rotation  Inguinal region no mass, no adenopathy, mild tenderness over  the inguinal ligament, no right lower quadrant mass, no suprapubic mass or tenderness  No lower extremity edema  Normal dorsiflexion, plantar flexion, knee flexion extension bilateral, slightly decreased right hip flexion-extension secondary to groin pain  Limited back flexion and extension      Cranial nerves intact       Assessment/Plan:     Assessment  #1 Right inguinal region pain question musculoskeletal strain inguinal region, question right hip arthritis, do not suspect lumbar pathology, no masses palpated, do not suspect statin side effect, do not suspect claudication    #2 dyslipidemia on Crestor 40 mg no previous muscle pain or muscle aches with statin therapy    #3 history of gout on allopurinol no recent flareup    #4 hypertension stable on metoprolol    #5 recent episode of dizziness, nausea one episode with no recurrence, question viral etiology, no sequela, do not suspect TIA, no evidence of stroke      Plan  #1 xray right hip    #2 physical therapy referral    #3 aleve one to two bid prn pain or tylenol 500 mg tid prn pain    #4 check blood pressure and record, continue metoprolol    #5 continue Crestor    #6 no heavy lifting, hold off on ivon chi    #9-year-old off on treadmill for now pending physical therapy evaluation, fall precautions    #8 follow-up if no improvement after physical therapy

## 2018-01-09 ENCOUNTER — HOSPITAL ENCOUNTER (OUTPATIENT)
Dept: RADIOLOGY | Facility: MEDICAL CENTER | Age: 83
End: 2018-01-09
Attending: INTERNAL MEDICINE
Payer: MEDICARE

## 2018-01-09 DIAGNOSIS — M25.551 PAIN OF RIGHT HIP JOINT: ICD-10-CM

## 2018-01-09 DIAGNOSIS — R10.31 RIGHT INGUINAL PAIN: ICD-10-CM

## 2018-01-09 PROCEDURE — 73502 X-RAY EXAM HIP UNI 2-3 VIEWS: CPT | Mod: RT

## 2018-01-10 ENCOUNTER — TELEPHONE (OUTPATIENT)
Dept: MEDICAL GROUP | Facility: MEDICAL CENTER | Age: 83
End: 2018-01-10

## 2018-01-12 DIAGNOSIS — I10 ESSENTIAL HYPERTENSION: ICD-10-CM

## 2018-01-15 RX ORDER — METOPROLOL TARTRATE 50 MG/1
TABLET, FILM COATED ORAL
Qty: 135 TAB | Refills: 3 | Status: SHIPPED | OUTPATIENT
Start: 2018-01-15 | End: 2018-01-01 | Stop reason: CLARIF

## 2018-01-16 ENCOUNTER — PHYSICAL THERAPY (OUTPATIENT)
Dept: PHYSICAL THERAPY | Facility: REHABILITATION | Age: 83
End: 2018-01-16
Attending: INTERNAL MEDICINE
Payer: MEDICARE

## 2018-01-16 DIAGNOSIS — M25.551 PAIN OF RIGHT HIP JOINT: ICD-10-CM

## 2018-01-16 PROCEDURE — 97110 THERAPEUTIC EXERCISES: CPT

## 2018-01-16 PROCEDURE — 97162 PT EVAL MOD COMPLEX 30 MIN: CPT

## 2018-01-16 ASSESSMENT — ENCOUNTER SYMPTOMS
ALLEVIATING FACTORS: PAIN MEDICATION
EXACERBATED BY: STANDING
EXACERBATED BY: WALKING
QUALITY: ACHING
PAIN TIMING: INTERMITTENT
PAIN SCALE AT HIGHEST: 9
PAIN SCALE: 0
PAIN SCALE AT LOWEST: 0
EXACERBATED BY: STAIR CLIMBING
QUALITY: PRESSURE

## 2018-01-16 NOTE — OP THERAPY EVALUATION
"  Outpatient Physical Therapy  INITIAL EVALUATION    [facility] Physical Therapy [address]  [address]  [place] [address]  Phone:  [phone]  Fax:  [phone]    Date of Evaluation: 2018    Patient: Susie Craig  YOB: 1935  MRN: [id number]     Referring Provider: Ant Bronson M.D.  [address]  [address]  [place] [address]   Referring Diagnosis Right inguinal pain [R10.31];Pain of right hip joint [M25.551]     Time Calculation  Start time: 830  Stop time: 940 Time Calculation (min): 70 minutes     Physical Therapy Occurrence Codes    Date physical therapy care plan established or reviewed:  18   Date physical therapy treatment started:  18          Chief Complaint: No chief complaint on file.    Visit Diagnoses     ICD-10-CM   1. Pain of right hip joint M25.551         Subjective:   History of Present Illness:     Mechanism of injury:  2017 onset increased right anterior thigh pain and up into glutes. Occasionally Increased right groin pain; knee \"went out\" when standing and walking a lot.  Increased symptoms going up steps and lifting right leg during adls. Taking NSAIDs    CABG x 3 surgery 2016, dizzy spells, chronic intermittent lumbar pain, no numbness and tingling, TIA  Sleep disturbance:  Not disrupted  Pain:     Current pain ratin (sitting)    At best pain ratin    At worst pain ratin    Quality:  Pressure and aching    Pain timing:  Intermittent    Relieving factors:  Pain medication (NSAID)    Aggravating factors:  Stair climbing, standing and walking  Diagnostic Tests:     Abnormal x-ray: arthritis right hip.        Past Medical History:   Diagnosis Date   • Arthritis    • Back pain    • Breath shortness     with exertion   • CAD (coronary artery disease)     CABG X3 in  with graft occlusion post-op   • Cataract     bilat IOL    • Gout    • Heart burn    • High cholesterol    • Hypertension    • " Indigestion    • MEDICAL HOME    • Stroke (CMS-HCC)     TIA      Past Surgical History:   Procedure Laterality Date   • MULTIPLE CORONARY ARTERY BYPASS ENDO VEIN HARVEST  11/6/2016    Procedure: MULTIPLE CORONARY ARTERY BYPASS ENDO VEIN HARVEST;  Surgeon: Jeff Naranjo M.D.;  Location: SURGERY [facility];  Service:    • ABIODUN  11/6/2016    Procedure: ABIODUN;  Surgeon: Jeff Naranjo M.D.;  Location: SURGERY [facility];  Service:    • CATARACT PHACO WITH IOL  8/31/2009    Performed by SOLOMON THOMAS at SURGERY SAME DAY [facility]   • CATARACT PHACO WITH IOL  8/20/2009    Performed by SOLOMON THOMAS at SURGERY SAME DAY [facility] OR[facility]   • GYN SURGERY  1953    Hysterectomy during last C Section    • ANGIOPLASTY  87, 89   • CARDIAC CATH     • GYN SURGERY      C Section x4   • OTHER      c-sections x4     Social History   Substance Use Topics   • Smoking status: Former Smoker     Packs/day: 0.50     Years: 32.00     Types: Cigarettes     Quit date: 8/31/1988   • Smokeless tobacco: Never Used      Comment: quit 1988, approx 30pyh   • Alcohol use 0.5 oz/week     1 Glasses of wine per week     Family and Occupational History     Social History   • Marital status:      Spouse name: N/A   • Number of children: N/A   • Years of education: N/A     Occupational History   • 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    Postural Observations  Seated posture: fair  Standing posture: fair  Correction of posture: has no consistent effect        Hip Screen   Hip range of motion within functional limits.  Hip strength within functional limits with the following exceptions: Painful hip flexion right  Hip joint mobility within functional limits    Neurological Testing     Reflexes   Left   Patellar (L4): normal (2+)  Achilles (S1): trace (1+)    Right   Patellar (L4): trace (1+)  Achilles (S1): trace (1+)    Myotome testing   Lumbar (left)   All left  lumbar myotomes within normal limits    Lumbar (right)   All right lumbar myotomes within normal limits    Dermatome testing   Lumbar (left)   All left lumbar dermatomes intact    Lumbar (right)   All right lumbar dermatomes intact        Therapeutic Exercises (CPT 31868):     1. REIL, 4 x 10    2. CRISTIAN, 4 x 10    3. Posture re ed maintaining neutral lumbar spine        Assessment, Response and Plan:   Impairments: activity intolerance, lacks appropriate home exercise program, limited ADL's, limited mobility and pain with function    Assessment details:  Patient presents with lumbar derangement syndrome with radicular pain to right glute, lateral hip and groin.  Symptoms are limiting her ability to go up stairs, lift right leg into bed and to walk for extended periods of time.  Symptoms increased with left rotation in flexion.  Patient will benefit from skilled PT to meet goals stated below.  Patient is highly anxious which is contributing to symptoms.  Barriers to therapy:  None  Prognosis: good    Goals:   Short Term Goals:   Patient able to lift leg into bed and car symptom free  Patient up and down stairs consistently symptom free  Symptom free sit to stand  Short term goal time span:  2-4 weeks      Long Term Goals:    Independent with home exercise program  Symptom free ADLs  Patient able to walk 1-2 miles without symptoms  Long term goal time span:  4-6 weeks    Plan:   Therapy options:  Physical therapy treatment to continue  Planned therapy interventions:  Therapeutic Exercise (CPT 01986), Gait Training (CPT 33128), E Stim Unattended (CPT 64180), Mechanical Traction (CPT 31887), Manual Therapy (CPT 09539), Neuromuscular Re-education (CPT 53535) and Therapeutic Activities (CPT 72693)  Frequency:  2x week  Duration in weeks:  6  Duration in visits:  12  Discussed with:  Patient      Functional Limitation G-Codes and Severity Modifiers     Current:     Goal:       Referring provider co-signature:  I have  reviewed this plan of care and my co-signature certifies the need for services.  Certification Dates:   From 1/16/2018    To 4/16/2018    Physician Signature: ________________________________ Date: ______________

## 2018-01-18 ENCOUNTER — PHYSICAL THERAPY (OUTPATIENT)
Dept: PHYSICAL THERAPY | Facility: REHABILITATION | Age: 83
End: 2018-01-18
Attending: INTERNAL MEDICINE
Payer: MEDICARE

## 2018-01-18 DIAGNOSIS — M25.551 PAIN OF RIGHT HIP JOINT: ICD-10-CM

## 2018-01-18 PROCEDURE — 97110 THERAPEUTIC EXERCISES: CPT

## 2018-01-18 PROCEDURE — 97014 ELECTRIC STIMULATION THERAPY: CPT

## 2018-01-18 PROCEDURE — 97140 MANUAL THERAPY 1/> REGIONS: CPT

## 2018-01-25 ENCOUNTER — PHYSICAL THERAPY (OUTPATIENT)
Dept: PHYSICAL THERAPY | Facility: REHABILITATION | Age: 83
End: 2018-01-25
Attending: INTERNAL MEDICINE
Payer: MEDICARE

## 2018-01-25 DIAGNOSIS — M25.551 PAIN OF RIGHT HIP JOINT: ICD-10-CM

## 2018-01-25 PROCEDURE — 97140 MANUAL THERAPY 1/> REGIONS: CPT

## 2018-01-25 PROCEDURE — 97014 ELECTRIC STIMULATION THERAPY: CPT

## 2018-01-25 PROCEDURE — 97110 THERAPEUTIC EXERCISES: CPT

## 2018-01-26 NOTE — OP THERAPY DAILY TREATMENT
Outpatient Physical Therapy  DAILY TREATMENT     [facility] Physical [address]  [address]  [place] [address]  Phone:  [phone]  Fax:  [phone]    Date: 01/25/2018    Patient: Susie Craig  YOB: 1935  MRN: [id number]     Time Calculation  Start time: 1530  Stop time: 1620 Time Calculation (min): 50 minutes     Chief Complaint: No chief complaint on file.    Visit #: 3    SUBJECTIVE:feeling better; > 50% decreased symptoms right groin      OBJECTIVE:  Current objective measures:        Therapeutic Exercises (CPT 70513):     Total treatment time spent on Therapeutic Exercises: 20 minutes.      1. REIL, 4 x 10    2. CRISTIAN, 4 x 10    3. Sit to stand hip hinge, 1 x 10    4. Neutral spine sit/stand    5. Wall ball squat, 1 x 20    6. Superman, 4 x 15 sec    Therapeutic Treatments and Modalities:     1. Manual Therapy (CPT 67790), 15 minutes, CPA, UPA L4-S1 GR 3, REIL with clinician overpressure, Sacral float mobilization prone, MFR to thoracolumbar fasci    2. E Stim Unattended (CPT 98615), 15 minutes, IFC  to lumbar paraspinals with MHP      Pain rating before treatment: 0  Pain rating after treatment: 0    ASSESSMENT:   Response to treatment: improved lumbar extension ROM, continued hypomobility lumbar spine     PLAN/RECOMMENDATIONS:   Plan for treatment: therapy treatment to continue next visit.  Planned interventions for next visit: E-stim unattended (CPT 90797), manual therapy (CPT 75803), neuromuscular re-education (CPT 14344) and therapeutic exercise (CPT 18416). Progress lumbar extension/ stabilization in quadraped

## 2018-01-30 ENCOUNTER — PHYSICAL THERAPY (OUTPATIENT)
Dept: PHYSICAL THERAPY | Facility: REHABILITATION | Age: 83
End: 2018-01-30
Attending: INTERNAL MEDICINE
Payer: MEDICARE

## 2018-01-30 DIAGNOSIS — M25.551 PAIN OF RIGHT HIP JOINT: ICD-10-CM

## 2018-01-30 PROCEDURE — 97140 MANUAL THERAPY 1/> REGIONS: CPT

## 2018-01-30 PROCEDURE — 97014 ELECTRIC STIMULATION THERAPY: CPT

## 2018-01-30 PROCEDURE — 97110 THERAPEUTIC EXERCISES: CPT

## 2018-01-30 NOTE — OP THERAPY DAILY TREATMENT
Outpatient Physical Therapy  DAILY TREATMENT     [facility] Physical [address].  [address]  [place] [address]  Phone:  [phone]  Fax:  [phone]    Date: 01/30/2018    Patient: Susie Craig  YOB: 1935  MRN: [id number]     Time Calculation  Start time: 1100  Stop time: 1145 Time Calculation (min): 45 minutes     Chief Complaint: No chief complaint on file.    Visit #: Visit count could not be calculated. Make sure you are using a visit which is associated with an episode.    SUBJECTIVE:  Increased right anterior thigh pain upon waking this morning with feeling  of weakness right hip flexors.      OBJECTIVE:  Current objective measures:          Therapeutic Exercises (CPT 94280):     Total treatment time spent on Therapeutic Exercises: 20 minutes.      1. REIL, 4 x 10    2. CRISTIAN, 4 x 10    3. Sit to stand hip hinge, 1 x 10    4. Neutral spine sit/stand    5. Wall ball squat, 1 x 20    6. Superman, 4 x 15 sec    Therapeutic Treatments and Modalities:     1. Manual Therapy (CPT 55699), 15 minutes, CPA, UPA L4-S1 GR 3, REIL with clinician overpressure, Sacral float mobilization prone, MFR to thoracolumbar fasci    2. E Stim Unattended (CPT 32957), 15 minutes, IFC prone  to lumbar paraspinals with MHP      Pain rating before treatment: 5  Pain rating after treatment: 5    ASSESSMENT:   Response to treatment: decreased leg pain with REIL and CRISTIAN. Continued intermittent weakness when lifting leg post treatment.    PLAN/RECOMMENDATIONS:   Plan for treatment: therapy treatment to continue next visit.  Planned interventions for next visit:  Ta stability, CRISTIAN, posture re ed  continue with current treatment.

## 2018-02-01 ENCOUNTER — PHYSICAL THERAPY (OUTPATIENT)
Dept: PHYSICAL THERAPY | Facility: REHABILITATION | Age: 83
End: 2018-02-01
Attending: INTERNAL MEDICINE
Payer: MEDICARE

## 2018-02-01 DIAGNOSIS — M25.551 PAIN OF RIGHT HIP JOINT: ICD-10-CM

## 2018-02-01 PROCEDURE — 97140 MANUAL THERAPY 1/> REGIONS: CPT

## 2018-02-01 PROCEDURE — 97014 ELECTRIC STIMULATION THERAPY: CPT

## 2018-02-01 PROCEDURE — 97110 THERAPEUTIC EXERCISES: CPT

## 2018-02-01 NOTE — OP THERAPY DAILY TREATMENT
Outpatient Physical Therapy  DAILY TREATMENT     [facility] Physical [address].  [address]  [place] [address]  Phone:  [phone]  Fax:  [phone]    Date: 02/01/2018    Patient: Susie Craig  YOB: 1935  MRN: [id number]     Time Calculation  Start time: 1035  Stop time: 1120 Time Calculation (min): 45 minutes     Chief Complaint: No chief complaint on file.    Visit #: 5    SUBJECTIVE:  1/10 VAS right anterior thigh pain  This morning    OBJECTIVE:  Current objective measures:          Therapeutic Exercises (CPT 45082):     Total treatment time spent on Therapeutic Exercises: 20 minutes.      1. REIL, 4 x 10    2. CRISTIAN, 4 x 10    3. Sit to stand hip hinge, 1 x 10    4. Neutral spine sit/stand    5. Wall ball squat, 1 x 20    6. Superman, 4 x 15 sec    7. CRISTIAN with belt overpressure, 4 x 10    Therapeutic Treatments and Modalities:     1. Manual Therapy (CPT 24340), 15 minutes, IASTM lumbar PS and multifidi, CPA, UPA L4-S1 GR 3, REIL with clinician overpressure, Sacral float mobilization prone, MFR to thoracolumbar fasci    2. E Stim Unattended (CPT 70697), 15 minutes, IFC prone  to lumbar paraspinals with MHP      Pain rating before treatment: 1  Pain rating after treatment: 0    ASSESSMENT:   Response to treatment: abolished symptoms post REIL with clinician overpressure and PA L4-S1    PLAN/RECOMMENDATIONS:   Plan for treatment: therapy treatment to continue next visit.  Planned interventions for next visit: continue with current treatment. Progress posterior chain strength and CRISTIAN with belt overpressure.  Review posture standing and sitting,

## 2018-02-08 ENCOUNTER — PHYSICAL THERAPY (OUTPATIENT)
Dept: PHYSICAL THERAPY | Facility: REHABILITATION | Age: 83
End: 2018-02-08
Attending: INTERNAL MEDICINE
Payer: MEDICARE

## 2018-02-08 DIAGNOSIS — M25.551 PAIN OF RIGHT HIP JOINT: ICD-10-CM

## 2018-02-08 PROCEDURE — 97110 THERAPEUTIC EXERCISES: CPT

## 2018-02-08 PROCEDURE — 97140 MANUAL THERAPY 1/> REGIONS: CPT

## 2018-02-08 NOTE — OP THERAPY DAILY TREATMENT
Outpatient Physical Therapy  DAILY TREATMENT     [facility] Physical [address]  [address]  [place] [address]  Phone:  [phone]  Fax:  [phone]    Date: 02/08/2018    Patient: Susie Craig  YOB: 1935  MRN: [id number]     Time Calculation  Start time: 0834  Stop time: 0905 Time Calculation (min): 31 minutes     Chief Complaint: No chief complaint on file.    Visit #: 6    SUBJECTIVE: BETTER only one episode of right groin pain; 25 min on the treadmill      OBJECTIVE:  Current objective measures:          Therapeutic Exercises (CPT 87964):     Total treatment time spent on Therapeutic Exercises: 20 minutes.      1. REIL, 4 x 10    2. CRISTIAN, 4 x 10    3. Sit to stand hip hinge, 1 x 10    4. Neutral spine sit/stand    5. Wall ball squat, 1 x 20    6. Superman, 4 x 15 sec    7. CRISTIAN with belt overpressure, 4 x 10    Therapeutic Treatments and Modalities:     1. Manual Therapy (CPT 03330), 15 minutes, IASTM lumbar PS and multifidi, CPA, UPA L4-S1 GR 3, REIL with clinician overpressure, Sacral float mobilization prone, MFR to thoracolumbar fasci    2. E Stim Unattended (CPT 13641), 0 minutes      Pain rating before treatment: 0  Pain rating after treatment: 0    ASSESSMENT:   Response to treatment:     PLAN/RECOMMENDATIONS:   Plan for treatment: therapy treatment to continue next visit.  Planned interventions for next visit: E-stim unattended (CPT 96838), manual therapy (CPT 08258), neuromuscular re-education (CPT 80353) and therapeutic exercise (CPT 23197). Lumbar extension rom/stabilization

## 2018-02-15 ENCOUNTER — APPOINTMENT (OUTPATIENT)
Dept: PHYSICAL THERAPY | Facility: REHABILITATION | Age: 83
End: 2018-02-15
Attending: INTERNAL MEDICINE
Payer: MEDICARE

## 2018-02-22 ENCOUNTER — APPOINTMENT (OUTPATIENT)
Dept: PHYSICAL THERAPY | Facility: REHABILITATION | Age: 83
End: 2018-02-22
Attending: INTERNAL MEDICINE
Payer: MEDICARE

## 2018-03-01 ENCOUNTER — APPOINTMENT (OUTPATIENT)
Dept: PHYSICAL THERAPY | Facility: REHABILITATION | Age: 83
End: 2018-03-01
Attending: INTERNAL MEDICINE
Payer: MEDICARE

## 2018-03-23 PROBLEM — M16.10 HIP ARTHRITIS: Status: ACTIVE | Noted: 2018-03-23

## 2018-04-10 ENCOUNTER — TELEPHONE (OUTPATIENT)
Dept: MEDICAL GROUP | Facility: MEDICAL CENTER | Age: 83
End: 2018-04-10

## 2018-04-10 ENCOUNTER — OFFICE VISIT (OUTPATIENT)
Dept: MEDICAL GROUP | Facility: MEDICAL CENTER | Age: 83
End: 2018-04-10
Payer: MEDICARE

## 2018-04-10 VITALS
HEIGHT: 60 IN | SYSTOLIC BLOOD PRESSURE: 140 MMHG | DIASTOLIC BLOOD PRESSURE: 70 MMHG | BODY MASS INDEX: 23.75 KG/M2 | OXYGEN SATURATION: 97 % | WEIGHT: 121 LBS | TEMPERATURE: 97.9 F | HEART RATE: 65 BPM

## 2018-04-10 DIAGNOSIS — M54.32 BILATERAL SCIATICA: ICD-10-CM

## 2018-04-10 DIAGNOSIS — I10 ESSENTIAL HYPERTENSION: Chronic | ICD-10-CM

## 2018-04-10 DIAGNOSIS — D69.6 THROMBOCYTOPENIA (HCC): ICD-10-CM

## 2018-04-10 DIAGNOSIS — M54.5 LOW BACK PAIN, UNSPECIFIED BACK PAIN LATERALITY, UNSPECIFIED CHRONICITY, WITH SCIATICA PRESENCE UNSPECIFIED: ICD-10-CM

## 2018-04-10 DIAGNOSIS — M54.31 BILATERAL SCIATICA: ICD-10-CM

## 2018-04-10 PROCEDURE — 99214 OFFICE O/P EST MOD 30 MIN: CPT | Performed by: INTERNAL MEDICINE

## 2018-04-10 ASSESSMENT — PATIENT HEALTH QUESTIONNAIRE - PHQ9: CLINICAL INTERPRETATION OF PHQ2 SCORE: 0

## 2018-04-10 NOTE — PROGRESS NOTES
Subjective:      Susie Craig is a 82 y.o. female who presents with hip pain and back pain          HPI  New complaint back pain  Seen january had hip pain and had xray and went to PT and completed therapy. Had been doing better but then had flare up of right sided low back pain and went to [facility] and had back xrays, then referred to orthopedic specialist and [facility] pain . Patient currently has low back pain right side with occasional radiation down the right leg. No falls. No sensory changes feet. Pain can be moderate in intensity worse with certain activities like walking and bending over. Pain can be sharp in intensity, better with rest and sitting down or lying down.  No falls, no incontinence of bowel or bladder  Does PT exercises in the am  Taking 2 otc aleve in am with some benefit, no GI upset or aleve, some benefit with this and heat application  Blood pressure has been stable on Lopressor  Dyslipidemia on Crestor no previous muscle pain or aches or statin therapy  No leg swelling        Current Outpatient Prescriptions   Medication Sig Dispense Refill   • allopurinol (ZYLOPRIM) 100 MG Tab Take 2 Tabs by mouth every day. 180 Tab 0   • metoprolol (LOPRESSOR) 50 MG Tab TAKE ONE-HALF TABLET BY MOUTH EVERY MORNING AND TAKE ONE TABLET BY MOUTH AT BEDTIME 135 Tab 3   • rosuvastatin (CRESTOR) 40 MG tablet TAKE ONE TABLET BY MOUTH EVERY DAY 90 Tab 3   • zolpidem (AMBIEN) 5 MG Tab Take 0.5 Tabs by mouth every bedtime. 30 Tab 4   • aspirin EC (ECOTRIN) 81 MG TBEC Take 81 mg by mouth every evening.       No current facility-administered medications for this visit.      Patient Active Problem List   Diagnosis   • S/P CABG x 3   • S/P KATY (total abdominal hysterectomy)   • History of gout   • Dyslipidemia   • Hx of transient ischemic attack (TIA)   • History of cataract   • Preventative health care   • LBBB (left bundle branch block)   • History of osteopenia   • Insomnia   • Neutropenia (HCC)   •  Decreased GFR   • Coronary artery disease involving native coronary artery of native heart without angina pectoris   • Hypertension   • Pulmonary nodule   • Subclinical hypothyroidism   • Osteopenia   • Hip arthritis     Depression Screening    Little interest or pleasure in doing things?  0 - not at all  Feeling down, depressed , or hopeless? 0 - not at all  Patient Health Questionnaire Score: 0       Health Maintenance Summary                IMM DTaP/Tdap/Td Vaccine Overdue 9/27/2011      Done 9/26/2011 Imm Admin: TD Vaccine    IMM PNEUMOCOCCAL 65+ (ADULT) LOW/MEDIUM RISK SERIES Overdue 5/8/2016      Done 5/8/2015 Imm Admin: Pneumococcal Conjugate Vaccine (Prevnar/PCV-13)    Annual Wellness Visit Overdue 3/31/2018      Done 3/30/2017 Visit Dx: Medicare annual wellness visit, subsequent     Patient has more history with this topic...    COLONOSCOPY Next Due 5/27/2018      Done 5/27/2008 AMB REFERRAL TO GI FOR COLONOSCOPY    BONE DENSITY Next Due 12/27/2022      Done 12/27/2017 DS-BONE DENSITY STUDY (DEXA)     Patient has more history with this topic...          Patient Care Team:  Ant Bronson M.D. as PCP - General  BERRY Grider as Mid Level Provider (Cardiology)  Carla Goode M.D. as Consulting Physician (Cardiology)  [facility] as Consulting Physician (Optometry)  Argelia Umaña, PT, MSPT as Physical Therapy    ROS       Objective:     /70   Pulse 65   Temp 36.6 °C (97.9 °F)   Ht 1.524 m (5')   Wt 54.9 kg (121 lb)   LMP 07/06/2011   SpO2 97%   BMI 23.63 kg/m²      Physical Exam   Constitutional: She appears well-developed and well-nourished. No distress.   HENT:   Head: Normocephalic and atraumatic.   Eyes: Conjunctivae are normal. Right eye exhibits no discharge. Left eye exhibits no discharge.   Neck: No JVD present.   Cardiovascular: Normal rate, regular rhythm and normal heart sounds.    Pulmonary/Chest: Effort normal and breath sounds normal. No  respiratory distress. She has no wheezes.   Abdominal: Soft. She exhibits no distension.   Musculoskeletal: She exhibits no edema.   Neurological: She is alert.   Skin: No rash noted. She is not diaphoretic. No erythema.   Psychiatric: She has a normal mood and affect. Her behavior is normal. Thought content normal.   Nursing note and vitals reviewed.      No spinal tenderness to palpation  Right gluteal tenderness and right paraspinal lumbar muscle tenderness to palpation  Mild tenderness over right IT band   positive right straight leg raise right side  Normal dorsiflexion, plantarflexion, knee flexion extension bilateral            Assessment/Plan:     Assessment  #!  Low back pain with positive right leg raise sign, no trauma, no falls, has seen physical therapy, orthopedics, has had x-rays, Aleve has been beneficial to post once a day without GI side effects, normal renal function, has been doing stretching exercises, able to perform ADLs    #2 right hip arthritis    #3 hypertension stable on metoprolol    #4 dyslipidemia on Crestor no previous muscle pain or aches or statin therapy      #5 thrombocytopenia most recent platelet 139,000 stable        Plan  #1 continue with massage therapy, declines further physical therapy    #2 follow-up orthopedics    #3 MRI lumbar spine with low back pain, sciatica, having had plain x-ray, physical therapy, and seen by orthopedics     #4 continue Aleve 1-2 tablets every morning over-the-counter, seems to be beneficial monitor for GI upset, blood in stools    #5 continue stretching exercises    #6 further follow-up based upon MRI scan report, follow-up with myself in months

## 2018-04-17 ENCOUNTER — APPOINTMENT (RX ONLY)
Dept: URBAN - METROPOLITAN AREA CLINIC 4 | Facility: CLINIC | Age: 83
Setting detail: DERMATOLOGY
End: 2018-04-17

## 2018-04-17 DIAGNOSIS — D22 MELANOCYTIC NEVI: ICD-10-CM

## 2018-04-17 DIAGNOSIS — L82.0 INFLAMED SEBORRHEIC KERATOSIS: ICD-10-CM

## 2018-04-17 DIAGNOSIS — D18.0 HEMANGIOMA: ICD-10-CM

## 2018-04-17 DIAGNOSIS — L81.4 OTHER MELANIN HYPERPIGMENTATION: ICD-10-CM

## 2018-04-17 DIAGNOSIS — L82.1 OTHER SEBORRHEIC KERATOSIS: ICD-10-CM

## 2018-04-17 PROBLEM — D22.62 MELANOCYTIC NEVI OF LEFT UPPER LIMB, INCLUDING SHOULDER: Status: ACTIVE | Noted: 2018-04-17

## 2018-04-17 PROBLEM — D22.72 MELANOCYTIC NEVI OF LEFT LOWER LIMB, INCLUDING HIP: Status: ACTIVE | Noted: 2018-04-17

## 2018-04-17 PROBLEM — E78.5 HYPERLIPIDEMIA, UNSPECIFIED: Status: ACTIVE | Noted: 2018-04-17

## 2018-04-17 PROBLEM — D48.5 NEOPLASM OF UNCERTAIN BEHAVIOR OF SKIN: Status: ACTIVE | Noted: 2018-04-17

## 2018-04-17 PROBLEM — I63.50 CEREBRAL INFARCTION DUE TO UNSPECIFIED OCCLUSION OR STENOSIS OF UNSPECIFIED CEREBRAL ARTERY: Status: ACTIVE | Noted: 2018-04-17

## 2018-04-17 PROBLEM — L57.0 ACTINIC KERATOSIS: Status: ACTIVE | Noted: 2018-04-17

## 2018-04-17 PROBLEM — I10 ESSENTIAL (PRIMARY) HYPERTENSION: Status: ACTIVE | Noted: 2018-04-17

## 2018-04-17 PROBLEM — D18.01 HEMANGIOMA OF SKIN AND SUBCUTANEOUS TISSUE: Status: ACTIVE | Noted: 2018-04-17

## 2018-04-17 PROBLEM — D22.5 MELANOCYTIC NEVI OF TRUNK: Status: ACTIVE | Noted: 2018-04-17

## 2018-04-17 PROBLEM — D22.61 MELANOCYTIC NEVI OF RIGHT UPPER LIMB, INCLUDING SHOULDER: Status: ACTIVE | Noted: 2018-04-17

## 2018-04-17 PROBLEM — D22.71 MELANOCYTIC NEVI OF RIGHT LOWER LIMB, INCLUDING HIP: Status: ACTIVE | Noted: 2018-04-17

## 2018-04-17 PROBLEM — Z85.828 PERSONAL HISTORY OF OTHER MALIGNANT NEOPLASM OF SKIN: Status: ACTIVE | Noted: 2018-04-17

## 2018-04-17 PROCEDURE — ? LIQUID NITROGEN

## 2018-04-17 PROCEDURE — ? SUNSCREEN RECOMMENDATIONS

## 2018-04-17 PROCEDURE — 11100: CPT | Mod: 59

## 2018-04-17 PROCEDURE — ? COUNSELING

## 2018-04-17 PROCEDURE — 99213 OFFICE O/P EST LOW 20 MIN: CPT | Mod: 25

## 2018-04-17 PROCEDURE — 17110 DESTRUCTION B9 LES UP TO 14: CPT

## 2018-04-17 PROCEDURE — ? BIOPSY BY SHAVE METHOD

## 2018-04-17 ASSESSMENT — LOCATION ZONE DERM
LOCATION ZONE: HAND
LOCATION ZONE: TRUNK
LOCATION ZONE: NECK
LOCATION ZONE: ARM
LOCATION ZONE: FACE
LOCATION ZONE: LEG

## 2018-04-17 ASSESSMENT — LOCATION DETAILED DESCRIPTION DERM
LOCATION DETAILED: LEFT ANTERIOR LATERAL PROXIMAL UPPER ARM
LOCATION DETAILED: LEFT INFERIOR ANTERIOR NECK
LOCATION DETAILED: LEFT CENTRAL MALAR CHEEK
LOCATION DETAILED: SUPERIOR THORACIC SPINE
LOCATION DETAILED: RIGHT CENTRAL MALAR CHEEK
LOCATION DETAILED: LEFT ULNAR DORSAL HAND
LOCATION DETAILED: RIGHT SUPERIOR MEDIAL MIDBACK
LOCATION DETAILED: LEFT PROXIMAL POSTERIOR UPPER ARM
LOCATION DETAILED: RIGHT ANTERIOR PROXIMAL THIGH
LOCATION DETAILED: LEFT ANTERIOR PROXIMAL THIGH
LOCATION DETAILED: RIGHT RADIAL DORSAL HAND
LOCATION DETAILED: RIGHT MEDIAL SUPERIOR CHEST
LOCATION DETAILED: PERIUMBILICAL SKIN
LOCATION DETAILED: RIGHT PROXIMAL DORSAL FOREARM
LOCATION DETAILED: RIGHT RIB CAGE
LOCATION DETAILED: LEFT SUPERIOR MEDIAL UPPER BACK
LOCATION DETAILED: LEFT PROXIMAL DORSAL FOREARM
LOCATION DETAILED: RIGHT DISTAL POSTERIOR UPPER ARM

## 2018-04-17 ASSESSMENT — LOCATION SIMPLE DESCRIPTION DERM
LOCATION SIMPLE: RIGHT POSTERIOR UPPER ARM
LOCATION SIMPLE: RIGHT FOREARM
LOCATION SIMPLE: UPPER BACK
LOCATION SIMPLE: LEFT ANTERIOR NECK
LOCATION SIMPLE: LEFT POSTERIOR UPPER ARM
LOCATION SIMPLE: RIGHT THIGH
LOCATION SIMPLE: RIGHT CHEEK
LOCATION SIMPLE: LEFT THIGH
LOCATION SIMPLE: LEFT CHEEK
LOCATION SIMPLE: LEFT FOREARM
LOCATION SIMPLE: LEFT UPPER ARM
LOCATION SIMPLE: CHEST
LOCATION SIMPLE: RIGHT HAND
LOCATION SIMPLE: LEFT UPPER BACK
LOCATION SIMPLE: LEFT HAND
LOCATION SIMPLE: RIGHT LOWER BACK
LOCATION SIMPLE: ABDOMEN

## 2018-04-17 NOTE — PROCEDURE: LIQUID NITROGEN
Medical Necessity Information: It is in your best interest to select a reason for this procedure from the list below. All of these items fulfill various CMS LCD requirements except the new and changing color options.
Post-Care Instructions: I reviewed with the patient in detail post-care instructions. Patient is to wear sunprotection, and avoid picking at any of the treated lesions. Pt may apply Vaseline to crusted or scabbing areas.
Medical Necessity Clause: This procedure was medically necessary because the lesions that were treated were:
Detail Level: Detailed
Include Z78.9 (Other Specified Conditions Influencing Health Status) As An Associated Diagnosis?: No
Consent: The patient's consent was obtained including but not limited to risks of crusting, scabbing, blistering, scarring, darker or lighter pigmentary change, recurrence, incomplete removal and infection.

## 2018-04-17 NOTE — PROCEDURE: BIOPSY BY SHAVE METHOD
Additional Anesthesia Volume In Cc (Will Not Render If 0): 0
Hemostasis: Drysol
Bill 84575 For Specimen Handling/Conveyance To Laboratory?: no
Silver Nitrate Text: The wound bed was treated with silver nitrate after the biopsy was performed.
Electrodesiccation And Curettage Text: The wound bed was treated with electrodesiccation and curettage after the biopsy was performed.
Biopsy Type: H and E
Biopsy Method: Personna blade
Post-Care Instructions: I reviewed with the patient in detail post-care instructions. Patient is to keep the biopsy site dry overnight, and then apply bacitracin twice daily until healed. Patient may apply hydrogen peroxide soaks to remove any crusting.
Notification Instructions: Patient will be notified of biopsy results. However, patient instructed to call the office if not contacted within 2 weeks.
Wound Care: Bacitracin
Lab: [id number]
Consent: Written consent was obtained and risks were reviewed including but not limited to scarring, infection, bleeding, scabbing, incomplete removal, nerve damage and allergy to anesthesia.
Dressing: bandage
Detail Level: Detailed
Anesthesia Type: 1% lidocaine with epinephrine
Curettage Text: The wound bed was treated with curettage after the biopsy was performed.
Lab Facility: 
Was A Bandage Applied: Yes
Billing Type: Third-Party Bill
Anesthesia Volume In Cc: 2
Cryotherapy Text: The wound bed was treated with cryotherapy after the biopsy was performed.
Electrodesiccation Text: The wound bed was treated with electrodesiccation after the biopsy was performed.
Type Of Destruction Used: Curettage

## 2018-04-20 ENCOUNTER — HOSPITAL ENCOUNTER (OUTPATIENT)
Dept: RADIOLOGY | Facility: MEDICAL CENTER | Age: 83
End: 2018-04-20
Attending: INTERNAL MEDICINE
Payer: MEDICARE

## 2018-04-20 ENCOUNTER — TELEPHONE (OUTPATIENT)
Dept: MEDICAL GROUP | Facility: MEDICAL CENTER | Age: 83
End: 2018-04-20

## 2018-04-20 DIAGNOSIS — M54.31 BILATERAL SCIATICA: ICD-10-CM

## 2018-04-20 DIAGNOSIS — M54.5 LOW BACK PAIN, UNSPECIFIED BACK PAIN LATERALITY, UNSPECIFIED CHRONICITY, WITH SCIATICA PRESENCE UNSPECIFIED: ICD-10-CM

## 2018-04-20 DIAGNOSIS — M54.32 BILATERAL SCIATICA: ICD-10-CM

## 2018-04-20 PROBLEM — M54.50 LOW BACK PAIN: Status: ACTIVE | Noted: 2018-04-20

## 2018-04-20 PROCEDURE — 72148 MRI LUMBAR SPINE W/O DYE: CPT

## 2018-05-31 ENCOUNTER — TELEPHONE (OUTPATIENT)
Dept: PHYSICAL THERAPY | Facility: REHABILITATION | Age: 83
End: 2018-05-31

## 2018-05-31 NOTE — OP THERAPY DISCHARGE SUMMARY
Outpatient Physical Therapy  DISCHARGE SUMMARY NOTE      [facility] Therapy [address]  [address]  [place] [address]  Phone:  [phone]  Fax:  [phone]    Date of Visit: 05/31/2018    Patient: Susie Craig  YOB: 1935  MRN: [id number]     Referring Provider: Ant Bronson MD   Referring Diagnosis Pain of right hip joint     Physical Therapy Occurrence Codes    Date physical therapy care plan established or reviewed:  1/16/18   Date physical therapy treatment started:  1/16/18          Functional Limitation G-Codes and Severity Modifiers      Goal:     Discharge:         Your patient is being discharged from Physical Therapy with the following comments:   · Goals partially met  · Patient has failed to schedule or reschedule follow-up visits    Comments:  Patient seen for 6 visits and made steady progress toward goals.  At the time of the last appointment on 2/8/2018 patient reported 0/10 pain right hip and was able to walk 25 min on treadmill.  Patient failed to make additional appointments to follow up.       Limitations Remaining:  Intermittent right groin/hip pain    Recommendations:  D/C from PT  Argelia Umaañ PT, MSPT    Date: 5/31/2018

## 2018-08-16 PROBLEM — M51.36 DDD (DEGENERATIVE DISC DISEASE), LUMBAR: Status: ACTIVE | Noted: 2018-01-01

## 2018-08-16 PROBLEM — I25.10 CARDIOVASCULAR DISEASE: Status: ACTIVE | Noted: 2018-01-01

## 2018-08-16 PROBLEM — F43.9 SITUATIONAL STRESS: Status: ACTIVE | Noted: 2018-01-01

## 2018-08-16 NOTE — PATIENT INSTRUCTIONS
Adjustment Disorder  Adjustment disorder is an unusually severe reaction to a stressful life event, such as the loss of a job or physical illness. The event may be any stressful event other than the loss of a loved one. Adjustment disorder may affect your feelings, your thinking, how you act, or a combination of these. It may interfere with personal relationships or with the way you are at work, school, or home. People with this disorder are at risk for suicide and substance abuse. They may develop a more serious mental disorder, such as major depressive disorder or post-traumatic stress disorder.  SIGNS AND SYMPTOMS   Symptoms may include:  · Sadness, depressed mood, or crying spells.  · Loss of enjoyment.  · Change in appetite or weight.  · Sense of loss or hopelessness.  · Thoughts of suicide.  · Anxiety, worry, or nervousness.  · Trouble sleeping.  · Avoiding family and friends.  · Poor school performance.  · Fighting or vandalism.  · Reckless driving.  · Skipping school.  · Poor work performance.  · Ignoring bills.  Symptoms of adjustment disorder start within 3 months of the stressful life event. They do not last more than 6 months after the event has ended.  DIAGNOSIS   To make a diagnosis, your health care provider will ask about what has happened in your life and how it has affected you. He or she may also ask about your medical history and use of medicines, alcohol, and other substances. Your health care provider may do a physical exam and order lab tests or other studies. You may be referred to a mental health specialist for evaluation.  TREATMENT   Treatment options include:  · Counseling or talk therapy. Talk therapy is usually provided by mental health specialists.  · Medicine. Certain medicines may help with depression, anxiety, and sleep.  · Support groups. Support groups offer emotional support, advice, and guidance. They are made up of people who have had similar experiences.  HOME CARE  INSTRUCTIONS  · Keep all follow-up visits as directed by your health care provider. This is important.  · Take medicines only as directed by your health care provider.  SEEK MEDICAL CARE IF:   Your symptoms get worse.   SEEK IMMEDIATE MEDICAL CARE IF:  You have serious thoughts about hurting yourself or someone else.  MAKE SURE YOU:  · Understand these instructions.  · Will watch your condition.  · Will get help right away if you are not doing well or get worse.  This information is not intended to replace advice given to you by your health care provider. Make sure you discuss any questions you have with your health care provider.  Document Released: 08/22/2007 Document Revised: 04/10/2017 Document Reviewed: 05/12/2015  Elsevier Interactive Patient Education © 2017 [employer]

## 2018-08-23 NOTE — ASSESSMENT & PLAN NOTE
This is chronic. Patient has difficulty with sleeping. Primarily secondary to mind racing. Difficulty falling asleep and staying asleep. Patient uses electronics before bed. Not exercising first thing in the morning.  Currently taking  Ambien 5 mg nightly

## 2018-08-23 NOTE — ASSESSMENT & PLAN NOTE
Dyslipidemia Chronic condition. Current treatments: diet/exercise/medicines. She is taking Crestor 40 mg a day as directed. Current side effects: none.  Patient was intolerant to Lipitor.

## 2018-08-23 NOTE — ASSESSMENT & PLAN NOTE
Patient is status post an MI and a CABG of  Three-vessels.   she denies any current chest pain or shortness of breath.  She does follow up with cardiology.

## 2018-08-23 NOTE — PROGRESS NOTES
Chief Complaint   Patient presents with   • Establish Care         Susie Craig is a 83 y.o. female here to establish care and for evaluation and management of:        HPI:    Dyslipidemia  Dyslipidemia Chronic condition. Current treatments: diet/exercise/medicines. She is taking Crestor 40 mg a day as directed. Current side effects: none.  Patient was intolerant to Lipitor.      Primary insomnia  This is chronic. Patient has difficulty with sleeping. Primarily secondary to mind racing. Difficulty falling asleep and staying asleep. Patient uses electronics before bed. Not exercising first thing in the morning.  Currently taking  Ambien 5 mg nightly          Low back pain  Patient has chronic low back pain.  She had an MRI in April that showed mild to moderate central canal stenosis and moderate right sided neural foraminal narrowing at L3-4.  She is following up with spinal doctor regarding this.    Cardiovascular disease  Patient is status post an MI and a CABG of  Three-vessels.   she denies any current chest pain or shortness of breath.  She does follow up with cardiology.    Situational stress  Patient is currently under a lot of stress.  Her  has early dementia and she finds it stressful to be at home with him all the time.  She feels like she needs a respite.  She denies any suicidal thoughts or plans.  She is not interested in medication at this time for this.  She does have good support system.      Allergies   Allergen Reactions   • Lipitor [Atorvastatin Calcium] Unspecified     Elevated LFT  RXN=long time ago       Current medicines (including changes today)  Current Outpatient Prescriptions   Medication Sig Dispense Refill   • zolpidem (AMBIEN) 5 MG Tab Take 0.5 Tabs by mouth every bedtime for 60 days. 30 Tab 1   • Diclofenac Sodium (VOLTAREN) 1 % Gel Apply thin film to affected area q 8 hours prn pain 80 g 1   • allopurinol (ZYLOPRIM) 100 MG Tab Take 2 Tabs by mouth every day. 180 Tab 2    • metoprolol (LOPRESSOR) 50 MG Tab TAKE ONE-HALF TABLET BY MOUTH EVERY MORNING AND TAKE ONE TABLET BY MOUTH AT BEDTIME 135 Tab 3   • rosuvastatin (CRESTOR) 40 MG tablet TAKE ONE TABLET BY MOUTH EVERY DAY 90 Tab 3   • aspirin EC (ECOTRIN) 81 MG TBEC Take 81 mg by mouth every evening.       No current facility-administered medications for this visit.      She  has a past medical history of Arthritis; Back pain; Breath shortness; CAD (coronary artery disease); Cataract; Gout; Heart attack (HCC); Heart burn; High cholesterol; Hypertension; Indigestion; MEDICAL HOME; and Stroke (HCC).  She  has a past surgical history that includes angioplasty (85, 97); other; cataract phaco with iol (2009); cataract phaco with iol (2009); gyn surgery; gyn surgery (); multiple coronary artery bypass endo vein harvest (2016); pollo (2016); cardiac cath; primary c section; and abdominal hysterectomy total.  Social History   Substance Use Topics   • Smoking status: Former Smoker     Packs/day: 0.50     Years: 32.00     Types: Cigarettes     Quit date: 1988   • Smokeless tobacco: Never Used      Comment: quit , approx 30pyh   • Alcohol use 0.5 oz/week     1 Glasses of wine per week     Social History     Social History Narrative   • No narrative on file     Family History   Problem Relation Age of Onset   • Cancer Mother         uterine   • Arthritis Mother         gout   • Heart Disease Father         MI age 50s   • Heart Disease Brother          40 MI   • Hyperlipidemia Sister      Family Status   Relation Status   • Mo         alzheimers   • Fa    • Bro    • Sis Alive   • MGMo    • MGFa    • PGMo    • PGFa          ROS  No fever or chills.  No nausea or vomiting.  No chest pain or palpitations.  No cough or SOB.  No pain with urination or hematuria.  No black or bloody stools.  All other systems reviewed and are negative     Objective:     Blood  pressure 124/72, pulse 65, temperature 36.7 °C (98.1 °F), resp. rate 20, height 1.524 m (5'), weight 54.9 kg (121 lb), last menstrual period 07/06/2011, SpO2 94 %. Body mass index is 23.63 kg/m².  Physical Exam:      Well developed, well nourished.  Alert, oriented in no acute distress.  Psych: Eye contact is good, speech goal directed, affect calm  Eyes: conjunctiva non-injected, sclera non-icteric.  Neck Supple.  No adenopathy or masses in the neck or supraclavicular regions. No thyromegaly  Lungs: clear to auscultation bilaterally with good excursion. No wheezes or rhonchi  CV: regular rate and rhythm. No murmur  SPINE: No significant spinal curvature on forward bend. Mild tenderness in paraspinous muscles lumbar spine without current spasm. SLT negative. DTR 2+ patella, 1+ achilles bilaterally. Strength 5/5 proximal and distal LE.  No pain with stress of SI. Full hip ROM. Poor hamstring flexibility. No symptoms with axial loading.       Assessment and Plan:   The following treatment plan was discussed    1. Cardiovascular disease  Stable.  Continue Crestor and metoprolol for secondary prevention.  Patient is on a daily aspirin.  Follow-up with cardiology as scheduled    2. DDD (degenerative disc disease), lumbar  Follow-up with spinal physician if symptoms worsen    3. Dyslipidemia  This is a chronic medical condition that is currently stable  Continue Crestor    4. Chronic right-sided low back pain with right-sided sciatica  Trial of Voltaren gel  - Diclofenac Sodium (VOLTAREN) 1 % Gel; Apply thin film to affected area q 8 hours prn pain  Dispense: 80 g; Refill: 1    5. Primary insomnia  [place]  was reviewed.  Controlled substance agreement signed.  Discussed increased risk of falls and Alzheimer's.  Follow-up in 3 months  - zolpidem (AMBIEN) 5 MG Tab; Take 0.5 Tabs by mouth every bedtime for 60 days.  Dispense: 30 Tab; Refill: 1  - Controlled Substance Treatment Agreement    6. Situational stress  Stress  reduction techniques discussed.  Patient will let me know if the situation worsens.      Records requested.    Any change or worsening of signs or symptoms, patient encouraged to follow-up or report to the emergency room for further evaluation. Patient understands and agrees.    Followup: Return in about 6 months (around 2/16/2019).

## 2018-08-23 NOTE — ASSESSMENT & PLAN NOTE
Patient is currently under a lot of stress.  Her  has early dementia and she finds it stressful to be at home with him all the time.  She feels like she needs a respite.  She denies any suicidal thoughts or plans.  She is not interested in medication at this time for this.  She does have good support system.

## 2018-08-23 NOTE — ASSESSMENT & PLAN NOTE
Patient has chronic low back pain.  She had an MRI in April that showed mild to moderate central canal stenosis and moderate right sided neural foraminal narrowing at L3-4.  She is following up with spinal doctor regarding this.

## 2018-09-07 NOTE — TELEPHONE ENCOUNTER
From: Susie Craig  To: Danielle Jon M.D.  Sent: 9/6/2018 4:53 PM PDT  Subject: Non-Urgent Medical Question    I have not had a blood test since April 3, 2017 and I would like one especially for my cholesterol and liver since I am taking more NSAiDs since my sciatic problem which is getting worse pain in hip and groin and limping. I have an appt with my cardiologist on October 8th. and want to make sure my cholesterol has not gone up. I have a call in to Dr. Sixto Mobley MD for pain control and should get a call tomorrow for an appointment. I will let you know who I end up going to.

## 2018-09-12 NOTE — TELEPHONE ENCOUNTER
From: Susie Craig  To: Danielle Jon M.D.  Sent: 9/12/2018 9:49 AM PDT  Subject: Procedure Question    I left a message on your assistants phone re my sciatic nerve which is worse and and the [facility] physiatry dept. said to have you refer me to see a pain Dr. I want to see a Dr. that believes in other methods besides cortisone shots. The pain is in my groin, buttock and going down my leg and it hurts to walk. I am still on 2 aleves a morning, ivon chi and exercises from the therapist that Dr. Gonsales sent me to when this all started. Info from computer re sciatic nerve said to see a Dr. if it gets worse.

## 2018-09-28 NOTE — PROGRESS NOTES
"Susie Craig is a 83 y.o. female here for a non-provider visit for:   FLU    Reason for immunization: Annual Flu Vaccine  Immunization records indicate need for vaccine: Yes, confirmed with Epic  Minimum interval has been met for this vaccine: Yes  ABN completed: No    Order and dose verified by: Harika ELIZABETH Dated  08/07/15 was given to patient: Yes  All IAC Questionnaire questions were answered \"No.\"    Patient tolerated injection and no adverse effects were observed or reported: Yes    Pt scheduled for next dose in series: Not Indicated  "

## 2018-09-28 NOTE — PROGRESS NOTES
Outcome: transferred from [facility] group, call dropped    Please transfer to Patient Outreach Team at [phone] when patient returns call.    Attempt # 1

## 2018-09-28 NOTE — PROGRESS NOTES
1. Attempt #:2 called patient back because the call dropped     2. HealthConnect Verified: yes  Effective Date: 1/1/2018     3. Verify PCP: yes    4. Review Care Team: yes    5. WebIZ Checked & Epic Updated: Yes   PPSV23   MMR   Influenza w/preserv.   Tdap   Zoster Alex (Shingrix)   WebIZ Recommendations: FLU, PNEUMOVAX (PPSV23), TDAP, SHINGRIX (Shingles) and MMR  · Is patient due for Tdap? YES. Patient was not notified of copay/out of pocket cost.  · Is patient due for Shingles? YES. Patient was not notified of copay/out of pocket cost.    6. Reviewed/Updated the following with patient:       •   Communication Preference Obtained? YES       •   Preferred Pharmacy? YES       •   Preferred Lab? YES       •   Family History (document living status of immediate family members and if + hx of cancer, diabetes, hypertension, hyperlipidemia, heart attack, stroke) YES. Was Abstract Encounter opened and chart updated? YES (clicked review on family history)    7. Annual Wellness Visit Scheduling  · Scheduling Status:Scheduled    9. Care Gap Scheduling (Attempt to Schedule EACH Overdue Care Gap!)     Health Maintenance Due   Topic Date Due   • IMM DTaP/Tdap/Td Vaccine (1 - Tdap) 09/27/2011   • IMM ZOSTER VACCINES (2 of 3) 07/30/2012   • IMM PNEUMOCOCCAL 65+ (ADULT) LOW/MEDIUM RISK SERIES (2 of 2 - PPSV23) 05/08/2016   • Annual Wellness Visit  03/31/2018   • COLONOSCOPY  05/27/2018   • IMM INFLUENZA (1) 09/01/2018        Scheduled patient for Annual Wellness Visit     10. [employer] Activation: already active    11. [employer] Basilia: no    12. Virtual Visits: no    13. Opt In to Text Messages: yes    Patient was transferred from [facility] [place].  14. Patient was NOT advised: “This is a free wellness visit. The provider will screen for medical conditions to help you stay healthy. If you have other concerns to address you may be asked to discuss these at a separate visit or there may be an additional fee.”     15. Patient was NOT informed to  arrive 15 min prior to their scheduled appointment and bring in their medication bottles.

## 2018-10-08 PROBLEM — I25.10 CARDIOVASCULAR DISEASE: Status: RESOLVED | Noted: 2018-01-01 | Resolved: 2018-01-01

## 2018-10-08 PROBLEM — F43.9 SITUATIONAL STRESS: Status: RESOLVED | Noted: 2018-01-01 | Resolved: 2018-01-01

## 2018-10-08 NOTE — PROGRESS NOTES
Subjective:   Susie Craig is a 83y.o. female who presents today in follow-up in regards to her coronary arterial bypass surgery 2016 (x3 LIMA to the LAD saphenous vein graft to the diagonal branch and saphenous vein graft to the first OM) in the setting of unstable angina, concomitant mild carotid disease hypertension and hyperlipidemia     Feels well but some situational stress, her 7-year-old dog is sick.  Her  has mild dementia.  She has terrible lower back pain.  Her blood pressure has been okay but high today because of this       Past Medical History:   Diagnosis Date   • Arthritis    • Back pain    • Breath shortness     with exertion   • CAD (coronary artery disease)     CABG X3 in '16 with graft occlusion post-op   • Cataract     bilat IOL    • Gout    • Heart attack (HCC)    • Heart burn    • High cholesterol    • Hypertension    • Indigestion    • MEDICAL HOME    • Stroke (HCC)     TIA      Past Surgical History:   Procedure Laterality Date   • MULTIPLE CORONARY ARTERY BYPASS ENDO VEIN HARVEST  11/6/2016    Procedure: MULTIPLE CORONARY ARTERY BYPASS ENDO VEIN HARVEST;  Surgeon: Jeff Naranjo M.D.;  Location: SURGERY [facility];  Service:    • ABIODUN  11/6/2016    Procedure: ABIODUN;  Surgeon: Jeff Naranjo M.D.;  Location: SURGERY [facility];  Service:    • CATARACT PHACO WITH IOL  8/31/2009    Performed by SOLOMON THOMAS at SURGERY SAME DAY [facility] ORS   • CATARACT PHACO WITH IOL  8/20/2009    Performed by SOLOMON THOMAS at SURGERY SAME DAY [facility] ORS   • GYN SURGERY  1953    Hysterectomy during last C Section    • ABDOMINAL HYSTERECTOMY TOTAL     • ANGIOPLASTY  85, 97   • CARDIAC CATH     • GYN SURGERY      C Section x4   • OTHER      c-sections x4   • PRIMARY C SECTION       Family History   Problem Relation Age of Onset   • Cancer Mother         uterine   • Arthritis Mother         gout   • Asthma Mother    • Heart Disease Father         MI age 50s   • Heart Disease  Brother          40 MI   • Hyperlipidemia Brother    • Hyperlipidemia Sister    • Arthritis Sister      History   Smoking Status   • Former Smoker   • Packs/day: 0.50   • Years: 32.00   • Types: Cigarettes   • Quit date: 1988   Smokeless Tobacco   • Never Used     Comment: quit , approx 30pyh     Allergies   Allergen Reactions   • Lipitor [Atorvastatin Calcium] Unspecified     Elevated LFT  RXN=long time ago     Outpatient Encounter Prescriptions as of 10/8/2018   Medication Sig Dispense Refill   • Naproxen Sodium (ALEVE) 220 MG Cap Take  by mouth. 2 tablet am     • zolpidem (AMBIEN) 5 MG Tab Take 0.5 Tabs by mouth every bedtime for 60 days. 30 Tab 1   • allopurinol (ZYLOPRIM) 100 MG Tab Take 2 Tabs by mouth every day. 180 Tab 2   • metoprolol (LOPRESSOR) 50 MG Tab TAKE ONE-HALF TABLET BY MOUTH EVERY MORNING AND TAKE ONE TABLET BY MOUTH AT BEDTIME 135 Tab 3   • rosuvastatin (CRESTOR) 40 MG tablet TAKE ONE TABLET BY MOUTH EVERY DAY 90 Tab 3   • aspirin EC (ECOTRIN) 81 MG TBEC Take 81 mg by mouth every evening.     • [DISCONTINUED] allopurinol (ZYLOPRIM) 100 MG Tab TAKE TWO TABLETS BY MOUTH EVERY  Tab 1   • [DISCONTINUED] Diclofenac Sodium (VOLTAREN) 1 % Gel Apply thin film to affected area q 8 hours prn pain 80 g 1     No facility-administered encounter medications on file as of 10/8/2018.      Review of Systems   Constitutional: Negative for malaise/fatigue and weight loss.   Eyes: Negative.    Respiratory: Negative for cough and shortness of breath.    Cardiovascular: Negative for chest pain, palpitations and leg swelling.   Gastrointestinal: Negative.    Genitourinary: Negative.    Musculoskeletal: Negative.  Negative for falls.   Neurological: Negative for dizziness and loss of consciousness.   Endo/Heme/Allergies: Negative.    Psychiatric/Behavioral: Negative for depression and memory loss. The patient is not nervous/anxious and does not have insomnia.    All other systems reviewed and are  negative.       Objective:   /75 (BP Location: Left arm, Patient Position: Sitting)   Pulse 74   Ht 1.524 m (5')   Wt 55.8 kg (123 lb)   LMP 07/06/2011   SpO2 94%   BMI 24.02 kg/m²     Physical Exam   Constitutional: She is oriented to person, place, and time. She appears well-developed and well-nourished.   Patient examined again today changes noted   HENT:   Head: Normocephalic and atraumatic.   Eyes: Pupils are equal, round, and reactive to light. EOM are normal. No scleral icterus.   Neck: Neck supple. No JVD present. No thyromegaly present.   Cardiovascular: Regular rhythm and intact distal pulses.  Exam reveals no friction rub.    No murmur heard.  Well-healed sternal scar   Pulmonary/Chest: Breath sounds normal. She exhibits no tenderness.   Abdominal: Soft. Bowel sounds are normal. She exhibits no distension.   Musculoskeletal: She exhibits no edema or tenderness.   Lymphadenopathy:     She has no cervical adenopathy.   Neurological: She is alert and oriented to person, place, and time. She exhibits normal muscle tone. Coordination normal.   Skin: Skin is warm and dry. No rash noted.   Psychiatric: She has a normal mood and affect. Her behavior is normal.       Assessment:     1. S/P CABG x 3     2. LBBB (left bundle branch block)     3. Essential hypertension     4. Dyslipidemia         Medical Decision Making:  Today's Assessment / Status / Plan:     Coronary artery disease,2016 open-heart surgery.    Quiescent.   she she is on statin and aspirin.   Recent lab work again reviewed by me, discussed with her normal and reassuring. Echo 2016 reviewed images with her, as well as carotids. Both low risk and normal repeat 2019    Anxiety and situational stress. Resolved,  has memory loss  Talked at length about the impact on her blood pressure    Lipids as above  Hypertension, high today  Talked at length about changing medications if above goal.  She will work on this and let us know, talked  about pathophysiology of hypertension and its effect on the heart.  Changes discussed    RTC 6 months, discuss labs and exercise. Not interested in cardiac rehab discussed again    Physical Exam   Constitutional: She is oriented to person, place, and time. She appears well-developed and well-nourished.   Patient examined again today changes noted   HENT:   Head: Normocephalic and atraumatic.   Eyes: Pupils are equal, round, and reactive to light. EOM are normal. No scleral icterus.   Neck: Neck supple. No JVD present. No thyromegaly present.   Cardiovascular: Regular rhythm and intact distal pulses.  Exam reveals no friction rub.    No murmur heard.  Well-healed sternal scar   Pulmonary/Chest: Breath sounds normal. She exhibits no tenderness.   Abdominal: Soft. Bowel sounds are normal. She exhibits no distension.   Musculoskeletal: She exhibits no edema or tenderness.   Lymphadenopathy:     She has no cervical adenopathy.   Neurological: She is alert and oriented to person, place, and time. She exhibits normal muscle tone. Coordination normal.   Skin: Skin is warm and dry. No rash noted.   Psychiatric: She has a normal mood and affect. Her behavior is normal.

## 2018-10-16 NOTE — TELEPHONE ENCOUNTER
ANNUAL WELLNESS VISIT PRE-VISIT PLANNING WITHOUT OUTREACH    1.  Reviewed note from last office visit with PCP: YES    2.  If any orders were placed at last visit, do we have Results/Consult Notes?        •  Labs - Labs ordered, completed on 10/01/18 and results are in chart.  Note: If patient appointment is for lab review and patient did not complete labs, check with provider if OK to reschedule patient until labs completed.       •  Imaging - Imaging was not ordered at last office visit.       •  Referrals - No referrals were ordered at last office visit.    3.  Immunizations were updated in [facility] using WebIZ?: Yes       •  WebIZ Recommendations: PNEUMOVAX (PPSV23), TDAP and SHINGRIX (Shingles)        •  Is patient due for Tdap? YES. Patient was notified of copay/out of pocket cost.       •  Is patient due for Shingles? YES. Patient was notified of copay/out of pocket cost.     4.  Patient is due for the following Health Maintenance Topics:   Health Maintenance Due   Topic Date Due   • IMM DTaP/Tdap/Td Vaccine (1 - Tdap) 09/27/2011   • IMM ZOSTER VACCINES (2 of 3) 07/30/2012   • IMM PNEUMOCOCCAL 65+ (ADULT) LOW/MEDIUM RISK SERIES (2 of 2 - PPSV23) 05/08/2016   • Annual Wellness Visit  03/31/2018   • COLONOSCOPY  05/27/2018       5.  Reviewed/Updated the following with patient:       •   Preferred Pharmacy? YES       •   Preferred Lab? YES       •   Preferred Communication? YES       •   Allergies? YES       •   Medications? YES. Was Abstract Encounter opened and chart updated? YES       •   Social History? YES. Was Abstract Encounter opened and chart updated? YES       •   Family History (document living status of immediate family members and if + hx of  cancer, diabetes, hypertension, hyperlipidemia, heart attack, stroke) YES. Was Abstract Encounter opened and chart updated? YES    6.  Care Team Updated:       •   DME Company (gait device, O2, CPAP, etc.): NO       •   Other Specialists (eye doctor, derm, GYN,  cardiology, endo, etc): YES    7.  Patient has the following Care Path diagnoses on Problem List:  NONE    8.  MDX printed and highlighted for Provider? NO    9.  Patient was advised: “This is a free wellness visit. The provider will screen for medical conditions to help you stay healthy. If you have other concerns to address you may be asked to discuss these at a separate visit or there may be an additional fee.”     10.  Patient was informed to arrive 15 min prior to their scheduled appointment and bring in their medication bottles.

## 2018-10-23 PROBLEM — M54.50 LOW BACK PAIN: Status: RESOLVED | Noted: 2018-04-20 | Resolved: 2018-01-01

## 2018-10-23 PROBLEM — I10 ESSENTIAL HYPERTENSION: Status: ACTIVE | Noted: 2017-03-26

## 2018-10-23 PROBLEM — I10 HTN (HYPERTENSION): Status: RESOLVED | Noted: 2017-09-24 | Resolved: 2018-01-01

## 2018-10-23 NOTE — PATIENT INSTRUCTIONS
Food Basics for Chronic Kidney Disease  When your kidneys are not working well, they cannot remove waste and excess substances from your blood as effectively as they did before. This can lead to a buildup and imbalance of these substances, which can affect how your body functions. This buildup can also make your kidneys work harder, causing even more damage.  You may need to eat less of certain foods that can lead to the buildup of these substances in your body. By making the changes to your diet that are recommended by your dietitian or health care provider, you could possibly help prevent further kidney damage and delay or prevent the need for dialysis.  The following information can help give you a basic understanding of these substances and how they affect your bodily functions. The information also gives examples of foods that contain the highest amounts of these substances.  WHAT DO I NEED TO KNOW ABOUT SUBSTANCES IN MY FOOD THAT I MAY NEED TO ADJUST?  Food adjustments will be different for each person with chronic kidney disease. It is important that you see a dietitian who can help you determine the specific adjustments that you will need to make for each of the following substances:  Potassium   Potassium affects how steadily your heart beats. If too much potassium builds up in your blood, it can cause an irregular heartbeat or even a heart attack.  Examples of foods rich in potassium include:  · Milk.  · Fruits.  · Vegetables.  Phosphorus   Phosphorus is a mineral found in your bones. A balance between calcium and phosphorous is needed to build and maintain healthy bones. Too much phosphorus pulls calcium from your bones. This can make your bones weak and more likely to break. Too much phosphorus can also make your skin itch.  Examples of foods rich in phosphorus include:  · Milk and cheese.  · Dried beans.  · Peas.  · Jacquie.  · Nuts and peanut butter.  Animal Protein   Animal protein helps you make and  keep muscle. It also helps in the repair of your body's cells and tissues. One of the natural breakdown products of protein is a waste product called urea. When your kidneys are not working properly, they cannot remove wastes such as urea like they did before you developed chronic kidney disease. You will likely need to limit the amount of protein you eat to help prevent a buildup of urea in your blood.  Examples of animal protein include:  · Meat (all types).  · Fish and seafood.  · Poultry.  · Eggs.  Sodium   Sodium, which is found in salt, helps maintain a healthy balance of fluids in your body. Too much sodium can increase your blood pressure level and have a negative affect on the function of your heart and lungs. Too much sodium also can cause your body to retain too much fluid, making your kidneys work harder. Examples of foods with high levels of sodium include:  · Salt seasonings.  · Soy sauce.  · Cured and processed meats.  · Salted crackers and snack foods.  · Fast food.  · Canned soups and most canned foods.  Glucose   Glucose provides energy for your body. If you have diabetes mellitus that is not properly controlled, you have too much glucose in your blood. Too much glucose in your blood can worsen the function of your kidneys by damaging small blood vessels. This prevents enough blood flow to your kidneys to give them what they need to work. If you have diabetes mellitus and chronic kidney disease, it is important to maintain your blood glucose at a level recommended by your health care provider.  SHOULD I TAKE A VITAMIN AND MINERAL SUPPLEMENT?  Because you may need to avoid eating certain foods, you may not get all of the vitamins and minerals that would normally come from those foods. Your health care provider or dietitian may recommend that you take a supplement to ensure that you get all of the vitamins and minerals that your body needs.   This information is not intended to replace advice given  to you by your health care provider. Make sure you discuss any questions you have with your health care provider.  Document Released: 03/09/2004 Document Revised: 01/08/2016 Document Reviewed: 11/14/2014  [employer] Interactive Patient Education © 2017 [employer]  Chronic Kidney Disease, Adult  Chronic kidney disease (CKD) occurs when the kidneys are damaged during a period of 3 or more months. The kidneys are two organs that do many important jobs in the body, which include:  · Removing wastes and extra fluids from the blood.  · Making hormones that maintain the amount of fluid in your tissues and blood vessels.  · Maintaining the right amount of fluids and chemicals in the body.  A small amount of kidney damage may not cause problems, but a large amount of damage may make it difficult or impossible for the kidneys to work the way they should. If steps are not taken to slow down the kidney damage or stop it from getting worse, the kidneys may stop working permanently (end stage kidney disease). Most of the time, CKD does not go away, but it can often be controlled. People who have CKD can usually live normal lives.  What are the causes?  The most common causes of this condition are diabetes and high blood pressure (hypertension). Other causes include:  · Heart and blood vessel (cardiovascular) disease.  · Kidney diseases:  ¨ Glomerulonephritis.  ¨ Interstitial nephritis.  ¨ Polycystic kidney disease.  ¨ Renal vascular disease.  · Diseases that affect the immune system.  · Genetic diseases.  · Medicines that damage the kidneys, such as anti-inflammatory medicines.  · Poisoning.  · Being around or in contact with poisonous (toxic) substances.  · A kidney or urinary infection that occurs again (recurs).  · Vasculitis.  · Repeat kidney infections.  · A problem with urine flow that may be caused by:  ¨ Cancer.  ¨ Having kidney stones more than one time.  ¨ An enlarged prostate in males.  What increases the risk?  This  condition is more likely to develop in people who are:  · Older than age 60.  · Female.  · Of -American descent.  · Current smokers or former smokers.  · Obese.  You may also have an increased risk for CKD if you have a family history of CKD or you frequently take medicines that are damaging to the kidneys.  What are the signs or symptoms?  Symptoms develop slowly and may not be obvious until the kidney damage becomes severe. It is possible to have a kidney disease for years without showing any symptoms. Symptoms of this condition can include:  · Swelling (edema) of the face, legs, ankles, or feet.  · Numbness, tingling, or loss of feeling (sensation) in the hands or feet.  · Tiredness (lethargy).  · Nausea or vomiting.  · Confusion or trouble concentrating.  · Problems with urination, such as:  ¨ Painful or burning feeling during urination.  ¨ Decreased urine production.  ¨ Frequent urination, especially at night.  ¨ Bloody urine.  · Muscle twitches and cramps, especially in the legs.  · Shortness of breath.  · Weakness.  · Constant itchiness.  · Loss of appetite.  · Metallic taste in the mouth.  · Trouble sleeping.  · Pale lining of the eyelids and surface of the eye (conjunctiva).  How is this diagnosed?  This condition may be diagnosed with various tests. Tests may include:  · Blood tests.  · Urine tests.  · Imaging tests.  · A test in which a sample of tissue is removed from the kidneys to be looked at under a microscope (kidney biopsy).  These test results will help your health care provider determine what class of CKD you have.  How is this treated?  Most cases of CKD cannot be cured. Treatment usually involves relieving symptoms and preventing or slowing the progression of the disease. Treatment may include:  · A special diet, which may require you to avoid alcohol, salty foods (sodium), and foods that are high in potassium, calcium, and protein.  · Medicines:  ¨ To lower blood pressure.  ¨ To relieve  low blood count (anemia).  ¨ To relieve swelling.  ¨ To protect your bones.  ¨ To improve the balance of electrolytes in your blood.  · Removing toxic waste from the body by using hemodialysis or peritoneal dialysis if the kidneys can no longer do their job (kidney failure).  · Management of other conditions that are causing your CKD or making it worse.  Follow these instructions at home:  · Follow your prescribed diet.  · Take over-the-counter and prescription medicines only as told by your health care provider.  ¨ Do not take any new medicines unless approved by your health care provider. Many medicines can worsen your kidney damage.  ¨ Do not take any vitamin and mineral supplements unless approved by your health care provider. Many nutritional supplements can worsen your kidney damage.  ¨ The dose of some medicines that you take may need to be adjusted.  · Do not use any tobacco products, such as cigarettes, chewing tobacco, and e-cigarettes. If you need help quitting, ask your health care provider.  · Keep all follow-up visits as told by your health care provider. This is important.  · Keep track of your blood pressure. Report changes in your blood pressure as told by your health care provider.  · Achieve and maintain a healthy weight. If you need help with this, ask your health care provider.  · Start or continue an exercise plan. Try to exercise at least 30 minutes a day, 5 days a week.  · Stay current with immunizations as told by your health care provider.  Where to find more information:  · American Association of Kidney Patients: www.aakp.org  · National Kidney Foundation: www.kidney.org  · American Kidney Fund: www.akfinc.org  · [employer] [facility]: www.lifeoptions.org and www.kidneyschool.org  Contact a health care provider if:  · Your symptoms get worse.  · You develop new symptoms.  Get help right away if:  · You develop symptoms of end-stage kidney disease, which  include:  ¨ Headaches.  ¨ Abnormally dark or light skin.  ¨ Numbness in the hands or feet.  ¨ Easy bruising.  ¨ Frequent hiccups.  ¨ Chest pain.  ¨ Shortness of breath.  ¨ End of menstruation in women.  · You have a fever.  · You have decreased urine production.  · You have pain or bleeding when you urinate.  This information is not intended to replace advice given to you by your health care provider. Make sure you discuss any questions you have with your health care provider.  Document Released: 09/26/2009 Document Revised: 05/25/2017 Document Reviewed: 08/16/2013  [employer] Interactive Patient Education © 2017 [employer]

## 2018-10-23 NOTE — PROGRESS NOTES
Chief Complaint   Patient presents with   • Annual Wellness Visit         HPI:  Susie is a 83 y.o. here for Medicare Annual Wellness Visit        Patient Active Problem List    Diagnosis Date Noted   • Thrombocytopenia (HCC) 11/07/2016     Priority: Low   • DDD (degenerative disc disease), lumbar 08/16/2018   • Hip arthritis 03/23/2018   • Osteopenia 12/27/2017   • Subclinical hypothyroidism 09/29/2017   • Pulmonary nodule 05/16/2017   • Essential hypertension 03/26/2017   • Renal dysfunction 11/05/2015   • Primary insomnia 06/04/2012   • LBBB (left bundle branch block) 07/27/2011   • Dyslipidemia 07/26/2011   • S/P CABG x 3 07/06/2011       Current Outpatient Prescriptions   Medication Sig Dispense Refill   • Naproxen Sodium (ALEVE) 220 MG Cap Take  by mouth. 2 tablet am     • allopurinol (ZYLOPRIM) 100 MG Tab Take 2 Tabs by mouth every day. 180 Tab 2   • metoprolol (LOPRESSOR) 50 MG Tab TAKE ONE-HALF TABLET BY MOUTH EVERY MORNING AND TAKE ONE TABLET BY MOUTH AT BEDTIME 135 Tab 3   • rosuvastatin (CRESTOR) 40 MG tablet TAKE ONE TABLET BY MOUTH EVERY DAY 90 Tab 3   • aspirin EC (ECOTRIN) 81 MG TBEC Take 81 mg by mouth every evening.       No current facility-administered medications for this visit.         Patient is taking medications as noted in medication list.  Current supplements as per medication list.     Allergies: Lipitor [atorvastatin calcium]    Current social contact/activities:      Is patient current with immunizations? No, due for PNEUMOVAX (PPSV23), TDAP and SHINGRIX (Shingles). Patient is interested in receiving NONE today.    She  reports that she quit smoking about 30 years ago. Her smoking use included Cigarettes. She has a 16.00 pack-year smoking history. She has never used smokeless tobacco. She reports that she drinks about 4.2 oz of alcohol per week . She reports that she does not use drugs.  Counseling given: Not Answered        DPA/Advanced directive: Patient has Advanced Directive and  Durable Power of  on file.     ROS:    Gait: Uses no assistive device   Ostomy: No   Other tubes: No   Amputations: No   Chronic oxygen use No   Last eye exam About 2 years   Wears hearing aids: No   : Reports urinary leakage during the last 6 months that has not interfered at all with their daily activities or sleep.      Depression Screening    Little interest or pleasure in doing things?  0 - not at all  Feeling down, depressed, or hopeless? 0 - not at all  Trouble falling or staying asleep, or sleeping too much?     Feeling tired or having little energy?     Poor appetite or overeating?     Feeling bad about yourself - or that you are a failure or have let yourself or your family down?    Trouble concentrating on things, such as reading the newspaper or watching television?    Moving or speaking so slowly that other people could have noticed.  Or the opposite - being so fidgety or restless that you have been moving around a lot more than usual?     Thoughts that you would be better off dead, or of hurting yourself?     Patient Health Questionnaire Score:        If depressive symptoms identified deferred to follow up visit unless specifically addressed in assessment and plan.    Interpretation of PHQ-9 Total Score   Score Severity   1-4 No Depression   5-9 Mild Depression   10-14 Moderate Depression   15-19 Moderately Severe Depression   20-27 Severe Depression      Screening for Cognitive Impairment    Three Minute Recall (leader, season, table)  1/3    Draw clock face with all 12 numbers and set the hands to show 10 past 11.  Yes 4/5  If cognitive concerns identified, deferred for follow up unless specifically addressed in assessment and plan.    Fall Risk Assessment    Has the patient had two or more falls in the last year or any fall with injury in the last year?  No  If fall risk identified, deferred for follow up unless specifically addressed in assessment and plan.      Safety Assessment    Throw  rugs on floor.  Yes  Handrails on all stairs.  Yes  Good lighting in all hallways.  Yes  Difficulty hearing.  No  Patient counseled about all safety risks that were identified.    Functional Assessment ADLs    Are there any barriers preventing you from cooking for yourself or meeting nutritional needs?  No.    Are there any barriers preventing you from driving safely or obtaining transportation?  No.    Are there any barriers preventing you from using a telephone or calling for help?  No.    Are there any barriers preventing you from shopping?  No.    Are there any barriers preventing you from taking care of your own finances?  No.    Are there any barriers preventing you from managing your medications?    No.    Are there any barriers preventing you from showering, bathing or dressing yourself?  No.    Are you currently engaging in any exercise or physical activity?  Yes.  Pt reports doing stretching and ivon chi  What is your perception of your health?  Excellent.    Health Maintenance Summary                COLON CANCER SCREENING ANNUAL FIT Overdue 4/16/1987     IMM ZOSTER VACCINES Overdue 7/30/2012      Done 6/4/2012 Imm Admin: Zoster Vaccine Live (ZVL) (Zostavax)    IMM DTaP/Tdap/Td Vaccine Postponed 10/23/2019 Originally 9/27/2011. Patient Refused     Done 9/26/2011 Imm Admin: TD Vaccine    Annual Wellness Visit Next Due 10/24/2019      Done 10/23/2018 Visit Dx: Medicare annual wellness visit, subsequent     Patient has more history with this topic...    BONE DENSITY Next Due 12/27/2022      Done 12/27/2017 DS-BONE DENSITY STUDY (DEXA)     Patient has more history with this topic...          Patient Care Team:  Danielle Jon M.D. as PCP - General (Family Medicine)  BERRY Grider as Mid Level Provider (Cardiology)  Carla Goode M.D. as Consulting Physician (Cardiology)  [facility] as Consulting Physician (Optometry)  Ted Aleman M.D. (Phys Med and Rehab)      Social History    Substance Use Topics   • Smoking status: Former Smoker     Packs/day: 0.50     Years: 32.00     Types: Cigarettes     Quit date: 1988   • Smokeless tobacco: Never Used      Comment: quit , approx 30pyh   • Alcohol use 4.2 oz/week     7 Glasses of wine per week     Family History   Problem Relation Age of Onset   • Cancer Mother         uterine   • Arthritis Mother         gout   • Asthma Mother    • Heart Disease Father         MI age 50s   • Heart Disease Brother          40 MI   • Hyperlipidemia Brother    • Hyperlipidemia Sister    • Arthritis Sister      She  has a past medical history of Arthritis; Back pain; Breath shortness; CAD (coronary artery disease); Cataract; Gout; Heart attack (HCC); Heart burn; High cholesterol; Hypertension; Indigestion; MEDICAL HOME; and Stroke (HCC).   Past Surgical History:   Procedure Laterality Date   • MULTIPLE CORONARY ARTERY BYPASS ENDO VEIN HARVEST  2016    Procedure: MULTIPLE CORONARY ARTERY BYPASS ENDO VEIN HARVEST;  Surgeon: Jeff Naranjo M.D.;  Location: SURGERY [facility];  Service:    • ABIODUN  2016    Procedure: ABIODUN;  Surgeon: Jeff Naranjo M.D.;  Location: SURGERY [facility];  Service:    • CATARACT PHACO WITH IOL  2009    Performed by SOLOMON THOMAS at SURGERY SAME DAY [facility]   • CATARACT PHACO WITH IOL  2009    Performed by SOLOMON THOMAS at SURGERY SAME DAY [facility]   • GYN SURGERY      Hysterectomy during last C Section    • ABDOMINAL HYSTERECTOMY TOTAL     • ANGIOPLASTY  85, 97   • CARDIAC CATH     • GYN SURGERY      C Section x4   • OTHER      c-sections x4   • PRIMARY C SECTION           Exam:     Blood pressure 138/74, pulse 70, temperature 36.4 °C (97.6 °F), temperature source Temporal, height 1.524 m (5'), weight 56.7 kg (125 lb), last menstrual period 2011, SpO2 98 %, not currently breastfeeding. Body mass index is 24.41 kg/m².    Hearing good.    Dentition good  Alert, oriented in no  acute distress.  Eye contact is good, speech goal directed, affect calm  Constitutional: Alert, no distress.  Skin: Warm, dry, good turgor, no rashes in visible areas.  Eye: Equal, round and reactive, conjunctiva clear, lids normal.  ENMT: Lips without lesions, good dentition, oropharynx clear.  Neck: Trachea midline, no masses, no thyromegaly. No cervical or supraclavicular lymphadenopathy  Respiratory: Unlabored respiratory effort, lungs clear to auscultation, no wheezes, no ronchi.  Cardiovascular: Normal S1, S2, RRR, no murmur, no edema.  Abdomen: Soft, non-tender, no masses, no hepatosplenomegaly.  Psych: Alert and oriented x3, normal affect and mood.        Assessment and Plan. The following treatment and monitoring plan is recommended:    1. Medicare annual wellness visit, subsequent  Routine anticipatory guidance.  No special services needed at this time    2. Essential hypertension  This is a chronic medical condition that is currently stable  Continue metoprolol 50 mg daily    3. Dyslipidemia  This is a chronic medical condition that is currently stable  Continue Crestor 40 mg daily    4. DDD (degenerative disc disease), lumbar  This is a chronic medical condition that is currently stable  Continue strengthening exercises    5. LBBB (left bundle branch block)  Follow-up with cardiology as scheduled    6. Osteopenia of multiple sites  This is a chronic medical condition that is currently stable  Continue weightbearing exercise    7. S/P CABG x 3  Up with cardiology as scheduled    8. Subclinical hypothyroidism  This is a chronic medical condition that is currently stable  Continue current medications    9. Pulmonary nodule  Continue to monitor    10. Primary insomnia  This is a chronic medical condition that is currently stable      11. Hip arthritis  This is a chronic medical condition that is currently stable  Follow-up orthopedics as needed    12. Screening for colon cancer    - OCCULT BLOOD FECES  IMMUNOASSAY; Future    13. Renal dysfunction  Recheck labs.  Avoid high-dose NSAIDs  - CBC WITH DIFFERENTIAL; Future  - RENAL FUNCTION PANEL; Future    14. Thrombocytopenia (HCC)  Recheck CBC  - CBC WITH DIFFERENTIAL; Future          Services suggested: No services needed at this time  Health Care Screening recommendations as per orders if indicated.  Referrals offered: PT/OT/Nutrition counseling/Behavioral Health/Smoking cessation as per orders if indicated.    Discussion today about general wellness and lifestyle habits:    · Prevent falls and reduce trip hazards; Cautioned about securing or removing rugs.  · Have a working fire alarm and carbon monoxide detector;   · Engage in regular physical activity and social activities       Follow-up: Return if symptoms worsen or fail to improve.

## 2018-11-26 PROBLEM — M10.9 GOUT: Status: ACTIVE | Noted: 2018-01-01

## 2018-11-26 NOTE — PROGRESS NOTES
New Patient to Establish    Reason to establish: New patient to establish    CC: to establish    HPI: 83 year old lady is here to get established. She has PMH of CAD, CRI, Insomnia is here to get established  CAD under care of cardiology. S/p CABG, On aspirin, metoprolol and Crestor.  Insomnia, takes Ambien 2.5 mg every other day.  Dyslipidemia, she is on Crestor, last LDL on 10/1/18 was 69.        Patient Active Problem List    Diagnosis Date Noted   • Thrombocytopenia (HCC) 11/07/2016     Priority: Low   • Gout 11/26/2018   • DDD (degenerative disc disease), lumbar 08/16/2018   • Hip arthritis 03/23/2018   • Osteopenia 12/27/2017   • Subclinical hypothyroidism 09/29/2017   • Pulmonary nodule 05/16/2017   • Essential hypertension 03/26/2017   • Renal dysfunction 11/05/2015   • Primary insomnia 06/04/2012   • LBBB (left bundle branch block) 07/27/2011   • Dyslipidemia 07/26/2011   • S/P CABG x 3 07/06/2011       Past Medical History:   Diagnosis Date   • Arthritis    • Back pain    • Breath shortness     with exertion   • CAD (coronary artery disease)     CABG X3 in '16 with graft occlusion post-op   • Cataract     bilat IOL    • Gout    • Heart attack (HCC)    • Heart burn    • High cholesterol    • Hypertension    • Indigestion    • MEDICAL HOME    • Stroke (HCC)     TIA        Current Outpatient Prescriptions   Medication Sig Dispense Refill   • rosuvastatin (CRESTOR) 40 MG tablet Take 1 Tab by mouth every day. 90 Tab 3   • zolpidem (AMBIEN) 5 MG Tab      • cyanocobalamin (VITAMIN B-12) 100 MCG Tab Take 100 mcg by mouth every day.     • Cholecalciferol (VITAMIN D3) 1000 units Cap Take  by mouth.     • Calcium Carbonate-Vit D-Min (CALCIUM 1200 PO) Take  by mouth.     • folic acid (FOLVITE) 1 MG Tab Take 1 mg by mouth every day.     • Naproxen Sodium (ALEVE) 220 MG Cap Take  by mouth. 2 tablet am     • allopurinol (ZYLOPRIM) 100 MG Tab Take 2 Tabs by mouth every day. 180 Tab 2   • metoprolol (LOPRESSOR) 50 MG Tab  TAKE ONE-HALF TABLET BY MOUTH EVERY MORNING AND TAKE ONE TABLET BY MOUTH AT BEDTIME 135 Tab 3   • aspirin EC (ECOTRIN) 81 MG TBEC Take 81 mg by mouth every evening.       No current facility-administered medications for this visit.        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     Social History Main Topics   • Smoking status: Former Smoker     Packs/day: 0.50     Years: 32.00     Types: Cigarettes     Quit date: 1988   • Smokeless tobacco: Never Used      Comment: quit , approx 30pyh   • Alcohol use 4.2 oz/week     7 Glasses of wine per week   • Drug use: No   • Sexual activity: No     Other Topics Concern   • Not on file     Social History Narrative   • No narrative on file       Family History   Problem Relation Age of Onset   • Cancer Mother         uterine   • Arthritis Mother         gout   • Asthma Mother    • Heart Disease Father         MI age 50s   • Heart Disease Brother          40 MI   • Hyperlipidemia Brother    • Hyperlipidemia Sister    • Arthritis Sister        Past Surgical History:   Procedure Laterality Date   • MULTIPLE CORONARY ARTERY BYPASS ENDO VEIN HARVEST  2016    Procedure: MULTIPLE CORONARY ARTERY BYPASS ENDO VEIN HARVEST;  Surgeon: Jeff Naranjo M.D.;  Location: SURGERY [facility];  Service:    • ABIODUN  2016    Procedure: ABIODUN;  Surgeon: Jeff Naranjo M.D.;  Location: SURGERY [facility];  Service:    • CATARACT PHACO WITH IOL  2009    Performed by SOLOMON THOMAS at SURGERY SAME DAY [facility]   • CATARACT PHACO WITH IOL  2009    Performed by SOLOMON THOMAS at SURGERY SAME DAY [facility]   • GYN SURGERY      Hysterectomy during last C  Section    • ABDOMINAL HYSTERECTOMY TOTAL     • ANGIOPLASTY  85, 97   • CARDIAC CATH     • GYN SURGERY      C Section x4   • OTHER      c-sections x4   • PRIMARY C SECTION         ROS: As per HPI. Additional pertinent symptoms as noted below.       Constitutional: no fever/chills, no weight gain or loss  Eyes: no blurred vision, no floaters  ENT: no sore throat, no ear pain  Cardiovascular: no CP, no palpitaiton  Respiratory: no SOB, no cough  GI: no abdominal pain, no diarrhea  : no dysuria, no frequency  Musculo-skeletal: Has low back pain  no numbness, no weakness, no B/B incontinence. no myalgia,     /109 (BP Location: Right arm, Patient Position: Sitting, BP Cuff Size: Adult)   Pulse 65   Temp 37.1 °C (98.7 °F) (Temporal)   Resp 16   Ht 1.524 m (5')   Wt 57.2 kg (126 lb)   LMP 07/06/2011   SpO2 95%   Breastfeeding? No   BMI 24.61 kg/m²     Physical Exam  General:  Alert and oriented, No apparent distress.    Eyes: Pupils equal and reactive. No scleral icterus.    Throat: Clear no erythema or exudates noted.    Neck: Supple. No lymphadenopathy noted. Thyroid not enlarged.    Lungs: Clear to auscultation and percussion bilaterally.    Cardiovascular: Regular rate and rhythm. No murmurs, rubs or gallops.    Abdomen:  Benign. No rebound or guarding noted.    Extremities: No clubbing, cyanosis, edema.    Skin: Clear. No rash or suspicious skin lesions noted.        Note: I have reviewed all pertinent labs and diagnostic tests associated with this visit with specific comments listed under the assessment and plan below  Results for WILVER PRETTY (MRN [id number]) as of 11/26/2018 16:50   Ref. Range 10/1/2018 09:01   WBC Latest Ref Range: 4.8 - 10.8 K/uL 4.8   RBC Latest Ref Range: 4.20 - 5.40 M/uL 4.38   Hemoglobin Latest Ref Range: 12.0 - 16.0 g/dL 13.9   Hematocrit Latest Ref Range: 37.0 - 47.0 % 42.2   MCV Latest Ref Range: 81.4 - 97.8 fL 96.3   MCH Latest Ref Range: 27.0 - 33.0 pg 31.7    MCHC Latest Ref Range: 33.6 - 35.0 g/dL 32.9 (L)   RDW Latest Ref Range: 35.9 - 50.0 fL 48.9   Platelet Count Latest Ref Range: 164 - 446 K/uL 155 (L)   MPV Latest Ref Range: 9.0 - 12.9 fL 10.7   Neutrophils-Polys Latest Ref Range: 44.00 - 72.00 % 55.10   Neutrophils (Absolute) Latest Ref Range: 2.00 - 7.15 K/uL 2.63   Lymphocytes Latest Ref Range: 22.00 - 41.00 % 27.90   Lymphs (Absolute) Latest Ref Range: 1.00 - 4.80 K/uL 1.33   Monocytes Latest Ref Range: 0.00 - 13.40 % 10.90   Monos (Absolute) Latest Ref Range: 0.00 - 0.85 K/uL 0.52   Eosinophils Latest Ref Range: 0.00 - 6.90 % 5.50   Eos (Absolute) Latest Ref Range: 0.00 - 0.51 K/uL 0.26   Basophils Latest Ref Range: 0.00 - 1.80 % 0.40   Baso (Absolute) Latest Ref Range: 0.00 - 0.12 K/uL 0.02   Immature Granulocytes Latest Ref Range: 0.00 - 0.90 % 0.20   Immature Granulocytes (abs) Latest Ref Range: 0.00 - 0.11 K/uL 0.01   Nucleated RBC Latest Units: /100 WBC 0.00   NRBC (Absolute) Latest Units: K/uL 0.00   Sodium Latest Ref Range: 135 - 145 mmol/L 141   Potassium Latest Ref Range: 3.6 - 5.5 mmol/L 4.3   Chloride Latest Ref Range: 96 - 112 mmol/L 106   Co2 Latest Ref Range: 20 - 33 mmol/L 26   Anion Gap Latest Ref Range: 0.0 - 11.9  9.0   Glucose Latest Ref Range: 65 - 99 mg/dL 100 (H)   Bun Latest Ref Range: 8 - 22 mg/dL 21   Creatinine Latest Ref Range: 0.50 - 1.40 mg/dL 1.09   GFR If  Latest Ref Range: >60 mL/min/1.73 m 2 58 (A)   GFR If Non  Latest Ref Range: >60 mL/min/1.73 m 2 48 (A)   Calcium Latest Ref Range: 8.5 - 10.5 mg/dL 9.9   AST(SGOT) Latest Ref Range: 12 - 45 U/L 31   ALT(SGPT) Latest Ref Range: 2 - 50 U/L 22   Alkaline Phosphatase Latest Ref Range: 30 - 99 U/L 203 (H)   Total Bilirubin Latest Ref Range: 0.1 - 1.5 mg/dL 0.6   Albumin Latest Ref Range: 3.2 - 4.9 g/dL 4.3   Total Protein Latest Ref Range: 6.0 - 8.2 g/dL 7.4   Globulin Latest Ref Range: 1.9 - 3.5 g/dL 3.1   A-G Ratio Latest Units: g/dL 1.4   Uric  "Acid Latest Ref Range: 1.9 - 8.2 mg/dL 4.0   Fasting Status Unknown Fasting   Cholesterol,Tot Latest Ref Range: 100 - 199 mg/dL 147   Triglycerides Latest Ref Range: 0 - 149 mg/dL 62   HDL Latest Ref Range: >=40 mg/dL 66   LDL Latest Ref Range: <100 mg/dL 69   TSH Latest Ref Range: 0.380 - 5.330 uIU/mL 5.440 (H)   Free T-4 Latest Ref Range: 0.53 - 1.43 ng/dL 1.05     Assessment and Plan    1. S/P CABG x 3  No Cp, No SOB, follows up with cardiology, takes Metoprolol and aspirin and Crestor.     2. Primary insomnia  On Ambien 2.5 mg every other night. I discussed switching to trazodone or Mirtazapine, She would look to look them up first on line.     3. DDD (degenerative disc disease), lumbar  Follows up with pain clinic. She takes Naproxen. She is going to go to PT. I advised her to cut back on Naproxen and if possible to stop it since it can worsen her kidney function and is not good for CAD. She will see her pain specialist and discuss it with him.    4. Osteopenia of multiple sites  Last bone densitometry was normal.  \"The mean bone mineral density for the lumbar spine is 1.372 g/cm2, which corresponds to a T score of 1.4 and a Z score of 3.3.    The proximal left femur has a mean bone mineral density of 0.845 g/cm2, with a T score of -1.3 and a Z score of 0.7.    When compared to the previous study dated 08/14/2013, there has been a 1.0% decrease in the bone mineral density of the lumbar spine and a  8.1% decrease in the bone mineral density of the left femur.\"    5. Gout, unspecified cause, unspecified chronicity, unspecified site  On allopurinol. No symptoms.    6- Hypothyroidism /subclinical  TSH was 5.4 on 10/1/18. No need to be started to be on levothyroxine at this point.Will repeat in few months.    7- Pulmonary nodule   Ct on 12/1/17 showed:  \"Stable reticular nodular opacity in the anterior aspect right upper lobe with dominant nodular component again measuring approximately 1.2 cm.    Stable 7 mm round " "nodule in the right lower lobe.\"    She declines any further follow up.    8- Hx of vitamin D deficiency,  Results for WILVER PRETTY (MRN [id number]) as of 11/26/2018 16:50   Ref. Range 7/29/2014 08:12   25-Hydroxy   Vitamin D 25 Latest Ref Range: 30 - 100 ng/mL 28 (L)    she is on vitamain D3 3000 units .  25 oh Vitamain D ordered.    9- HTN,   will make a log and follow up with MA in 1 week.     Geriatrics assessment and health maintenance;  No falls. She does [employer]  Independent in all ADL and IADLs.  Depression: PHQ2 was 0 10/23/18.  MINI COG is normal , she recalled 3 /3 words.  Declines Mammogram   Had both pneumonia shots and Flu.  Declines colonoscopy. Last colonoscopy less than 10 years and according to her was normal.      Followup: 2 months    CMP, TSH, 25 OH Vitamain D ordered.    Signed by: Lesli Ocasio M.D.    "

## 2018-11-28 NOTE — PROGRESS NOTES
Susie Craig is a 83 y.o. female who is here for an acute visit.    CC: Right ear pain    HPI:    Patient is a 83-year-old female who is here for an acute visit with complaints of right ear pain that started this morning.  She said she had a similar pain years ago and went to the urgent care.  She is unsure exactly what happened at that time.  She says the pain is stabbing in nature and comes and goes and it also radiates to the right jaw.  She said last time there was a lot of wax impaction and she had that cleaned out which helped the pain.  She was also told that there was inflammation there.  She says that she has been having this constant pain since this morning, denied any fever, no chills, no recent upper respiratory infection or oral infection.  She said her ear did plugged up last week and she cleared it up by clearing her throat.  According to her , she has been slow in hearing but the patient has not noticed any changes in her hearing.  There is no discharge from that ear, no tooth pain, no sinus tenderness, no runny nose, no cough, no bleeding, no external lesions, no trauma to that area.  She does not have any other complaints and just wants to see her to be looked at.    No problem-specific Assessment & Plan notes found for this encounter.      She  has a past medical history of Arthritis; Back pain; Breath shortness; CAD (coronary artery disease); Cataract; Gout; Heart attack (HCC); Heart burn; High cholesterol; Hypertension; Indigestion; MEDICAL HOME; and Stroke (HCC).    Patient Active Problem List    Diagnosis Date Noted   • Thrombocytopenia (HCC) 11/07/2016     Priority: Low   • Gout 11/26/2018   • DDD (degenerative disc disease), lumbar 08/16/2018   • Hip arthritis 03/23/2018   • Osteopenia 12/27/2017   • Subclinical hypothyroidism 09/29/2017   • Pulmonary nodule 05/16/2017   • Essential hypertension 03/26/2017   • Renal dysfunction 11/05/2015   • Primary insomnia 06/04/2012    • LBBB (left bundle branch block) 2011   • Dyslipidemia 2011   • S/P CABG x 3 2011       Allergies:Lipitor [atorvastatin calcium]    Current Outpatient Prescriptions   Medication Sig Dispense Refill   • ofloxacin otic sol (FLOXIN OTIC) 0.3 % Solution Place 10 Drops in right ear every day for 7 days. Administer drops to both ears. 10 mL 0   • rosuvastatin (CRESTOR) 40 MG tablet Take 1 Tab by mouth every day. 90 Tab 3   • zolpidem (AMBIEN) 5 MG Tab      • cyanocobalamin (VITAMIN B-12) 100 MCG Tab Take 100 mcg by mouth every day.     • Cholecalciferol (VITAMIN D3) 1000 units Cap Take  by mouth.     • Calcium Carbonate-Vit D-Min (CALCIUM 1200 PO) Take  by mouth.     • folic acid (FOLVITE) 1 MG Tab Take 1 mg by mouth every day.     • Naproxen Sodium (ALEVE) 220 MG Cap Take  by mouth. 2 tablet am     • allopurinol (ZYLOPRIM) 100 MG Tab Take 2 Tabs by mouth every day. 180 Tab 2   • metoprolol (LOPRESSOR) 50 MG Tab TAKE ONE-HALF TABLET BY MOUTH EVERY MORNING AND TAKE ONE TABLET BY MOUTH AT BEDTIME 135 Tab 3   • aspirin EC (ECOTRIN) 81 MG TBEC Take 81 mg by mouth every evening.       No current facility-administered medications for this visit.        Social History   Substance Use Topics   • Smoking status: Former Smoker     Packs/day: 0.50     Years: 32.00     Types: Cigarettes     Quit date: 1988   • Smokeless tobacco: Never Used      Comment: quit , approx 30pyh   • Alcohol use 4.2 oz/week     7 Glasses of wine per week       Family History   Problem Relation Age of Onset   • Cancer Mother         uterine   • Arthritis Mother         gout   • Asthma Mother    • Heart Disease Father         MI age 50s   • Heart Disease Brother          40 MI   • Hyperlipidemia Brother    • Hyperlipidemia Sister    • Arthritis Sister      Review of Systems:   Pertinent positives as stated in HPI, all others reviewed as negative.    Physical Exam:  Blood pressure 154/83, pulse 68, temperature 36.8 °C (98.3  °F), temperature source Temporal, height 1.524 m (5'), weight 56.2 kg (124 lb), last menstrual period 07/06/2011, SpO2 95 %, not currently breastfeeding. Body mass index is 24.22 kg/m².    General Appearance: healthy, alert, no distress, cooperative  Skin: No rashes or lesions.  Head: Normocephalic. No masses appreciated.   Ear: Left ear with some wax, TM intact, not bulging, no lesions. Right ear with war around the walls but with a visible TM. TM appears intact, not bulging but redness and opacity appreciated. No tenderness on traction of the outer ear.   Oropharynx: Oropharynx moist and without lesion.  Neck: Neck supple. No adenopathy.   Lungs: Lungs clear to auscultation bilaterally.  Heart: RRR without murmur, gallop, or rubs.   Psychiatric: Mood appears normal.     Assessment/Plan:     1. Right ear pain  Likely a mild case of Acute Otitis Media. No pain on traction of the outer ear. Tympanic membrane with redness and opacification. Will prescribe Ofloxacin drops. Advised patient to maintain hydration. If no relief, advised for her to call the office in about 24-48 hours.   - ofloxacin otic sol (FLOXIN OTIC) 0.3 % Solution; Place 10 Drops in right ear every day for 7 days. Administer drops to both ears.  Dispense: 10 mL; Refill: 0      Followup: Return if symptoms worsen or fail to improve.    This note was created using voice recognition software. There may be unintended errors spelling, and grammar or content.

## 2018-12-04 NOTE — NON-PROVIDER
Susie Craig is a 83 y.o. female here for a non-provider visit for blood pressure check metoprolol 50mg 1/2 tab po qam and whole tab evening pt checked BP on own machine left arm sitting 143/73 pulse 62 pt's machine is more than 5 years old pt brought in BP readings       Manual BP  128/60 pulse 73 left arm sitting     There were no vitals filed for this visit.  If abnormal, was an in office provider notified today? Yes  Routed to PCP? Yes

## 2018-12-06 NOTE — OP THERAPY EVALUATION
Outpatient Physical Therapy  INITIAL EVALUATION    [facility] [place] [address]  [address]  [place] [address]  Phone:  [phone]  Fax:  [phone]    Date of Evaluation: 2018    Patient: Susie Craig  YOB: 1935  MRN: [id number]     Referring Provider: Ted Aleman M.D.  [address]  [address]  [place] [address]   Referring Diagnosis lumbar     Time Calculation  Start time: 0800  Stop time: 0900 Time Calculation (min): 60 minutes     Physical Therapy Occurrence Codes    Date physical therapy care plan established or reviewed:  18   Date physical therapy treatment started:  18          Chief Complaint: No chief complaint on file.    Visit Diagnoses     ICD-10-CM   1. Lumbar pain M54.5         Subjective:   History of Present Illness:     Mechanism of injury:  Patient presents with 1 year+ chronic  Right posterior hip and leg pain.   Multiple injections into right posterior hip/spine-not effective. Pain down right leg down to ankle.  Seen in this clinic at the beginning of the year, responded to extension principles last spring but they dont seem to be helping anymore.  Increased right LE weakness.  Difficulty getting into and out of car, squatting. Pain wakes her at night.  Difficulty walking and now has limp.  Taking Aleve, having to use a cart in [facility]  to shop.  X ray Right hip:  Mild osteoarthritis  MRI 2018  1.  Minimal to mild multilevel lumbar spondylotic change.  2.  Mild to moderate central canal stenosis at the L3-4 level with mild central canal stenosis at the L4-5 level secondary to hypertrophic degenerative changes of the facets and ligamentum flavum.  3.  Mild to moderate multilevel neural foraminal narrowing.    CABG x 3 2016, MI, CAD  Sleep disturbance:  Interrupted sleep  Pain:     Current pain ratin    At best pain ratin    At worst pain rating:  10    Location:  Right posterior hip. groin, down to shin     Quality:  Aching and deep    Pain timing:  Constant    Relieving factors:  Heating pad and position change    Aggravating factors:  Walking, rapid position changes, repositioning and squatting (sit to stand)    Progression:  Stable  Social Support:     Lives in:  One-story house    Lives with:  Spouse      Past Medical History:   Diagnosis Date   • Arthritis    • Back pain    • Breath shortness     with exertion   • CAD (coronary artery disease)     CABG X3 in '16 with graft occlusion post-op   • Cataract     bilat IOL    • Gout    • Heart attack (HCC)    • Heart burn    • High cholesterol    • Hypertension    • Indigestion    • MEDICAL HOME    • Stroke (HCC)     TIA      Past Surgical History:   Procedure Laterality Date   • MULTIPLE CORONARY ARTERY BYPASS ENDO VEIN HARVEST  11/6/2016    Procedure: MULTIPLE CORONARY ARTERY BYPASS ENDO VEIN HARVEST;  Surgeon: Jeff Naranjo M.D.;  Location: SURGERY [facility];  Service:    • ABIODUN  11/6/2016    Procedure: ABIODUN;  Surgeon: Jeff Naranjo M.D.;  Location: SURGERY [facility];  Service:    • CATARACT PHACO WITH IOL  8/31/2009    Performed by SOLOMON THOMAS at SURGERY SAME DAY [facility]   • CATARACT PHACO WITH IOL  8/20/2009    Performed by SOLOMON THOMAS at SURGERY SAME DAY [facility]   • GYN SURGERY  1953    Hysterectomy during last C Section    • ABDOMINAL HYSTERECTOMY TOTAL     • ANGIOPLASTY  85, 97   • CARDIAC CATH     • GYN SURGERY      C Section x4   • OTHER      c-sections x4   • PRIMARY C SECTION       Social History   Substance Use Topics   • Smoking status: Former Smoker     Packs/day: 0.50     Years: 32.00     Types: Cigarettes     Quit date: 8/31/1988   • Smokeless tobacco: Never Used      Comment: quit 1988, approx 30pyh   • Alcohol use 4.2 oz/week     7 Glasses of wine per week     Family and Occupational History     Social History   • Marital status:      Spouse name: N/A   • Number of children: N/A   • Years of education: N/A      Occupational History   • 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       Objective     Hip Screen   Hip strength within functional limits with the following exceptions: 3+/5 hip flexion right + pain  4/5 ER with pain, ABD 3-/5 , extension 3/5 right, 3+/5 left  asymetric squat with decrease weightshift right  Hip joint mobility within functional limits with the following exceptions: FADIR + right    Neurological Testing     Reflexes   Left   Patellar (L4): normal (2+)    Right   Patellar (L4): normal (2+)    Myotome testing   Lumbar (left)   All left lumbar myotomes within normal limits    Lumbar (right)   L2 (hip flexors): 3-  L3 (knee extensors): 4-  L4 (ankle dorsiflexors): 5  L5 (great toe extension): 5    Dermatome testing   Lumbar (left)   All left lumbar dermatomes intact    Lumbar (right)   All right lumbar dermatomes intact    Palpation     Right   Hypertonic in the lumbar paraspinals and quadratus lumborum.   Muscle spasm in the quadratus lumborum.   Tenderness of the lumbar paraspinals.     Active Range of Motion     Additional Active Range of Motion Details  FT to floor no pain  BB 25% restreicted no pain    Joint Play   Spine     Unilateral PA Glide (right)        L2: painful       L3: painful       L4: painful       L5: painful            Therapeutic Exercises (CPT 94469):     1. Prone extension, 2 x 10    Therapeutic Treatments and Modalities:     1. E Stim Unattended (CPT 20367), Right QL and lumbar paraspinals IFC with MHP x 15 min prone    Time-based treatments/modalities:  Therapeutic exercise minutes (CPT 54272): 10 minutes       Assessment, Response and Plan:   Assessment details:  Patient presents with chronic right hip/LE pain > 1 year with increased Right hip weakness in all planes and impaired quad strength right.  Patient has episodes of right knee giving way.  Patient also demonstrates a limp with gait and decreased weightshift right with squat. Symptoms are consistent with lumbar stenosis and  possible Right hip derangement with + FADIR. Symptoms are limiting her ability to drive, sit to stand, and walk.  Patient will benefit from skilled PT to meet goals stated below.    Barriers to therapy:  Comorbidities  Goals:   Short Term Goals:   Sit to stand without using UE and >50% decreased symptoms  Patient able to walk 600+ feet with >50% decreased symptoms.    Short term goal time span:  2-4 weeks      Long Term Goals:    Independent with home program  Patient able to walk 1/4 mile with normal gait.  Long term goal time span:  4-6 weeks    Plan:   Therapy options:  Physical therapy treatment to continue  Planned therapy interventions:  Mechanical Traction (CPT 88792), E Stim Unattended (CPT 63317), Manual Therapy (CPT 53196), Neuromuscular Re-education (CPT 38299) and Therapeutic Exercise (CPT 69501)  Frequency:  2x week  Duration in weeks:  8  Duration in visits:  12  Discussed with:  Patient      Functional Limitation G-Codes and Severity Modifiers  WOMAC Grand Total: 28.12   Current:     Goal:       Referring provider co-signature:  I have reviewed this plan of care and my co-signature certifies the need for services.  Certification Dates:   From 12/6/2018    To 1/31/2019    Physician Signature: ________________________________ Date: ______________

## 2018-12-10 NOTE — OP THERAPY DAILY TREATMENT
Outpatient Physical Therapy  DAILY TREATMENT     [facility] Physical [address]  [address]  [place] [address]  Phone:  [phone]  Fax:  [phone]    Date: 12/10/2018    Patient: Susie Craig  YOB: 1935  MRN: [id number]     Time Calculation  Start time: 0830  Stop time: 0920 Time Calculation (min): 50 minutes     Chief Complaint: No chief complaint on file.    Visit #: 2    SUBJECTIVE: hurting bad 9/10 VAS.  Been doing a lot of errands    OBJECTIVE:  Current objective measures:           Therapeutic Exercises (CPT 45465):     1. REIL    2. Prone lying    Therapeutic Treatments and Modalities:     1. Manual Therapy (CPT 08369), GR 1-2 rotational mob and STM right QL, standing shift correction    2. Gait Training (CPT 43296), with SPC x 5 min     3. Mechanical Traction (CPT 52377), Lumbar traction 60/40lb 60 sec on/ 20 sec off with MHP    Time-based treatments/modalities:  Manual therapy minutes (CPT 57774): 10 minutes  Gait training minutes (CPT 08450): 5 minutes  Therapeutic exercise minutes (CPT 54341): 10 minutes       Pain rating before treatment: 9  Pain rating after treatment: 9    ASSESSMENT:   Response to treatment: improved gait post traction. Continued right lateral shift    PLAN/RECOMMENDATIONS:   Plan for treatment: therapy treatment to continue next visit.  Planned interventions for next visit: continue with current treatment.

## 2018-12-13 NOTE — OP THERAPY DAILY TREATMENT
Outpatient Physical Therapy  DAILY TREATMENT     [facility] Physical [address].  [address]  [place] [address]  Phone:  [phone]  Fax:  [phone]    Date: 12/13/2018    Patient: Susie Craig  YOB: 1935  MRN: [id number]     Time Calculation  Start time: 0900  Stop time: 0950 Time Calculation (min): 50 minutes     Chief Complaint: No chief complaint on file.    Visit #: 3    SUBJECTIVE: no change, patient to have Epidural next week, just started taking Gabepentin.    OBJECTIVE:  Current objective measures: significant limp with gait          Therapeutic Exercises (CPT 75882):     1. Prone lying, 3 min    2. SEIL, 3 min    3. REIL, x 10    4. CRISTIAN, x 10    Therapeutic Treatments and Modalities:     1. Manual Therapy (CPT 20769), manual lumbar traction, manual long axis traction,     2. E Stim Unattended (CPT 89224), SEIL with MHP IF lumbar spine    3. Mechanical Traction (CPT 18722), 0    Time-based treatments/modalities:  Manual therapy minutes (CPT 90567): 15 minutes  Massage therapy minutes (CPT 82874): 0 minutes  Therapeutic exercise minutes (CPT 73352): 10 minutes       Pain rating before treatment: 9  Pain rating after treatment: 6    ASSESSMENT:   Response to treatment: poor response to treatment with no lasting change in pain level, gait or strength.  Patient is very frustrated and exhausted from symptoms.  Unable to tolerated light  manual contact to lumbar spine, tenderness right groin.    PLAN/RECOMMENDATIONS:   Plan for treatment: therapy treatment to continue next visit.  Planned interventions for next visit: E-stim unattended (CPT 38956), manual therapy (CPT 62981), mechanical traction (CPT 08315), neuromuscular re-education (CPT 54896) and therapeutic exercise (CPT 47648).

## 2018-12-17 NOTE — ED NOTES
Pt reports pain has improved with movement. She does not have much pain when she is not moving. Denies nausea. States she feels ready to be discharged.

## 2018-12-17 NOTE — ED TRIAGE NOTES
"Susie Craig  83 y.o. female  Chief Complaint   Patient presents with   • Leg Pain     Pt. states a shooting pain to her right leg secondary to sciatica. Pt. states she sees a pain management doctor for pain control for this. Pt. states that today she is having increasing difficulty walking due to the pain starting last night. Pt. has also been told this may be a right hip deformity.     /63   Pulse 73   Temp 36.7 °C (98 °F) (Temporal)   Resp 20   Ht 1.549 m (5' 1\")   Wt 54.4 kg (120 lb)   LMP 07/06/2011   SpO2 98%   BMI 22.67 kg/m²     Pt amb to triage via wheelchair.   Pt is alert and oriented, speaking in full sentences, follows commands and responds appropriately to questions. NAD. Resp are even and unlabored.  Pt placed in lobby. Pt educated on triage process. Pt encouraged to alert staff for any changes.  "

## 2018-12-17 NOTE — ED PROVIDER NOTES
ED Provider Note    Scribed for Oli Alvarado M.D. by Harshad Ba. 12/17/2018  7:23 AM    Primary care provider: Lesli Ocasio M.D.  Means of arrival: walk in  History obtained from: patient  History limited by: none    CHIEF COMPLAINT  Chief Complaint   Patient presents with   • Leg Pain     Pt. states a shooting pain to her right leg secondary to sciatica. Pt. states she sees a pain management doctor for pain control for this. Pt. states that today she is having increasing difficulty walking due to the pain starting last night. Pt. has also been told this may be a right hip deformity.       HPI  Susie Craig is a 83 y.o. female with a history of sciatica who presents to the Emergency Department complaining of suddenly worsening right leg pain starting last night. She describes her pain as shooting that starts in her low back and radiates down her right leg and into her groin. Patient reports associated focal weakness in her right leg. She reports a history of sciatica, arthritis and a possible hip deformity with chronic pain. Patient is currently being treated by Dr. Aleman  (pain management), with minimal relief to her chronic pain. She reports that her pain suddenly worsened last night, prompting her to come to the ED for evaluation. Patient has been referred to an orthopedist, but she has not followed up. She has also been evaluated with MRI in the past. Patient denies incontinence, saddle anesthesia.     REVIEW OF SYSTEMS  Pertinent positives include leg pain, focal weakness.   Pertinent negatives include no incontinence, saddle anesthesia.    All other systems reviewed and negative. See HPI for further details.     PAST MEDICAL HISTORY   has a past medical history of Arthritis; Back pain; Breath shortness; CAD (coronary artery disease); Cataract; Gout; Heart attack (HCC); Heart burn; High cholesterol; Hypertension; Indigestion; MEDICAL HOME; and Stroke (HCC).    SURGICAL HISTORY   has a  "past surgical history that includes angioplasty (85, 97); other; cataract phaco with iol (2009); cataract phaco with iol (2009); gyn surgery; gyn surgery (); multiple coronary artery bypass endo vein harvest (2016); pollo (2016); cardiac cath; primary c section; and abdominal hysterectomy total.    SOCIAL HISTORY  Social History   Substance Use Topics   • Smoking status: Former Smoker     Packs/day: 0.50     Years: 32.00     Types: Cigarettes     Quit date: 1988   • Smokeless tobacco: Never Used      Comment: quit , approx 30pyh   • Alcohol use 4.2 oz/week     7 Glasses of wine per week      History   Drug Use No       FAMILY HISTORY  Family History   Problem Relation Age of Onset   • Cancer Mother         uterine   • Arthritis Mother         gout   • Asthma Mother    • Heart Disease Father         MI age 50s   • Heart Disease Brother          40 MI   • Hyperlipidemia Brother    • Hyperlipidemia Sister    • Arthritis Sister        CURRENT MEDICATIONS  Home Medications     Reviewed by hCelsey Lew R.N. (Registered Nurse) on 18 at 0656  Med List Status: Partial   Medication Last Dose Status   allopurinol (ZYLOPRIM) 100 MG Tab  Active   aspirin EC (ECOTRIN) 81 MG TBEC  Active   Calcium Carbonate-Vit D-Min (CALCIUM 1200 PO)  Active   Cholecalciferol (VITAMIN D3) 1000 units Cap  Active   cyanocobalamin (VITAMIN B-12) 100 MCG Tab  Active   folic acid (FOLVITE) 1 MG Tab  Active   gabapentin (NEURONTIN) 100 MG Cap  Active   metoprolol (LOPRESSOR) 50 MG Tab  Active   Naproxen Sodium (ALEVE) 220 MG Cap  Active   rosuvastatin (CRESTOR) 40 MG tablet  Active   zolpidem (AMBIEN) 5 MG Tab  Active                ALLERGIES  Allergies   Allergen Reactions   • Lipitor [Atorvastatin Calcium] Unspecified     Elevated LFT  RXN=long time ago       PHYSICAL EXAM  VITAL SIGNS: /63   Pulse 73   Temp 36.7 °C (98 °F) (Temporal)   Resp 20   Ht 1.549 m (5' 1\")   Wt 54.4 kg (120 lb)   " LMP 07/06/2011   SpO2 98%   BMI 22.67 kg/m²     Constitutional: Well developed, Well nourished, mild distress secondary to pain, Non-toxic appearance.   Neck: Normal range of motion, No tenderness, Supple, No stridor.   Cardiovascular: Normal heart rate, Normal rhythm, No murmurs, No rubs, No gallops. No pulsatile masses.  Thorax & Lungs: Normal breath sounds, No respiratory distress, No wheezing, No chest tenderness.   Abdomen: Bowel sounds normal, Soft, No tenderness, No masses, No pulsatile masses.   Skin: Warm, Dry, No erythema, No rash.   Back: No midline TTP, no point tenderness, the patient has mild tenderness to palpation of the lumbar paraspinal muscles on the right. Positive straight leg raise at 20 degrees on the right.   Extremities: Intact distal pulses, No edema, No tenderness, No cyanosis, No clubbing.   Neurologic: Alert & oriented x 3, Normal motor function, Normal sensory function, No focal deficits noted. No saddle anesthesia.     DIAGNOSTIC STUDIES / PROCEDURES    COURSE & MEDICAL DECISION MAKING  Nursing notes, VS, PMSFHx reviewed in chart.     7:23 AM - Patient seen and examined at bedside. I discussed the chronic nature of her pain and pain relief through the ED. Patient understands that pain management is not the cure to her condition and is only a temporary relief from a debilitating pain. I discussed the risks and precautions associated with narcotic medications, as required by law. I discussed various options that she can explore as an outpatient to address her chronic condition. Patient will be treated with Morphine 4 mg, Zofran 4 mg. The differential diagnoses include but are not limited to: sciatica    The patient presents today with low back pain. At this point her physical exam is essentially benign. There is no evidence of cord impingement, or cauda equina. Clinically and historically there is no concern for epidural abscess or epidural hematoma. There is no history of recent  trauma. Patient has had these symptoms previously. At this time I feel that the most appropriate treatment for what is apparently mechanical low back pain is pain control. I will prescribe Norco, Medrol for pain. Patient is instructed to return with any new or worsening symptoms, specifically if they develop incontinence, saddle anesthesia. They are instructed to follow up with their primary for evaluation and treatment of their chronic condition.     Reviewed the patient's prescription history on [place] Prescription Monitoring Program which showed Narcotics: 70 (Value from [employer] System.)  Sedatives: 141 (Value from [employer].)  Stimulants: 0 (Value from [employer].)     I reviewed prescription monitoring program for patient's narcotic use before prescribing a scheduled drug.The patient will not drink alcohol nor drive with prescribed medications. The patient will return for new or worsening symptoms and is stable at the time of discharge.    The patient is referred to a primary physician for blood pressure management, diabetic screening, and for all other preventative health concerns.    I reviewed prescription monitoring program for patient's narcotic use before prescribing a scheduled drug.The patient will not drink alcohol nor drive with prescribed medications    In prescribing controlled substances to this patient, I certify that I have obtained and reviewed the medical history this patient I have also made a good rowan effort to obtain applicable records from other providers who have treated the patient and records did not demonstrate any increased risk of substance abuse that would prevent me from prescribing controlled substances.     I have conducted a physical exam and documented it. I have reviewed Ms. Craig’s prescription history as maintained by the [place] Prescription Monitoring Program.     I have assessed the patient’s risk for abuse, dependency, and addiction using the  validated Opioid Risk Tool available at https://www.mdcalc.com/xktpiu-dopl-qmbo-ort-narcotic-abuse.     Given the above, I believe the benefits of controlled substance therapy outweigh the risks. The reasons for prescribing controlled substances include in my professional opinion, controlled substances are a reasonable choice for this patient. Accordingly, I have discussed the risk and benefits, treatment plan, and alternative therapies with the patient. The patient has been consented for the medication and understands the risks.    DISPOSITION:  Patient will be discharged home in stable condition.    FOLLOW UP:  No follow-up provider specified.    The patient was instructed to follow-up with her pain management physician.  Dr. Aleman.    OUTPATIENT MEDICATIONS:  New Prescriptions    HYDROCODONE-ACETAMINOPHEN (NORCO) 5-325 MG TAB PER TABLET    Take 1-2 Tabs by mouth every four hours as needed for up to 5 days.    METHYLPREDNISOLONE (MEDROL) 4 MG TAB    Take as per the package instructions.     FINAL IMPRESSION  1. Sciatica of right side    2. Chronic pain syndrome          IHarshad (Brandy), am scribing for, and in the presence of, Oli Alvarado M.D..    Electronically signed by: Harshad Ba (Brandy), 12/17/2018    IOli M.D. personally performed the services described in this documentation, as scribed by Harshad Ba in my presence, and it is both accurate and complete. C    The note accurately reflects work and decisions made by me.  Oli Alvarado  12/17/2018  1:46 PM

## 2018-12-17 NOTE — ED NOTES
Pt prepared for discharged. Reviewed all discharge instructions/prescriptions and verbalized agreement with plan. No further questions.

## 2018-12-20 NOTE — TELEPHONE ENCOUNTER
"Problem with pain on left side under breast   Received: Today   Message Contents   AMANDA Pathak     Patient is having problems with pain on her left side under her breast and wants a call back at [phone].      Contacted patient, she states she's \"not doing very good\" today.  She reports increased sciatica pain which she was just evaluated by the ER on the 17th for     Today, Patient c/o left side pain - lateral area of breast/side pain, she describes location as the very end of bra cup.  She cannot describe the pain - she states \"I guess it's like an ache\".  It just started this afternoon. She can't give duration time. She states there has not been enough time to determine if anything makes it worse or better.  She is currently taking Norco for leg pain.  She states the norco made her sleepy and she took a nap - and when she woke up the pain was still on the side.     Due to the sciatica pain, she did just start using a walker starting on Monday.      She reports her BP is elevated. -158 with HR 57    Denies nausea, but states she's \"burping a lot\".  Denies jaw, arm pain, or headache.  Denies SOB or difficulty breathing, Denies edema or weight gain.    Explained this could be musculoskeletal in nature with brand new use of walker,  however for chest pain or pressure, shortness of breath, difficulty breathing patient to initiate EMS immediately.  Patient verbalizes understanding.       To LA to review/advise      "

## 2018-12-21 PROBLEM — M54.59 INTRACTABLE LOW BACK PAIN: Status: ACTIVE | Noted: 2018-04-20

## 2018-12-21 PROBLEM — R79.1 ELEVATED INR (INTERNATIONAL NORMALIZED RATIO): Status: ACTIVE | Noted: 2018-01-01

## 2018-12-21 PROBLEM — R74.8 ELEVATED ALKALINE PHOSPHATASE LEVEL: Status: ACTIVE | Noted: 2018-01-01

## 2018-12-21 PROBLEM — M89.8X5 LYTIC BONE LESION OF HIP: Status: ACTIVE | Noted: 2018-01-01

## 2018-12-21 PROBLEM — R79.9 ELEVATED BUN: Status: ACTIVE | Noted: 2018-01-01

## 2018-12-21 NOTE — TELEPHONE ENCOUNTER
I agree entirely with you it sounds completely noncardiac.  Warm compresses, not an issue for us.  I am not worried about her blood pressure right now because it sounds like she is in pain.

## 2018-12-21 NOTE — TELEPHONE ENCOUNTER
Contacted patient, discussed LA's advice of warm compresses.  Patient verbalizes understanding and appreciative of assistance.

## 2018-12-22 NOTE — ED NOTES
Med Rec Updated and Complete per Pt at bedside  Allergies Reviewed   No PO ABX last 30 days    Pt reports starting Medrol Dose Pack on 12/17/18.     Pt reports she no longer takes Lisinopril or Ambien.

## 2018-12-22 NOTE — H&P
Hospital Medicine History & Physical Note    Date of Service  12/21/2018    Primary Care Physician  Lesli Ocasio M.D.    Code Status  Full code per patient     Chief Complaint  Intractable low back pain    History of Presenting Illness  83 y.o. female w/h/o arthritis, gout, hypertension who presented 9/17/2018 with intractable back pain.  The pain originally started back in April.  It started worsening in August.  She then went to go see pain management.  She has had multiple epidural injections as well as been tried on pain medications but she says nothing really takes her pain away.  She does had an epidural injection management the day prior to admission but then continued to have pain so came to the hospital.  She had a CT scan that showed multiple lytic lesions and was referred to hospitalist for admission.    Review of Systems  Review of Systems   Constitutional: Positive for fever. Negative for chills.        Unintentional weight loss   Respiratory: Positive for shortness of breath. Negative for cough and wheezing.    Cardiovascular: Positive for chest pain.   Gastrointestinal: Negative for constipation, diarrhea, nausea and vomiting.   Genitourinary: Negative for dysuria.   Musculoskeletal: Positive for back pain and joint pain (hip).   Neurological: Negative for headaches.      Otherwise negative per AMA/CMS criteria    Past Medical History   has a past medical history of Arthritis; Back pain; Breath shortness; CAD (coronary artery disease); Cataract; Gout; Heart attack (HCC); Heart burn; High cholesterol; Hypertension; Indigestion; MEDICAL HOME; and Stroke (HCC).    Surgical History   has a past surgical history that includes angioplasty (85, 97); other; cataract phaco with iol (8/20/2009); cataract phaco with iol (8/31/2009); gyn surgery; gyn surgery (1953); multiple coronary artery bypass endo vein harvest (11/6/2016); pollo (11/6/2016); cardiac cath; primary c section; and abdominal hysterectomy  "total.    Family History  family history includes Arthritis in her mother and sister; Asthma in her mother; Cancer in her mother; Heart Disease in her brother and father; Hyperlipidemia in her brother and sister.    Social History   reports that she quit smoking about 30 years ago. Her smoking use included Cigarettes. She has a 16.00 pack-year smoking history. She has never used smokeless tobacco. She reports that she drinks about 4.2 oz of alcohol per week . She reports that she does not use drugs.    Allergies  Allergies   Allergen Reactions   • Atorvastatin      Elevated LFT  RXN = long time ago        Medications  No current facility-administered medications on file prior to encounter.      Current Outpatient Prescriptions on File Prior to Encounter   Medication Sig Dispense Refill   • methylPREDNISolone (MEDROL) 4 MG Tab Take as per the package instructions. 21 Tab 0   • gabapentin (NEURONTIN) 100 MG Cap Take 100 mg by mouth 3 times a day.     • aspirin EC (ECOTRIN) 81 MG TBEC Take 81 mg by mouth every evening.         Physical Exam  Weight/BMI: Body mass index is 22.67 kg/m².  Blood pressure 146/72, pulse 72, temperature 36.4 °C (97.6 °F), temperature source Oral, resp. rate 18, height 1.549 m (5' 1\"), weight 54.4 kg (120 lb), last menstrual period 07/06/2011, SpO2 98 %, not currently breastfeeding.   Vitals:    12/21/18 2005 12/21/18 2100 12/21/18 2200 12/21/18 2248   BP: 138/75   146/72   Pulse: (!) 58 68 71 72   Resp: 16   18   Temp:    36.4 °C (97.6 °F)   TempSrc:    Oral   SpO2: 98% 100% 98% 98%   Weight:       Height:        Oxygen Therapy:  Pulse Oximetry: 98 %, O2 Delivery: None (Room Air)  Physical Exam   Constitutional: She is oriented to person, place, and time. She appears well-developed.   frail   HENT:   Head: Normocephalic.   Right Ear: External ear normal.   Left Ear: External ear normal.   Nose: Nose normal.   Eyes: Pupils are equal, round, and reactive to light. Conjunctivae and EOM are " normal. Right eye exhibits no discharge. Left eye exhibits no discharge. No scleral icterus.   Neck: Neck supple. No tracheal deviation present.   Cardiovascular: Normal rate.  Exam reveals no gallop and no friction rub.    Pulmonary/Chest: No respiratory distress. She has no wheezes. She has no rales.   Abdominal: Soft. She exhibits no distension. There is no tenderness. There is no rebound and no guarding.   Musculoskeletal: She exhibits tenderness. She exhibits no edema.   Neurological: She is alert and oriented to person, place, and time.   Skin: Skin is warm and dry. No rash noted. No erythema.   Psychiatric: She has a normal mood and affect.       Laboratory:   Objective   Recent Results (from the past 24 hour(s))   CBC WITH DIFFERENTIAL    Collection Time: 12/21/18  6:30 PM   Result Value Ref Range    WBC 11.2 (H) 4.8 - 10.8 K/uL    RBC 4.31 4.20 - 5.40 M/uL    Hemoglobin 13.5 12.0 - 16.0 g/dL    Hematocrit 40.8 37.0 - 47.0 %    MCV 94.7 81.4 - 97.8 fL    MCH 31.3 27.0 - 33.0 pg    MCHC 33.1 (L) 33.6 - 35.0 g/dL    RDW 48.3 35.9 - 50.0 fL    Platelet Count 180 164 - 446 K/uL    MPV 10.8 9.0 - 12.9 fL    Neutrophils-Polys 69.90 44.00 - 72.00 %    Lymphocytes 18.30 (L) 22.00 - 41.00 %    Monocytes 11.20 0.00 - 13.40 %    Eosinophils 0.10 0.00 - 6.90 %    Basophils 0.10 0.00 - 1.80 %    Immature Granulocytes 0.40 0.00 - 0.90 %    Nucleated RBC 0.00 /100 WBC    Neutrophils (Absolute) 7.81 (H) 2.00 - 7.15 K/uL    Lymphs (Absolute) 2.04 1.00 - 4.80 K/uL    Monos (Absolute) 1.25 (H) 0.00 - 0.85 K/uL    Eos (Absolute) 0.01 0.00 - 0.51 K/uL    Baso (Absolute) 0.01 0.00 - 0.12 K/uL    Immature Granulocytes (abs) 0.05 0.00 - 0.11 K/uL    NRBC (Absolute) 0.00 K/uL   COMP METABOLIC PANEL    Collection Time: 12/21/18  6:30 PM   Result Value Ref Range    Sodium 140 135 - 145 mmol/L    Potassium 3.8 3.6 - 5.5 mmol/L    Chloride 107 96 - 112 mmol/L    Co2 20 20 - 33 mmol/L    Anion Gap 13.0 (H) 0.0 - 11.9    Glucose 114 (H)  65 - 99 mg/dL    Bun 27 (H) 8 - 22 mg/dL    Creatinine 1.15 0.50 - 1.40 mg/dL    Calcium 10.0 8.5 - 10.5 mg/dL    AST(SGOT) 35 12 - 45 U/L    ALT(SGPT) 20 2 - 50 U/L    Alkaline Phosphatase 483 (H) 30 - 99 U/L    Total Bilirubin 0.7 0.1 - 1.5 mg/dL    Albumin 4.4 3.2 - 4.9 g/dL    Total Protein 7.2 6.0 - 8.2 g/dL    Globulin 2.8 1.9 - 3.5 g/dL    A-G Ratio 1.6 g/dL   PROTHROMBIN TIME    Collection Time: 12/21/18  6:30 PM   Result Value Ref Range    PT 16.1 (H) 12.0 - 14.6 sec    INR 1.28 (H) 0.87 - 1.13   APTT    Collection Time: 12/21/18  6:30 PM   Result Value Ref Range    APTT 25.8 24.7 - 36.0 sec   ESTIMATED GFR    Collection Time: 12/21/18  6:30 PM   Result Value Ref Range    GFR If  55 (A) >60 mL/min/1.73 m 2    GFR If Non African American 45 (A) >60 mL/min/1.73 m 2       (click the triangle to expand results)    Imaging:  CT-LSPINE W/O PLUS RECONS   Final Result      Destructive lesions within the right pedicle and transverse process of L3, the right sacral wing, and right iliac crest consistent with metastatic disease or multiple myeloma.   Multilevel degenerative changes.   Moderate spinal stenosis at L4-5.      CT-HIP W/O PLUS RECONS RIGHT   Final Result         1.  Multiple lytic expansile lesions in the pelvis and spine as described.   2.  Hairline fracture of the acetabular roof on the right, likely pathologic fracture.   3.  Hairline lucency through the right femoral neck suggests hairline fracture        Assessment/Plan:  I anticipate this patient is appropriate for observation status at this time.    Elevated INR (international normalized ratio)- (present on admission)   Assessment & Plan    - not on anticoagulants as an outpt  - may be indicative of liver disease  - will administor Vit K oral once   - consider recheck      Elevated BUN- (present on admission)   Assessment & Plan    -Rehydrating with IV fluid     Elevated alkaline phosphatase level- (present on admission)   Assessment  & Plan    -Likely related to bone lesions     Lytic bone lesion of hip- (present on admission)   Assessment & Plan    -Differential includes multiple myeloma versus metastatic cancer  -Patient does not have a known history of cancer  -IR biopsy pending  -SPEP and BEATRICE pending     Gout- (present on admission)   Assessment & Plan    -The current pain is likely more than just a gout flare  -Continue allopurinol  -Uric acid pending     Intractable low back pain- (present on admission)   Assessment & Plan    -Normally follows with pain management outpatient  -Just got an epidural injection the day prior to admission  -However, feels that pain is still not managed  -Increasing Norco dosing and keeping Dilaudid IV as needed for now  -Added dexamethasone oral  -Consulted palliative for additional recommendations         VTE prophylaxis:  sc heparin

## 2018-12-22 NOTE — CARE PLAN
Problem: Safety  Goal: Will remain free from injury  Hourly rounding in effect, pt instructed to call for assistance, bed locked and in lowest position. Bed alarm on. Pt calls appropriately for assistance.       Problem: Knowledge Deficit  Goal: Knowledge of disease process/condition, treatment plan, diagnostic tests, and medications will improve  Pt and family updated and educated on nursing interventions, medications and POC

## 2018-12-22 NOTE — ED PROVIDER NOTES
ED Provider Note    Scribed for Shayan Chan M.D. by Carol Damon. 12/21/2018  6:55 PM    Primary care provider: Lesli Ocasio M.D.  Means of arrival: Wheel Chair  History obtained from: Patient  History limited by: None    CHIEF COMPLAINT  Chief Complaint   Patient presents with   • Low Back Pain     Right lower back pain, hx of sciatica     HPI  Susie Craig is a 83 y.o. female with a history of sciatica who presents to the Emergency Department complaining of severe right lower back pain radiating down her hip. She received an injection this morning from her pain management physician without relief. She states this is her third injection that she receives every two weeks. Her pain management physician informed her that he did not know where her pain is coming from and would like to order an MRI of pelvic and nerve test on 1/7/19. She states her pain is so severe that she is unable to sleep. Associated symptoms include bowel changes stating that she is not normally having bowel movements. Denies falls, abdominal pain, dysuria, nausea, vomiting.     REVIEW OF SYSTEMS  Pertinent positives include back pain, bowel changes. Pertinent negatives include no falls, abdominal pain, dysuria, nausea, vomiting. All other systems reviewed and negative.     PAST MEDICAL HISTORY   has a past medical history of Arthritis; Back pain; Breath shortness; CAD (coronary artery disease); Cataract; Gout; Heart attack (HCC); Heart burn; High cholesterol; Hypertension; Indigestion; MEDICAL HOME; and Stroke (HCC).    SURGICAL HISTORY   has a past surgical history that includes angioplasty (85, 97); other; cataract phaco with iol (8/20/2009); cataract phaco with iol (8/31/2009); gyn surgery; gyn surgery (1953); multiple coronary artery bypass endo vein harvest (11/6/2016); pollo (11/6/2016); cardiac cath; primary c section; and abdominal hysterectomy total.    SOCIAL HISTORY  Social History   Substance Use Topics   •  "Smoking status: Former Smoker     Packs/day: 0.50     Years: 32.00     Types: Cigarettes     Quit date: 1988   • Smokeless tobacco: Never Used      Comment: quit , approx 30pyh   • Alcohol use 4.2 oz/week     7 Glasses of wine per week      History   Drug Use No       FAMILY HISTORY  Family History   Problem Relation Age of Onset   • Cancer Mother         uterine   • Arthritis Mother         gout   • Asthma Mother    • Heart Disease Father         MI age 50s   • Heart Disease Brother          40 MI   • Hyperlipidemia Brother    • Hyperlipidemia Sister    • Arthritis Sister        CURRENT MEDICATIONS  Home Medications     Reviewed by Lupe Chan R.N. (Registered Nurse) on 18 at 1813  Med List Status: Not Addressed   Medication Last Dose Status   allopurinol (ZYLOPRIM) 100 MG Tab  Active   aspirin EC (ECOTRIN) 81 MG TBEC  Active   Calcium Carbonate-Vit D-Min (CALCIUM 1200 PO)  Active   Cholecalciferol (VITAMIN D3) 1000 units Cap  Active   cyanocobalamin (VITAMIN B-12) 100 MCG Tab  Active   folic acid (FOLVITE) 1 MG Tab  Active   gabapentin (NEURONTIN) 100 MG Cap  Active   HYDROcodone-acetaminophen (NORCO) 5-325 MG Tab per tablet  Active   methylPREDNISolone (MEDROL) 4 MG Tab  Active   metoprolol (LOPRESSOR) 50 MG Tab  Active   Naproxen Sodium (ALEVE) 220 MG Cap  Active   rosuvastatin (CRESTOR) 40 MG tablet  Active   zolpidem (AMBIEN) 5 MG Tab  Active                ALLERGIES  Allergies   Allergen Reactions   • Lipitor [Atorvastatin Calcium] Unspecified     Elevated LFT  RXN=long time ago       PHYSICAL EXAM  VITAL SIGNS: /91   Pulse 73   Temp 36.6 °C (97.9 °F) (Temporal)   Resp 14   Ht 1.549 m (5' 1\")   Wt 54.4 kg (120 lb)   LMP 2011   SpO2 97%   BMI 22.67 kg/m²     Constitutional: Well developed, Well nourished, mild to moderate distress, Non-toxic appearance.   HENT: Normocephalic, Atraumatic, Bilateral external ears normal, Oropharynx moist, No oral exudates.   Eyes: " PERRLA, EOMI, Conjunctiva normal, No discharge.   Neck: No tenderness, Supple, No stridor.   Lymphatic: No lymphadenopathy noted.   Cardiovascular: Normal heart rate, Normal rhythm.   Thorax & Lungs: Clear to auscultation bilaterally, No respiratory distress, No wheezing, No crackles.   Abdomen: Soft, No tenderness, No masses, No pulsatile masses.   Skin: Warm, Dry, No erythema, No rash.   Extremities: No edema No cyanosis.   Musculoskeletal: Tenderness with palpation to right Paraspinous lumbar area and right gluteal area. Subjective decreased muscular strength on the right leg. No major deformities noted. Intact distal pulses  Neurologic: Awake, alert. Moves all extremities spontaneously.  Psychiatric: Affect normal, Judgment normal, Mood normal.       LABS  Labs Reviewed   CBC WITH DIFFERENTIAL - Abnormal; Notable for the following:        Result Value    WBC 11.2 (*)     MCHC 33.1 (*)     Lymphocytes 18.30 (*)     Neutrophils (Absolute) 7.81 (*)     Monos (Absolute) 1.25 (*)     All other components within normal limits    Narrative:     Indicate which anticoagulants the patient is on:->UNKNOWN   COMP METABOLIC PANEL - Abnormal; Notable for the following:     Anion Gap 13.0 (*)     Glucose 114 (*)     Bun 27 (*)     Alkaline Phosphatase 483 (*)     All other components within normal limits    Narrative:     Indicate which anticoagulants the patient is on:->UNKNOWN   PROTHROMBIN TIME - Abnormal; Notable for the following:     PT 16.1 (*)     INR 1.28 (*)     All other components within normal limits    Narrative:     Indicate which anticoagulants the patient is on:->UNKNOWN   ESTIMATED GFR - Abnormal; Notable for the following:     GFR If  55 (*)     GFR If Non  45 (*)     All other components within normal limits    Narrative:     Indicate which anticoagulants the patient is on:->UNKNOWN   APTT    Narrative:     Indicate which anticoagulants the patient is on:->UNKNOWN     All  labs reviewed by me.    RADIOLOGY  CT-LSPINE W/O PLUS RECONS   Final Result      Destructive lesions within the right pedicle and transverse process of L3, the right sacral wing, and right iliac crest consistent with metastatic disease or multiple myeloma.   Multilevel degenerative changes.   Moderate spinal stenosis at L4-5.      CT-HIP W/O PLUS RECONS RIGHT   Final Result         1.  Multiple lytic expansile lesions in the pelvis and spine as described.   2.  Hairline fracture of the acetabular roof on the right, likely pathologic fracture.   3.  Hairline lucency through the right femoral neck suggests hairline fracture        The radiologist's interpretation of all radiological studies have been reviewed by me.    COURSE & MEDICAL DECISION MAKING  Nursing notes, VS, PMSFHx reviewed in chart.    Review of past medical records shows the patient has a history of chronic back pain and is being followed by pain management. She is currently on Norco and Medrol dose pack. She was seen here on the 17th with similar symptoms. She was given morphine and Zofran.     6:55 PM - Patient seen and examined at bedside. Plan of care was discussed with the patient which includes admission for pain management. Will do CT-scans and treat with pain medication. She verbalized her understanding and agrees to the treatment care plan and admission. Patient will be treated with Toradol 15 mg and Dilaudid 0.5 mg. Ordered CT-hip, CT-L spine, CBC with differential, CMP, prothrombin time, and APTT to evaluate her symptoms. Differentials include but are not limited to: acute vs chronic pain.     Decision Making:  Patient with worsening lower back pain, right hip pain, right gluteal pain.  The patient is exhausted her outpatient treatment measures therefore established IV, give the patient IV pain medicines which she is much improved on.  I get a CT scan throughout the pelvis and L-spine that shows multiple lytic lesions concerning for  metastatic disease versus multiple myeloma.  Updated the patient on this, will admit the patient to the hospital, for further evaluation and treatment.      DISPOSITION:  Patient will be admitted to Dr. Gupta (Hospitalist) in guarded condition.          FINAL IMPRESSION  1. Acute right-sided low back pain with sciatica, sciatica laterality unspecified    2. Lytic bone lesions on xray          Carol RAM (Scribe), am scribing for, and in the presence of, Shayan Chan M.D..    Electronically signed by: Carol Damon (Scribe), 12/21/2018    IShayan M.D. personally performed the services described in this documentation, as scribed by Carol Damon in my presence, and it is both accurate and complete. C    The note accurately reflects work and decisions made by me.  Shayan Chan  12/21/2018  8:44 PM

## 2018-12-22 NOTE — ASSESSMENT & PLAN NOTE
Palliative following for pain management. Delirium improved with medication adjustments  Pain improved with medications and nailing of R femur.

## 2018-12-22 NOTE — ED NOTES
Assumed care at this time. Patient returning from CT. Stable w no ss of distress at this time. Resting comfortably in bed, no needs noted.

## 2018-12-22 NOTE — PROGRESS NOTES
"Pt transferred to floor from ER.     A/ox4, up with one assist, RA  Pt complains of pain, Dilaudid IV PRN given per MAR.   L PIV running NS @ 75mL/hr.   Rounding in place.     /72   Pulse 72   Temp 36.4 °C (97.6 °F) (Oral)   Resp 18   Ht 1.549 m (5' 1\")   Wt 54.4 kg (120 lb)   LMP 07/06/2011   SpO2 98%   Breastfeeding? No   BMI 22.67 kg/m²     "

## 2018-12-22 NOTE — PROGRESS NOTES
"Pt A&Ox4. VS: /63   Pulse 63   Temp 36.8 °C (98.3 °F) (Oral)   Resp 18   Ht 1.549 m (5' 1\")   Wt 54.4 kg (120 lb)   LMP 07/06/2011   SpO2 95%   Breastfeeding? No   BMI 22.67 kg/m² . Pt denies n/v, numbness, tingling, SOB and chest pain. Pt states pain in her lower back and hips 7/10, prn pain medication administered per MAR. Sons at bedside, no questions at this time. Pt needs met at this time, call light within reach, hourly rounding in effect, and will continue to monitor.       "

## 2018-12-22 NOTE — CARE PLAN
Problem: Communication  Goal: The ability to communicate needs accurately and effectively will improve  Outcome: PROGRESSING AS EXPECTED  Discussed POC with pt, all questions answered.    Problem: Safety  Goal: Will remain free from injury  Outcome: PROGRESSING AS EXPECTED  Pt will call before ambulating.

## 2018-12-22 NOTE — ED TRIAGE NOTES
".  Chief Complaint   Patient presents with   • Low Back Pain     Right lower back pain, hx of sciatica     ./91   Pulse 73   Temp 36.6 °C (97.9 °F) (Temporal)   Resp 14   Ht 1.549 m (5' 1\")   Wt 54.4 kg (120 lb)   LMP 07/06/2011   SpO2 97%   BMI 22.67 kg/m²     Patient to triage via wheelchair with family with above complaints, hx of chronic pain, sees pain management doctor, had injection today, seen here with same complaint 11/17/18 and prescribed Norco and Medrol, educated on triage process, placed in lobby, told to inform staff of any change in condition.     "

## 2018-12-22 NOTE — ED NOTES
RN assumed care of patient. Agree with triage note. Pt is in obvious distress. States she unable to sit and stand without extreme pain.

## 2018-12-23 NOTE — PROGRESS NOTES
[facility] Medicine Daily Progress Note    Date of Service  12/23/2018    Chief Complaint  83 y.o. female admitted 12/21/2018 with worsening back pain to the point of intractable few days prior to admission, had been seen by pcp, pain management, ortho without significant relief other than temporary improvement.    Hospital Course    Patient had scans showing lytic lesions in Right hip and lower back.  Biopsy ordered on admission but will not be done until after Soni d/t weekend and holiday.  Bone marrow biopsy ordered and also likely cannot be scheduled until Wednesday.  SPEP and UPEP collected and pending.      Interval Problem Update  12/22 Patient with pain, responding well to narcotic though is in extreme pain between doses.  I increased her steroids and gabapentin for better overall pain control, continue with narcotic medication.  Skeletal survey ordered to r/o impending fracture - long bones are okay but she does have lytic lesion in right pelvis which is likely the source of the majority of her pain.  12/23 Patient states she has no pain when still but still having significant pain when moving - responding appropriate to pain medications received - sedating at times.  Electrophoresis results should be back tomorrow.  Bone marrow biopsy pending Wednesday - is scheduled.    Consultants/Specialty  none    Code Status  full    Disposition  tbd    Review of Systems  Review of Systems   Constitutional: Positive for malaise/fatigue. Negative for chills and fever.   HENT: Negative for congestion and sore throat.    Eyes: Negative for blurred vision and photophobia.   Respiratory: Negative for cough and shortness of breath.    Cardiovascular: Negative for chest pain, claudication and leg swelling.   Gastrointestinal: Negative for abdominal pain, constipation, diarrhea, heartburn, nausea and vomiting.   Genitourinary: Negative for dysuria and hematuria.   Musculoskeletal: Positive for back pain and joint pain  (right hip and groin). Negative for myalgias.   Skin: Negative for itching and rash.   Neurological: Negative for dizziness, sensory change, speech change, weakness and headaches.   Psychiatric/Behavioral: Negative for depression. The patient is not nervous/anxious and does not have insomnia.         Physical Exam  Temp:  [36.3 °C (97.4 °F)-37.1 °C (98.7 °F)] 36.9 °C (98.4 °F)  Pulse:  [58-66] 58  Resp:  [18-20] 18  BP: (135-143)/(66-86) 142/81    Physical Exam   Constitutional: She is oriented to person, place, and time. She appears well-developed and well-nourished. No distress.   HENT:   Head: Normocephalic and atraumatic.   Eyes: Conjunctivae are normal. No scleral icterus.   Neck: Neck supple. No JVD present.   Cardiovascular: Normal rate, regular rhythm and normal heart sounds.  Exam reveals no gallop and no friction rub.    No murmur heard.  Pulmonary/Chest: Effort normal and breath sounds normal. No respiratory distress. She exhibits no tenderness.   Abdominal: Soft. Bowel sounds are normal. She exhibits no distension. There is no guarding.   Musculoskeletal: She exhibits no edema or tenderness.   Lymphadenopathy:     She has no cervical adenopathy.   Neurological: She is alert and oriented to person, place, and time. No cranial nerve deficit.   Skin: Skin is warm and dry. She is not diaphoretic. No erythema. No pallor.   Psychiatric: She has a normal mood and affect. Her behavior is normal.   Nursing note and vitals reviewed.      Fluids  No intake or output data in the 24 hours ending 12/23/18 1436    Laboratory  Recent Labs      12/21/18   1830  12/22/18   0105   WBC  11.2*  8.6   RBC  4.31  3.97*   HEMOGLOBIN  13.5  12.1   HEMATOCRIT  40.8  37.9   MCV  94.7  95.5   MCH  31.3  30.5   MCHC  33.1*  31.9*   RDW  48.3  49.1   PLATELETCT  180  142*   MPV  10.8  10.4     Recent Labs      12/21/18   1830  12/22/18   0105   SODIUM  140  140   POTASSIUM  3.8  3.7   CHLORIDE  107  109   CO2  20  24   GLUCOSE  114*   129*   BUN  27*  27*   CREATININE  1.15  1.16   CALCIUM  10.0  9.2     Recent Labs      12/21/18   1830  12/23/18   0152   APTT  25.8   --    INR  1.28*  1.27*               Imaging  DX-BONE SURVEY-METASTATIC LTD   Final Result      1.  Lytic foci within the calvaria, right iliac bone, and right distal clavicle      2.  Multiple additional lytic foci identified on recent CT are not apparent on conventional radiography      3.  Different diagnosis includes metastatic malignancy or multiple myeloma      CT-LSPINE W/O PLUS RECONS   Final Result      Destructive lesions within the right pedicle and transverse process of L3, the right sacral wing, and right iliac crest consistent with metastatic disease or multiple myeloma.   Multilevel degenerative changes.   Moderate spinal stenosis at L4-5.      CT-HIP W/O PLUS RECONS RIGHT   Final Result         1.  Multiple lytic expansile lesions in the pelvis and spine as described.   2.  Hairline fracture of the acetabular roof on the right, likely pathologic fracture.   3.  Hairline lucency through the right femoral neck suggests hairline fracture           Assessment/Plan  * Intractable low back pain- (present on admission)   Assessment & Plan    -Normally follows with pain management outpatient  -Just got an epidural injection the day prior to admission  -However, feels that pain is still not managed  -Dilaudid IV as needed, norco PO  -High dose dexamethasone for pain control (4mg qid), increase neurontin daily as tolerated     Lytic bone lesion of hip- (present on admission)   Assessment & Plan    -Differential includes multiple myeloma versus metastatic cancer  -Patient does not have a known history of cancer  -SPEP and BEATRICE pending  Bone marrow biopsy when can be coordinated Wednesday  Bone biopsy may still be needed  Skeletal survey without evidence of impending fracture  Right hip, lumbar spine and cranium with evidence of lytic lesions though calcium is normal and total  protein is normal.       Gout- (present on admission)   Assessment & Plan    -The current pain is likely more than just a gout flare  -Continue allopurinol  -Uric acid normal       Elevated INR (international normalized ratio)- (present on admission)   Assessment & Plan    - not on anticoagulants as an outpt         Elevated BUN- (present on admission)   Assessment & Plan    -Rehydrating with IV fluid     Elevated alkaline phosphatase level- (present on admission)   Assessment & Plan    -Likely related to bone lesions          VTE prophylaxis: heparin

## 2018-12-23 NOTE — PROGRESS NOTES
"A/ox4, up with one assist to bedside commode.  Pt complains of pain in lower back and hips, Dilaudid IV PRN given per MAR.   L PIV running NS @ 75mL/hr.   Rounding in place.  at bedside.     /75   Pulse 66   Temp 36.6 °C (97.8 °F) (Temporal)   Resp 20   Ht 1.549 m (5' 1\")   Wt 54.4 kg (120 lb)   LMP 07/06/2011   SpO2 95%   Breastfeeding? No   BMI 22.67 kg/m²     "

## 2018-12-23 NOTE — PROGRESS NOTES
[facility] Medicine Daily Progress Note    Date of Service  12/22/2018    Chief Complaint  83 y.o. female admitted 12/21/2018 with worsening back pain to the point of intractable few days prior to admission, had been seen by pcp, pain management, ortho without significant relief other than temporary improvement.    Hospital Course    Patient had scans showing lytic lesions in Right hip and lower back.  Biopsy ordered on admission but will not be done until after Son[place] d/t weekend and holiday.  Bone marrow biopsy ordered and also likely cannot be scheduled until Wednesday.  SPEP and UPEP collected and pending.      Interval Problem Update  Patient with pain, responding well to narcotic though is in extreme pain between doses.  I increased her steroids and gabapentin for better overall pain control, continue with narcotic medication.  Skeletal survey ordered to r/o impending fracture - long bones are okay but she does have lytic lesion in right pelvis which is likely the source of the majority of her pain.    Consultants/Specialty  none    Code Status  full    Disposition  tbd    Review of Systems  Review of Systems   Constitutional: Positive for malaise/fatigue. Negative for chills and fever.   HENT: Negative for congestion and sore throat.    Eyes: Negative for blurred vision and photophobia.   Respiratory: Negative for cough and shortness of breath.    Cardiovascular: Negative for chest pain, claudication and leg swelling.   Gastrointestinal: Negative for abdominal pain, constipation, diarrhea, heartburn and vomiting.   Genitourinary: Negative for dysuria and hematuria.   Musculoskeletal: Positive for back pain and joint pain (right hip and groin). Negative for myalgias.   Skin: Negative for itching and rash.   Neurological: Negative for dizziness, sensory change, speech change, weakness and headaches.   Psychiatric/Behavioral: Negative for depression. The patient is not nervous/anxious and does not have  insomnia.         Physical Exam  Temp:  [36.4 °C (97.6 °F)-37.1 °C (98.7 °F)] 37.1 °C (98.7 °F)  Pulse:  [56-72] 61  Resp:  [16-18] 18  BP: (111-146)/(63-75) 135/66    Physical Exam   Constitutional: She is oriented to person, place, and time. She appears well-developed and well-nourished. No distress.   HENT:   Head: Normocephalic and atraumatic.   Eyes: Conjunctivae are normal. No scleral icterus.   Neck: Neck supple. No JVD present.   Cardiovascular: Normal rate, regular rhythm and normal heart sounds.  Exam reveals no gallop and no friction rub.    No murmur heard.  Pulmonary/Chest: Effort normal and breath sounds normal. No respiratory distress. She exhibits no tenderness.   Abdominal: Soft. Bowel sounds are normal. She exhibits no distension. There is no guarding.   Musculoskeletal: She exhibits no edema or tenderness.   Lymphadenopathy:     She has no cervical adenopathy.   Neurological: She is alert and oriented to person, place, and time. No cranial nerve deficit.   Skin: Skin is warm and dry. She is not diaphoretic. No erythema. No pallor.   Psychiatric: She has a normal mood and affect. Her behavior is normal.   Nursing note and vitals reviewed.      Fluids  No intake or output data in the 24 hours ending 12/22/18 1815    Laboratory  Recent Labs      12/21/18 1830  12/22/18   0105   WBC  11.2*  8.6   RBC  4.31  3.97*   HEMOGLOBIN  13.5  12.1   HEMATOCRIT  40.8  37.9   MCV  94.7  95.5   MCH  31.3  30.5   MCHC  33.1*  31.9*   RDW  48.3  49.1   PLATELETCT  180  142*   MPV  10.8  10.4     Recent Labs      12/21/18   1830  12/22/18   0105   SODIUM  140  140   POTASSIUM  3.8  3.7   CHLORIDE  107  109   CO2  20  24   GLUCOSE  114*  129*   BUN  27*  27*   CREATININE  1.15  1.16   CALCIUM  10.0  9.2     Recent Labs      12/21/18   1830   APTT  25.8   INR  1.28*               Imaging  DX-BONE SURVEY-METASTATIC LTD   Final Result      1.  Lytic foci within the calvaria, right iliac bone, and right distal clavicle       2.  Multiple additional lytic foci identified on recent CT are not apparent on conventional radiography      3.  Different diagnosis includes metastatic malignancy or multiple myeloma      CT-LSPINE W/O PLUS RECONS   Final Result      Destructive lesions within the right pedicle and transverse process of L3, the right sacral wing, and right iliac crest consistent with metastatic disease or multiple myeloma.   Multilevel degenerative changes.   Moderate spinal stenosis at L4-5.      CT-HIP W/O PLUS RECONS RIGHT   Final Result         1.  Multiple lytic expansile lesions in the pelvis and spine as described.   2.  Hairline fracture of the acetabular roof on the right, likely pathologic fracture.   3.  Hairline lucency through the right femoral neck suggests hairline fracture           Assessment/Plan  * Intractable low back pain- (present on admission)   Assessment & Plan    -Normally follows with pain management outpatient  -Just got an epidural injection the day prior to admission  -However, feels that pain is still not managed  -Dilaudid IV as needed, norco PO  -High dose dexamethasone for pain control (4mg qid), increase neurontin daily as tolerated     Lytic bone lesion of hip- (present on admission)   Assessment & Plan    -Differential includes multiple myeloma versus metastatic cancer  -Patient does not have a known history of cancer  -SPEP and BEATRICE pending  Bone marrow biopsy when can be coordinated  Bone biopsy likely will be needed  Skeletal survey without evidence of impending fracture  Right hip, lumbar and thoracic spine with evidence of lytic lesions though calcium is normal and total protein is normal.       Gout- (present on admission)   Assessment & Plan    -The current pain is likely more than just a gout flare  -Continue allopurinol  -Uric acid normal       Elevated INR (international normalized ratio)- (present on admission)   Assessment & Plan    - not on anticoagulants as an outpt  - may be  indicative of liver disease  - will administor Vit K oral once        Elevated BUN- (present on admission)   Assessment & Plan    -Rehydrating with IV fluid     Elevated alkaline phosphatase level- (present on admission)   Assessment & Plan    -Likely related to bone lesions          VTE prophylaxis: heparin

## 2018-12-23 NOTE — CARE PLAN
Problem: Communication  Goal: The ability to communicate needs accurately and effectively will improve  Outcome: PROGRESSING AS EXPECTED  Discussed POC with pt and , all questions answered.    Problem: Safety  Goal: Will remain free from injury  Outcome: PROGRESSING AS EXPECTED  Pt calls appropriately. Uses bedside commode because pain in legs.

## 2018-12-24 NOTE — PROCEDURES
DATE OF PROCEDURE: 12/24/2018    PROCEDURE: Bone marrow biopsy with bone marrow aspiration.     REQUESTING PHYSICIAN: Dr. Marks     INDICATIONS: multiple myeloma    INFORMED CONSENT: Consent signed and on the chart.     DESCRIPTION: A time out was called. The patient was placed in the prone position. The left buttock was prepped and draped in a sterile fashion.  1 mL of 1% lidocaine was injected into the skin followed by 3 mL into the periosteum of the posterior ilium bone. Large-bore needle was used to obtain 5 mL of aspirate followed by 5 mL   into a heparinized syringe for flow cytometry. This followed by a  bone marrow biopsy using the Jamshidi needle.     SEDATION USED: 2mg IV versed & 50mcg IV fentanyl    ESTIMATED BLOOD LOSS: none    Time of moderate sedation 11:40am- 11:51 am

## 2018-12-24 NOTE — PROGRESS NOTES
"A/ox4, up with one assist to bedside commode, waffle mattress.  Pt complains of pain in lower back and hips, Dilaudid IV PRN given per MAR.   L PIV running NS @ 75mL/hr.   BMB planned for Wednesday.  Rounding in place.  at bedside.     /68   Pulse 66   Temp 36.4 °C (97.6 °F) (Temporal)   Resp 16   Ht 1.549 m (5' 1\")   Wt 58.1 kg (128 lb 1.4 oz)   LMP 07/06/2011   SpO2 95%   Breastfeeding? No   BMI 24.20 kg/m²     "

## 2018-12-24 NOTE — CARE PLAN
Problem: Communication  Goal: The ability to communicate needs accurately and effectively will improve  Outcome: PROGRESSING AS EXPECTED  Discussed POC with pt, all questions answered    Problem: Safety  Goal: Will remain free from injury  Outcome: PROGRESSING AS EXPECTED  Pt calls for assistance

## 2018-12-24 NOTE — PROGRESS NOTES
· 2 RN skin check complete.   · Devices in place: n/a.  · Skin assessed under devices: n/a   · Sacrum red, blanching.  · The following interventions in place: waffle mattress, pt repositions self.

## 2018-12-24 NOTE — PROGRESS NOTES
Pt alert ant talking about her plans for later this week. Dr. Marks at bedside to talk with pt and family about plan of care and pending diagnostics.

## 2018-12-25 PROBLEM — N18.30 CKD (CHRONIC KIDNEY DISEASE) STAGE 3, GFR 30-59 ML/MIN: Chronic | Status: ACTIVE | Noted: 2018-01-01

## 2018-12-25 PROBLEM — G93.41 METABOLIC ENCEPHALOPATHY: Status: ACTIVE | Noted: 2018-01-01

## 2018-12-25 PROBLEM — R73.9 HYPERGLYCEMIA: Status: ACTIVE | Noted: 2018-01-01

## 2018-12-25 NOTE — CARE PLAN
Problem: Communication  Goal: The ability to communicate needs accurately and effectively will improve  Outcome: PROGRESSING AS EXPECTED  Discussed POC with pt and , all questions answered.    Problem: Safety  Goal: Will remain free from injury  Outcome: PROGRESSING AS EXPECTED  Pt calls for assistance.

## 2018-12-25 NOTE — PROGRESS NOTES
Rec'd report & assumed care of pt at 0700. Assessment completed. Pt is A&Ox4, up x1 to BSC,  at bedside. NPO this morning for bone marrow biopsy done at bedside, resumed Regular diet at 1pm per MD. R PIV came out, ultrasound guided PIV placed in L forearm, patent, running NS @ 75 mL/hr. POC discussed & questions answered. Bed locked and in lowest position, call light within reach, non-skid socks in place, hourly rounding. Pt reports no further needs at this time, will continue to monitor.

## 2018-12-25 NOTE — PROGRESS NOTES
"/66   Pulse 66   Temp 37 °C (98.6 °F) (Oral)   Resp 18   Ht 1.549 m (5' 1\")   Wt 58.1 kg (128 lb 1.4 oz)   LMP 07/06/2011   SpO2 95%   Breastfeeding? No   BMI 24.20 kg/m²     Pt is AOX2. Disoriented to time and event. Pt does not answer questions appropriately and goes off on tangents. Does not remember that I am a nurse or who I am. Needs frequent reorienting. Pt in 10/10 pain- medicated per MAR. Delay in pain meds due to it not being available in [facility].  is at bedside. Denies SOB, chest pain, n/v. Bed is locked in lowest position.   "

## 2018-12-25 NOTE — CARE PLAN
Problem: Communication  Goal: The ability to communicate needs accurately and effectively will improve  Outcome: NOT MET  Pt requires reorientation. Confused and not able to make needs known. Medications have been re-evaluated per Pharm and MD. Urinalysis ordered to rule out UTI. Hourly rounding in place.     Problem: Infection  Goal: Will remain free from infection  Outcome: PROGRESSING SLOWER THAN EXPECTED  Assess for signs and symptoms of infection. Urinalysis ordered- waiting sample.

## 2018-12-25 NOTE — PROGRESS NOTES
"A/ox4, up with one assist to bedside commode, waffle mattress.  Pt complains of pain in lower back and hips, Dilaudid IV PRN given per MAR.   L PIV running NS @ 75mL/hr.   Did BMB today, awaiting results.   Rounding in place.  at bedside.     /63   Pulse 65   Temp 36.4 °C (97.6 °F) (Oral)   Resp 18   Ht 1.549 m (5' 1\")   Wt 58.1 kg (128 lb 1.4 oz)   LMP 07/06/2011   SpO2 95%   Breastfeeding? No   BMI 24.20 kg/m²     "

## 2018-12-25 NOTE — PROGRESS NOTES
[facility] Medicine Daily Progress Note    Date of Service  12/24/2018    Chief Complaint  83 y.o. female admitted 12/21/2018 with worsening back pain to the point of intractable few days prior to admission, had been seen by pcp, pain management, ortho without significant relief other than temporary improvement.    Hospital Course    Patient had scans showing lytic lesions in Right hip and lower back.  Biopsy ordered on admission but will not be done until after Soni d/t weekend and holiday.  Bone marrow biopsy ordered and also likely cannot be scheduled until Wednesday.  SPEP and UPEP collected and pending.      Interval Problem Update  12/22 Patient with pain, responding well to narcotic though is in extreme pain between doses.  I increased her steroids and gabapentin for better overall pain control, continue with narcotic medication.  Skeletal survey ordered to r/o impending fracture - long bones are okay but she does have lytic lesion in right pelvis which is likely the source of the majority of her pain.  12/23 Patient states she has no pain when still but still having significant pain when moving - responding appropriate to pain medications received - sedating at times.  Electrophoresis results should be back tomorrow.  Bone marrow biopsy pending Wednesday - is scheduled.  12/24 Patient seen after bone marrow biopsy earlier today, histology department was able to coordinate earlier.  SPEP and UPEP still pending at this time.  Patient slightly confused and speaking in gibberish following sedation for BM but resolving with time.  She has improved pain control with steroids and is tolerating higher dose gabapentin well.  Oncology consult pending labs and bone marrow results.   and son at bedside when evaluated - explained current treatment and anticipated turnaround for test results.    Consultants/Specialty  none    Code Status  full    Disposition  tbd    Review of Systems  Review of Systems    Constitutional: Positive for malaise/fatigue. Negative for chills and fever.   HENT: Negative for congestion and sore throat.    Eyes: Negative for blurred vision and photophobia.   Respiratory: Negative for cough and shortness of breath.    Cardiovascular: Negative for chest pain, claudication and leg swelling.   Gastrointestinal: Negative for abdominal pain, constipation, diarrhea, heartburn, nausea and vomiting.   Genitourinary: Negative for dysuria and hematuria.   Musculoskeletal: Positive for back pain and joint pain (right hip and groin). Negative for myalgias.   Skin: Negative for itching and rash.   Neurological: Negative for dizziness, sensory change, speech change, weakness and headaches.   Psychiatric/Behavioral: Negative for depression. The patient is not nervous/anxious and does not have insomnia.         Physical Exam  Temp:  [36.3 °C (97.4 °F)-36.6 °C (97.9 °F)] 36.4 °C (97.5 °F)  Pulse:  [59-66] 64  Resp:  [10-20] 15  BP: (131-165)/(68-92) 159/87    Physical Exam   Constitutional: She is oriented to person, place, and time. She appears well-developed and well-nourished. No distress.   HENT:   Head: Normocephalic and atraumatic.   Eyes: Conjunctivae are normal. No scleral icterus.   Neck: Neck supple. No JVD present.   Cardiovascular: Normal rate, regular rhythm and normal heart sounds.  Exam reveals no gallop and no friction rub.    No murmur heard.  Pulmonary/Chest: Effort normal and breath sounds normal. No respiratory distress. She exhibits no tenderness.   Abdominal: Soft. Bowel sounds are normal. She exhibits no distension. There is no guarding.   Musculoskeletal: She exhibits no edema or tenderness.   Lymphadenopathy:     She has no cervical adenopathy.   Neurological: She is alert and oriented to person, place, and time. No cranial nerve deficit.   Skin: Skin is warm and dry. She is not diaphoretic. No erythema. No pallor.   Psychiatric: She has a normal mood and affect. Her behavior is  normal.   Nursing note and vitals reviewed.      Fluids    Intake/Output Summary (Last 24 hours) at 12/24/18 1728  Last data filed at 12/24/18 0756   Gross per 24 hour   Intake                0 ml   Output              200 ml   Net             -200 ml       Laboratory  Recent Labs      12/21/18   1830  12/22/18   0105   WBC  11.2*  8.6   RBC  4.31  3.97*   HEMOGLOBIN  13.5  12.1   HEMATOCRIT  40.8  37.9   MCV  94.7  95.5   MCH  31.3  30.5   MCHC  33.1*  31.9*   RDW  48.3  49.1   PLATELETCT  180  142*   MPV  10.8  10.4     Recent Labs      12/21/18   1830  12/22/18   0105   SODIUM  140  140   POTASSIUM  3.8  3.7   CHLORIDE  107  109   CO2  20  24   GLUCOSE  114*  129*   BUN  27*  27*   CREATININE  1.15  1.16   CALCIUM  10.0  9.2     Recent Labs      12/21/18   1830  12/23/18   0152   APTT  25.8   --    INR  1.28*  1.27*               Imaging  DX-BONE SURVEY-METASTATIC LTD   Final Result      1.  Lytic foci within the calvaria, right iliac bone, and right distal clavicle      2.  Multiple additional lytic foci identified on recent CT are not apparent on conventional radiography      3.  Different diagnosis includes metastatic malignancy or multiple myeloma      CT-LSPINE W/O PLUS RECONS   Final Result      Destructive lesions within the right pedicle and transverse process of L3, the right sacral wing, and right iliac crest consistent with metastatic disease or multiple myeloma.   Multilevel degenerative changes.   Moderate spinal stenosis at L4-5.      CT-HIP W/O PLUS RECONS RIGHT   Final Result         1.  Multiple lytic expansile lesions in the pelvis and spine as described.   2.  Hairline fracture of the acetabular roof on the right, likely pathologic fracture.   3.  Hairline lucency through the right femoral neck suggests hairline fracture           Assessment/Plan  * Intractable low back pain- (present on admission)   Assessment & Plan    -Normally follows with pain management outpatient  -Just got an epidural  injection the day prior to admission  -However, feels that pain is still not managed  -Dilaudid IV as needed, norco PO  -High dose dexamethasone for pain control (4mg qid), increase neurontin daily as tolerated     Lytic bone lesion of hip- (present on admission)   Assessment & Plan    -Differential includes multiple myeloma versus metastatic cancer  -Patient does not have a known history of cancer  -SPEP and BEATRICE pending  Bone marrow biopsy when can be coordinated Wednesday  Bone biopsy may still be needed  Skeletal survey without evidence of impending fracture  Right hip, lumbar spine and cranium with evidence of lytic lesions though calcium is normal and total protein is normal.       Gout- (present on admission)   Assessment & Plan    -The current pain is likely more than just a gout flare  -Continue allopurinol  -Uric acid normal       Elevated INR (international normalized ratio)- (present on admission)   Assessment & Plan    - not on anticoagulants as an outpt         Elevated BUN- (present on admission)   Assessment & Plan    -Rehydrating with IV fluid     Elevated alkaline phosphatase level- (present on admission)   Assessment & Plan    -Likely related to bone lesions          VTE prophylaxis: heparin

## 2018-12-26 NOTE — CONSULTS
"Date & Time note created:    12/26/2018   11:42 AM       Date of Consult: 12/26/2018    Requesting Provider: Cristine Matthews M.D.  Consulting Provider: Nela Wild  Reason for Consult: Symptom management    Chief Complaint: Severe lower back and hip pain  HPI:   Susie Craig is a 83 y.o. female admitted 12/21/2018 for intractable lower back pain.  CT imaging on day of admission showed multiple lytic lesions in the patient's lumbar spine, right pelvic bones, and hairline fractures of the right femoral neck and acetabulum roof.  Bone marrow biopsy was conducted on 12/24/2018 and results are pending.  There is concern for multiple myeloma versus metastatic disease.  Bone lesion biopsy has been ordered and is pending.  The patient has developed some acute delirium during her hospitalization that started 12/23/2018 per her  and has become increasingly worse over the past several days.  Urinalysis on 12/25/2018 revealed a urinary tract infection and the patient was started on IV Rocephin.      Patient is confused and anxious making review of systems limited.  Information obtained from the patient, the patient's family, medical record review, and bedside nurse.    Patient and patient's  report that patient's low back pain started in April of this past year and reports that at that time she started going to outpatient pain management.  The patient was able to state that \"this pain is different.\"  Patient's  reports that the patient's pain escalated the week leading up to her hospitalization.  She received an interventional injection at pain management with little relieve and she came to the emergency department.  Patient reports that her pain is constant and in her bilateral groin area \"they say I have an infection in my bladder.\"  She also reports low back pain but was unable to say which side hurt more.  She is unable to describe what her pain feels like but does report that it is " aggravated by talking.  Her  reports that the patient is able to sleep following IV Dilaudid at night.  The patient and her  report that the patient is taking morphine without issues in the past.  Patient's  reports the patient is swallowing fine when the nurses provide pills with pudding but was chewing some of her pills last night if not coached.     Past Medical History:   Diagnosis Date   • Arthritis    • Back pain    • Breath shortness     with exertion   • CAD (coronary artery disease)     CABG X3 in '16 with graft occlusion post-op   • Cataract     bilat IOL    • Gout    • Heart attack (HCC)    • Heart burn    • High cholesterol    • Hypertension    • Indigestion    • MEDICAL HOME    • Stroke (HCC)     TIA      Past Surgical History:   Procedure Laterality Date   • MULTIPLE CORONARY ARTERY BYPASS ENDO VEIN HARVEST  11/6/2016    Procedure: MULTIPLE CORONARY ARTERY BYPASS ENDO VEIN HARVEST;  Surgeon: Jeff Naranjo M.D.;  Location: SURGERY [facility];  Service:    • ABIODUN  11/6/2016    Procedure: ABIODUN;  Surgeon: Jeff Naranjo M.D.;  Location: SURGERY [facility];  Service:    • CATARACT PHACO WITH IOL  8/31/2009    Performed by SOLOMON THOMAS at SURGERY SAME DAY [facility] ORS   • CATARACT PHACO WITH IOL  8/20/2009    Performed by SOLOMON THOMAS at SURGERY SAME DAY [facility] ORS   • GYN SURGERY  1951    Hysterectomy during last C Section    • ABDOMINAL HYSTERECTOMY TOTAL     • ANGIOPLASTY  85, 97   • CARDIAC CATH     • GYN SURGERY      C Section x4   • OTHER      c-sections x4   • PRIMARY C SECTION       Current Medications:  No current facility-administered medications on file prior to encounter.      Current Outpatient Prescriptions on File Prior to Encounter   Medication Sig Dispense Refill   • methylPREDNISolone (MEDROL) 4 MG Tab Take as per the package instructions. 21 Tab 0   • gabapentin (NEURONTIN) 100 MG Cap Take 100 mg by mouth 3 times a day.     • aspirin EC (ECOTRIN)  "81 MG TBEC Take 81 mg by mouth every evening.       Medication Allergy/Sensitivities:  Allergies   Allergen Reactions   • Atorvastatin      Elevated LFT  RXN = long time ago      Family History   Problem Relation Age of Onset   • Cancer Mother         uterine   • Arthritis Mother         gout   • Asthma Mother    • Heart Disease Father         MI age 50s   • Heart Disease Brother          40 MI   • Hyperlipidemia Brother    • Hyperlipidemia Sister    • Arthritis Sister      Social History: Patient quit smoking about 30 years ago; she has a 16-pack-year smoking history.  She drinks a small amount of alcohol once per week.  She does not use illicit drugs or medications that are not prescribed to her.  She has 4 adult sons who were visiting today.    Living situation: Patient lives with her  Sixto \"Aristeo\" Kiara.  She reports he has somewhat older and does not have the best memory at times.    Palliative Performance Scale: 30% at this time due to pain and difficulty with mobilization.      Spiritual History: Patient's  report that she prays.  Patient declined spiritual care visit from hospital chaplaincy at this time as she finds it distressing to talk due to her confusion.    ROS:    Review of Systems   Respiratory: Negative for shortness of breath.    Cardiovascular: Negative for leg swelling.   Gastrointestinal: Positive for constipation. Negative for nausea and vomiting.   Genitourinary: Positive for urgency.        Several episodes of incontinence; utilizing female urinal with assistance of bedside staff   Musculoskeletal: Positive for back pain and joint pain.   Neurological: Positive for weakness (generalized due to pain). Negative for tingling, sensory change and focal weakness.   Psychiatric/Behavioral: Negative for substance abuse. The patient is nervous/anxious.        PE:   Recent vital signs  Weight/BMI: Body mass index is 24.2 kg/m².  /84   Pulse (!) 53   Temp 36.3 °C (97.3 " "°F) (Temporal)   Resp (!) 7   Ht 1.549 m (5' 1\")   Wt 58.1 kg (128 lb 1.4 oz)   LMP 07/06/2011   SpO2 96%   Breastfeeding? No   BMI 24.20 kg/m²   Vitals:    12/25/18 1630 12/25/18 2128 12/26/18 0547 12/26/18 0900   BP: 136/67 141/65 126/75 148/84   Pulse: 61 (!) 50 (!) 58 (!) 53   Resp: 18 16 16 (!) 7   Temp: 36.4 °C (97.5 °F) 36.9 °C (98.4 °F) 36.3 °C (97.4 °F) 36.3 °C (97.3 °F)   TempSrc: Temporal Oral Oral Temporal   SpO2: 95% 94% 92% 96%   Weight:       Height:         Oxygen Therapy:  Pulse Oximetry: 96 %, O2 Delivery: None (Room Air)  Physical Exam   HENT:   Mouth/Throat: Oropharynx is clear and moist. No oropharyngeal exudate.   Eyes:   Would not open eyes for exam   Cardiovascular: Bradycardia present.  Exam reveals distant heart sounds.    Pulmonary/Chest: She has decreased breath sounds in the right lower field and the left lower field.   Abdominal: Bowel sounds are normal. She exhibits distension (mild).   Musculoskeletal: She exhibits no edema.   Neurological: She is alert. She is agitated and disoriented (time, details of event).   Skin: There is pallor.   Psychiatric: Her mood appears anxious. She is agitated. She exhibits disordered thought content and abnormal new learning ability.   Nursing note and vitals reviewed.      Lab Data Review:  Recent Results (from the past 24 hour(s))   URINALYSIS    Collection Time: 12/25/18  6:35 PM   Result Value Ref Range    Color Yellow     Character Cloudy (A)     Specific Gravity 1.019 <1.035    Ph 5.0 5.0 - 8.0    Glucose Negative Negative mg/dL    Ketones Negative Negative mg/dL    Protein 100 (A) Negative mg/dL    Bilirubin Negative Negative    Urobilinogen, Urine 0.2 Negative    Nitrite Negative Negative    Leukocyte Esterase Negative Negative    Occult Blood Negative Negative    Micro Urine Req Microscopic    URINE MICROSCOPIC (W/UA)    Collection Time: 12/25/18  6:35 PM   Result Value Ref Range    WBC 5-10 (A) /hpf    RBC 5-10 (A) /hpf    Bacteria " Many (A) None /hpf    Epithelial Cells Few /hpf    Hyaline Cast 3-5 (A) /lpf   CBC WITH DIFFERENTIAL    Collection Time: 12/26/18  5:39 AM   Result Value Ref Range    WBC 12.0 (H) 4.8 - 10.8 K/uL    RBC 3.87 (L) 4.20 - 5.40 M/uL    Hemoglobin 11.9 (L) 12.0 - 16.0 g/dL    Hematocrit 37.1 37.0 - 47.0 %    MCV 95.9 81.4 - 97.8 fL    MCH 30.7 27.0 - 33.0 pg    MCHC 32.1 (L) 33.6 - 35.0 g/dL    RDW 50.4 (H) 35.9 - 50.0 fL    Platelet Count 144 (L) 164 - 446 K/uL    MPV 11.0 9.0 - 12.9 fL    Neutrophils-Polys 79.50 (H) 44.00 - 72.00 %    Lymphocytes 9.30 (L) 22.00 - 41.00 %    Monocytes 10.30 0.00 - 13.40 %    Eosinophils 0.00 0.00 - 6.90 %    Basophils 0.10 0.00 - 1.80 %    Immature Granulocytes 0.80 0.00 - 0.90 %    Nucleated RBC 0.00 /100 WBC    Neutrophils (Absolute) 9.55 (H) 2.00 - 7.15 K/uL    Lymphs (Absolute) 1.11 1.00 - 4.80 K/uL    Monos (Absolute) 1.24 (H) 0.00 - 0.85 K/uL    Eos (Absolute) 0.00 0.00 - 0.51 K/uL    Baso (Absolute) 0.01 0.00 - 0.12 K/uL    Immature Granulocytes (abs) 0.09 0.00 - 0.11 K/uL    NRBC (Absolute) 0.00 K/uL   COMP METABOLIC PANEL    Collection Time: 12/26/18  5:40 AM   Result Value Ref Range    Sodium 140 135 - 145 mmol/L    Potassium 4.4 3.6 - 5.5 mmol/L    Chloride 113 (H) 96 - 112 mmol/L    Co2 20 20 - 33 mmol/L    Anion Gap 7.0 0.0 - 11.9    Glucose 120 (H) 65 - 99 mg/dL    Bun 28 (H) 8 - 22 mg/dL    Creatinine 0.95 0.50 - 1.40 mg/dL    Calcium 9.1 8.5 - 10.5 mg/dL    AST(SGOT) 43 12 - 45 U/L    ALT(SGPT) 41 2 - 50 U/L    Alkaline Phosphatase 779 (H) 30 - 99 U/L    Total Bilirubin 0.4 0.1 - 1.5 mg/dL    Albumin 3.6 3.2 - 4.9 g/dL    Total Protein 6.0 6.0 - 8.2 g/dL    Globulin 2.4 1.9 - 3.5 g/dL    A-G Ratio 1.5 g/dL   ESTIMATED GFR    Collection Time: 12/26/18  5:40 AM   Result Value Ref Range    GFR If African American >60 >60 mL/min/1.73 m 2    GFR If Non  56 (A) >60 mL/min/1.73 m 2       Imaging/Procedures Review:  12/21/18 (CT LSPINE/CT HIP); 12/22/18 Bone  Survey)  DX-BONE SURVEY-METASTATIC LTD   Final Result      1.  Lytic foci within the calvaria, right iliac bone, and right distal clavicle      2.  Multiple additional lytic foci identified on recent CT are not apparent on conventional radiography      3.  Different diagnosis includes metastatic malignancy or multiple myeloma      CT-LSPINE W/O PLUS RECONS   Final Result      Destructive lesions within the right pedicle and transverse process of L3, the right sacral wing, and right iliac crest consistent with metastatic disease or multiple myeloma.   Multilevel degenerative changes.   Moderate spinal stenosis at L4-5.      CT-HIP W/O PLUS RECONS RIGHT   Final Result         1.  Multiple lytic expansile lesions in the pelvis and spine as described.   2.  Hairline fracture of the acetabular roof on the right, likely pathologic fracture.   3.  Hairline lucency through the right femoral neck suggests hairline fracture         Problem List:  1.  Cancer related low back and bilateral hip pain (active)  2.  Acute delirium (active)  3.  Constipation (active)  4.  Intermittent somnolence (active)  5.  Lytic bone lesion of right hip and lumbar spine with elevated alkaline phosphate (active)  6.  Chronic kidney disease stage III (active)  7.  Urinary tract infection (active)  8.  Gout (chronic) -continue allopurinol  9.  Hyperglycemia (stable) -recommend continued monitoring; likely related to steroid use  10.  Elevated INR - mild (stable) - recommend continued monitoring; not on anticoagulation    DISCUSSION/RECOMMENDATIONS:   Cancer related low back and bilateral hip pain  MEDD (morphine equivalent daily dose) is approximately 75 mg:  -hydrocodone 10 mg x 2 doses = 20 mg = 15 mg MEDD  -hydromorphone 0.5 mg IV x 6 doses = 3 mg = 60 mg MEDD    Start fentanyl transdermal patch at 12 mcg/h every 72 hours; an equianalgesic dose would be 37.5 mcg.  This is a new medication so a cross intolerance dose reduction of 25% is warranted  making the dose around 28 mcg. 25 mcg would be appropriate but given the patient's intermittent somnolence with low respiratory rate, will start lowest dose (12 mcg).  -Patient has good body habitus to support the fentanyl patch  - Due to its low toxic metabolite profile this is a good choice for the patient considering her CKD  -Provided education for onset and efficacy of fentanyl patch  Decrease hydromorphone to 0.25 mg - 0.5 mg IV every 3 hours as needed breakthrough pain  Continue hydrocodone/acetaminophen (NORCO ) at current dose  -Provided education that oral medications can last longer than intravenous route  Consider oral hydromorphone 2 mg p.o. every 3 hours as needed breakthrough pain as an alternate if Norco is ineffective  Continue with dexamethasone 4 mg IV twice daily and gabapentin 200 mg p.o. 3 times daily; these were lower yesterday due to patient's intermittent somnolence and delirium    If BUN/GFR improve, could consider switching opioid profile to MS Contin for long acting and MS IR and IV morphine for break through pain    Acute delirium  We will avoid changing to many medications today  Delirium could be related to medications such as steroids versus UTI     Constipation  Last bowel movement 12/23/2018  Continue with senna-docusate 2 tabs p.o. twice daily  Please provide aggressive bowel protocol until patient has a bowel movement  PRN medications are available including MiraLAX, milk of magnesia, and bisacodyl suppository in that order    Intermittent somnolence  Decreased IV hydromorphone dosing to 0.25 mg-0.5 mg IV every 3 hours as needed breakthrough pain    Lytic bone lesion of right hip and lumbar spine with elevated alkaline phosphate  Bone marrow biopsy completed 12/24/2018 and results are pending  Concern for multiple myeloma versus metastatic disease  Cancer nurse navigator in to see the patient and her  following my visit  Recommend oncology consult and radiation  oncology consult    Urinary tract infection   Patient was started on IV Rocephin    Chronic kidney disease stage III   Renal dose medications and avoid nephrotoxic medications  If BUN/GFR improve as UTI is being treated, could consider switching opioid profile    Code Status: Full    Advance Care Planning/Current Goals of Care: Patient's advanced directive is on file.  Her  is her DURABLE POWER OF  for healthcare decision making but she did indicate that she would like her 4 sons included in decision making.  She selected #2, 3, and 5 for statement of desires indicating that she would not want life-sustaining treatment in the event of incurable or irreversible or if no reasonable hope of recovery or survival was expected.  Patient and family are anxiously awaiting diagnosis and prognosis.  Patient and her /sons are all aware and concerned that their patient has been diagnosed with lytic lesions indicating cancer.  Palliative care will continue to follow to discuss goals of care as clinical picture unfolds.    Thank you for allowing the palliative care team to participate in this patient's care. Please call with any questions/needs.     Total visit time was 65 minutes.  Greater than 50% of today's visit was spent counseling and coordinating the patient's care regarding symptom management plan.   Please refer to HPI and assessment/plan for details.     Nela Wild A.P.R.N.  Palliative Care Nurse Practitioner  [phone]

## 2018-12-26 NOTE — ASSESSMENT & PLAN NOTE
UA positive for UTI - completed 5 day course of abx.  Medication induced - meds adjusted and mentation improved

## 2018-12-26 NOTE — PROGRESS NOTES
[facility] Medicine Daily Progress Note    Date of Service  12/25/2018    Chief Complaint  83 y.o. female admitted 12/21/2018 with worsening back pain to the point of intractable few days prior to admission, had been seen by pcp, pain management, ortho without significant relief other than temporary improvement.    Hospital Course    Patient had scans showing lytic lesions in Right hip and lower back.  Biopsy ordered on admission but will not be done until after Soni d/t weekend and holiday.  Bone marrow biopsy ordered and also likely cannot be scheduled until Wednesday.  SPEP and UPEP collected and pending.      Interval Problem Update  12/22 Patient with pain, responding well to narcotic though is in extreme pain between doses.  I increased her steroids and gabapentin for better overall pain control, continue with narcotic medication.  Skeletal survey ordered to r/o impending fracture - long bones are okay but she does have lytic lesion in right pelvis which is likely the source of the majority of her pain.  12/23 Patient states she has no pain when still but still having significant pain when moving - responding appropriate to pain medications received - sedating at times.  Electrophoresis results should be back tomorrow.  Bone marrow biopsy pending Wednesday - is scheduled.  12/24 Patient seen after bone marrow biopsy earlier today, histology department was able to coordinate earlier.  SPEP and UPEP still pending at this time.  Patient slightly confused and speaking in gibberish following sedation for BM but resolving with time.  She has improved pain control with steroids and is tolerating higher dose gabapentin well.  Oncology consult pending labs and bone marrow results.   and son at bedside when evaluated - explained current treatment and anticipated turnaround for test results.  12/25: Patient is restless.  Agitated.  Delirious.  Repeating herself.  In severe low back pain.   at  bedside.  Consultants/Specialty  none    Code Status  full    Disposition  tbd    Review of Systems  Review of Systems   Constitutional: Positive for malaise/fatigue. Negative for chills, fever and weight loss.   HENT: Negative for congestion, ear discharge, ear pain, hearing loss, sore throat and tinnitus.    Eyes: Negative for blurred vision, double vision and photophobia.   Respiratory: Negative for cough, hemoptysis and shortness of breath.    Cardiovascular: Negative for chest pain, palpitations, claudication and leg swelling.   Gastrointestinal: Negative for abdominal pain, diarrhea, heartburn, nausea and vomiting.   Genitourinary: Negative for dysuria and urgency.   Musculoskeletal: Positive for back pain and joint pain (right hip and groin). Negative for myalgias and neck pain.   Skin: Negative for itching.   Neurological: Negative for dizziness, tingling, sensory change, speech change, weakness and headaches.   Psychiatric/Behavioral: Negative for depression and suicidal ideas. The patient is not nervous/anxious and does not have insomnia.         Physical Exam  Temp:  [36.3 °C (97.4 °F)-37 °C (98.6 °F)] 37 °C (98.6 °F)  Pulse:  [55-66] 66  Resp:  [18] 18  BP: (129-149)/(63-84) 129/66    Physical Exam   Constitutional: She appears well-nourished. No distress.   HENT:   Head: Normocephalic and atraumatic.   Mouth/Throat: No oropharyngeal exudate.   Eyes: Pupils are equal, round, and reactive to light.   Neck: Neck supple. No JVD present. No tracheal deviation present.   Cardiovascular: Normal rate and regular rhythm.  Exam reveals no gallop and no friction rub.    No murmur heard.  Pulmonary/Chest: Effort normal and breath sounds normal. No respiratory distress. She has no wheezes.   Abdominal: Soft. Bowel sounds are normal. She exhibits no distension. There is no tenderness.   Musculoskeletal: She exhibits no edema or tenderness.   Lymphadenopathy:     She has no cervical adenopathy.   Neurological: She is  alert. She is disoriented.   Skin: Skin is warm and dry. She is not diaphoretic. No erythema. No pallor.   Psychiatric: Her speech is tangential. She expresses impulsivity.   Nursing note and vitals reviewed.      Fluids  No intake or output data in the 24 hours ending 12/25/18 1618    Laboratory  Recent Labs      12/25/18   0213   WBC  10.2   RBC  3.99*   HEMOGLOBIN  12.3   HEMATOCRIT  37.9   MCV  95.0   MCH  30.8   MCHC  32.5*   RDW  49.7   PLATELETCT  148*   MPV  10.9     Recent Labs      12/25/18   0213   SODIUM  140   POTASSIUM  4.7   CHLORIDE  113*   CO2  21   GLUCOSE  156*   BUN  30*   CREATININE  1.11   CALCIUM  8.9     Recent Labs      12/23/18   0152   INR  1.27*               Imaging  DX-BONE SURVEY-METASTATIC LTD   Final Result      1.  Lytic foci within the calvaria, right iliac bone, and right distal clavicle      2.  Multiple additional lytic foci identified on recent CT are not apparent on conventional radiography      3.  Different diagnosis includes metastatic malignancy or multiple myeloma      CT-LSPINE W/O PLUS RECONS   Final Result      Destructive lesions within the right pedicle and transverse process of L3, the right sacral wing, and right iliac crest consistent with metastatic disease or multiple myeloma.   Multilevel degenerative changes.   Moderate spinal stenosis at L4-5.      CT-HIP W/O PLUS RECONS RIGHT   Final Result         1.  Multiple lytic expansile lesions in the pelvis and spine as described.   2.  Hairline fracture of the acetabular roof on the right, likely pathologic fracture.   3.  Hairline lucency through the right femoral neck suggests hairline fracture           Assessment/Plan  * Intractable low back pain- (present on admission)   Assessment & Plan    -Normally follows with pain management outpatient  -Just got an epidural injection the day prior to admission  -However, feels that pain is still not managed  -Dilaudid IV as needed, norco PO  -We will decrease gabapentin  and dexamethasone doses due to delirium.     Lytic bone lesion of hip- (present on admission)   Assessment & Plan    -Differential includes multiple myeloma versus metastatic cancer  -Patient does not have a known history of cancer  -SPEP and BEATRICE still pending (from bone marrow)   Bone marrow biopsy 12/24, pathology pending  Bone biopsy may still be needed  Skeletal survey without evidence of impending fracture  Right hip, lumbar spine and cranium with evidence of lytic lesions though calcium is normal and total protein is normal.       Gout- (present on admission)   Assessment & Plan    -The current pain is likely more than just a gout flare  -Continue allopurinol  -Uric acid normal       CKD (chronic kidney disease) stage 3, GFR 30-59 ml/min ([facility])- (present on admission)   Assessment & Plan    Stable for now.  Avoid nephrotoxins.  Renal dose all medications.     Hyperglycemia- (present on admission)   Assessment & Plan    Suspecting steroids induced.  No history of diabetes.  Continue to monitor.     Metabolic encephalopathy- (present on admission)   Assessment & Plan    Suspecting delirium.  Multifactorial.  Could be gabapentin (high doses for patient age and size) as well as high doses steroids as hallucinations has been reported.  Poorly controlled pain is a factor as well.  No history of diagnosed dementia.  We will decrease Decadron and gabapentin doses.  Avoid benzodiazepines and/or anticholinergic medications.  Minimize hypnotics or sedatives.  Minimize lines.  Avoid Hatch catheter.  We will check UA for possible UTI.       Elevated INR (international normalized ratio)- (present on admission)   Assessment & Plan    - not on anticoagulants as an outpt         Elevated BUN- (present on admission)   Assessment & Plan    -Rehydrating with IV fluid     Elevated alkaline phosphatase level- (present on admission)   Assessment & Plan    -Likely related to bone lesions     Plan of care discussed with  multidisciplinary team during rounds.    VTE prophylaxis: heparin

## 2018-12-26 NOTE — PROGRESS NOTES
Received report from day RN and assumed care of patient at 1900.  Assessed patient and reviewed labs and notes.  Patient is AOx4 with some confusion noted during conversation.  She appears to be having auditory hallucinations; when asked if hearing voices of people who are not present, she stated yes.   present, anxious.  Patient requires max assist to BSC.  Patient reports bilateral hip pain, right > left, and was medicated 3 times overnight per MAR.  After 0.5 mg IV hydromorphone administration at 1705, patient was too somnolent to wake appropriately for administration of PM medications at [id number].  She was arousable but repeatedly fell back asleep seconds after waking.  She awoke again in early AM to void, was in severe pain, and received a second dose of IV hydromorphone.  Patient states she was able to walk before admission; overnight, she was unable to bear weight and required assist x2 to bedside commode.  In AM, we did not attempt to get patient up to BSC; patient able to use female urinal with assistance.  Patient has not been seen by PT/OT; may benefit from consult.    Urinalysis results:  Cloudy; protein 100; WBC 5-10; RBC 5-10; bacteria many; hyaline cast 3-5; nitrite negative; leukocyte esterase negative.  Per on-call MD, okay to order and administer 2g IV rocephin daily for 5 days.  First dose received at 0100.    Patient refused AM lab draw at midnight; labs rescheduled for 0530.    Plan of care reviewed, patient board updated, safety precautions in place, call light within reach, and patient calling appropriately.

## 2018-12-26 NOTE — CARE PLAN
Problem: Infection  Goal: Will remain free from infection  Outcome: PROGRESSING AS EXPECTED    Intervention: Assess signs and symptoms of infection  Patient confused, having difficulty urinating, and has elevated neutrophils.  UA results and symptoms reported to on-call MD.  IV rocephin started.      Problem: Pain Management  Goal: Pain level will decrease to patient's comfort goal  Outcome: PROGRESSING SLOWER THAN EXPECTED    Intervention: Follow pain managment plan developed in collaboration with patient and Interdisciplinary Team  Patient in severe pain.  IV dilaudid appears to cause significant sedation; however, patient wakes up in severe pain and requires additional dose for immediate pain relief.  Plan at time of assessment was to alternate PO medication with IV medication; however, patient is too somnolent to take PO meds after IV meds and when she wakes she requires immediate pain relief.

## 2018-12-26 NOTE — PROGRESS NOTES
"/82   Pulse (!) 58   Temp 36.4 °C (97.6 °F) (Temporal)   Resp 18   Ht 1.549 m (5' 1\")   Wt 58.1 kg (128 lb 1.4 oz)   LMP 07/06/2011   SpO2 98%   Breastfeeding? No   BMI 24.20 kg/m²     Pt is AOX3. Disoriented to time. Pt is confused. Auditory hallucinations present.   Pain is rated between 9-10. Medicated per MAR. K-pad ordered. Palliative NP came to bedside.   Family ( and sons) are very anxious. This RN spoke with family in family room for 20 minutes answering questions regarding pt's pain management and confusion. Son, Conor would like to be called when biopsy results are in. Phone number is on pt's care board.   Bed is locked in lowest position. Call light within reach.   "

## 2018-12-27 NOTE — PROGRESS NOTES
[facility] Medicine Daily Progress Note    Date of Service  12/27/2018    Chief Complaint  83 y.o. female admitted 12/21/2018 with worsening back pain to the point of intractable few days prior to admission, had been seen by pcp, pain management, ortho without significant relief other than temporary improvement.    Hospital Course    Patient had scans showing lytic lesions in Right hip and lower back.  Biopsy ordered on admission but will not be done until after Soni d/t weekend and holiday.  Bone marrow biopsy ordered and also likely cannot be scheduled until Wednesday.  SPEP and UPEP collected and pending.      Interval Problem Update  12/22 Patient with pain, responding well to narcotic though is in extreme pain between doses.  I increased her steroids and gabapentin for better overall pain control, continue with narcotic medication.  Skeletal survey ordered to r/o impending fracture - long bones are okay but she does have lytic lesion in right pelvis which is likely the source of the majority of her pain.  12/23 Patient states she has no pain when still but still having significant pain when moving - responding appropriate to pain medications received - sedating at times.  Electrophoresis results should be back tomorrow.  Bone marrow biopsy pending Wednesday - is scheduled.  12/24 Patient seen after bone marrow biopsy earlier today, histology department was able to coordinate earlier.  SPEP and UPEP still pending at this time.  Patient slightly confused and speaking in gibberish following sedation for BM but resolving with time.  She has improved pain control with steroids and is tolerating higher dose gabapentin well.  Oncology consult pending labs and bone marrow results.   and son at bedside when evaluated - explained current treatment and anticipated turnaround for test results.  12/25: Patient is restless.  Agitated.  Delirious.  Repeating herself.  In severe low back pain.   at  bedside.  12/26: Patient still in agonizing pain.  Delirious occasionally.  Palliative team evaluated the patient and adjusted pain medications. Awaiting pathology results.   12/27: Feels much comfortable this morning.  Stated that her pain is well controlled.  More interactive this morning.  Bone marrow biopsy results with no significant abnormalities.  Ordered bone biopsy to L3.  Discussed with oncology.  Recommended bone scan.  Consultants/Specialty  Oncology (will come officially on board once pathology results are available).    Code Status  full    Disposition  tbd    Review of Systems  Review of Systems   Constitutional: Positive for malaise/fatigue. Negative for chills, diaphoresis and fever.   HENT: Negative for ear discharge, ear pain, hearing loss and tinnitus.    Eyes: Negative for blurred vision, double vision and photophobia.   Respiratory: Negative for cough and hemoptysis.    Cardiovascular: Negative for chest pain, palpitations and orthopnea.   Gastrointestinal: Negative for heartburn and nausea.   Genitourinary: Negative for dysuria, frequency and urgency.   Musculoskeletal: Positive for back pain and joint pain (right hip and groin). Negative for myalgias and neck pain.   Skin: Negative for itching.   Neurological: Negative for dizziness, tingling and headaches.   Psychiatric/Behavioral: Negative for depression, hallucinations, substance abuse and suicidal ideas.        Physical Exam  Temp:  [36.1 °C (97 °F)-36.9 °C (98.5 °F)] 36.1 °C (97 °F)  Pulse:  [49-68] 58  Resp:  [10-18] 18  BP: (120-183)/(68-86) 130/68    Physical Exam   Constitutional: She is oriented to person, place, and time. She appears well-developed. No distress.   HENT:   Head: Normocephalic and atraumatic.   Mouth/Throat: No oropharyngeal exudate.   Eyes: Pupils are equal, round, and reactive to light. No scleral icterus.   Neck: Neck supple. No tracheal deviation present.   Cardiovascular: Normal rate and regular rhythm.  Exam  reveals no gallop and no friction rub.    Pulmonary/Chest: Effort normal. No respiratory distress. She has no wheezes.   Abdominal: Soft. She exhibits no distension. There is no tenderness.   Musculoskeletal: She exhibits no edema or deformity.   Neurological: She is alert and oriented to person, place, and time. She is not disoriented.   Skin: Skin is warm and dry. She is not diaphoretic. No erythema.   Psychiatric: Her speech is tangential. She expresses impulsivity.   Nursing note and vitals reviewed.      Fluids    Intake/Output Summary (Last 24 hours) at 12/27/18 1532  Last data filed at 12/27/18 0938   Gross per 24 hour   Intake             1781 ml   Output             1025 ml   Net              756 ml       Laboratory  Recent Labs      12/25/18   0213  12/26/18   0539   WBC  10.2  12.0*   RBC  3.99*  3.87*   HEMOGLOBIN  12.3  11.9*   HEMATOCRIT  37.9  37.1   MCV  95.0  95.9   MCH  30.8  30.7   MCHC  32.5*  32.1*   RDW  49.7  50.4*   PLATELETCT  148*  144*   MPV  10.9  11.0     Recent Labs      12/25/18   0213  12/26/18   0540  12/27/18   0042   SODIUM  140  140  142   POTASSIUM  4.7  4.4  4.5   CHLORIDE  113*  113*  112   CO2  21  20  22   GLUCOSE  156*  120*  130*   BUN  30*  28*  25*   CREATININE  1.11  0.95  0.81   CALCIUM  8.9  9.1  8.6                   Imaging  CT-NEEDLE BX-DEEP BONE   Final Result      1.  Successful CT-guided core biopsy of expansile mass involving right L3 transverse process.      CT-ABDOMEN-PELVIS WITH   Final Result      1.  Multiple lytic expansile bone lesions involving the pelvis and thoracolumbar spine as described above. There are areas of cortical disruption and extension into the surrounding soft tissues involving multiple of these lesions. The largest involves    the right ilium and demonstrates some speckled calcification. Differential diagnosis includes metastatic disease as well as multiple myeloma.      2.  Destructive lesion involving the right proximal femur with some  faint associated sclerosis. This is located within the intertrochanteric region and extends to an area of focal cortical breakthrough/disruption at the base of the right femoral neck    superiorly.      3.  Fatty heterogeneous liver likely representing presence of hepatocellular dysfunction.      4.  Bibasilar atelectasis and small bilateral pleural effusions.      5.  Ill-defined pulmonary nodules within the right lung base which of previously been evaluated with CT scan.      6.  6 mm left adrenal apex nodule      CT-CHEST (THORAX) WITH   Final Result      1.  2 noncalcified nodules in the right upper lobe. The largest is spiculated measuring 11 mm in maximum diameter. Differential diagnosis includes primary or secondary lung carcinoma.      2.  3 mm noncalcified nodule in the right middle lobe.      3.  No thoracic adenopathy.      4.  Small bilateral pleural effusions, left greater than right.      5.  Heterogeneous liver which may represent primary or secondary carcinoma.            DX-BONE SURVEY-METASTATIC LTD   Final Result      1.  Lytic foci within the calvaria, right iliac bone, and right distal clavicle      2.  Multiple additional lytic foci identified on recent CT are not apparent on conventional radiography      3.  Different diagnosis includes metastatic malignancy or multiple myeloma      CT-LSPINE W/O PLUS RECONS   Final Result      Destructive lesions within the right pedicle and transverse process of L3, the right sacral wing, and right iliac crest consistent with metastatic disease or multiple myeloma.   Multilevel degenerative changes.   Moderate spinal stenosis at L4-5.      CT-HIP W/O PLUS RECONS RIGHT   Final Result         1.  Multiple lytic expansile lesions in the pelvis and spine as described.   2.  Hairline fracture of the acetabular roof on the right, likely pathologic fracture.   3.  Hairline lucency through the right femoral neck suggests hairline fracture      NM-BONE SCAN WHOLE  BODY    (Results Pending)        Assessment/Plan  * Intractable low back pain- (present on admission)   Assessment & Plan    -Normally follows with pain management outpatient  -Just got an epidural injection the day prior to admission  -However, feels that pain is still not managed  -Dilaudid IV as needed, norco PO  -We will decrease gabapentin and dexamethasone doses due to delirium.  Palliative following for pain management.     Lytic bone lesion of hip- (present on admission)   Assessment & Plan    With hairline fracture of the right acetabular roof and femur neck.  Likely pathological.  -Differential includes multiple myeloma versus metastatic cancer  -Patient does not have a known history of cancer  -SPEP and BEATRICE still pending (from bone marrow)   Bone marrow biopsy 12/24 was negative for any abnormalities.  I ordered bone biopsy of L3 today 12/27.  CT scan of the chest showed 2 nodules in the right upper lobe with the largest one looks spiculated and measures around 11 mm.  CT scan of the abdomen and pelvis showed:  1.  Multiple lytic expansile bone lesions involving the pelvis and thoracolumbar spine as described above. There are areas of cortical disruption and extension into the surrounding soft tissues involving multiple of these lesions. The largest involves   the right ilium and demonstrates some speckled calcification. Differential diagnosis includes metastatic disease as well as multiple myeloma.  2.  Destructive lesion involving the right proximal femur with some faint associated sclerosis. This is located within the intertrochanteric region and extends to an area of focal cortical breakthrough/disruption at the base of the right femoral neck   Superiorly.  3.  6 mm left adrenal Nodule.  Discussed the case with oncology.  Recommended bone scan.       Gout- (present on admission)   Assessment & Plan    -The current pain is likely more than just a gout flare  -Continue allopurinol  -Uric acid normal        CKD (chronic kidney disease) stage 3, GFR 30-59 ml/min ([facility])- (present on admission)   Assessment & Plan    Stable for now.  Avoid nephrotoxins.  Renal dose all medications.     Hyperglycemia- (present on admission)   Assessment & Plan    Suspecting steroids induced.  No history of diabetes.  Continue to monitor.     Metabolic encephalopathy- (present on admission)   Assessment & Plan    Suspecting delirium.  Multifactorial.  Could be gabapentin (high doses for patient age and size) as well as high doses steroids as hallucinations has been reported.  Poorly controlled pain is a factor as well.  No history of diagnosed dementia.  We will decrease Decadron and gabapentin doses.  Avoid benzodiazepines and/or anticholinergic medications.  Minimize hypnotics or sedatives.  Minimize lines.  Avoid Hatch catheter.  We will check UA for possible UTI.       Elevated INR (international normalized ratio)- (present on admission)   Assessment & Plan    - not on anticoagulants as an outpt         Elevated BUN- (present on admission)   Assessment & Plan    -Rehydrating with IV fluid     Elevated alkaline phosphatase level- (present on admission)   Assessment & Plan    -Likely related to bone lesions     Coronary artery disease- (present on admission)   Assessment & Plan    Status post CABG.  Continue with aspirin and statin.     Plan of care discussed with multidisciplinary team during rounds.    VTE prophylaxis: heparin

## 2018-12-27 NOTE — DISCHARGE PLANNING
"Anticipated Discharge Disposition: Home/HHC/Hospice    Action: Attempted to meet with pt, out of room for bone marrow biopsy.  Pt's  Aristeo in room.  Discussed role of CM RN, obtained information r/t family support, current living situation, access to primary care and needed medication.  Aristeo sts, \"We have 4 sons, and lots of grandchildren and great grandchildren. My wife and I live together in a [place] home [place]. We have lots of family. \" Aristeo reports driving, sts pt no longer can because of pain, and has a FWW in the car, if we need it.\" Discussed HHC, Aristeo denies having this service in the past.  Advised PT/OT recommendations are pending, to help identify appropriate LOC, sts understanding.     Barriers to Discharge: none    Plan: Awaiting PT/OT recommendations for discharge POC, and medical team collaboration for continued POC.     Care Transition Team Assessment    Information Source  Orientation : Oriented x 4  Information Given By: Spouse  Informant's Name: Sixto  Who is responsible for making decisions for patient? : Spouse  Name(s) of Primary Decision Maker: Sixto Dawson    Readmission Evaluation  Is this a readmission?: No    Elopement Risk  Legal Hold: No  Ambulatory or Self Mobile in Wheelchair: No-Not an Elopement Risk  Disoriented: No  Psychiatric Symptoms: None  History of Wandering: No  Elopement this Admit: No  Vocalizing Wanting to Leave: No  Displays Behaviors, Body Language Wanting to Leave: No-Not at Risk for Elopement  Elopement Risk: Not at Risk for Elopement    Interdisciplinary Discharge Planning  Patient or legal guardian wants to designate a caregiver (see row info): No  Support Systems: Children, Spouse / Significant Other    Discharge Preparedness  What is your plan after discharge?: Uncertain - pending medical team collaboration  What are your discharge supports?: Spouse, Child ( four sons, )  Prior Functional Level: Uses Walker  Difficulity with ADLs: Walking  Difficulity " with IADLs: Driving    Functional Assesment  Prior Functional Level: Uses Walker    Finances  Financial Barriers to Discharge: No  Prescription Coverage: Yes  Prescription Coverage Comments: [id number] - [place], [place] - [address] [facility]    Vision / Hearing Impairment  Right Eye Vision: Impaired, Wears Glasses  Left Eye Vision: Impaired, Wears Glasses    Values / Beliefs / Concerns  Values / Beliefs Concerns : No    Advance Directive  Advance Directive?: DPOA for Health Care  Durable Power of  Name and Contact : Sixto Craig [phone]       Discharge Risks or Barriers  Discharge risks or barriers?: Complex medical needs  Patient risk factors: Complex medical needs    Anticipated Discharge Information  Anticipated discharge disposition: Home, HHC, Hospice, SNF

## 2018-12-27 NOTE — PROGRESS NOTES
"IR Procedure RN's Note:    Patient consented by Dr. Garland; per MD pt \"lucid enough\" to consent for herself; MD and pt signed consent and placed in chart, witnessed by beside RN.    RIGHT L3 transverse process bone lesion biopsy done by Dr. Garland; RIGHT mid lower back access site; pt assessed upon arrival, pt A/Ox2 (aware knows self and place, pt confused about time and date), pt aware of the procedure, pt baseline - confusion and forgetful; x4 cores obtained and sent to pathology; pt appears comfortable throughout the procedure with no signs of distress or discomfort; ETCO2 monitored with a baseline of 32mmHg and ranged between 29-40mmHg throughout the procedure; access site CDI, soft with no signs of hematoma or bleeding, gauze and tegaderm for dressing; telephone report given to Julia; pt is lethargic and on 2L NC O2 post procedure and during transport; pt transported to room.      "

## 2018-12-27 NOTE — PROGRESS NOTES
"Palliative Progress Note               Author: Nela L Frenchbeverley Date & Time created: 2018  2:16 PM     Reason for subsequent visit: Cancer related low back and right hip pain    Interval History:  No acute events overnight.  Patient is resting with eyes closed.  Her  Aristeo is resting on a cot in patient's room.  He reports she has been sleeping since she went to her CT scan this morning.  They are awaiting further testing and meeting with the oncologist.      Patient denies pain currently \"as long as I don't move.\"  She reports her pain is \"much better.\"  She continues to have pain in her lower back and right hip area that is aggravated by movement.  She reports that her pain was more tolerable over night \"when they were moving me around.\"  She continues to use a female urinal to urinate.  She has not had a significant bowel movement since 18 per her and her .  She denies abdominal pain or fullness.  She is not eating or drinking much; she does feel hungry but is NPO for a bone scan.  Patient and her  deny any issues with chewing oral medications anymore and patient's  Aristeo stated \"that was just one time yesterday morning.\"    Review of Systems:  Positive for delirium (feels more clear mentally today). Negative for dyspnea. Positive for pain (low back and right hip). Positive for anorexia. Positive for fatigue. Negative for sedation. Negative for anxiety. Negative for depression. Negative for insomnia. Negative for nausea and vomiting. Positive for constipation. Negative for dysphagia.      Physical Exam:    Recent vital signs  Temp (24hrs), Av.7 °C (98 °F), Min:36.1 °C (97 °F), Max:36.9 °C (98.5 °F)  Temperature: 36.1 °C (97 °F)  Pulse  Av.3  Min: 49  Max: 73Heart Rate (Monitored): 60  Blood Pressure : (!) 171/84, NIBP: (!) 171/84       Physical Exam   Constitutional: She appears well-developed. No distress.   Patient had difficulty following some commands " during physical examination   HENT:   Mouth/Throat: No oropharyngeal exudate.   Patient continued to hold tongue out during exam and continued to stick it out when asked perform other task such as taking a deep breath   Eyes: Pupils are equal, round, and reactive to light.   Cardiovascular: Normal rate.  Exam reveals distant heart sounds.    Pulmonary/Chest: She has decreased breath sounds in the right lower field and the left lower field.   Abdominal: Soft. She exhibits no distension. Bowel sounds are decreased.   Musculoskeletal: She exhibits no edema.   Neurological: She is disoriented (time; event - improved some from yesterday).   Skin: There is pallor.   Psychiatric: She has a normal mood and affect. Her speech is normal and behavior is normal. Thought content normal. Cognition and memory are impaired (forgetful). She does not express impulsivity.     Recent Labs      18   0213  18   0540  18   0042   SODIUM  140  140  142   POTASSIUM  4.7  4.4  4.5   CHLORIDE  113*  113*  112   CO2  21  20  22   GLUCOSE  156*  120*  130*   BUN  30*  28*  25*   CREATININE  1.11  0.95  0.81   CALCIUM  8.9  9.1  8.6     Recent Labs      18   0213  18   0539   WBC  10.2  12.0*   RBC  3.99*  3.87*   HEMOGLOBIN  12.3  11.9*   HEMATOCRIT  37.9  37.1   MCV  95.0  95.9   MCH  30.8  30.7   MCHC  32.5*  32.1*   RDW  49.7  50.4*   PLATELETCT  148*  144*   MPV  10.9  11.0     Imaging/testin18 bone biopsy results pending  18 CT abdomen- pelvis: No evidence of bowel obstruction, multiple lytic lesions for thoracic, lumbar spine, pelvis, and right femur.  Hepatocellular dysfunction.  Atelectasis and small bilateral pleural effusions, ill-defined pulmonary nodules, left adrenal nodule.    Medications reviewed. Labs Reviewed.     Problem List:  1.  Cancer related low back and bilateral hip pain (active)  2.  Acute delirium (active/improving)  3.  Constipation (active)  4.  Intermittent somnolence  with sporadic bradypnea (active)  5.  Lytic bone lesion of right hip and lumbar spine with elevated alkaline phosphate (active)  6.  Chronic kidney disease stage III (active)  7.  Urinary tract infection (active) -being treated with IV Rocephin  8.  Gout (chronic) -continue allopurinol  9.  Hyperglycemia (stable) -recommend continued monitoring; likely related to steroid use  10.  Elevated INR - mild (stable) - recommend continued monitoring; not on anticoagulation  11.  Intermittent bradycardia (active)  12.  Immobility/deconditioning (active)    Assessment/Plan:  Cancer related low back and bilateral hip pain  MEDD (morphine equivalent daily dose) is approximately 114 mg:  -fentanyl patch 12 mcg/HR = 24 mg MEDD  -MS Contin 30 mg x 2 doses = 24 mg MEDD  -hydromorphone 0.5 mg IV x  2 doses = 1 mg = 20 mg MEDD  -hydromorphone 0.25 mg IV x 2 doses = 0.5 mg = 10 mg MEDD    Switch to oral morphine single entity opioid profile as patient's renal function has improved  Discontinue fentanyl transdermal patch  Continue MS Contin 30 mg p.o. twice daily (started by hospitalist)  Discontinue hydromorphone to 0.25 mg - 0.5 mg IV every 3 hours as needed breakthrough pain  Hydrocodone/acetaminophen (NORCO )  was discontinued by hospitalist team yesterday  Start MS IR 7.5 mg p.o. every 3 hours as needed breakthrough pain  Start morphine 3 mg IV every 3 hours as needed severe breakthrough pain if pain persist past oral opioid or n.p.o.    Continue with dexamethasone 4 mg IV twice daily and gabapentin 200 mg p.o. 3 times daily; these were lower yesterday due to patient's intermittent somnolence and delirium     Acute delirium  Improving   patient tolerated MS Contin started by hospitalist team  Delirium could be related to medications such as steroids versus UTI versus acute pain crisis     Constipation  Last bowel movement 12/24/2018 per patient/ (12/23/18 per chart)  Continue with senna-docusate 2 tabs p.o. twice  daily  Increase MiraLAX to p.o. twice daily in addition to senna-docusate 2 tabs twice daily  Patient declined bisacodyl suppository today and would prefer to try oral medications  She is agreeable to bisacodyl suppository tomorrow if she has not had a bowel movement     Intermittent somnolence with sporadic bradypnea  Used lower end of equal analgesic calculations for single entity opioid rotation to morphine alone     Lytic bone lesion of right hip and lumbar spine with elevated alkaline phosphate, , and CEA  Pending bone scan  Pending results for deep bone biopsy  Pending oncology consult     Chronic kidney disease stage III   Renal dose medications and avoid nephrotoxic medications  If BUN/GFR improve as UTI is being treated, could consider switching opioid profile    Intermittent bradycardia   Discussed with hospitalist and recommended decreasing beta-blocker    Immobility/deconditioning  PT ordered -discussed with Dr. Matthews    Patient and her  are in agreement with plan as stated above.    Code Status:Full; patient's mentation is clearing some and we will plan on discussing recommendation for DNR/DNI when patient is more lucid and patient/family have more information -likely tomorrow    Advance Care Planning (ACP)/Goals of Care (GOC): Advance directive on file.    Thank you for allowing the palliative care team to participate in this patient's care. Please call with any questions/needs.     Total visit time was 40 minutes.  Greater than 50% of today's visit was spent counseling and coordinating the patient's care regarding symptom management and plan of care.   Please refer to interval history assessment/plan for details.    JORGE LUIS Hooper.  Palliative Care Nurse Practitioner  [phone]

## 2018-12-27 NOTE — OR SURGEON
Immediate Post- Operative Note        PostOp Diagnosis: Bone Mets    Procedure(s): CT BX RT L3 Transverse Process      Estimated Blood Loss: Less than 5 ml        Complications: None            12/27/2018     2:46 PM     Jose Garland

## 2018-12-27 NOTE — PROGRESS NOTES
[facility] Medicine Daily Progress Note    Date of Service  12/26/2018    Chief Complaint  83 y.o. female admitted 12/21/2018 with worsening back pain to the point of intractable few days prior to admission, had been seen by pcp, pain management, ortho without significant relief other than temporary improvement.    Hospital Course    Patient had scans showing lytic lesions in Right hip and lower back.  Biopsy ordered on admission but will not be done until after [place] d/t weekend and holiday.  Bone marrow biopsy ordered and also likely cannot be scheduled until Wednesday.  SPEP and UPEP collected and pending.      Interval Problem Update  12/22 Patient with pain, responding well to narcotic though is in extreme pain between doses.  I increased her steroids and gabapentin for better overall pain control, continue with narcotic medication.  Skeletal survey ordered to r/o impending fracture - long bones are okay but she does have lytic lesion in right pelvis which is likely the source of the majority of her pain.  12/23 Patient states she has no pain when still but still having significant pain when moving - responding appropriate to pain medications received - sedating at times.  Electrophoresis results should be back tomorrow.  Bone marrow biopsy pending Wednesday - is scheduled.  12/24 Patient seen after bone marrow biopsy earlier today, histology department was able to coordinate earlier.  SPEP and UPEP still pending at this time.  Patient slightly confused and speaking in gibberish following sedation for BM but resolving with time.  She has improved pain control with steroids and is tolerating higher dose gabapentin well.  Oncology consult pending labs and bone marrow results.   and son at bedside when evaluated - explained current treatment and anticipated turnaround for test results.  12/25: Patient is restless.  Agitated.  Delirious.  Repeating herself.  In severe low back pain.   at  bedside.  12/26: Patient still in agonizing pain.  Delirious occasionally.  Palliative team evaluated the patient and adjusted pain medications. Awaiting pathology results.   Consultants/Specialty  none    Code Status  full    Disposition  tbd    Review of Systems  Review of Systems   Constitutional: Positive for malaise/fatigue. Negative for chills and fever.   HENT: Negative for ear pain, hearing loss, sore throat and tinnitus.    Eyes: Negative for blurred vision, double vision and photophobia.   Respiratory: Negative for cough, hemoptysis and sputum production.    Cardiovascular: Negative for chest pain, palpitations and orthopnea.   Gastrointestinal: Negative for heartburn, nausea and vomiting.   Genitourinary: Negative for dysuria, frequency and urgency.   Musculoskeletal: Positive for back pain and joint pain (right hip and groin). Negative for myalgias and neck pain.   Skin: Negative for rash.   Neurological: Negative for dizziness, tremors, weakness and headaches.   Psychiatric/Behavioral: Negative for depression, hallucinations, substance abuse and suicidal ideas.        Physical Exam  Temp:  [36.3 °C (97.3 °F)-36.9 °C (98.4 °F)] 36.8 °C (98.3 °F)  Pulse:  [50-68] 68  Resp:  [7-18] 18  BP: (126-155)/(65-84) 155/71    Physical Exam   Constitutional: No distress.   HENT:   Head: Normocephalic and atraumatic.   Mouth/Throat: No oropharyngeal exudate.   Eyes: Pupils are equal, round, and reactive to light. Right eye exhibits no discharge. Left eye exhibits no discharge.   Neck: Neck supple. No tracheal deviation present.   Cardiovascular: Normal rate and regular rhythm.  Exam reveals no gallop and no friction rub.    No murmur heard.  Pulmonary/Chest: Effort normal. No respiratory distress. She has no wheezes.   Abdominal: Soft. Bowel sounds are normal. She exhibits no distension. There is no tenderness.   Musculoskeletal: She exhibits no edema or deformity.   Lymphadenopathy:     She has no cervical  adenopathy.   Neurological: She is alert. She is disoriented.   Skin: Skin is warm and dry. She is not diaphoretic. No erythema.   Psychiatric: Her speech is tangential. She expresses impulsivity.   Nursing note and vitals reviewed.      Fluids    Intake/Output Summary (Last 24 hours) at 12/26/18 1702  Last data filed at 12/26/18 1700   Gross per 24 hour   Intake             6899 ml   Output              750 ml   Net             6149 ml       Laboratory  Recent Labs      12/25/18   0213  12/26/18   0539   WBC  10.2  12.0*   RBC  3.99*  3.87*   HEMOGLOBIN  12.3  11.9*   HEMATOCRIT  37.9  37.1   MCV  95.0  95.9   MCH  30.8  30.7   MCHC  32.5*  32.1*   RDW  49.7  50.4*   PLATELETCT  148*  144*   MPV  10.9  11.0     Recent Labs      12/25/18   0213  12/26/18   0540   SODIUM  140  140   POTASSIUM  4.7  4.4   CHLORIDE  113*  113*   CO2  21  20   GLUCOSE  156*  120*   BUN  30*  28*   CREATININE  1.11  0.95   CALCIUM  8.9  9.1                   Imaging  DX-BONE SURVEY-METASTATIC LTD   Final Result      1.  Lytic foci within the calvaria, right iliac bone, and right distal clavicle      2.  Multiple additional lytic foci identified on recent CT are not apparent on conventional radiography      3.  Different diagnosis includes metastatic malignancy or multiple myeloma      CT-LSPINE W/O PLUS RECONS   Final Result      Destructive lesions within the right pedicle and transverse process of L3, the right sacral wing, and right iliac crest consistent with metastatic disease or multiple myeloma.   Multilevel degenerative changes.   Moderate spinal stenosis at L4-5.      CT-HIP W/O PLUS RECONS RIGHT   Final Result         1.  Multiple lytic expansile lesions in the pelvis and spine as described.   2.  Hairline fracture of the acetabular roof on the right, likely pathologic fracture.   3.  Hairline lucency through the right femoral neck suggests hairline fracture      CT-CHEST (THORAX) WITH    (Results Pending)         Assessment/Plan  * Intractable low back pain- (present on admission)   Assessment & Plan    -Normally follows with pain management outpatient  -Just got an epidural injection the day prior to admission  -However, feels that pain is still not managed  -Dilaudid IV as needed, norco PO  -We will decrease gabapentin and dexamethasone doses due to delirium.  Palliative following for pain management.     Lytic bone lesion of hip- (present on admission)   Assessment & Plan    With hairline fracture of the right acetabular roof and femur neck.  Likely pathological.  -Differential includes multiple myeloma versus metastatic cancer  -Patient does not have a known history of cancer  -SPEP and BEATRICE still pending (from bone marrow)   Bone marrow biopsy 12/24, pathology pending  Bone biopsy may still be needed  Skeletal survey without evidence of impending fracture  Right hip, lumbar spine and cranium with evidence of lytic lesions though calcium is normal and total protein is normal.  CT chest with contrast ordered for possible metastatic disease.        Gout- (present on admission)   Assessment & Plan    -The current pain is likely more than just a gout flare  -Continue allopurinol  -Uric acid normal       CKD (chronic kidney disease) stage 3, GFR 30-59 ml/min ([facility])- (present on admission)   Assessment & Plan    Stable for now.  Avoid nephrotoxins.  Renal dose all medications.     Hyperglycemia- (present on admission)   Assessment & Plan    Suspecting steroids induced.  No history of diabetes.  Continue to monitor.     Metabolic encephalopathy- (present on admission)   Assessment & Plan    Suspecting delirium.  Multifactorial.  Could be gabapentin (high doses for patient age and size) as well as high doses steroids as hallucinations has been reported.  Poorly controlled pain is a factor as well.  No history of diagnosed dementia.  We will decrease Decadron and gabapentin doses.  Avoid benzodiazepines and/or anticholinergic  medications.  Minimize hypnotics or sedatives.  Minimize lines.  Avoid Hatch catheter.  We will check UA for possible UTI.       Elevated INR (international normalized ratio)- (present on admission)   Assessment & Plan    - not on anticoagulants as an outpt         Elevated BUN- (present on admission)   Assessment & Plan    -Rehydrating with IV fluid     Elevated alkaline phosphatase level- (present on admission)   Assessment & Plan    -Likely related to bone lesions     Plan of care discussed with multidisciplinary team during rounds.    VTE prophylaxis: heparin

## 2018-12-27 NOTE — THERAPY
PT consult received, Pt with hairline fractures and lytic lesions at weight bearing bones.  Please consult ortho to determine WB status recommendations prior to therapy involvement.     Bernadette Post PT/DPT  Pager# [phone]

## 2018-12-27 NOTE — PROGRESS NOTES
"Delirium and anxiety observed from patient at start of shift. Did not observe any auditory hallucinations; pt reporting having no auditory hallucinations. Pt reported uncontrolled constant 10/10 right hip and lower back pain; grimacing with movement. Medicated with 0.5 mg IV Dilaudid twice thus far which helped to bring down pain to 4/10, but post administration on the second dose patient is solmnent--still arousable. Per palliative notes, use Norco for pain management and IV Dilaudid for breakthrough pain, but Norco tab is discontinued on MAR. Pt reporting she was taking Norco at home and it stopped working for her. Asked patient  about her pain management MD and what he/she was prescribing for her and if it worked, he could not remember MD or what pain medications patient has been on. Respirations 14. Pt with bradycardia. Placed  on patient and 2L NC for comfort. Oxygen saturation 96%. Pt voiding in bed pan and female urinal. Pain needs to be better controlled--is not controlled with 0.25 mg of IV Dilaudid and pt cannot tolerate 0.5 mg of IV Dilaudid. Needs to be addressed today during rounds. Pt would also benefit from PT/OT evaluation as she has had increased weakness. Pt  reported she is \"sad\" that she might not be around much longer. Emotional support was provided. No bm for 3 days.   "

## 2018-12-28 PROBLEM — N30.00 ACUTE CYSTITIS: Status: ACTIVE | Noted: 2018-01-01

## 2018-12-28 PROBLEM — Z71.89 DNR (DO NOT RESUSCITATE) DISCUSSION: Status: ACTIVE | Noted: 2018-01-01

## 2018-12-28 NOTE — THERAPY
"Speech Language Therapy Clinical Swallow Evaluation completed.  Functional Status: The patient was seen for clinical swallow evaluation this date. The patient was awake, alert and oriented to self, location and partial reason with word finding difficulties appreciated during CSE. The patient reported awareness of difficulties but reported she thought it was from \"all the pain meds.\" The patient was able to follow oral motor directives with no gross asymmetry or weakness appreciated. The patient was given PO trials of ice chips, nectars, purees, thins and solids. The patient consumed PO trials with no overt s/s of aspiration. Patient requesting \"soft foods\" but denied pain or difficulty with mastication or swallow and willingly took dry hard solids. At this time, recommend patient upgrade to mechanical soft with swallow strategies. Patient okay to advance to regular diet per SLP regards however, patient requesting \"soft foods\" and therefore will upgrade to only D3 at this time. SLP following. Hold PO with any difficulty or change in status. RN aware of word finding difficulties appreciated during CSE.     Recommendations - Diet:  Dysphagia III, Thin Liquid                          Strategies: Monitor during meals and Head of Bed at 90 Degrees                           Medication Administration:  Whole with Liquid Wash, Float Whole with Puree    Plan of Care: Will benefit from Speech Therapy 3 times per week  Post-Acute Therapy: Recommend inpatient transitional care services for continued speech therapy services.        See \"Rehab Therapy-Acute\" Patient Summary Report for complete documentation.   "

## 2018-12-28 NOTE — DISCHARGE PLANNING
Anticipated Discharge Disposition: Home/HHC/Hospice/SNF    Action: Discussed continued POC in IDT rounds.  PT recommending ortho consult to determine WB prior to receiving PT services.     Barriers to Discharge: none    Plan: Will continue to coordinate discharge plan as needs are identified.

## 2018-12-28 NOTE — PROGRESS NOTES
"Palliative Progress Note               Author: Nela Wild Date & Time created: 2018  3:02 PM     Reason for subsequent visit: Cancer related low back and right hip pain    Interval History:  No acute events overnight.  Patient feels mentally clear and denies pain currently.  She reports she has been able to move around more easily in bed and demonstrated rolling over on her stomach.  She denies any bowel movement but stated \"I am eating\" enthusiastically.\"  Her  reports she continues to sleep/nap often.     Review of Systems:  Negative for delirium (mentally clear today). Negative for dyspnea. Positive for pain (low back with movement). Negative for anorexia. Positive for fatigue. Negative for sedation. Negative for anxiety. Negative for depression (she does feel sad about her new health issues). Negative for insomnia. Negative for nausea and vomiting. Positive for constipation. Negative for dysphagia.      Physical Exam:    Recent vital signs  Temp (24hrs), Av.5 °C (97.7 °F), Min:36.4 °C (97.6 °F), Max:36.6 °C (97.8 °F)  Temperature: 36.6 °C (97.8 °F)  Pulse  Av.9  Min: 49  Max: 77  Blood Pressure : 146/74       Physical Exam   Constitutional: No distress.   HENT:   Mouth/Throat: No oropharyngeal exudate.   Eyes: Pupils are equal, round, and reactive to light.   Cardiovascular: Normal rate and normal heart sounds.    Pulmonary/Chest: Breath sounds normal.   Abdominal: Soft. Bowel sounds are normal. She exhibits distension (mild).   Musculoskeletal: She exhibits no edema.   Neurological: She is alert. She is disoriented (details of events but oriented to person, place, and day).   Skin: There is pallor.   Psychiatric: She has a normal mood and affect. Her speech is normal and behavior is normal. Judgment and thought content normal. Cognition and memory are impaired (forgetful).     Recent Labs      18   0540  18   0042  18   0036   SODIUM  140  142  139   POTASSIUM  4.4 "  4.5  4.8   CHLORIDE  113*  112  111   CO2  20  22  20   GLUCOSE  120*  130*  163*   BUN  28*  25*  24*   CREATININE  0.95  0.81  0.82   CALCIUM  9.1  8.6  8.3*     Recent Labs      18   0539  18   0037   WBC  12.0*  10.1   RBC  3.87*  3.89*   HEMOGLOBIN  11.9*  12.2   HEMATOCRIT  37.1  37.1   MCV  95.9  95.4   MCH  30.7  31.4   MCHC  32.1*  32.9*   RDW  50.4*  50.0   PLATELETCT  144*  112*   MPV  11.0  11.1     Imaging/testin18 bone biopsy results pending  18 CT abdomen- pelvis: No evidence of bowel obstruction, multiple lytic lesions for thoracic, lumbar spine, pelvis, and right femur.  Hepatocellular dysfunction.  Atelectasis and small bilateral pleural effusions, ill-defined pulmonary nodules, left adrenal nodule.    Medications reviewed. Labs Reviewed.     Problem List:  1.  Cancer related low back and bilateral hip pain (active)  2.  Acute delirium (resolved)  3.  Constipation (active)  4.  Intermittent somnolence with sporadic bradypnea (active/improving)  5.  Lytic bone lesion of right hip and lumbar spine with elevated alkaline phosphate (active)  6.  Chronic kidney disease stage III (active)  7.  Urinary tract infection (active) -being treated with IV Rocephin  8.  Gout (chronic) -continue allopurinol  9.  Hyperglycemia (stable) -recommend continued monitoring; likely related to steroid use  10.  Elevated INR - mild (stable) - recommend continued monitoring; not on anticoagulation  11.  Intermittent bradycardia (active)  12.  Immobility/deconditioning (active)    Assessment/Plan:  Cancer related low back and bilateral hip pain  MEDD (morphine equivalent daily dose) is approximately 60 mg:  -MS Contin 30 mg x 2 doses = 60 mg MEDD    Decrease MS Contin 15 mg p.o. twice daily; pain is well controlled even with movement  Continue MS IR 7.5 mg p.o. every 3 hours as needed breakthrough pain  Morphine 3 mg IV every 3 hours as needed severe breakthrough pain if pain persist past oral  "opioid or n.p.o.    Continue with dexamethasone gabapentin     Constipation  Last bowel movement 12/24/2018 per patient/ (12/23/18 per chart)  Continue with senna-docusate 2 tabs p.o. twice daily  Continue MiraLAX to p.o. twice daily in addition to senna-docusate 2 tabs twice daily  Patient accepting of bisacodyl suppository today; nursing communication order written to please provide today and spoke with RN who has provided Milk of Magnesia and warm prune juice and agreed to give suppository if ineffective     Intermittent somnolence with sporadic bradypnea  Decreased MS Contin to      Lytic bone lesion of right hip and lumbar spine with elevated alkaline phosphate, , and CEA  Pending results for deep bone biopsy  Pending oncology consult who is aware per hospitalist and awaiting test results     Chronic kidney disease stage III   BUN/GFR improved    Intermittent bradycardia   Hospitalist decreased beta blocker    Immobility/deconditioning  PT ordered; pending orthopedic consult first due to femur fracture    Patient in agreement with plan as stated above.    Code Status:Full.        Advance Care Planning (ACP)/Goals of Care (GOC): Advance directive on file.  Patient's  sleeping at bedside.  Discussed code status with patient.  Assured her that she did not have to make any decisions today but I wanted to ensure I understand her wishes.  She expressed that she would \"not want to linger\" or \"live on tubes.\"  She would accept resuscitation measures if she could return to her prior state of health stating \"I've had a happy life.\"  Discussed my concern for CPR due to her age and bone lesions.  Explained that CPR would likely be painful and that she would have a low likely campos of returning to her state of prior health.  She expressed she understood.  She was slightly tearful and stated that her and her  have talked about her wishes but she will talk with him some more.  All questions answered. " " Shortly after my visit, patient's  came out of the room.  I provided an overview of my conversation with his wife.  He had poor recall of patient's pain management plan so I explained it to him again in detail.  He expressed him and his wife have talked about her wishes and stated \"but we don't even know what is going on yet.\"  He then expressed that he want's her to \"be well enough to come home.\"  Provided therapeutic silence and reflective listening.  20 minutes of total visit time spent discussing advance care planning with the patient and her .     Thank you for allowing the palliative care team to participate in this patient's care. Please call with any questions/needs.     Total visit time was 35 minutes.  Greater than 50% of today's visit was spent counseling and coordinating the patient's care regarding symptom management and plan of care.   Please refer to interval history assessment/plan for details.    JORGE LUIS Hooper.  Palliative Care Nurse Practitioner  [phone]      "

## 2018-12-28 NOTE — PROGRESS NOTES
Pt remains confused/delirius at times, disoriented to place/event. Pt reports pain is tolerable this shift with scheduled pain medications, denies n/v and remains afebrile. Pt is able to turn/weight shift in bed, waiting on ortho consult to find out if patient is able to bear weight on extremities- PT/OT consulted as well. Pt had CT scan of abd this AM, and CT-guided bx of R hip lesion in bone, tolerated well. Fall precautions maintained. Speech consulted this shift for swallow eval. Pt on dysphagia 2 with nectar thick until speech can eval. Fair appetite with adequate fluid intake (pt was NPO most of shift this AM/afternoon), but had good intake when diet was advanced.  at bedside and sons visited t/o shift.

## 2018-12-28 NOTE — PROGRESS NOTES
Pt sleeping well through this night. Denies any need for pain medications thus far.  at bedside. Pt voiding in female urinal. Bedrest. PIV with fluids infusing. All needs met at this time.

## 2018-12-29 PROBLEM — D72.829 LEUKOCYTOSIS: Status: ACTIVE | Noted: 2018-01-01

## 2018-12-29 PROBLEM — M89.9 LESION OF RIGHT FEMUR: Status: ACTIVE | Noted: 2018-01-01

## 2018-12-29 PROBLEM — C34.90 LUNG CANCER (HCC): Status: ACTIVE | Noted: 2018-01-01

## 2018-12-29 NOTE — CONSULTS
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    HISTORY OF PRESENT ILLNESS:  The patient is an 83-year-old woman with a past   medical history of arthritis, gout, hypertension, presented to the hospital   with intractable back pain.  This started back in April.  She went to see the   pain management service.  She got multiple epidural injections, but the pain   was not getting any better.  She came to the ER and a CT scan was performed on   12/28/2018 showing multiple lytic expansive lesions in the pelvis and spine.    Hairline fracture of the acetabular roof of the right, likely pathologic   fracture, hairline lucency throughout the right femoral neck suggestive of   hairline fracture.  A CT of the lumbar spine showed destructive lesions within   the right pedicle and transverse process of L3, the right sacral wing and the   right iliac crest consistent with metastatic disease.  Moderate spinal   stenosis was seen from L4-L5.  She underwent a CT scan of the chest, which   showed 2 noncalcified nodules in the right upper lobe, a large spiculated   measuring 11 mm in maximum diameter.  Showed no thoracic adenopathy and small   bilateral pleural effusions.  She also had a heterogeneous liver, which may be   representing primary or secondary carcinoma.  There are poorly defined areas   of low attenuation in the dome of the right lobe of the liver which could   represent hepatic metastasis or primary hepatic malignancy.  The patient   underwent for a CT of the abdomen and pelvis, which showed multiple lytic   expansive bone lesions involving the pelvic and thoracolumbar spine as   described above already, destructive lesion involving the right proximal femur   and known pulmonary nodules.  In the liver shows fatty heterogeneous liver   likely representing hepatocellular dysfunction.   MRI of the lumbar spine on   04/20/2018 did not show any signs of malignancy.  The patient underwent for a   bone marrow biopsy during this admission, which was within normal limits.    Also, she underwent for a CT-guided right L3 transverse process biopsy   consistent with metastatic carcinoma with immunohistochemistry profile   consistent with lung primary.  ER negative, TTF-1 positive, MCK positive, CK7   positive, CK20 negative, ALEXIS-3 negative,  positive.  Tumor markers   CA-125 elevated at 1400, CA 19-9, AFP 2, .3.    The patient does have a long history of smoking, but she quit around 30 years   ago.  Otherwise, she does have a past medical history of arthritis, back pain,   coronary artery disease, status post artery bypass.  Abdominal hysterectomy.    The patient denied any shortness of breath.  She did have a few pounds weight   loss.  She lives with her .  She does have lots of family members in   [place].    REVIEW OF SYSTEMS:  Negative except as stated above.    PAST MEDICAL HISTORY:  Arthritis, back pain, coronary artery disease, cataract   surgery, gout, heart attack, high cholesterol, hypertension.    PAST SURGICAL HISTORY:  Angioplasty in 87, 1997, cataract surgery in the past,   total abdominal hysterectomy, cardiac catheterization, coronary artery bypass   in 2016.    FAMILY HISTORY:  Asthma in mother, cancer in mother, heart disease in brother   and father, hyperlipidemia.    SOCIAL HISTORY:  She quit smoking around 30 years ago.  She used to smoke for   16 years, 1 pack.  She drinks socially.  Lives with her .    ALLERGIES:  ATORVASTATIN.    MEDICATIONS:  Per medication reconciliation note.    PHYSICAL EXAMINATION:  VITAL SIGNS:  Temperature 36.6, pulse 64, respiratory rate is 18, 96% on room   air, and blood pressure 147/77.  GENERAL:  The patient is in no distress.  HEENT:  Mucous membranes are moist.  NECK:  No lymphadenopathy, no thyromegaly.  LUNGS:  Clear to  auscultation bilaterally with no wheezing.  She does have   crackles bilaterally.  ABDOMEN:  Soft, nontender and nondistended.  Positive bowel sounds.  EXTREMITIES:  There is no lower extremity swelling.  NEUROLOGIC:  Alert and oriented x3.  Cranial nerves II-XII grossly intact.    LABORATORY DATA:  WBC 15.5, hemoglobin 12.5 and platelet count 139.    Creatinine is 0.8, alkaline phosphatase 513.  Otherwise, liver function tests   within normal limits, albumin 3.    ASSESSMENT AND PLAN:  The patient is an 83-year-old woman with a history of   smoking, quit 30 years ago.  She comes with chronic back pain.  She was found   to have extensive metastatic bone disease biopsy of L3 consistent with   metastatic lung adenocarcinoma.  1.  Non-small cell lung carcinoma, stage IV.  She does have noncalcified lung   nodules in the right upper lobe.  Extensive bone metastasis.  I will recommend   further staging workup including an MRI of the brain and a bone scan.  The   patient will also need PD-L1 testing with [employer], which will be   requested through pathology once she reach the 2-week period for   authorization of this testing.  The patient and  were explained this is   not a curable disease and treatment option is to prolong survival and quality   of life.  Further treatment options will include immunotherapy alone,   immunotherapy plus chemotherapy depending on the PD-L1 expression.  Also, we   will check targetable mutations through [employer].  The patient will have   to follow up with me in the outpatient setting to discuss about further   treatment options.  2.  She does have extensive metastatic bone disease.  Concerning pathologic   fracture of the right hip, ortho consulted, check an x-ray of the right hip.    Weightbearing precautions.  3.  Back pain due to metastatic disease.  No neurologic deficits.  The patient   has been doing well on morphine tablets 7.5 mg q. 3 hours p.r.n. Will also    judi radiation therapy to R hip after orothopedic intervention  The patient will require bisphosphonate therapy as an outpatient.    We will keep following the patient.       ____________________________________     SUNITA Brock / ALICIA    DD:  12/29/2018 13:09:21  DT:  12/29/2018 13:38:56    D#:  [id number]  Job#:  [id number]

## 2018-12-29 NOTE — ASSESSMENT & PLAN NOTE
Destructive metastatic bone lesion on the right femur.  Orthopedic surgery consulted.  Status post intramedullary nailing of the right femur on 12/29.   PT/OT.  WBAT.

## 2018-12-29 NOTE — PROGRESS NOTES
[facility] Medicine Daily Progress Note    Date of Service  12/28/2018    Chief Complaint  83 y.o. female admitted 12/21/2018 with worsening back pain to the point of intractable few days prior to admission, had been seen by pcp, pain management, ortho without significant relief other than temporary improvement.    Hospital Course    Patient had scans showing lytic lesions in Right hip and lower back.  Biopsy ordered on admission but will not be done until after [place] d/t weekend and holiday.  Bone marrow biopsy ordered and also likely cannot be scheduled until Wednesday.  SPEP and UPEP collected and pending.      Interval Problem Update  12/22 Patient with pain, responding well to narcotic though is in extreme pain between doses.  I increased her steroids and gabapentin for better overall pain control, continue with narcotic medication.  Skeletal survey ordered to r/o impending fracture - long bones are okay but she does have lytic lesion in right pelvis which is likely the source of the majority of her pain.  12/23 Patient states she has no pain when still but still having significant pain when moving - responding appropriate to pain medications received - sedating at times.  Electrophoresis results should be back tomorrow.  Bone marrow biopsy pending Wednesday - is scheduled.  12/24 Patient seen after bone marrow biopsy earlier today, histology department was able to coordinate earlier.  SPEP and UPEP still pending at this time.  Patient slightly confused and speaking in gibberish following sedation for BM but resolving with time.  She has improved pain control with steroids and is tolerating higher dose gabapentin well.  Oncology consult pending labs and bone marrow results.   and son at bedside when evaluated - explained current treatment and anticipated turnaround for test results.  12/25: Patient is restless.  Agitated.  Delirious.  Repeating herself.  In severe low back pain.   at  bedside.  12/26: Patient still in agonizing pain.  Delirious occasionally.  Palliative team evaluated the patient and adjusted pain medications. Awaiting pathology results.   12/27: Feels much comfortable this morning.  Stated that her pain is well controlled.  More interactive this morning.  Bone marrow biopsy results with no significant abnormalities.  Ordered bone biopsy to L3.  Discussed with oncology.  Recommended bone scan.  12/28: Pain is well controlled.  More interactive and less confused.  Answers questions and follows commands appropriately.  No acute distress noted.  Consultants/Specialty  Oncology (will come officially on board once pathology results are available).    Code Status  full    Disposition  tbd    Review of Systems  Review of Systems   Constitutional: Positive for malaise/fatigue. Negative for diaphoresis and fever.   HENT: Negative for ear pain, hearing loss and tinnitus.    Eyes: Negative for blurred vision and double vision.   Respiratory: Negative for cough, hemoptysis and sputum production.    Cardiovascular: Negative for chest pain, palpitations and orthopnea.   Gastrointestinal: Negative for heartburn, nausea and vomiting.   Genitourinary: Negative for dysuria, frequency and urgency.   Musculoskeletal: Positive for back pain and joint pain (right hip and groin). Negative for myalgias and neck pain.   Skin: Negative for rash.   Neurological: Negative for dizziness, tingling and headaches.   Psychiatric/Behavioral: Negative for depression, substance abuse and suicidal ideas.        Physical Exam  Temp:  [36.4 °C (97.6 °F)-37.1 °C (98.7 °F)] 37.1 °C (98.7 °F)  Pulse:  [58-95] 95  Resp:  [18] 18  BP: (126-148)/(73-78) 148/78    Physical Exam   Constitutional: She is oriented to person, place, and time. No distress.   HENT:   Head: Normocephalic and atraumatic.   Mouth/Throat: No oropharyngeal exudate.   Eyes: Pupils are equal, round, and reactive to light. No scleral icterus.   Neck: Neck  supple. No JVD present. No tracheal deviation present.   Cardiovascular: Normal rate and regular rhythm.  Exam reveals no gallop and no friction rub.    Pulmonary/Chest: Effort normal. No respiratory distress. She has no wheezes.   Abdominal: Soft. She exhibits no distension. There is no tenderness.   Musculoskeletal: She exhibits no tenderness or deformity.   Neurological: She is alert and oriented to person, place, and time. She is not disoriented.   Skin: Skin is warm and dry. She is not diaphoretic. No erythema.   Psychiatric: Her speech is tangential. She expresses impulsivity.   Nursing note and vitals reviewed.      Fluids    Intake/Output Summary (Last 24 hours) at 12/28/18 1806  Last data filed at 12/28/18 1731   Gross per 24 hour   Intake                0 ml   Output              350 ml   Net             -350 ml       Laboratory  Recent Labs      12/26/18   0539  12/28/18   0037   WBC  12.0*  10.1   RBC  3.87*  3.89*   HEMOGLOBIN  11.9*  12.2   HEMATOCRIT  37.1  37.1   MCV  95.9  95.4   MCH  30.7  31.4   MCHC  32.1*  32.9*   RDW  50.4*  50.0   PLATELETCT  144*  112*   MPV  11.0  11.1     Recent Labs      12/26/18   0540  12/27/18   0042  12/28/18   0036   SODIUM  140  142  139   POTASSIUM  4.4  4.5  4.8   CHLORIDE  113*  112  111   CO2  20  22  20   GLUCOSE  120*  130*  163*   BUN  28*  25*  24*   CREATININE  0.95  0.81  0.82   CALCIUM  9.1  8.6  8.3*                   Imaging  CT-NEEDLE BX-DEEP BONE   Final Result      1.  Successful CT-guided core biopsy of expansile mass involving right L3 transverse process.      CT-ABDOMEN-PELVIS WITH   Final Result      1.  Multiple lytic expansile bone lesions involving the pelvis and thoracolumbar spine as described above. There are areas of cortical disruption and extension into the surrounding soft tissues involving multiple of these lesions. The largest involves    the right ilium and demonstrates some speckled calcification. Differential diagnosis includes  metastatic disease as well as multiple myeloma.      2.  Destructive lesion involving the right proximal femur with some faint associated sclerosis. This is located within the intertrochanteric region and extends to an area of focal cortical breakthrough/disruption at the base of the right femoral neck    superiorly.      3.  Fatty heterogeneous liver likely representing presence of hepatocellular dysfunction.      4.  Bibasilar atelectasis and small bilateral pleural effusions.      5.  Ill-defined pulmonary nodules within the right lung base which of previously been evaluated with CT scan.      6.  6 mm left adrenal apex nodule      CT-CHEST (THORAX) WITH   Final Result      1.  2 noncalcified nodules in the right upper lobe. The largest is spiculated measuring 11 mm in maximum diameter. Differential diagnosis includes primary or secondary lung carcinoma.      2.  3 mm noncalcified nodule in the right middle lobe.      3.  No thoracic adenopathy.      4.  Small bilateral pleural effusions, left greater than right.      5.  Heterogeneous liver which may represent primary or secondary carcinoma.            DX-BONE SURVEY-METASTATIC LTD   Final Result      1.  Lytic foci within the calvaria, right iliac bone, and right distal clavicle      2.  Multiple additional lytic foci identified on recent CT are not apparent on conventional radiography      3.  Different diagnosis includes metastatic malignancy or multiple myeloma      CT-LSPINE W/O PLUS RECONS   Final Result      Destructive lesions within the right pedicle and transverse process of L3, the right sacral wing, and right iliac crest consistent with metastatic disease or multiple myeloma.   Multilevel degenerative changes.   Moderate spinal stenosis at L4-5.      CT-HIP W/O PLUS RECONS RIGHT   Final Result         1.  Multiple lytic expansile lesions in the pelvis and spine as described.   2.  Hairline fracture of the acetabular roof on the right, likely  pathologic fracture.   3.  Hairline lucency through the right femoral neck suggests hairline fracture           Assessment/Plan  * Intractable low back pain- (present on admission)   Assessment & Plan    -Normally follows with pain management outpatient  -Just got an epidural injection the day prior to admission  -However, feels that pain is still not managed  -Dilaudid IV as needed, norco PO  -We will decrease gabapentin and dexamethasone doses due to delirium.  Palliative following for pain management.     Lytic bone lesion of hip- (present on admission)   Assessment & Plan    With hairline fracture of the right acetabular roof and femur neck.  Likely pathological.  -Differential includes multiple myeloma versus metastatic cancer  -Patient does not have a known history of cancer  -SPEP pending.  BEATRICE with no increase in immunoglobulins.  Bone marrow biopsy 12/24 was negative for any abnormalities.  I ordered bone biopsy of L3 today 12/27.  CT scan of the chest showed 2 nodules in the right upper lobe with the largest one looks spiculated and measures around 11 mm.  CT scan of the abdomen and pelvis showed:  1.  Multiple lytic expansile bone lesions involving the pelvis and thoracolumbar spine as described above. There are areas of cortical disruption and extension into the surrounding soft tissues involving multiple of these lesions. The largest involves   the right ilium and demonstrates some speckled calcification. Differential diagnosis includes metastatic disease as well as multiple myeloma.  2.  Destructive lesion involving the right proximal femur with some faint associated sclerosis. This is located within the intertrochanteric region and extends to an area of focal cortical breakthrough/disruption at the base of the right femoral neck   Superiorly.  3.  6 mm left adrenal Nodule.  Discussed the case with oncology.  Recommended bone scan.  Pending bone biopsy pathology results.       Gout- (present on  admission)   Assessment & Plan    -The current pain is likely more than just a gout flare  -Continue allopurinol  -Uric acid normal       Acute cystitis- (present on admission)   Assessment & Plan    To complete a course of IV Rocephin.       CKD (chronic kidney disease) stage 3, GFR 30-59 ml/min ([facility])- (present on admission)   Assessment & Plan    Stable for now.  Avoid nephrotoxins.  Renal dose all medications.     Hyperglycemia- (present on admission)   Assessment & Plan    Suspecting steroids induced.  No history of diabetes.  Continue to monitor.     Metabolic encephalopathy- (present on admission)   Assessment & Plan    Suspecting delirium.  Multifactorial.  Could be gabapentin (high doses for patient age and size) as well as high doses steroids as hallucinations has been reported.  Poorly controlled pain is a factor as well.  No history of diagnosed dementia.  We will decrease Decadron and gabapentin doses.  Avoid benzodiazepines and/or anticholinergic medications.  Minimize hypnotics or sedatives.  Minimize lines.  Avoid Hatch catheter.  UA positive for UTI.       Elevated INR (international normalized ratio)- (present on admission)   Assessment & Plan    - not on anticoagulants as an outpt         Elevated BUN- (present on admission)   Assessment & Plan    -Rehydrating with IV fluid     Elevated alkaline phosphatase level- (present on admission)   Assessment & Plan    -Likely related to bone lesions     Coronary artery disease- (present on admission)   Assessment & Plan    Status post CABG.  Continue with aspirin and statin.     Plan of care discussed with multidisciplinary team during rounds.    VTE prophylaxis: heparin

## 2018-12-29 NOTE — PROGRESS NOTES
Assumed care of pt @0700. Bedside report received. Pt AOX 3. Pt confused and forgetful. Surgery today at 5 pm. Family by the bedside. Pt NPO. PIV patent infusing NS. Pt on bedrest. Last Bm today.       · 2 RN skin check complete: Purple discoloration on coccyx.   · The following interventions in place: waffle overlay in place, pt turn self.        POC discussed with pt, all questions answered at this time. Pt makes needs known, call light within reach, hourly rounding in place.

## 2018-12-29 NOTE — PROGRESS NOTES
[facility] Medicine Daily Progress Note    Date of Service  12/29/2018    Chief Complaint  83 y.o. female admitted 12/21/2018 with worsening back pain to the point of intractable few days prior to admission, had been seen by pcp, pain management, ortho without significant relief other than temporary improvement.    Hospital Course    Patient had scans showing lytic lesions in Right hip and lower back.  Biopsy ordered on admission but will not be done until after Soni d/t weekend and holiday.  Bone marrow biopsy ordered and also likely cannot be scheduled until Wednesday.  SPEP and UPEP collected and pending.      Interval Problem Update  12/22 Patient with pain, responding well to narcotic though is in extreme pain between doses.  I increased her steroids and gabapentin for better overall pain control, continue with narcotic medication.  Skeletal survey ordered to r/o impending fracture - long bones are okay but she does have lytic lesion in right pelvis which is likely the source of the majority of her pain.  12/23 Patient states she has no pain when still but still having significant pain when moving - responding appropriate to pain medications received - sedating at times.  Electrophoresis results should be back tomorrow.  Bone marrow biopsy pending Wednesday - is scheduled.  12/24 Patient seen after bone marrow biopsy earlier today, histology department was able to coordinate earlier.  SPEP and UPEP still pending at this time.  Patient slightly confused and speaking in gibberish following sedation for BM but resolving with time.  She has improved pain control with steroids and is tolerating higher dose gabapentin well.  Oncology consult pending labs and bone marrow results.   and son at bedside when evaluated - explained current treatment and anticipated turnaround for test results.  12/25: Patient is restless.  Agitated.  Delirious.  Repeating herself.  In severe low back pain.   at  bedside.  12/26: Patient still in agonizing pain.  Delirious occasionally.  Palliative team evaluated the patient and adjusted pain medications. Awaiting pathology results.   12/27: Feels much comfortable this morning.  Stated that her pain is well controlled.  More interactive this morning.  Bone marrow biopsy results with no significant abnormalities.  Ordered bone biopsy to L3.  Discussed with oncology.  Recommended bone scan.  12/28: Pain is well controlled.  More interactive and less confused.  Answers questions and follows commands appropriately.  No acute distress noted.  12/29: Preliminary pathology of bone biopsy suggesting possible metastatic adenocarcinoma of the lung.  Orthopedic surgery consulted for the destructive lesion of the right femur area.  Plan for ORIF.  MRI of the brain for staging.  Consultants/Specialty  Oncology.  Orthopedic surgery.  Code Status  full    Disposition  tbd    Review of Systems  Review of Systems   Constitutional: Positive for malaise/fatigue. Negative for chills, diaphoresis and fever.   HENT: Negative for ear discharge, ear pain, hearing loss and tinnitus.    Eyes: Negative for blurred vision, double vision and photophobia.   Respiratory: Negative for cough, hemoptysis and sputum production.    Cardiovascular: Negative for chest pain, palpitations and orthopnea.   Gastrointestinal: Negative for abdominal pain, heartburn, nausea and vomiting.   Genitourinary: Negative for dysuria, frequency and urgency.   Musculoskeletal: Positive for back pain and joint pain (right hip and groin). Negative for myalgias and neck pain.   Skin: Negative for itching.   Neurological: Negative for dizziness, tingling, tremors and headaches.   Psychiatric/Behavioral: Negative for depression, hallucinations, substance abuse and suicidal ideas.        Physical Exam  Temp:  [36.6 °C (97.8 °F)-37.1 °C (98.8 °F)] 36.6 °C (97.8 °F)  Pulse:  [51-95] 64  Resp:  [18] 18  BP: (132-150)/(73-78)  147/77    Physical Exam   Constitutional: She is oriented to person, place, and time. No distress.   HENT:   Head: Normocephalic and atraumatic.   Mouth/Throat: No oropharyngeal exudate.   Eyes: Pupils are equal, round, and reactive to light. Right eye exhibits no discharge. Left eye exhibits no discharge.   Neck: Neck supple. No JVD present. No tracheal deviation present.   Cardiovascular: Normal rate and regular rhythm.  Exam reveals no gallop and no friction rub.    Pulmonary/Chest: Effort normal and breath sounds normal. No respiratory distress. She has no wheezes. She has no rales.   Abdominal: Soft. She exhibits no distension. There is no tenderness. There is no rebound.   Musculoskeletal: She exhibits no tenderness or deformity.   Neurological: She is alert and oriented to person, place, and time. She is not disoriented.   Skin: Skin is warm. She is not diaphoretic. No erythema.   Psychiatric: Her speech is tangential. She expresses impulsivity.   Nursing note and vitals reviewed.      Fluids    Intake/Output Summary (Last 24 hours) at 12/29/18 1302  Last data filed at 12/28/18 1900   Gross per 24 hour   Intake              480 ml   Output              550 ml   Net              -70 ml       Laboratory  Recent Labs      12/28/18   0037  12/29/18   0053   WBC  10.1  15.5*   RBC  3.89*  4.08*   HEMOGLOBIN  12.2  12.5   HEMATOCRIT  37.1  38.4   MCV  95.4  94.1   MCH  31.4  30.6   MCHC  32.9*  32.6*   RDW  50.0  48.7   PLATELETCT  112*  139*   MPV  11.1  10.9     Recent Labs      12/27/18   0042  12/28/18   0036  12/29/18   0053   SODIUM  142  139  141   POTASSIUM  4.5  4.8  4.9   CHLORIDE  112  111  109   CO2  22  20  24   GLUCOSE  130*  163*  166*   BUN  25*  24*  22   CREATININE  0.81  0.82  0.80   CALCIUM  8.6  8.3*  8.7                   Imaging  DX-HIP-BILATERAL-WITH PELVIS-3/4 VIEWS   Final Result      1.  There are lytic lesions seen involving the right inferior ilium and right inferior obturator ring but  there is no plain film evidence of pathologic fracture.      CT-NEEDLE BX-DEEP BONE   Final Result      1.  Successful CT-guided core biopsy of expansile mass involving right L3 transverse process.      CT-ABDOMEN-PELVIS WITH   Final Result      1.  Multiple lytic expansile bone lesions involving the pelvis and thoracolumbar spine as described above. There are areas of cortical disruption and extension into the surrounding soft tissues involving multiple of these lesions. The largest involves    the right ilium and demonstrates some speckled calcification. Differential diagnosis includes metastatic disease as well as multiple myeloma.      2.  Destructive lesion involving the right proximal femur with some faint associated sclerosis. This is located within the intertrochanteric region and extends to an area of focal cortical breakthrough/disruption at the base of the right femoral neck    superiorly.      3.  Fatty heterogeneous liver likely representing presence of hepatocellular dysfunction.      4.  Bibasilar atelectasis and small bilateral pleural effusions.      5.  Ill-defined pulmonary nodules within the right lung base which of previously been evaluated with CT scan.      6.  6 mm left adrenal apex nodule      CT-CHEST (THORAX) WITH   Final Result      1.  2 noncalcified nodules in the right upper lobe. The largest is spiculated measuring 11 mm in maximum diameter. Differential diagnosis includes primary or secondary lung carcinoma.      2.  3 mm noncalcified nodule in the right middle lobe.      3.  No thoracic adenopathy.      4.  Small bilateral pleural effusions, left greater than right.      5.  Heterogeneous liver which may represent primary or secondary carcinoma.            DX-BONE SURVEY-METASTATIC LTD   Final Result      1.  Lytic foci within the calvaria, right iliac bone, and right distal clavicle      2.  Multiple additional lytic foci identified on recent CT are not apparent on conventional  radiography      3.  Different diagnosis includes metastatic malignancy or multiple myeloma      CT-LSPINE W/O PLUS RECONS   Final Result      Destructive lesions within the right pedicle and transverse process of L3, the right sacral wing, and right iliac crest consistent with metastatic disease or multiple myeloma.   Multilevel degenerative changes.   Moderate spinal stenosis at L4-5.      CT-HIP W/O PLUS RECONS RIGHT   Final Result         1.  Multiple lytic expansile lesions in the pelvis and spine as described.   2.  Hairline fracture of the acetabular roof on the right, likely pathologic fracture.   3.  Hairline lucency through the right femoral neck suggests hairline fracture      DX-HIP-UNILATERAL-W/O PELVIS-2/3 VIEWS RIGHT    (Results Pending)   DX-PORTABLE FLUORO > 1 HOUR    (Results Pending)   MR-BRAIN-WITH & W/O    (Results Pending)        Assessment/Plan  * Intractable low back pain- (present on admission)   Assessment & Plan    -Normally follows with pain management outpatient  -Just got an epidural injection the day prior to admission  -However, feels that pain is still not managed  -Dilaudid IV as needed, norco PO  -We will decrease gabapentin and dexamethasone doses due to delirium.  Palliative following for pain management.     Lytic bone lesion of hip- (present on admission)   Assessment & Plan    With hairline fracture of the right acetabular roof and femur neck.  Likely pathological.  -Differential includes multiple myeloma versus metastatic cancer  -Patient does not have a known history of cancer  -SPEP pending.  BEATRICE with no increase in immunoglobulins.  Bone marrow biopsy 12/24 was negative for any abnormalities.  I ordered bone biopsy of L3 today 12/27.  CT scan of the chest showed 2 nodules in the right upper lobe with the largest one looks spiculated and measures around 11 mm.  CT scan of the abdomen and pelvis showed:  1.  Multiple lytic expansile bone lesions involving the pelvis and  thoracolumbar spine as described above. There are areas of cortical disruption and extension into the surrounding soft tissues involving multiple of these lesions. The largest involves   the right ilium and demonstrates some speckled calcification. Differential diagnosis includes metastatic disease as well as multiple myeloma.  2.  Destructive lesion involving the right proximal femur with some faint associated sclerosis. This is located within the intertrochanteric region and extends to an area of focal cortical breakthrough/disruption at the base of the right femoral neck   Superiorly.  3.  6 mm left adrenal Nodule.  Discussed the case with oncology.  Recommended bone scan.  Bone biopsy prelim pathology suggesting metastatic adenocarcinoma of the lung.  TTF1: positive (consistent with lung source).  MRI of the brain with and without contrast for staging.  Bone scan is pending.  Oncology following.  Likely outpatient follow-up to decide on treatment regimen.       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  Orthopedic surgery consulted.  Plan for ORIF.     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  MRI of the brain for staging.  Pending bone scan.  Oncology following.  Pending mutation and genetic testing.     Leukocytosis- (present on admission)   Assessment & Plan    Likely steroid induced.  Continue to monitor.     Acute cystitis- (present on admission)   Assessment & Plan    To complete a course of IV Rocephin.       CKD (chronic kidney disease) stage 3, GFR 30-59 ml/min (HCC)- (present on admission)   Assessment & Plan    Stable for now.  Avoid nephrotoxins.  Renal dose all medications.      Hyperglycemia- (present on admission)   Assessment & Plan    Suspecting steroids induced.  No history of diabetes.  Continue to monitor.     Metabolic encephalopathy- (present on admission)   Assessment & Plan    Suspecting delirium.  Multifactorial.  Could be gabapentin (high doses for patient age and size) as well as high doses steroids as hallucinations has been reported.  Poorly controlled pain is a factor as well.  No history of diagnosed dementia.  We will decrease Decadron and gabapentin doses.  Avoid benzodiazepines and/or anticholinergic medications.  Minimize hypnotics or sedatives.  Minimize lines.  Avoid Hatch catheter.  UA positive for UTI.       Elevated INR (international normalized ratio)- (present on admission)   Assessment & Plan    - not on anticoagulants as an outpt         Elevated BUN- (present on admission)   Assessment & Plan    -Rehydrating with IV fluid     Elevated alkaline phosphatase level- (present on admission)   Assessment & Plan    -Likely related to bone lesions     Coronary artery disease- (present on admission)   Assessment & Plan    Status post CABG.  Continue with aspirin and statin.     Plan of care discussed with multidisciplinary team during rounds.    VTE prophylaxis: heparin

## 2018-12-29 NOTE — PROGRESS NOTES
Pt reports pain is well controlled, denies n/v and remains afebrile. Pt was unable to do bone scan today due to not being able to follow directions, can attempt to complete at a later date once patient has less confusion. Milk of mag administered, along with senna-doc, miralax, and prune juice/butter with no results. Pt refused suppository, but NOC RN is aware to administer to patient this evening (if pt allows).  at bedside.

## 2018-12-29 NOTE — CONSULTS
12/29/2018    Susie Craig is a 83 y.o. female who presents with likely metastatic disease or multiple myeloma and a right hip lesion and is here for operative management. Patient denies numbness, parasthesias, loss of concousness or other trauma    Past Medical History:   Diagnosis Date   • Arthritis    • Back pain    • Breath shortness     with exertion   • CAD (coronary artery disease)     CABG X3 in '16 with graft occlusion post-op   • Cataract     bilat IOL    • Gout    • Heart attack (HCC)    • Heart burn    • High cholesterol    • Hypertension    • Indigestion    • MEDICAL HOME    • Stroke (HCC)     TIA        Past Surgical History:   Procedure Laterality Date   • MULTIPLE CORONARY ARTERY BYPASS ENDO VEIN HARVEST  11/6/2016    Procedure: MULTIPLE CORONARY ARTERY BYPASS ENDO VEIN HARVEST;  Surgeon: Jeff Naranjo M.D.;  Location: SURGERY [facility];  Service:    • ABIODUN  11/6/2016    Procedure: ABIODUN;  Surgeon: Jeff Naranjo M.D.;  Location: SURGERY [facility];  Service:    • CATARACT PHACO WITH IOL  8/31/2009    Performed by SOLOMON THOMAS at SURGERY SAME DAY [facility]   • CATARACT PHACO WITH IOL  8/20/2009    Performed by SOLOMON THOMAS at SURGERY SAME DAY [facility]   • GYN SURGERY  1951    Hysterectomy during last C Section    • ABDOMINAL HYSTERECTOMY TOTAL     • ANGIOPLASTY  85, 97   • CARDIAC CATH     • GYN SURGERY      C Section x4   • OTHER      c-sections x4   • PRIMARY C SECTION         Medications  No current facility-administered medications on file prior to encounter.      Current Outpatient Prescriptions on File Prior to Encounter   Medication Sig Dispense Refill   • methylPREDNISolone (MEDROL) 4 MG Tab Take as per the package instructions. 21 Tab 0   • gabapentin (NEURONTIN) 100 MG Cap Take 100 mg by mouth 3 times a day.     • aspirin EC (ECOTRIN) 81 MG TBEC Take 81 mg by mouth every evening.         Allergies  Atorvastatin    ROS  Right hip pain. 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 rate 18, height 1.549 m (5' 1\"), weight 58.1 kg (128 lb 1.4 oz), last menstrual period 2011, SpO2 96 %, not currently breastfeeding.  General: Well Developed, Well Nourished, no acute distress  HEENT: Normocephalic, atraumatic  Eyes: Anicteric, PERRLA, EOMI  Neck: Supple, nontender, no masses  Lungs: CTA, no wheezes or crackles  Heart: RRR, no murmurs, rubs or gallops  Abdomen: Soft, NT, ND  Pelvis: Stable to AP and Lateral Compression  Skin: Intact, no open wounds  Extremities: Right hip pain, LLE  Neuro: NVI  Vascular: 2+DP/PT, Capillary refill <2 seconds    Radiographs:  CT-NEEDLE BX-DEEP BONE   Final Result      1.  Successful CT-guided core biopsy of expansile mass involving right L3 transverse process.      CT-ABDOMEN-PELVIS WITH   Final Result      1.  Multiple lytic expansile bone lesions involving the pelvis and thoracolumbar spine as " described above. There are areas of cortical disruption and extension into the surrounding soft tissues involving multiple of these lesions. The largest involves    the right ilium and demonstrates some speckled calcification. Differential diagnosis includes metastatic disease as well as multiple myeloma.      2.  Destructive lesion involving the right proximal femur with some faint associated sclerosis. This is located within the intertrochanteric region and extends to an area of focal cortical breakthrough/disruption at the base of the right femoral neck    superiorly.      3.  Fatty heterogeneous liver likely representing presence of hepatocellular dysfunction.      4.  Bibasilar atelectasis and small bilateral pleural effusions.      5.  Ill-defined pulmonary nodules within the right lung base which of previously been evaluated with CT scan.      6.  6 mm left adrenal apex nodule      CT-CHEST (THORAX) WITH   Final Result      1.  2 noncalcified nodules in the right upper lobe. The largest is spiculated measuring 11 mm in maximum diameter. Differential diagnosis includes primary or secondary lung carcinoma.      2.  3 mm noncalcified nodule in the right middle lobe.      3.  No thoracic adenopathy.      4.  Small bilateral pleural effusions, left greater than right.      5.  Heterogeneous liver which may represent primary or secondary carcinoma.            DX-BONE SURVEY-METASTATIC LTD   Final Result      1.  Lytic foci within the calvaria, right iliac bone, and right distal clavicle      2.  Multiple additional lytic foci identified on recent CT are not apparent on conventional radiography      3.  Different diagnosis includes metastatic malignancy or multiple myeloma      CT-LSPINE W/O PLUS RECONS   Final Result      Destructive lesions within the right pedicle and transverse process of L3, the right sacral wing, and right iliac crest consistent with metastatic disease or multiple myeloma.   Multilevel  degenerative changes.   Moderate spinal stenosis at L4-5.      CT-HIP W/O PLUS RECONS RIGHT   Final Result         1.  Multiple lytic expansile lesions in the pelvis and spine as described.   2.  Hairline fracture of the acetabular roof on the right, likely pathologic fracture.   3.  Hairline lucency through the right femoral neck suggests hairline fracture      DX-HIP-BILATERAL-WITH PELVIS-3/4 VIEWS    (Results Pending)       Laboratory Values  Recent Labs      12/28/18   0037  12/29/18   0053   WBC  10.1  15.5*   RBC  3.89*  4.08*   HEMOGLOBIN  12.2  12.5   HEMATOCRIT  37.1  38.4   MCV  95.4  94.1   MCH  31.4  30.6   MCHC  32.9*  32.6*   RDW  50.0  48.7   PLATELETCT  112*  139*   MPV  11.1  10.9     Recent Labs      12/27/18   0042  12/28/18   0036  12/29/18   0053   SODIUM  142  139  141   POTASSIUM  4.5  4.8  4.9   CHLORIDE  112  111  109   CO2  22  20  24   GLUCOSE  130*  163*  166*   BUN  25*  24*  22             Impression: Right proximal femur lesion    Plan:Operative intervention. Risks and benefits of surgery were discussed which include but are not limited to bleeding, infection, neurovascular damage, malunion, nonunion, instability, limb length discrepancy, DVT, PE, MI, Stroke and death. They understand these risks and wish to proceed.

## 2018-12-29 NOTE — PROGRESS NOTES
Still confused/forgetful at times with poor coordination during medication administration.  at bedside. Pain controlled with scheduled PO morphine. Administered suppository and pt had two small hard bowel movement. Pt turns self in bed with encouragement. All needs met at this time.

## 2018-12-29 NOTE — CARE PLAN
Problem: Communication  Goal: The ability to communicate needs accurately and effectively will improve  Outcome: PROGRESSING AS EXPECTED  Pt and pt family updated about POC- surgery today at 5 pm. Pt NPO.     Problem: Safety  Goal: Will remain free from injury  Outcome: PROGRESSING AS EXPECTED  Bed alarm on, A&O x3, family by the bedside, slipper socks, bed locked and in low position, call light and personal belongings with reach, report given to CNA, appropriate signs outside door, hourly rounding in place.     Problem: Pain Management  Goal: Pain level will decrease to patient's comfort goal  Outcome: PROGRESSING AS EXPECTED  Pt denies pain.

## 2018-12-29 NOTE — PROGRESS NOTES
Palliative Progress Note               Author: Nela JUN Frenchbeverley Date & Time created: 2018  3:17 PM     Reason for subsequent visit: Cancer related low back and right hip pain    Interval History:  Patient denies pain currently while laying in bed.  She reports only mild pain while moving around in bed to her low back and right hip.  She has required no breakthrough pain medication in the last 24 hours.     Review of Systems:  Negative for delirium. Negative for dyspnea. Positive for pain. Negative for anorexia. Positive for fatigue. Negative for sedation. Negative for anxiety. Negative for depression. Negative for insomnia. Negative for nausea and vomiting. Positive for constipation (whent 5 days without bowel movement; small hard bowel movement yesterday following suppository). Negative for dysphagia.      Physical Exam:    Recent vital signs  Temp (24hrs), Av.8 °C (98.3 °F), Min:36.6 °C (97.8 °F), Max:37.1 °C (98.8 °F)  Temperature: 36.6 °C (97.8 °F)  Pulse  Av.5  Min: 49  Max: 95  Blood Pressure : 147/77       Physical Exam   Constitutional: She appears well-developed. No distress.   Mild difficulty following directions during physical exam   HENT:   Mouth/Throat: No oropharyngeal exudate.   Eyes: Pupils are equal, round, and reactive to light.   Cardiovascular: Normal rate and normal heart sounds.    Pulmonary/Chest: Effort normal and breath sounds normal.   Abdominal: Bowel sounds are normal. She exhibits distension (mild).   Musculoskeletal: She exhibits no edema.   Neurological: She is alert. She is disoriented (disoriented to event).   Skin: There is pallor.   Psychiatric: She has a normal mood and affect. Her speech is normal. Judgment normal. Cognition and memory are impaired (forgetful).   Nursing note and vitals reviewed.    Recent Labs      18   0042  18   0036  18   0053   SODIUM  142  139  141   POTASSIUM  4.5  4.8  4.9   CHLORIDE  112  111  109   CO2  22  20  24    GLUCOSE  130*  163*  166*   BUN  25*  24*  22   CREATININE  0.81  0.82  0.80   CALCIUM  8.6  8.3*  8.7     Recent Labs      12/28/18   0037  12/29/18   0053   WBC  10.1  15.5*   RBC  3.89*  4.08*   HEMOGLOBIN  12.2  12.5   HEMATOCRIT  37.1  38.4   MCV  95.4  94.1   MCH  31.4  30.6   MCHC  32.9*  32.6*   RDW  50.0  48.7   PLATELETCT  112*  139*   MPV  11.1  10.9     Medications reviewed. Labs Reviewed.     Problem List:  1.  Cancer related low back and bilateral hip pain (active)  2.  Acute delirium (resolved)  3.  Constipation (active)  4.  Intermittent somnolence with sporadic bradypnea (active/improving)  5.  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  Chronic kidney disease stage III (active)  7.  Urinary tract infection (active) -being treated with IV Rocephin  8.  Gout (chronic) -continue allopurinol  9.  Hyperglycemia (stable) -recommend continued monitoring; likely related to steroid use  10.  Elevated INR - mild (stable) - recommend continued monitoring; not on anticoagulation  11.  Intermittent bradycardia (stable) - beta blocker was decreased  12.  Immobility/deconditioning (active)  13.  Arthritis (chronic)    Assessment/Plan:  Cancer related low back and bilateral hip pain  MEDD (morphine equivalent daily dose) is approximately 30 mg:  -MS Contin 15 mg x 2 doses = 30 mg MEDD    Continue MS Contin 15 mg p.o. twice daily  Continue MS IR 7.5 mg p.o. every 3 hours as needed breakthrough pain; provided education on when to ask for break through pain medication  Morphine 3 mg IV every 3 hours as needed severe breakthrough pain if pain persist past oral opioid or n.p.o.    Changed dexamethasone to oral route  Continue gabapentin     Constipation  Continue with senna-docusate 2 tabs p.o. twice daily  Continue MiraLAX to p.o. 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    Code Status: Full      Advance Care Planning (ACP)/Goals of Care (GOC): Patient's sons exited the room and her  was present for follow-up discussion regarding advance care planning.  Patient wishes to remain a full code but also stated she \"wants to go peacefully.\"  She feels her family understands her wishes and will make appropriate decisions on her behalf.  She has executed and Advance Directive which is on file and current.  She has expressed she does not want any feeding tube of any kind and does not wish to be sustained on life support but would be willing to go on it for a short time.  All questions answered.  17 minutes of total visit time was spent discussing advance care planning.      Thank you for allowing the palliative care team to participate in this patient's care. Please call with any questions/needs.     Total visit time was 32 minutes.  Greater than 50% of today's visit was spent counseling and coordinating the patient's care regarding symptom management and plan of care.   Please refer to interval history assessment/plan for details.    JORGE LUIS Hooper.  Palliative Care Nurse Practitioner  [phone]      "

## 2018-12-29 NOTE — ASSESSMENT & PLAN NOTE
Stage IV adenocarcinoma of the lung with extensive bony metastases.  MRI of the brain showed no intracranial metastasis but rather calvarial and C2 possible bone metastasis.  Pending bone scan.  Oncology follow up after discharge from hospital.  Pending mutation and genetic testing on biopsy.

## 2018-12-30 NOTE — CARE PLAN
Problem: Communication  Goal: The ability to communicate needs accurately and effectively will improve  Outcome: PROGRESSING SLOWER THAN EXPECTED  Pt is highly anxious. Hourly rounding in place.  at bedside. Call light within reach.     Problem: Mobility  Goal: Risk for activity intolerance will decrease  Outcome: PROGRESSING AS EXPECTED  Pt is up to commode, edge of bed, wheelchair using FWW. Encourage ambulation s/p ORIF of right femur.

## 2018-12-30 NOTE — PROGRESS NOTES
Oncology/Hematology Progress Note               Author: Crispin Newellbenjamín DORSEY Date & Time created: 12/30/2018  12:23 PM   Newly diagnosed metastatic non-small cell lung adenocarcinoma with extensive metastasis to bone  Interval History:  The patient underwent for intramedullary nailing of the right hip.  She did overall well.  She denies any nausea, vomiting, S OB, chest pain cough.  Pain is very well controlled.  She denies any worsening neurologic deficits.    Review of Systems:  Review of Systems   Constitutional: Positive for malaise/fatigue. Negative for fever and weight loss.   HENT: Negative for ear discharge, ear pain, hearing loss and tinnitus.    Eyes: Negative for blurred vision, double vision and photophobia.   Cardiovascular: Negative for chest pain, palpitations and orthopnea.   Skin: Negative for itching and rash.   Neurological: Positive for weakness.       Physical Exam:  Physical Exam   Constitutional: She is oriented to person, place, and time. She appears well-developed and well-nourished.   HENT:   Head: Normocephalic.   Eyes: Pupils are equal, round, and reactive to light. Conjunctivae are normal.   Neck: Normal range of motion. Neck supple.   Cardiovascular: Normal rate and regular rhythm.    Pulmonary/Chest: Effort normal and breath sounds normal.   Abdominal: Soft. Bowel sounds are normal.   Musculoskeletal: Normal range of motion.   Neurological: She is alert and oriented to person, place, and time.       Labs:          Recent Labs      12/28/18 0036 12/29/18 0053   SODIUM  139  141   POTASSIUM  4.8  4.9   CHLORIDE  111  109   CO2  20  24   BUN  24*  22   CREATININE  0.82  0.80   CALCIUM  8.3*  8.7     Recent Labs      12/28/18 0036  12/29/18 0053   ALTSGPT  27  34   ASTSGOT  25  30   ALKPHOSPHAT  585*  513*   TBILIRUBIN  0.3  0.3   GLUCOSE  163*  166*     Recent Labs      12/28/18 0037 12/29/18 0053   RBC  3.89*  4.08*   HEMOGLOBIN  12.2  12.5   HEMATOCRIT  37.1  38.4    PLATELETCT  112*  139*     Recent Labs      18   0036  18   0037  18   0053   WBC   --   10.1  15.5*   NEUTSPOLYS   --   84.90*  88.40*   LYMPHOCYTES   --   8.10*  5.50*   MONOCYTES   --   6.20  5.10   EOSINOPHILS   --   0.00  0.00   BASOPHILS   --   0.10  0.10   ASTSGOT  25   --   30   ALTSGPT  27   --   34   ALKPHOSPHAT  585*   --   513*   TBILIRUBIN  0.3   --   0.3     Recent Labs      18   0036  18   0053   SODIUM  139  141   POTASSIUM  4.8  4.9   CHLORIDE  111  109   CO2  20  24   GLUCOSE  163*  166*   BUN  24*  22   CREATININE  0.82  0.80   CALCIUM  8.3*  8.7     Hemodynamics:  Temp (24hrs), Av.6 °C (97.8 °F), Min:36.3 °C (97.4 °F), Max:37 °C (98.6 °F)  Temperature: 36.5 °C (97.7 °F)  Pulse  Av.3  Min: 49  Max: 95Heart Rate (Monitored): (!) 53  Blood Pressure : 136/64, NIBP: 155/74     Respiratory:    Respiration: 18, Pulse Oximetry: 99 %        RUL Breath Sounds: Clear, RML Breath Sounds: Clear, RLL Breath Sounds: Diminished, MARYBETH Breath Sounds: Clear, LLL Breath Sounds: Diminished  Fluids:    Intake/Output Summary (Last 24 hours) at 18 1223  Last data filed at 18 0949   Gross per 24 hour   Intake             2327 ml   Output             1400 ml   Net              927 ml        GI/Nutrition:  Orders Placed This Encounter   Procedures   • Diet Order Regular     Standing Status:   Standing     Number of Occurrences:   1     Order Specific Question:   Diet:     Answer:   Regular [1]     Medical Decision Making, by Problem:  Active Hospital Problems    Diagnosis   • *Intractable low back pain [M54.5]   • Lytic bone lesion of hip [M89.8X5]   • Gout [M10.9]   • Leukocytosis [D72.829]   • Lung cancer (HCC) [C34.90]   • Lesion of right femur [M89.9]   • DNR (do not resuscitate) discussion [Z71.89]   • Acute cystitis [N30.00]   • Metabolic encephalopathy [G93.41]   • Hyperglycemia [R73.9]   • CKD (chronic kidney disease) stage 3, GFR 30-59 ml/min (HCC) [N18.3]   •  Elevated alkaline phosphatase level [R74.8]   • Elevated BUN [R79.9]   • Elevated INR (international normalized ratio) [R79.1]   • Coronary artery disease [I25.10]       Plan:  1.  Stage IV adenocarcinoma of lung with extensive bone involvement  -He understand this is not curative and treatment is offered to prolong survival and quality of life.  We will order PDL 1 expression and  foundation one.  She will be a candidate for immunotherapy alone versus immunotherapy plus chemotherapy or targeted agents if she is found to have a specific mutation  -She will follow-up with me as an outpatient to discuss further treatment options  -MRI of the brain and bone scan pending    2.  Extensive metastatic bone disease with right hip pathologic fracture  -12/29/2018 status post intramedullary nailing of right hip  -Consult radiation oncology tomorrow for right hip radiation therapy    3..  Back pain control with morphine    Will sign off.  We will arrange follow-up as an outpatient.  Please call back if there is any questions or concerns      Quality-Core Measures

## 2018-12-30 NOTE — OP REPORT
DATE OF SERVICE:  12/29/2018    PREOPERATIVE DIAGNOSIS:  Pathologic fracture, right proximal femur.    POSTOPERATIVE DIAGNOSIS:  Pathologic fracture, right proximal femur.    PROCEDURE:  Intramedullary nailing, right hip.    SURGEON:  Alejo Hsu MD    ASSISTANT:  David Kaur DO    ESTIMATED BLOOD LOSS:  Minimal.    INDICATIONS:  This is an 83-year-old female who has had hip pain and a CT scan   demonstrating multiple lytic lesions in her pelvis and proximal femur.  Risks   and benefits of intramedullary nailing were discussed at length, which   include, but not limited to bleeding, infection, neurovascular damage, pain,   stiffness, malunion, nonunion, DVT, PE, MI, stroke, and death.  They   understand all these risks and wished to proceed.    DESCRIPTION OF PROCEDURE:  Patient was sedated with LMA anesthesia and   administered preoperative antibiotics.  She was placed on the fracture table   and the right hip and lower extremity were prepped and draped in the usual   sterile fashion.  A small incision was made distally and a vent-hole was   created in the femur.  A guidepin was then placed  in the appropriate starting   point in the hip and an anterior reamer was utilized.  A guidewire was passed   across the fracture site and the canal was reamed to 12.5 mm and a 340x11 OIC   hip nail was inserted at the appropriate depth.  The guidepin was placed in a   center-center position in the femoral head and an 80 mm lag screw was placed.    Setscrew was tightened.  Wounds were irrigated and closed with 2-0 Vicryl   suture and staples.  Sterile dressings were applied.  Patient tolerated the   procedure well.    POSTOPERATIVE PLAN:  Patient to be weightbearing as tolerated, admitted for   perioperative antibiotics, DVT prophylaxis, and pain control while awaiting   pathology results and biopsy tissue taken today.       ____________________________________     ALEJO HSU MD    NIKO / NTS    DD:   12/29/2018 19:34:02  DT:  12/29/2018 19:57:37    D#:  [id number]  Job#:  [id number]

## 2018-12-30 NOTE — OR NURSING
REPORT TO ARAMIS YADAV  PT PLEASANLTY CONFUSED. VS SB 56  -150-/ 70'S. ERAS PROTOCOL 10 OXYMASK SATING 100%  RR 16    RIGHT LEG 3 SITES C/D/I W/ XEROFORM, GAUZE, TEGADERM.  STRONG PEDAL PULSES.

## 2018-12-30 NOTE — PROGRESS NOTES
Patient returned from surgery in bed with O2 on per Oxymask at 10L.  VS taken and patient is saturating at 96% on arrival.  VS will be taken Q15 minutes as per protocol.  Patient pleasantly confused which is her normal state.  Right thigh has 3 dressings from small incisions to her outer thigh.  Middle dressing has gauze that is blood filled.  Will reinforce the dressing.  Other 2 dressings are clean dry and intact.  IV  Per ERAS PROTOCOL is infusing for 6 hours after surgery.  IV site to left forearm is intact and infusing as ordered.  Site is positional and will occlude if arm is bent.  Pedal pulses are intact and strong.  Will medicate patient for pain as needed.

## 2018-12-31 NOTE — PROGRESS NOTES
Assumed care of the patient at 0715, received report from the NOC RN. Patient is AOx4, VSS, w/ no complaint of discomfort at this time. Patient updated on POC, all questions have been answered at this time.    Patient has three incision sites w/ dressings in place. Dressings have been changed as needed for saturation.    Patient is up +2 with FWW. Bed alarm is active. Call light within reach and hourly rounding in place.

## 2018-12-31 NOTE — PROGRESS NOTES
Patient bed has large amounts of blood noted in bed sheets and gown.  Changed dressing as ordered and placed pressure to all 3 sites as ordered.

## 2018-12-31 NOTE — PROGRESS NOTES
"   Orthopaedic Progress Note    Interval changes:  Patient doing well  Some drainage from proximal incision site - nursing to change dressing PRN as needed for saturation    ROS - Patient denies any new issues.  Pain well controlled.    Blood pressure 108/72, pulse 99, temperature 37 °C (98.6 °F), temperature source Oral, resp. rate 18, height 1.549 m (5' 1\"), weight 58.1 kg (128 lb 1.4 oz), last menstrual period 07/06/2011, SpO2 99 %, not currently breastfeeding.      Patient at bone scan at time of exam  Dressings CDI middle and distal incision, proximal with min/mod sanguinous saturation.    Recent Labs      12/28/18   0037  12/29/18   0053  12/30/18   1259   WBC  10.1  15.5*  15.3*   RBC  3.89*  4.08*  2.73*   HEMOGLOBIN  12.2  12.5  8.5*   HEMATOCRIT  37.1  38.4  27.0*   MCV  95.4  94.1  97.1   MCH  31.4  30.6  31.1   MCHC  32.9*  32.6*  32.1*   RDW  50.0  48.7  50.5*   PLATELETCT  112*  139*  116*   MPV  11.1  10.9  10.5       Active Hospital Problems    Diagnosis   • Lytic bone lesion of hip [M89.8X5]     Priority: High   • Gout [M10.9]     Priority: Medium   • Thrombocytopenia (HCC) [D69.6]     Priority: Low     8/12/09 plt 195  3/28/13 plt 152  11/7/16 plt 115  5/12/17 plt 155  9/25/17 plt 139     • Leukocytosis [D72.829]   • Lung cancer (HCC) [C34.90]   • Lesion of right femur [M89.9]   • DNR (do not resuscitate) discussion [Z71.89]   • Acute cystitis [N30.00]   • Metabolic encephalopathy [G93.41]   • Hyperglycemia [R73.9]   • CKD (chronic kidney disease) stage 3, GFR 30-59 ml/min ([facility]) [N18.3]   • Elevated alkaline phosphatase level [R74.8]   • Elevated BUN [R79.9]   • Elevated INR (international normalized ratio) [R79.1]   • Intractable low back pain [M54.5]     4/20/18 MRI lumbar spine mild to moderate central canal stenosis and moderate right-sided neuroforaminal narrowing L3-L4     • Coronary artery disease [I25.10]       Assessment/Plan:  Doing well post op  POD#1 S/P Intramedullary nailing, right " hip  Wt bearing status - WBAT  Wound care/Drains - dressing change every other day by nursing or PRN for saturation  Future Procedures - none planned   Sutures/Staples out- 10-14 days post operatively  PT/OT-initiated  Antibiotics: completed  DVT Prophylaxis- TEDS/SCDs/Foot pumps/aspirin/heparin  Hatch-none  Case Coordination for Discharge Planning - Disposition home

## 2018-12-31 NOTE — PROGRESS NOTES
[facility] Medicine Daily Progress Note    Date of Service  12/31/2018    Chief Complaint  83 y.o. female admitted 12/21/2018 with worsening back pain to the point of intractable few days prior to admission, had been seen by pcp, pain management, ortho without significant relief other than temporary improvement.    Hospital Course    Patient had scans showing lytic lesions in Right hip and lower back.  Biopsy ordered on admission but will not be done until after Soni d/t weekend and holiday.  Bone marrow biopsy ordered and also likely cannot be scheduled until Wednesday.  SPEP and UPEP collected and pending.      Interval Problem Update  12/22 Patient with pain, responding well to narcotic though is in extreme pain between doses.  I increased her steroids and gabapentin for better overall pain control, continue with narcotic medication.  Skeletal survey ordered to r/o impending fracture - long bones are okay but she does have lytic lesion in right pelvis which is likely the source of the majority of her pain.  12/23 Patient states she has no pain when still but still having significant pain when moving - responding appropriate to pain medications received - sedating at times.  Electrophoresis results should be back tomorrow.  Bone marrow biopsy pending Wednesday - is scheduled.  12/24 Patient seen after bone marrow biopsy earlier today, histology department was able to coordinate earlier.  SPEP and UPEP still pending at this time.  Patient slightly confused and speaking in gibberish following sedation for BM but resolving with time.  She has improved pain control with steroids and is tolerating higher dose gabapentin well.  Oncology consult pending labs and bone marrow results.   and son at bedside when evaluated - explained current treatment and anticipated turnaround for test results.  12/25: Patient is restless.  Agitated.  Delirious.  Repeating herself.  In severe low back pain.   at  bedside.  12/26: Patient still in agonizing pain.  Delirious occasionally.  Palliative team evaluated the patient and adjusted pain medications. Awaiting pathology results.   12/27: Feels much comfortable this morning.  Stated that her pain is well controlled.  More interactive this morning.  Bone marrow biopsy results with no significant abnormalities.  Ordered bone biopsy to L3.  Discussed with oncology.  Recommended bone scan.  12/28: Pain is well controlled.  More interactive and less confused.  Answers questions and follows commands appropriately.  No acute distress noted.  12/29: Preliminary pathology of bone biopsy suggesting possible metastatic adenocarcinoma of the lung.  Orthopedic surgery consulted for the destructive lesion of the right femur area.  Plan for ORIF.  MRI of the brain for staging.  12/30: Laying in bed comfortably.  Denies any chest pain this morning (musculoskeletal on exam yesterday).  Pain is well controlled.  Tolerated surgery yesterday (ORIF of the right femur).  No new complaints to report.  12/31: Laying in bed comfortably.  Pain is well controlled.  No bowel movements for few days.  No acute issues to report.  Consultants/Specialty  Oncology.  Orthopedic surgery.  Code Status  full    Disposition  tbd    Review of Systems  Review of Systems   Constitutional: Positive for malaise/fatigue and weight loss. Negative for chills, diaphoresis and fever.   HENT: Negative for congestion, ear discharge, ear pain, hearing loss, nosebleeds and tinnitus.    Eyes: Negative for blurred vision, double vision, photophobia, pain and discharge.   Respiratory: Negative for cough, hemoptysis and sputum production.    Cardiovascular: Negative for chest pain, palpitations, orthopnea, claudication and leg swelling.   Gastrointestinal: Positive for constipation. Negative for abdominal pain, diarrhea, heartburn, nausea and vomiting.   Genitourinary: Negative for dysuria, frequency, hematuria and urgency.    Musculoskeletal: Positive for back pain and joint pain (right hip and groin area). Negative for myalgias and neck pain.   Skin: Negative for itching and rash.   Neurological: Negative for dizziness, tingling, tremors, sensory change, speech change and headaches.   Endo/Heme/Allergies: Does not bruise/bleed easily.   Psychiatric/Behavioral: Negative for depression, hallucinations, substance abuse and suicidal ideas. The patient is not nervous/anxious.         Physical Exam  Temp:  [36.1 °C (97 °F)-37 °C (98.6 °F)] 36.6 °C (97.8 °F)  Pulse:  [64-99] 64  Resp:  [18-20] 18  BP: ()/(47-72) 131/64    Physical Exam   Constitutional: She is oriented to person, place, and time. She appears well-developed. No distress.   HENT:   Head: Normocephalic and atraumatic.   Mouth/Throat: No oropharyngeal exudate.   Eyes: Pupils are equal, round, and reactive to light. Right eye exhibits no discharge. Left eye exhibits no discharge. No scleral icterus.   Neck: Neck supple. No JVD present. No tracheal deviation present. No thyromegaly present.   Cardiovascular: Normal rate, regular rhythm and intact distal pulses.  Exam reveals no gallop and no friction rub.    Pulmonary/Chest: Effort normal. No respiratory distress. She has no wheezes. She has no rales. She exhibits tenderness (Left costosternal edge. Resolving ).   Abdominal: Soft. She exhibits no distension. There is no tenderness. There is no rebound and no guarding.   Musculoskeletal: She exhibits tenderness. She exhibits no deformity.   Surgical dressing on the lateral aspect of the right thigh.  Neurovascular bundle is intact on the right lower extremity.   Neurological: She is alert and oriented to person, place, and time. She is not disoriented. No cranial nerve deficit.   Skin: Skin is warm. She is not diaphoretic. No erythema.   Psychiatric: She has a normal mood and affect. Her speech is tangential. She expresses impulsivity.   Nursing note and vitals  reviewed.      Fluids    Intake/Output Summary (Last 24 hours) at 12/31/18 1418  Last data filed at 12/31/18 1000   Gross per 24 hour   Intake              350 ml   Output              750 ml   Net             -400 ml       Laboratory  Recent Labs      12/30/18   1259  12/31/18   0010  12/31/18   0917   WBC  15.3*  13.0*  13.6*   RBC  2.73*  2.04*  2.69*   HEMOGLOBIN  8.5*  6.2*  8.4*   HEMATOCRIT  27.0*  19.4*  25.2*   MCV  97.1  97.1  93.7   MCH  31.1  30.4  31.2   MCHC  32.1*  31.3*  33.3*   RDW  50.5*  50.3*  48.7   PLATELETCT  116*  101*  105*   MPV  10.5  11.2  10.9     Recent Labs      12/29/18   0053  12/31/18   0010   SODIUM  141  138   POTASSIUM  4.9  4.6   CHLORIDE  109  108   CO2  24  26   GLUCOSE  166*  152*   BUN  22  27*   CREATININE  0.80  0.93   CALCIUM  8.7  7.8*                   Imaging  DX-PORTABLE FLUORO > 1 HOUR   Preliminary Result         Portable fluoroscopy utilized for 18 seconds.         NM-BONE SCAN WHOLE BODY   Final Result      Extensive bony metastatic disease as described on prior CT exams.      MR-BRAIN-WITH & W/O   Final Result      1.  No evidence of intracranial metastatic disease   2.  Calvarial and RIGHT C2 lesions highly suspicious for osseous metastatic disease   3.  Mild white matter changes   4.  Mild atrophy      DX-CHEST-PORTABLE (1 VIEW)   Final Result         1. No acute cardiopulmonary abnormalities are identified.      DX-HIP-UNILATERAL-W/O PELVIS-2/3 VIEWS RIGHT   Final Result      Digitized intraoperative radiograph is submitted for review.  This examination is not for diagnostic purpose but for guidance during a surgical procedure.      DX-HIP-BILATERAL-WITH PELVIS-3/4 VIEWS   Final Result      1.  There are lytic lesions seen involving the right inferior ilium and right inferior obturator ring but there is no plain film evidence of pathologic fracture.      CT-NEEDLE BX-DEEP BONE   Final Result      1.  Successful CT-guided core biopsy of expansile mass involving  right L3 transverse process.      CT-ABDOMEN-PELVIS WITH   Final Result      1.  Multiple lytic expansile bone lesions involving the pelvis and thoracolumbar spine as described above. There are areas of cortical disruption and extension into the surrounding soft tissues involving multiple of these lesions. The largest involves    the right ilium and demonstrates some speckled calcification. Differential diagnosis includes metastatic disease as well as multiple myeloma.      2.  Destructive lesion involving the right proximal femur with some faint associated sclerosis. This is located within the intertrochanteric region and extends to an area of focal cortical breakthrough/disruption at the base of the right femoral neck    superiorly.      3.  Fatty heterogeneous liver likely representing presence of hepatocellular dysfunction.      4.  Bibasilar atelectasis and small bilateral pleural effusions.      5.  Ill-defined pulmonary nodules within the right lung base which of previously been evaluated with CT scan.      6.  6 mm left adrenal apex nodule      CT-CHEST (THORAX) WITH   Final Result      1.  2 noncalcified nodules in the right upper lobe. The largest is spiculated measuring 11 mm in maximum diameter. Differential diagnosis includes primary or secondary lung carcinoma.      2.  3 mm noncalcified nodule in the right middle lobe.      3.  No thoracic adenopathy.      4.  Small bilateral pleural effusions, left greater than right.      5.  Heterogeneous liver which may represent primary or secondary carcinoma.            DX-BONE SURVEY-METASTATIC LTD   Final Result      1.  Lytic foci within the calvaria, right iliac bone, and right distal clavicle      2.  Multiple additional lytic foci identified on recent CT are not apparent on conventional radiography      3.  Different diagnosis includes metastatic malignancy or multiple myeloma      CT-LSPINE W/O PLUS RECONS   Final Result      Destructive lesions within  the right pedicle and transverse process of L3, the right sacral wing, and right iliac crest consistent with metastatic disease or multiple myeloma.   Multilevel degenerative changes.   Moderate spinal stenosis at L4-5.      CT-HIP W/O PLUS RECONS RIGHT   Final Result         1.  Multiple lytic expansile lesions in the pelvis and spine as described.   2.  Hairline fracture of the acetabular roof on the right, likely pathologic fracture.   3.  Hairline lucency through the right femoral neck suggests hairline fracture           Assessment/Plan  * Intractable low back pain- (present on admission)   Assessment & Plan    -Normally follows with pain management outpatient  -Just got an epidural injection the day prior to admission  -However, feels that pain is still not managed  -Dilaudid IV as needed, norco PO  -We will decrease gabapentin and dexamethasone doses due to delirium.  Palliative following for pain management.     Lytic bone lesion of hip- (present on admission)   Assessment & Plan    With hairline fracture of the right acetabular roof and femur neck.  Likely pathological.  -Differential includes multiple myeloma versus metastatic cancer  -Patient does not have a known history of cancer  -SPEP pending.  BEATRICE with no increase in immunoglobulins.  Bone marrow biopsy 12/24 was negative for any abnormalities.  I ordered bone biopsy of L3 today 12/27.  CT scan of the chest showed 2 nodules in the right upper lobe with the largest one looks spiculated and measures around 11 mm.  CT scan of the abdomen and pelvis showed:  1.  Multiple lytic expansile bone lesions involving the pelvis and thoracolumbar spine as described above. There are areas of cortical disruption and extension into the surrounding soft tissues involving multiple of these lesions. The largest involves   the right ilium and demonstrates some speckled calcification. Differential diagnosis includes metastatic disease as well as multiple myeloma.  2.   Destructive lesion involving the right proximal femur with some faint associated sclerosis. This is located within the intertrochanteric region and extends to an area of focal cortical breakthrough/disruption at the base of the right femoral neck   Superiorly.  3.  6 mm left adrenal Nodule.  Discussed the case with oncology.  Recommended bone scan.  Bone biopsy prelim pathology suggesting metastatic adenocarcinoma of the lung.  TTF1: positive (consistent with lung source).  MRI showed no intracranial metastasis but calvarial and C2 possible bony metastasis.  Bone scan showed extensive foci of activity in the spine, ribs, pelvis, calvarium, and femora.  Oncology following.  Will need outpatient immunotherapy +/-chemotherapy.  Will likely need radiation therapy.    Dr. Pantoja from radiology oncology will see the patient.       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  Orthopedic surgery consulted.  Status post intramedullary nailing of the right femur on 12/29.   PT/OT.  WBAT.      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  MRI of the brain showed no intracranial metastasis but rather calvarial and C2 possible bone metastasis.  Pending bone scan.  Oncology following.  Pending mutation and genetic testing on biopsy.  Prognosis is poor.  Palliative following.     Leukocytosis- (present on admission)   Assessment & Plan    Likely steroid induced.  Continue to monitor.     Acute cystitis- (present on admission)   Assessment & Plan    To complete a course of IV Rocephin.       CKD (chronic kidney disease) stage 3, GFR 30-59 ml/min (HCC)- (present on admission)   Assessment & Plan    Stable for  now.  Avoid nephrotoxins.  Renal dose all medications.     Hyperglycemia- (present on admission)   Assessment & Plan    Suspecting steroids induced.  No history of diabetes.  Continue to monitor.     Metabolic encephalopathy- (present on admission)   Assessment & Plan    Suspecting delirium.  Multifactorial.  Could be gabapentin (high doses for patient age and size) as well as high doses steroids as hallucinations has been reported.  Poorly controlled pain is a factor as well.  No history of diagnosed dementia.  We will decrease Decadron and gabapentin doses.  Avoid benzodiazepines and/or anticholinergic medications.  Minimize hypnotics or sedatives.  Minimize lines.  Avoid Hatch catheter.  UA positive for UTI.       Elevated INR (international normalized ratio)- (present on admission)   Assessment & Plan    - not on anticoagulants as an outpt         Elevated BUN- (present on admission)   Assessment & Plan    -Rehydrating with IV fluid     Elevated alkaline phosphatase level- (present on admission)   Assessment & Plan    -Likely related to bone lesions     Coronary artery disease- (present on admission)   Assessment & Plan    Status post CABG.  Continue with aspirin and statin.     Thrombocytopenia (HCC)- (present on admission)   Assessment & Plan    Chronic.  Continue to monitor.  Discontinue chemical anticoagulation if platelets drop below 50k  Transfuse if platelets drops below 10 K     Plan of care discussed with multidisciplinary team during rounds.    VTE prophylaxis: heparin

## 2018-12-31 NOTE — DISCHARGE PLANNING
IP consult for physiatry from Dr. Matthews    Pathological hip fracture s/p repair IM nailing DOS 12/29/2018. SCP provider. Following for post operative PT evaluation. Radiation plan pending. Will follow. No physiatry consult ordered at this time.

## 2018-12-31 NOTE — THERAPY
"Occupational Therapy Evaluation completed.   Functional Status:  CGA seated grooming, CGA self feeding, Mod A LB dressing, Min A toileting  Plan of Care: Will benefit from Occupational Therapy 3 times per week  Discharge Recommendations:  Equipment: Will Continue to Assess for Equipment Needs. Post-acute therapy Recommend inpatient transitional care services for continued occupational therapy services. Please consider PM&R consult.    See \"Rehab Therapy-Acute\" Patient Summary Report for complete documentation.    Pt is an 82 y/o female who presents to acute 2/2 worsening back pain, pt found to have lytic lesions in R hip and lower back. PT is now s/p ORIF of R hip. Pt lives w/ spouse who appears very supportive. Pt demo'd impaired balance, functional mobility, B UE fine motor coordination, gross motor coordination, and appears to require increased processing time as well as increased verbal/tactile cues to follow instructions. Pt reports a very independent PLOF and is eager to return to it. Will follow for Acute OT services to facilitate increased functional independence. Recommend subacute placement prior to DC for continued inpt therapy.     "

## 2018-12-31 NOTE — PROGRESS NOTES
Palliative Progress Note               Author: Catherine Haro Date & Time created: 2018  1:55 PM     Reason for subsequent visit: Cancer-related low back and right hip pain    Interval History:  No acute events overnight.  Pt had R intermedullary nailing surgery 18 and reports sanguinous oozing from incision site.  Susie reports she has been up ambulating using a walker with physical therapy assist.  She states post-operative pain has been manageable with current regimen.  Pt appears to have mild myoclonus of bilateral upper extremities.  Pt is alert and pleasant, but is also exhibiting mild intermittent confusion and jumbled thought process. Her  notes that she is having difficulty with word finding but thinks she is a bitt better over the past few days.    Review of Systems:  Negative for agitation. Negative for delirium. Negative for dyspnea. Positive for pain. Negative for fatigue. Negative for anxiety. Negative for insomnia. Negative for nausea and vomiting. Positive for constipation.        Physical Exam:  Recent vital signs  Temp (24hrs), Av.7 °C (98 °F), Min:36.1 °C (97 °F), Max:37 °C (98.6 °F)  Temperature: 36.6 °C (97.8 °F)  Pulse  Av.9  Min: 49  Max: 99  Blood Pressure : 131/64       Physical Exam   Constitutional: She appears well-nourished.   HENT:   Head: Normocephalic.   Eyes: Pupils are equal, round, and reactive to light.   Neck: Normal range of motion.   Cardiovascular: Normal rate, regular rhythm and normal heart sounds.    Pulmonary/Chest: Effort normal and breath sounds normal.   Abdominal: Soft. Bowel sounds are normal.   Musculoskeletal: Normal range of motion.   Neurological: She is alert.   Skin: Skin is warm and dry.   Psychiatric: She has a normal mood and affect.     Recent Labs      18   0053  18   0010   SODIUM  141  138   POTASSIUM  4.9  4.6   CHLORIDE  109  108   CO2  24  26   GLUCOSE  166*  152*   BUN  22  27*   CREATININE  0.80  0.93   CALCIUM   8.7  7.8*     Recent Labs      12/30/18   1259  12/31/18   0010  12/31/18   0917   WBC  15.3*  13.0*  13.6*   RBC  2.73*  2.04*  2.69*   HEMOGLOBIN  8.5*  6.2*  8.4*   HEMATOCRIT  27.0*  19.4*  25.2*   MCV  97.1  97.1  93.7   MCH  31.1  30.4  31.2   MCHC  32.1*  31.3*  33.3*   RDW  50.5*  50.3*  48.7   PLATELETCT  116*  101*  105*   MPV  10.5  11.2  10.9     Problem List:  1.  Cancer related low back and bilateral hip pain (active)  2.  Acute delirium (resolved)  3.  Constipation (active)  4.  Intermittent somnolence with sporadic bradypnea (active/improving)  5.  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  Chronic kidney disease stage III (active)  7.  Urinary tract infection (active) -being treated with IV Rocephin  8.  Gout (chronic) -continue allopurinol  9.  Hyperglycemia (stable) -recommend continued monitoring; likely related to steroid use  10.  Elevated INR - mild (stable) - recommend continued monitoring; not on anticoagulation  11.  Intermittent bradycardia (stable) - beta blocker was decreased  12.  Immobility/deconditioning (active)  13.  Arthritis (chronic)    Assessment/Plan:  Cancer related low back and bilateral hip pain  MEDD (morphine equivalent daily dose) is approximately 30 mg:  -MS Contin 15 mg x 2 doses = 30 mg MEDD   (12/29 MEDD = 30).     -Continue MS Contin 15 mg p.o. twice daily  -Continue MS IR 7.5 mg p.o. every 3 hours as needed breakthrough pain  -Morphine 3 mg IV every 3 hours as needed severe breakthrough pain if pain persists after oral opioid administration.  -Discontinue oxycodone 10 mg PO Q3 hours (try not to mix opioids DT potential side effects)  -Discontinue oxycodone 5 mg PO Q3 hours     -Decrease dexamethasone from 2 mg PO Q12 to 2 mg PO QAM..  -Decrease gabapentin from 200 mg PO tid to 200 mg PO bid d/t confusional state and decrease muscle jerking.     Constipation  -Change senna-docusate from 2 tabs PO bid prn to 2 tabs PO  bid.  -Change MiraLAX from prn to scheduled daily.     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     Code Status:Full    Advance Care Planning (ACP)/Goals of Care (GOC):  Advance Directive on file and up-to-date.    <PALLIATIVEREVIEW>    Total visit time was 25 minutes.  Greater than 50% of today's visit was spent counseling and coordinating the patient's care regarding symptom management and plan of care.   Please refer to interval history assessment/plan for details.

## 2018-12-31 NOTE — PROGRESS NOTES
[facility] Medicine Daily Progress Note    Date of Service  12/30/2018    Chief Complaint  83 y.o. female admitted 12/21/2018 with worsening back pain to the point of intractable few days prior to admission, had been seen by pcp, pain management, ortho without significant relief other than temporary improvement.    Hospital Course    Patient had scans showing lytic lesions in Right hip and lower back.  Biopsy ordered on admission but will not be done until after Soni d/t weekend and holiday.  Bone marrow biopsy ordered and also likely cannot be scheduled until Wednesday.  SPEP and UPEP collected and pending.      Interval Problem Update  12/22 Patient with pain, responding well to narcotic though is in extreme pain between doses.  I increased her steroids and gabapentin for better overall pain control, continue with narcotic medication.  Skeletal survey ordered to r/o impending fracture - long bones are okay but she does have lytic lesion in right pelvis which is likely the source of the majority of her pain.  12/23 Patient states she has no pain when still but still having significant pain when moving - responding appropriate to pain medications received - sedating at times.  Electrophoresis results should be back tomorrow.  Bone marrow biopsy pending Wednesday - is scheduled.  12/24 Patient seen after bone marrow biopsy earlier today, histology department was able to coordinate earlier.  SPEP and UPEP still pending at this time.  Patient slightly confused and speaking in gibberish following sedation for BM but resolving with time.  She has improved pain control with steroids and is tolerating higher dose gabapentin well.  Oncology consult pending labs and bone marrow results.   and son at bedside when evaluated - explained current treatment and anticipated turnaround for test results.  12/25: Patient is restless.  Agitated.  Delirious.  Repeating herself.  In severe low back pain.   at  bedside.  12/26: Patient still in agonizing pain.  Delirious occasionally.  Palliative team evaluated the patient and adjusted pain medications. Awaiting pathology results.   12/27: Feels much comfortable this morning.  Stated that her pain is well controlled.  More interactive this morning.  Bone marrow biopsy results with no significant abnormalities.  Ordered bone biopsy to L3.  Discussed with oncology.  Recommended bone scan.  12/28: Pain is well controlled.  More interactive and less confused.  Answers questions and follows commands appropriately.  No acute distress noted.  12/29: Preliminary pathology of bone biopsy suggesting possible metastatic adenocarcinoma of the lung.  Orthopedic surgery consulted for the destructive lesion of the right femur area.  Plan for ORIF.  MRI of the brain for staging.  12/30: Laying in bed comfortably.  Denies any chest pain this morning (musculoskeletal on exam yesterday).  Pain is well controlled.  Tolerated surgery yesterday (ORIF of the right femur).  No new complaints to report.  Consultants/Specialty  Oncology.  Orthopedic surgery.  Code Status  full    Disposition  tbd    Review of Systems  Review of Systems   Constitutional: Positive for malaise/fatigue and weight loss. Negative for chills, diaphoresis and fever.   HENT: Negative for ear discharge, ear pain, hearing loss, nosebleeds and tinnitus.    Eyes: Negative for blurred vision, double vision, photophobia and pain.   Respiratory: Negative for cough, hemoptysis and sputum production.    Cardiovascular: Negative for chest pain, palpitations, orthopnea and claudication.   Gastrointestinal: Negative for abdominal pain, heartburn, nausea and vomiting.   Genitourinary: Negative for dysuria, frequency and urgency.   Musculoskeletal: Positive for back pain and joint pain (right hip and groin area). Negative for myalgias and neck pain.   Skin: Negative for itching.   Neurological: Negative for dizziness, tingling, tremors,  sensory change and headaches.   Endo/Heme/Allergies: Does not bruise/bleed easily.   Psychiatric/Behavioral: Negative for depression, hallucinations, substance abuse and suicidal ideas. The patient is not nervous/anxious.         Physical Exam  Temp:  [36.3 °C (97.4 °F)-37 °C (98.6 °F)] 37 °C (98.6 °F)  Pulse:  [53-99] 99  Resp:  [7-19] 18  BP: (108-160)/(58-94) 108/72    Physical Exam   Constitutional: She is oriented to person, place, and time. No distress.   HENT:   Head: Normocephalic and atraumatic.   Mouth/Throat: No oropharyngeal exudate.   Eyes: Pupils are equal, round, and reactive to light. No scleral icterus.   Neck: Neck supple. No JVD present. No tracheal deviation present.   Cardiovascular: Normal rate and regular rhythm.  Exam reveals no gallop and no friction rub.    Pulmonary/Chest: Effort normal and breath sounds normal. No respiratory distress. She has no wheezes.   Abdominal: Soft. She exhibits no distension. There is no tenderness. There is no rebound.   Musculoskeletal: She exhibits tenderness (Left sternocostal angle tender with palpation.). She exhibits no deformity.   Surgical dressing on the lateral aspect of the right thigh.  Neurovascular bundle is intact on the right lower extremity.   Neurological: She is alert and oriented to person, place, and time. She is not disoriented. No cranial nerve deficit.   Skin: Skin is warm. She is not diaphoretic. No erythema.   Psychiatric: She has a normal mood and affect. Her speech is tangential. She expresses impulsivity.   Nursing note and vitals reviewed.      Fluids    Intake/Output Summary (Last 24 hours) at 12/30/18 1621  Last data filed at 12/30/18 0949   Gross per 24 hour   Intake             2327 ml   Output             1100 ml   Net             1227 ml       Laboratory  Recent Labs      12/28/18   0037  12/29/18   0053  12/30/18   1259   WBC  10.1  15.5*  15.3*   RBC  3.89*  4.08*  2.73*   HEMOGLOBIN  12.2  12.5  8.5*   HEMATOCRIT  37.1   38.4  27.0*   MCV  95.4  94.1  97.1   MCH  31.4  30.6  31.1   MCHC  32.9*  32.6*  32.1*   RDW  50.0  48.7  50.5*   PLATELETCT  112*  139*  116*   MPV  11.1  10.9  10.5     Recent Labs      12/28/18   0036  12/29/18   0053   SODIUM  139  141   POTASSIUM  4.8  4.9   CHLORIDE  111  109   CO2  20  24   GLUCOSE  163*  166*   BUN  24*  22   CREATININE  0.82  0.80   CALCIUM  8.3*  8.7                   Imaging  MR-BRAIN-WITH & W/O   Final Result      1.  No evidence of intracranial metastatic disease   2.  Calvarial and RIGHT C2 lesions highly suspicious for osseous metastatic disease   3.  Mild white matter changes   4.  Mild atrophy      DX-CHEST-PORTABLE (1 VIEW)   Final Result         1. No acute cardiopulmonary abnormalities are identified.      DX-HIP-UNILATERAL-W/O PELVIS-2/3 VIEWS RIGHT   Final Result      Digitized intraoperative radiograph is submitted for review.  This examination is not for diagnostic purpose but for guidance during a surgical procedure.      DX-HIP-BILATERAL-WITH PELVIS-3/4 VIEWS   Final Result      1.  There are lytic lesions seen involving the right inferior ilium and right inferior obturator ring but there is no plain film evidence of pathologic fracture.      CT-NEEDLE BX-DEEP BONE   Final Result      1.  Successful CT-guided core biopsy of expansile mass involving right L3 transverse process.      CT-ABDOMEN-PELVIS WITH   Final Result      1.  Multiple lytic expansile bone lesions involving the pelvis and thoracolumbar spine as described above. There are areas of cortical disruption and extension into the surrounding soft tissues involving multiple of these lesions. The largest involves    the right ilium and demonstrates some speckled calcification. Differential diagnosis includes metastatic disease as well as multiple myeloma.      2.  Destructive lesion involving the right proximal femur with some faint associated sclerosis. This is located within the intertrochanteric region and  extends to an area of focal cortical breakthrough/disruption at the base of the right femoral neck    superiorly.      3.  Fatty heterogeneous liver likely representing presence of hepatocellular dysfunction.      4.  Bibasilar atelectasis and small bilateral pleural effusions.      5.  Ill-defined pulmonary nodules within the right lung base which of previously been evaluated with CT scan.      6.  6 mm left adrenal apex nodule      CT-CHEST (THORAX) WITH   Final Result      1.  2 noncalcified nodules in the right upper lobe. The largest is spiculated measuring 11 mm in maximum diameter. Differential diagnosis includes primary or secondary lung carcinoma.      2.  3 mm noncalcified nodule in the right middle lobe.      3.  No thoracic adenopathy.      4.  Small bilateral pleural effusions, left greater than right.      5.  Heterogeneous liver which may represent primary or secondary carcinoma.            DX-BONE SURVEY-METASTATIC LTD   Final Result      1.  Lytic foci within the calvaria, right iliac bone, and right distal clavicle      2.  Multiple additional lytic foci identified on recent CT are not apparent on conventional radiography      3.  Different diagnosis includes metastatic malignancy or multiple myeloma      CT-LSPINE W/O PLUS RECONS   Final Result      Destructive lesions within the right pedicle and transverse process of L3, the right sacral wing, and right iliac crest consistent with metastatic disease or multiple myeloma.   Multilevel degenerative changes.   Moderate spinal stenosis at L4-5.      CT-HIP W/O PLUS RECONS RIGHT   Final Result         1.  Multiple lytic expansile lesions in the pelvis and spine as described.   2.  Hairline fracture of the acetabular roof on the right, likely pathologic fracture.   3.  Hairline lucency through the right femoral neck suggests hairline fracture      DX-PORTABLE FLUORO > 1 HOUR    (Results Pending)   NM-BONE SCAN WHOLE BODY    (Results Pending)         Assessment/Plan  * Intractable low back pain- (present on admission)   Assessment & Plan    -Normally follows with pain management outpatient  -Just got an epidural injection the day prior to admission  -However, feels that pain is still not managed  -Dilaudid IV as needed, norco PO  -We will decrease gabapentin and dexamethasone doses due to delirium.  Palliative following for pain management.     Lytic bone lesion of hip- (present on admission)   Assessment & Plan    With hairline fracture of the right acetabular roof and femur neck.  Likely pathological.  -Differential includes multiple myeloma versus metastatic cancer  -Patient does not have a known history of cancer  -SPEP pending.  BEATRICE with no increase in immunoglobulins.  Bone marrow biopsy 12/24 was negative for any abnormalities.  I ordered bone biopsy of L3 today 12/27.  CT scan of the chest showed 2 nodules in the right upper lobe with the largest one looks spiculated and measures around 11 mm.  CT scan of the abdomen and pelvis showed:  1.  Multiple lytic expansile bone lesions involving the pelvis and thoracolumbar spine as described above. There are areas of cortical disruption and extension into the surrounding soft tissues involving multiple of these lesions. The largest involves   the right ilium and demonstrates some speckled calcification. Differential diagnosis includes metastatic disease as well as multiple myeloma.  2.  Destructive lesion involving the right proximal femur with some faint associated sclerosis. This is located within the intertrochanteric region and extends to an area of focal cortical breakthrough/disruption at the base of the right femoral neck   Superiorly.  3.  6 mm left adrenal Nodule.  Discussed the case with oncology.  Recommended bone scan.  Bone biopsy prelim pathology suggesting metastatic adenocarcinoma of the lung.  TTF1: positive (consistent with lung source).  MRI showed no intracranial metastasis but calvarial  and C2 possible bony metastasis.  Bone scan is pending.  Oncology following.  Will need outpatient immunotherapy +/-chemotherapy.  Will likely need radiation therapy.  Will call radiology oncology.       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  Orthopedic surgery consulted.  Status post intramedullary nailing of the right femur.  PT/OT.  WBAT.      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  MRI of the brain showed no intracranial metastasis but rather calvarial and C2 possible bone metastasis.  Pending bone scan.  Oncology following.  Pending mutation and genetic testing on biopsy.  Prognosis is poor.  Palliative following.     Leukocytosis- (present on admission)   Assessment & Plan    Likely steroid induced.  Continue to monitor.     Acute cystitis- (present on admission)   Assessment & Plan    To complete a course of IV Rocephin.       CKD (chronic kidney disease) stage 3, GFR 30-59 ml/min ([facility])- (present on admission)   Assessment & Plan    Stable for now.  Avoid nephrotoxins.  Renal dose all medications.     Hyperglycemia- (present on admission)   Assessment & Plan    Suspecting steroids induced.  No history of diabetes.  Continue to monitor.     Metabolic encephalopathy- (present on admission)   Assessment & Plan    Suspecting delirium.  Multifactorial.  Could be gabapentin (high doses for patient age and size) as well as high doses steroids as hallucinations has been reported.  Poorly controlled pain is a factor as well.  No history of diagnosed dementia.  We will decrease Decadron and gabapentin doses.  Avoid benzodiazepines and/or anticholinergic medications.  Minimize hypnotics or  sedatives.  Minimize lines.  Avoid Hatch catheter.  UA positive for UTI.       Elevated INR (international normalized ratio)- (present on admission)   Assessment & Plan    - not on anticoagulants as an outpt         Elevated BUN- (present on admission)   Assessment & Plan    -Rehydrating with IV fluid     Elevated alkaline phosphatase level- (present on admission)   Assessment & Plan    -Likely related to bone lesions     Coronary artery disease- (present on admission)   Assessment & Plan    Status post CABG.  Continue with aspirin and statin.     Thrombocytopenia (HCC)- (present on admission)   Assessment & Plan    Chronic.  Continue to monitor.  Discontinue chemical anticoagulation if platelets drop below 50k  Transfuse if platelets drops below 10 K     Plan of care discussed with multidisciplinary team during rounds.    VTE prophylaxis: heparin

## 2018-12-31 NOTE — CARE PLAN
Problem: Communication  Goal: The ability to communicate needs accurately and effectively will improve    Intervention: Educate patient and significant other/support system about the plan of care, procedures, treatments, medications and allow for questions  Patient updated on POC, all questions have been answered at this time.      Problem: Safety  Goal: Will remain free from falls    Intervention: Implement fall precautions  Bed alarm active, bed locked and in low position, treaded socks in use. Call light within reach and hourly rounding in place.

## 2018-12-31 NOTE — ASSESSMENT & PLAN NOTE
Chronic.  Continue to monitor.  Discontinue chemical anticoagulation if platelets drop below 50k  Transfuse if platelets drops below 10 K

## 2019-01-01 ENCOUNTER — NON-PROVIDER VISIT (OUTPATIENT)
Dept: WOUND CARE | Facility: MEDICAL CENTER | Age: 84
End: 2019-01-01
Attending: INTERNAL MEDICINE
Payer: MEDICARE

## 2019-01-01 ENCOUNTER — HOSPITAL ENCOUNTER (OUTPATIENT)
Dept: LAB | Facility: MEDICAL CENTER | Age: 84
End: 2019-02-27
Attending: INTERNAL MEDICINE
Payer: MEDICARE

## 2019-01-01 ENCOUNTER — HOSPITAL ENCOUNTER (INPATIENT)
Facility: MEDICAL CENTER | Age: 84
LOS: 3 days | DRG: 683 | End: 2019-06-06
Attending: EMERGENCY MEDICINE | Admitting: INTERNAL MEDICINE
Payer: MEDICARE

## 2019-01-01 ENCOUNTER — HOSPITAL ENCOUNTER (OUTPATIENT)
Dept: RADIATION ONCOLOGY | Facility: MEDICAL CENTER | Age: 84
End: 2019-05-15

## 2019-01-01 ENCOUNTER — APPOINTMENT (OUTPATIENT)
Dept: RADIOLOGY | Facility: MEDICAL CENTER | Age: 84
DRG: 683 | End: 2019-01-01
Attending: EMERGENCY MEDICINE
Payer: MEDICARE

## 2019-01-01 ENCOUNTER — APPOINTMENT (OUTPATIENT)
Dept: ADMISSIONS | Facility: MEDICAL CENTER | Age: 84
End: 2019-01-01
Attending: INTERNAL MEDICINE
Payer: MEDICARE

## 2019-01-01 ENCOUNTER — HOME CARE VISIT (OUTPATIENT)
Dept: HOSPICE | Facility: HOSPICE | Age: 84
End: 2019-01-01
Payer: MEDICARE

## 2019-01-01 ENCOUNTER — TELEPHONE (OUTPATIENT)
Dept: INTERNAL MEDICINE | Facility: MEDICAL CENTER | Age: 84
End: 2019-01-01

## 2019-01-01 ENCOUNTER — APPOINTMENT (OUTPATIENT)
Dept: RADIOLOGY | Facility: MEDICAL CENTER | Age: 84
End: 2019-01-01
Attending: EMERGENCY MEDICINE
Payer: MEDICARE

## 2019-01-01 ENCOUNTER — HOSPITAL ENCOUNTER (OUTPATIENT)
Dept: LAB | Facility: MEDICAL CENTER | Age: 84
End: 2019-04-12
Attending: RADIOLOGY
Payer: MEDICARE

## 2019-01-01 ENCOUNTER — HOSPITAL ENCOUNTER (EMERGENCY)
Facility: MEDICAL CENTER | Age: 84
End: 2019-07-03
Attending: EMERGENCY MEDICINE
Payer: MEDICARE

## 2019-01-01 ENCOUNTER — PATIENT OUTREACH (OUTPATIENT)
Dept: HEALTH INFORMATION MANAGEMENT | Facility: OTHER | Age: 84
End: 2019-01-01

## 2019-01-01 ENCOUNTER — HOSPITAL ENCOUNTER (OUTPATIENT)
Dept: RADIATION ONCOLOGY | Facility: MEDICAL CENTER | Age: 84
End: 2019-01-15

## 2019-01-01 ENCOUNTER — HOSPITAL ENCOUNTER (OUTPATIENT)
Dept: LAB | Facility: MEDICAL CENTER | Age: 84
End: 2019-03-13
Attending: INTERNAL MEDICINE
Payer: MEDICARE

## 2019-01-01 ENCOUNTER — HOSPITAL ENCOUNTER (OUTPATIENT)
Dept: LAB | Facility: MEDICAL CENTER | Age: 84
End: 2019-06-13
Attending: NURSE PRACTITIONER
Payer: MEDICARE

## 2019-01-01 ENCOUNTER — HOSPITAL ENCOUNTER (OUTPATIENT)
Dept: RADIATION ONCOLOGY | Facility: MEDICAL CENTER | Age: 84
End: 2019-05-07

## 2019-01-01 ENCOUNTER — HOSPITAL ENCOUNTER (OUTPATIENT)
Dept: RADIATION ONCOLOGY | Facility: MEDICAL CENTER | Age: 84
End: 2019-05-31
Attending: RADIOLOGY
Payer: MEDICARE

## 2019-01-01 ENCOUNTER — HOSPITAL ENCOUNTER (OUTPATIENT)
Dept: RADIATION ONCOLOGY | Facility: MEDICAL CENTER | Age: 84
End: 2019-01-17

## 2019-01-01 ENCOUNTER — HOSPITAL ENCOUNTER (OUTPATIENT)
Dept: RADIATION ONCOLOGY | Facility: MEDICAL CENTER | Age: 84
End: 2019-01-31
Attending: RADIOLOGY
Payer: MEDICARE

## 2019-01-01 ENCOUNTER — HOSPITAL ENCOUNTER (OUTPATIENT)
Dept: LAB | Facility: MEDICAL CENTER | Age: 84
End: 2019-04-20
Attending: INTERNAL MEDICINE
Payer: MEDICARE

## 2019-01-01 ENCOUNTER — HOSPITAL ENCOUNTER (OUTPATIENT)
Dept: RADIATION ONCOLOGY | Facility: MEDICAL CENTER | Age: 84
End: 2019-01-11

## 2019-01-01 ENCOUNTER — OUTPATIENT INFUSION SERVICES (OUTPATIENT)
Dept: ONCOLOGY | Facility: MEDICAL CENTER | Age: 84
End: 2019-01-01
Attending: INTERNAL MEDICINE
Payer: MEDICARE

## 2019-01-01 ENCOUNTER — APPOINTMENT (OUTPATIENT)
Dept: HOSPICE | Facility: HOSPICE | Age: 84
End: 2019-01-01
Payer: MEDICARE

## 2019-01-01 ENCOUNTER — OFFICE VISIT (OUTPATIENT)
Dept: NEPHROLOGY | Facility: MEDICAL CENTER | Age: 84
End: 2019-01-01
Payer: MEDICARE

## 2019-01-01 ENCOUNTER — HOSPITAL ENCOUNTER (OUTPATIENT)
Dept: RADIATION ONCOLOGY | Facility: MEDICAL CENTER | Age: 84
End: 2019-01-14

## 2019-01-01 ENCOUNTER — HOSPITAL ENCOUNTER (OUTPATIENT)
Dept: RADIATION ONCOLOGY | Facility: MEDICAL CENTER | Age: 84
End: 2019-05-10

## 2019-01-01 ENCOUNTER — TELEPHONE (OUTPATIENT)
Dept: HEMATOLOGY ONCOLOGY | Facility: MEDICAL CENTER | Age: 84
End: 2019-01-01

## 2019-01-01 ENCOUNTER — TELEPHONE (OUTPATIENT)
Dept: NEPHROLOGY | Facility: MEDICAL CENTER | Age: 84
End: 2019-01-01

## 2019-01-01 ENCOUNTER — TELEPHONE (OUTPATIENT)
Dept: PHYSICAL THERAPY | Facility: REHABILITATION | Age: 84
End: 2019-01-01

## 2019-01-01 ENCOUNTER — HOSPITAL ENCOUNTER (OUTPATIENT)
Dept: LAB | Facility: MEDICAL CENTER | Age: 84
End: 2019-05-21
Attending: NURSE PRACTITIONER
Payer: MEDICARE

## 2019-01-01 ENCOUNTER — HOSPITAL ENCOUNTER (OUTPATIENT)
Dept: RADIATION ONCOLOGY | Facility: MEDICAL CENTER | Age: 84
End: 2019-01-08

## 2019-01-01 ENCOUNTER — HOSPITAL ENCOUNTER (OUTPATIENT)
Dept: RADIATION ONCOLOGY | Facility: MEDICAL CENTER | Age: 84
End: 2019-05-20

## 2019-01-01 ENCOUNTER — HOSPITAL ENCOUNTER (EMERGENCY)
Facility: MEDICAL CENTER | Age: 84
End: 2019-05-05
Attending: EMERGENCY MEDICINE
Payer: MEDICARE

## 2019-01-01 ENCOUNTER — HOSPITAL ENCOUNTER (OUTPATIENT)
Dept: RADIATION ONCOLOGY | Facility: MEDICAL CENTER | Age: 84
End: 2019-05-09

## 2019-01-01 ENCOUNTER — HOSPITAL ENCOUNTER (OUTPATIENT)
Dept: RADIATION ONCOLOGY | Facility: MEDICAL CENTER | Age: 84
End: 2019-04-30
Attending: RADIOLOGY
Payer: MEDICARE

## 2019-01-01 ENCOUNTER — HOSPICE ADMISSION (OUTPATIENT)
Dept: HOSPICE | Facility: HOSPICE | Age: 84
End: 2019-01-01
Payer: MEDICARE

## 2019-01-01 ENCOUNTER — TELEPHONE (OUTPATIENT)
Dept: CARDIOLOGY | Facility: MEDICAL CENTER | Age: 84
End: 2019-01-01

## 2019-01-01 ENCOUNTER — HOSPITAL ENCOUNTER (OUTPATIENT)
Dept: RADIATION ONCOLOGY | Facility: MEDICAL CENTER | Age: 84
End: 2019-05-08

## 2019-01-01 ENCOUNTER — HOSPITAL ENCOUNTER (OUTPATIENT)
Dept: LAB | Facility: MEDICAL CENTER | Age: 84
End: 2019-03-22
Attending: INTERNAL MEDICINE
Payer: MEDICARE

## 2019-01-01 ENCOUNTER — HOSPITAL ENCOUNTER (OUTPATIENT)
Dept: LAB | Facility: MEDICAL CENTER | Age: 84
End: 2019-05-13
Attending: NURSE PRACTITIONER
Payer: MEDICARE

## 2019-01-01 ENCOUNTER — HOSPITAL ENCOUNTER (OUTPATIENT)
Dept: RADIATION ONCOLOGY | Facility: MEDICAL CENTER | Age: 84
End: 2019-01-07

## 2019-01-01 ENCOUNTER — HOSPITAL ENCOUNTER (OUTPATIENT)
Dept: LAB | Facility: MEDICAL CENTER | Age: 84
End: 2019-02-11
Attending: NURSE PRACTITIONER
Payer: MEDICARE

## 2019-01-01 ENCOUNTER — HOSPITAL ENCOUNTER (OUTPATIENT)
Dept: LAB | Facility: MEDICAL CENTER | Age: 84
End: 2019-05-11
Attending: INTERNAL MEDICINE
Payer: MEDICARE

## 2019-01-01 ENCOUNTER — APPOINTMENT (OUTPATIENT)
Dept: RADIOLOGY | Facility: MEDICAL CENTER | Age: 84
End: 2019-01-01
Attending: INTERNAL MEDICINE
Payer: MEDICARE

## 2019-01-01 ENCOUNTER — HOSPITAL ENCOUNTER (OUTPATIENT)
Dept: RADIATION ONCOLOGY | Facility: MEDICAL CENTER | Age: 84
End: 2019-05-16

## 2019-01-01 ENCOUNTER — HOSPITAL ENCOUNTER (OUTPATIENT)
Facility: MEDICAL CENTER | Age: 84
End: 2019-04-09
Attending: INTERNAL MEDICINE | Admitting: INTERNAL MEDICINE
Payer: MEDICARE

## 2019-01-01 ENCOUNTER — HOSPITAL ENCOUNTER (OUTPATIENT)
Dept: LAB | Facility: MEDICAL CENTER | Age: 84
End: 2019-06-11
Attending: INTERNAL MEDICINE
Payer: MEDICARE

## 2019-01-01 ENCOUNTER — OFFICE VISIT (OUTPATIENT)
Dept: INTERNAL MEDICINE | Facility: MEDICAL CENTER | Age: 84
End: 2019-01-01
Payer: MEDICARE

## 2019-01-01 ENCOUNTER — HOSPITAL ENCOUNTER (OUTPATIENT)
Dept: LAB | Facility: MEDICAL CENTER | Age: 84
DRG: 683 | End: 2019-06-24
Attending: INTERNAL MEDICINE
Payer: MEDICARE

## 2019-01-01 ENCOUNTER — APPOINTMENT (OUTPATIENT)
Dept: RADIOLOGY | Facility: MEDICAL CENTER | Age: 84
DRG: 683 | End: 2019-01-01
Attending: INTERNAL MEDICINE
Payer: MEDICARE

## 2019-01-01 ENCOUNTER — TELEPHONE (OUTPATIENT)
Dept: HOSPITALIST | Facility: MEDICAL CENTER | Age: 84
End: 2019-01-01

## 2019-01-01 ENCOUNTER — HOSPITAL ENCOUNTER (OUTPATIENT)
Facility: MEDICAL CENTER | Age: 84
End: 2019-06-18
Attending: INTERNAL MEDICINE | Admitting: RADIOLOGY
Payer: MEDICARE

## 2019-01-01 ENCOUNTER — HOSPITAL ENCOUNTER (OUTPATIENT)
Dept: LAB | Facility: MEDICAL CENTER | Age: 84
End: 2019-01-21
Attending: INTERNAL MEDICINE
Payer: MEDICARE

## 2019-01-01 ENCOUNTER — HOSPITAL ENCOUNTER (OUTPATIENT)
Dept: LAB | Facility: MEDICAL CENTER | Age: 84
End: 2019-03-25
Attending: INTERNAL MEDICINE
Payer: MEDICARE

## 2019-01-01 ENCOUNTER — HOSPITAL ENCOUNTER (OUTPATIENT)
Dept: RADIATION ONCOLOGY | Facility: MEDICAL CENTER | Age: 84
End: 2019-05-06

## 2019-01-01 ENCOUNTER — APPOINTMENT (OUTPATIENT)
Dept: RADIATION ONCOLOGY | Facility: MEDICAL CENTER | Age: 84
End: 2019-01-01
Attending: RADIOLOGY
Payer: MEDICARE

## 2019-01-01 ENCOUNTER — PATIENT OUTREACH (OUTPATIENT)
Dept: OTHER | Facility: MEDICAL CENTER | Age: 84
End: 2019-01-01

## 2019-01-01 ENCOUNTER — HOSPITAL ENCOUNTER (OUTPATIENT)
Dept: RADIATION ONCOLOGY | Facility: MEDICAL CENTER | Age: 84
End: 2019-05-17

## 2019-01-01 ENCOUNTER — HOSPITAL ENCOUNTER (OUTPATIENT)
Dept: LAB | Facility: MEDICAL CENTER | Age: 84
End: 2019-02-27
Attending: NURSE PRACTITIONER
Payer: MEDICARE

## 2019-01-01 ENCOUNTER — HOSPITAL ENCOUNTER (OUTPATIENT)
Dept: RADIATION ONCOLOGY | Facility: MEDICAL CENTER | Age: 84
End: 2019-05-14

## 2019-01-01 ENCOUNTER — HOSPITAL ENCOUNTER (OUTPATIENT)
Dept: LAB | Facility: MEDICAL CENTER | Age: 84
DRG: 683 | End: 2019-06-01
Attending: INTERNAL MEDICINE
Payer: MEDICARE

## 2019-01-01 ENCOUNTER — HOSPITAL ENCOUNTER (OUTPATIENT)
Dept: RADIOLOGY | Facility: MEDICAL CENTER | Age: 84
End: 2019-01-23
Attending: INTERNAL MEDICINE
Payer: MEDICARE

## 2019-01-01 ENCOUNTER — HOSPITAL ENCOUNTER (OUTPATIENT)
Dept: RADIATION ONCOLOGY | Facility: MEDICAL CENTER | Age: 84
End: 2019-03-31
Attending: RADIOLOGY
Payer: MEDICARE

## 2019-01-01 ENCOUNTER — HOSPITAL ENCOUNTER (OUTPATIENT)
Dept: LAB | Facility: MEDICAL CENTER | Age: 84
End: 2019-05-06
Attending: INTERNAL MEDICINE
Payer: MEDICARE

## 2019-01-01 ENCOUNTER — HOSPITAL ENCOUNTER (OUTPATIENT)
Dept: RADIATION ONCOLOGY | Facility: MEDICAL CENTER | Age: 84
End: 2019-04-30

## 2019-01-01 ENCOUNTER — HOSPITAL ENCOUNTER (OUTPATIENT)
Dept: RADIOLOGY | Facility: MEDICAL CENTER | Age: 84
End: 2019-04-15
Attending: INTERNAL MEDICINE
Payer: MEDICARE

## 2019-01-01 ENCOUNTER — HOSPITAL ENCOUNTER (EMERGENCY)
Facility: MEDICAL CENTER | Age: 84
End: 2019-04-27
Attending: EMERGENCY MEDICINE
Payer: MEDICARE

## 2019-01-01 ENCOUNTER — HOSPITAL ENCOUNTER (OUTPATIENT)
Dept: RADIATION ONCOLOGY | Facility: MEDICAL CENTER | Age: 84
End: 2019-01-03

## 2019-01-01 VITALS
DIASTOLIC BLOOD PRESSURE: 72 MMHG | OXYGEN SATURATION: 94 % | BODY MASS INDEX: 19.29 KG/M2 | RESPIRATION RATE: 14 BRPM | SYSTOLIC BLOOD PRESSURE: 128 MMHG | TEMPERATURE: 98.7 F | WEIGHT: 120 LBS | HEART RATE: 72 BPM | HEIGHT: 66 IN

## 2019-01-01 VITALS
HEART RATE: 94 BPM | OXYGEN SATURATION: 94 % | DIASTOLIC BLOOD PRESSURE: 61 MMHG | HEIGHT: 61 IN | RESPIRATION RATE: 16 BRPM | TEMPERATURE: 98.1 F | BODY MASS INDEX: 26.43 KG/M2 | WEIGHT: 139.99 LBS | SYSTOLIC BLOOD PRESSURE: 128 MMHG

## 2019-01-01 VITALS
RESPIRATION RATE: 24 BRPM | BODY MASS INDEX: 19.33 KG/M2 | HEART RATE: 70 BPM | WEIGHT: 99 LBS | SYSTOLIC BLOOD PRESSURE: 127 MMHG | DIASTOLIC BLOOD PRESSURE: 68 MMHG

## 2019-01-01 VITALS
BODY MASS INDEX: 21.06 KG/M2 | WEIGHT: 111.55 LBS | RESPIRATION RATE: 16 BRPM | SYSTOLIC BLOOD PRESSURE: 125 MMHG | TEMPERATURE: 98.4 F | OXYGEN SATURATION: 97 % | HEART RATE: 70 BPM | DIASTOLIC BLOOD PRESSURE: 66 MMHG | HEIGHT: 61 IN

## 2019-01-01 VITALS
HEART RATE: 95 BPM | RESPIRATION RATE: 18 BRPM | SYSTOLIC BLOOD PRESSURE: 133 MMHG | BODY MASS INDEX: 20.02 KG/M2 | DIASTOLIC BLOOD PRESSURE: 73 MMHG | OXYGEN SATURATION: 98 % | TEMPERATURE: 97.7 F | WEIGHT: 102.51 LBS

## 2019-01-01 VITALS
HEART RATE: 64 BPM | HEIGHT: 60 IN | DIASTOLIC BLOOD PRESSURE: 89 MMHG | TEMPERATURE: 97.6 F | RESPIRATION RATE: 18 BRPM | WEIGHT: 103.84 LBS | SYSTOLIC BLOOD PRESSURE: 145 MMHG | OXYGEN SATURATION: 98 % | BODY MASS INDEX: 20.39 KG/M2

## 2019-01-01 VITALS — RESPIRATION RATE: 22 BRPM | HEART RATE: 117 BPM | DIASTOLIC BLOOD PRESSURE: 62 MMHG | SYSTOLIC BLOOD PRESSURE: 138 MMHG

## 2019-01-01 VITALS
SYSTOLIC BLOOD PRESSURE: 110 MMHG | BODY MASS INDEX: 22.68 KG/M2 | WEIGHT: 120.15 LBS | RESPIRATION RATE: 18 BRPM | HEIGHT: 61 IN | OXYGEN SATURATION: 100 % | DIASTOLIC BLOOD PRESSURE: 56 MMHG | HEART RATE: 77 BPM

## 2019-01-01 VITALS
HEART RATE: 78 BPM | TEMPERATURE: 97.8 F | SYSTOLIC BLOOD PRESSURE: 130 MMHG | DIASTOLIC BLOOD PRESSURE: 73 MMHG | OXYGEN SATURATION: 97 % | RESPIRATION RATE: 16 BRPM

## 2019-01-01 VITALS — HEART RATE: 106 BPM | SYSTOLIC BLOOD PRESSURE: 102 MMHG | DIASTOLIC BLOOD PRESSURE: 61 MMHG | RESPIRATION RATE: 3 BRPM

## 2019-01-01 VITALS — RESPIRATION RATE: 18 BRPM | DIASTOLIC BLOOD PRESSURE: 70 MMHG | HEART RATE: 90 BPM | SYSTOLIC BLOOD PRESSURE: 138 MMHG

## 2019-01-01 VITALS
WEIGHT: 117 LBS | BODY MASS INDEX: 22.47 KG/M2 | HEART RATE: 71 BPM | SYSTOLIC BLOOD PRESSURE: 130 MMHG | OXYGEN SATURATION: 98 % | TEMPERATURE: 98.1 F | DIASTOLIC BLOOD PRESSURE: 65 MMHG

## 2019-01-01 VITALS
HEIGHT: 66 IN | DIASTOLIC BLOOD PRESSURE: 87 MMHG | SYSTOLIC BLOOD PRESSURE: 136 MMHG | RESPIRATION RATE: 16 BRPM | TEMPERATURE: 97.8 F | BODY MASS INDEX: 17.82 KG/M2 | HEART RATE: 72 BPM | WEIGHT: 110.89 LBS | OXYGEN SATURATION: 97 %

## 2019-01-01 VITALS
HEART RATE: 75 BPM | SYSTOLIC BLOOD PRESSURE: 114 MMHG | OXYGEN SATURATION: 97 % | DIASTOLIC BLOOD PRESSURE: 57 MMHG | BODY MASS INDEX: 21.17 KG/M2 | HEIGHT: 60 IN | RESPIRATION RATE: 16 BRPM | WEIGHT: 107.81 LBS | TEMPERATURE: 98.2 F

## 2019-01-01 VITALS
RESPIRATION RATE: 14 BRPM | WEIGHT: 103 LBS | TEMPERATURE: 98.3 F | OXYGEN SATURATION: 98 % | SYSTOLIC BLOOD PRESSURE: 118 MMHG | DIASTOLIC BLOOD PRESSURE: 64 MMHG | BODY MASS INDEX: 20.22 KG/M2 | HEIGHT: 60 IN | HEART RATE: 65 BPM

## 2019-01-01 VITALS — SYSTOLIC BLOOD PRESSURE: 102 MMHG | DIASTOLIC BLOOD PRESSURE: 61 MMHG | RESPIRATION RATE: 6 BRPM | HEART RATE: 113 BPM

## 2019-01-01 VITALS
RESPIRATION RATE: 12 BRPM | SYSTOLIC BLOOD PRESSURE: 135 MMHG | DIASTOLIC BLOOD PRESSURE: 53 MMHG | HEIGHT: 61 IN | TEMPERATURE: 98 F | HEART RATE: 65 BPM | WEIGHT: 107.36 LBS | OXYGEN SATURATION: 100 % | BODY MASS INDEX: 20.27 KG/M2

## 2019-01-01 VITALS — SYSTOLIC BLOOD PRESSURE: 132 MMHG | DIASTOLIC BLOOD PRESSURE: 82 MMHG | HEART RATE: 78 BPM | RESPIRATION RATE: 18 BRPM

## 2019-01-01 VITALS
DIASTOLIC BLOOD PRESSURE: 54 MMHG | RESPIRATION RATE: 18 BRPM | TEMPERATURE: 96.9 F | HEIGHT: 61 IN | HEART RATE: 60 BPM | SYSTOLIC BLOOD PRESSURE: 123 MMHG | BODY MASS INDEX: 22.52 KG/M2 | WEIGHT: 119.27 LBS | OXYGEN SATURATION: 97 %

## 2019-01-01 VITALS — HEART RATE: 115 BPM | RESPIRATION RATE: 10 BRPM

## 2019-01-01 VITALS — SYSTOLIC BLOOD PRESSURE: 130 MMHG | HEART RATE: 64 BPM | RESPIRATION RATE: 16 BRPM | DIASTOLIC BLOOD PRESSURE: 58 MMHG

## 2019-01-01 VITALS — DIASTOLIC BLOOD PRESSURE: 62 MMHG | RESPIRATION RATE: 16 BRPM | SYSTOLIC BLOOD PRESSURE: 121 MMHG | HEART RATE: 123 BPM

## 2019-01-01 VITALS
DIASTOLIC BLOOD PRESSURE: 68 MMHG | BODY MASS INDEX: 21.82 KG/M2 | HEART RATE: 73 BPM | WEIGHT: 115.5 LBS | SYSTOLIC BLOOD PRESSURE: 142 MMHG | TEMPERATURE: 97.4 F | OXYGEN SATURATION: 100 %

## 2019-01-01 VITALS
DIASTOLIC BLOOD PRESSURE: 58 MMHG | TEMPERATURE: 99.2 F | SYSTOLIC BLOOD PRESSURE: 116 MMHG | OXYGEN SATURATION: 97 % | HEIGHT: 61 IN | HEART RATE: 88 BPM | BODY MASS INDEX: 21.71 KG/M2 | WEIGHT: 115 LBS

## 2019-01-01 VITALS — SYSTOLIC BLOOD PRESSURE: 118 MMHG | HEART RATE: 70 BPM | RESPIRATION RATE: 16 BRPM | DIASTOLIC BLOOD PRESSURE: 84 MMHG

## 2019-01-01 VITALS
OXYGEN SATURATION: 98 % | SYSTOLIC BLOOD PRESSURE: 126 MMHG | DIASTOLIC BLOOD PRESSURE: 76 MMHG | HEART RATE: 75 BPM | WEIGHT: 106 LBS | HEIGHT: 60 IN | BODY MASS INDEX: 20.81 KG/M2 | RESPIRATION RATE: 16 BRPM | TEMPERATURE: 97.8 F

## 2019-01-01 DIAGNOSIS — C79.52 SECONDARY MALIGNANT NEOPLASM OF BONE AND BONE MARROW (HCC): ICD-10-CM

## 2019-01-01 DIAGNOSIS — E86.0 DEHYDRATION: ICD-10-CM

## 2019-01-01 DIAGNOSIS — D53.9 MACROCYTIC ANEMIA: ICD-10-CM

## 2019-01-01 DIAGNOSIS — D69.6 THROMBOCYTOPENIA (HCC): ICD-10-CM

## 2019-01-01 DIAGNOSIS — C79.9 METASTATIC ADENOCARCINOMA (HCC): ICD-10-CM

## 2019-01-01 DIAGNOSIS — M79.604 BILATERAL LOWER EXTREMITY PAIN: ICD-10-CM

## 2019-01-01 DIAGNOSIS — N17.0 ACUTE RENAL FAILURE WITH ACUTE TUBULAR NECROSIS SUPERIMPOSED ON STAGE 4 CHRONIC KIDNEY DISEASE (HCC): ICD-10-CM

## 2019-01-01 DIAGNOSIS — G89.3 NEOPLASM RELATED PAIN: ICD-10-CM

## 2019-01-01 DIAGNOSIS — I10 ESSENTIAL HYPERTENSION: ICD-10-CM

## 2019-01-01 DIAGNOSIS — C34.10 MALIGNANT NEOPLASM OF UPPER LOBE OF LUNG, UNSPECIFIED LATERALITY (HCC): ICD-10-CM

## 2019-01-01 DIAGNOSIS — C34.11 MALIGNANT NEOPLASM OF UPPER LOBE OF RIGHT LUNG (HCC): ICD-10-CM

## 2019-01-01 DIAGNOSIS — D62 ACUTE BLOOD LOSS ANEMIA: ICD-10-CM

## 2019-01-01 DIAGNOSIS — R68.89 FEVER AND OTHER PHYSIOLOGIC DISTURBANCES OF TEMPERATURE REGULATION: ICD-10-CM

## 2019-01-01 DIAGNOSIS — M10.9 GOUT, UNSPECIFIED CAUSE, UNSPECIFIED CHRONICITY, UNSPECIFIED SITE: ICD-10-CM

## 2019-01-01 DIAGNOSIS — Z95.1 S/P CABG X 3: ICD-10-CM

## 2019-01-01 DIAGNOSIS — D64.9 CHRONIC ANEMIA: ICD-10-CM

## 2019-01-01 DIAGNOSIS — C34.91 MALIGNANT NEOPLASM OF RIGHT LUNG, UNSPECIFIED PART OF LUNG (HCC): ICD-10-CM

## 2019-01-01 DIAGNOSIS — G89.3 MALIGNANT BONE PAIN: ICD-10-CM

## 2019-01-01 DIAGNOSIS — N17.9 AKI (ACUTE KIDNEY INJURY) (HCC): ICD-10-CM

## 2019-01-01 DIAGNOSIS — G47.00 INSOMNIA, UNSPECIFIED TYPE: ICD-10-CM

## 2019-01-01 DIAGNOSIS — R80.8 OTHER PROTEINURIA: ICD-10-CM

## 2019-01-01 DIAGNOSIS — L89.153 PRESSURE INJURY OF COCCYGEAL REGION, STAGE 3 (HCC): Primary | ICD-10-CM

## 2019-01-01 DIAGNOSIS — M79.609 PAIN IN EXTREMITY, UNSPECIFIED EXTREMITY: ICD-10-CM

## 2019-01-01 DIAGNOSIS — C34.90 PRIMARY MALIGNANT NEOPLASM OF LUNG METASTATIC TO OTHER SITE, UNSPECIFIED LATERALITY (HCC): ICD-10-CM

## 2019-01-01 DIAGNOSIS — M79.605 BILATERAL LOWER EXTREMITY PAIN: ICD-10-CM

## 2019-01-01 DIAGNOSIS — I10 ESSENTIAL HYPERTENSION, BENIGN: ICD-10-CM

## 2019-01-01 DIAGNOSIS — E78.5 HYPERLIPIDEMIA, UNSPECIFIED HYPERLIPIDEMIA TYPE: Primary | ICD-10-CM

## 2019-01-01 DIAGNOSIS — R54 ADVANCED AGE: ICD-10-CM

## 2019-01-01 DIAGNOSIS — R31.21 ASYMPTOMATIC MICROSCOPIC HEMATURIA: ICD-10-CM

## 2019-01-01 DIAGNOSIS — C34.90 NON-SMALL CELL LUNG CANCER, UNSPECIFIED LATERALITY (HCC): ICD-10-CM

## 2019-01-01 DIAGNOSIS — N18.4 ACUTE RENAL FAILURE WITH ACUTE TUBULAR NECROSIS SUPERIMPOSED ON STAGE 4 CHRONIC KIDNEY DISEASE (HCC): ICD-10-CM

## 2019-01-01 DIAGNOSIS — Z01.812 PRE-OPERATIVE LABORATORY EXAMINATION: ICD-10-CM

## 2019-01-01 DIAGNOSIS — R53.1 WEAKNESS: ICD-10-CM

## 2019-01-01 DIAGNOSIS — F51.01 PRIMARY INSOMNIA: ICD-10-CM

## 2019-01-01 DIAGNOSIS — C79.51 SECONDARY MALIGNANT NEOPLASM OF BONE AND BONE MARROW (HCC): ICD-10-CM

## 2019-01-01 DIAGNOSIS — E78.5 DYSLIPIDEMIA: Chronic | ICD-10-CM

## 2019-01-01 DIAGNOSIS — E87.20 METABOLIC ACIDOSIS: ICD-10-CM

## 2019-01-01 DIAGNOSIS — M89.8X9 MALIGNANT BONE PAIN: ICD-10-CM

## 2019-01-01 DIAGNOSIS — I25.810 CORONARY ARTERY DISEASE INVOLVING CORONARY BYPASS GRAFT OF NATIVE HEART WITHOUT ANGINA PECTORIS: ICD-10-CM

## 2019-01-01 DIAGNOSIS — L89.302 PRESSURE INJURY OF BUTTOCK, STAGE 2, UNSPECIFIED LATERALITY (HCC): ICD-10-CM

## 2019-01-01 LAB
ABO GROUP BLD: NORMAL
ALBUMIN 24H UR QL ELPH: DETECTED
ALBUMIN SERPL BCP-MCNC: 2.2 G/DL (ref 3.2–4.9)
ALBUMIN SERPL BCP-MCNC: 3.7 G/DL (ref 3.2–4.9)
ALBUMIN SERPL BCP-MCNC: 3.8 G/DL (ref 3.2–4.9)
ALBUMIN SERPL BCP-MCNC: 3.8 G/DL (ref 3.2–4.9)
ALBUMIN SERPL BCP-MCNC: 3.9 G/DL (ref 3.2–4.9)
ALBUMIN SERPL BCP-MCNC: 4 G/DL (ref 3.2–4.9)
ALBUMIN SERPL BCP-MCNC: 4.1 G/DL (ref 3.2–4.9)
ALBUMIN SERPL BCP-MCNC: 4.1 G/DL (ref 3.2–4.9)
ALBUMIN SERPL BCP-MCNC: 4.2 G/DL (ref 3.2–4.9)
ALBUMIN SERPL BCP-MCNC: 4.3 G/DL (ref 3.2–4.9)
ALBUMIN SERPL BCP-MCNC: 4.5 G/DL (ref 3.2–4.9)
ALBUMIN SERPL-MCNC: 4.13 G/DL (ref 3.75–5.01)
ALBUMIN/GLOB SERPL: 1.2 G/DL
ALBUMIN/GLOB SERPL: 1.3 G/DL
ALBUMIN/GLOB SERPL: 1.4 G/DL
ALBUMIN/GLOB SERPL: 1.5 G/DL
ALBUMIN/GLOB SERPL: 1.6 G/DL
ALBUMIN/GLOB SERPL: 1.8 G/DL
ALBUMIN/GLOB SERPL: 1.8 G/DL
ALP SERPL-CCNC: 107 U/L (ref 30–99)
ALP SERPL-CCNC: 109 U/L (ref 30–99)
ALP SERPL-CCNC: 110 U/L (ref 30–99)
ALP SERPL-CCNC: 115 U/L (ref 30–99)
ALP SERPL-CCNC: 118 U/L (ref 30–99)
ALP SERPL-CCNC: 121 U/L (ref 30–99)
ALP SERPL-CCNC: 122 U/L (ref 30–99)
ALP SERPL-CCNC: 125 U/L (ref 30–99)
ALP SERPL-CCNC: 128 U/L (ref 30–99)
ALP SERPL-CCNC: 132 U/L (ref 30–99)
ALP SERPL-CCNC: 137 U/L (ref 30–99)
ALP SERPL-CCNC: 144 U/L (ref 30–99)
ALP SERPL-CCNC: 145 U/L (ref 30–99)
ALP SERPL-CCNC: 150 U/L (ref 30–99)
ALP SERPL-CCNC: 237 U/L (ref 30–99)
ALP SERPL-CCNC: 282 U/L (ref 30–99)
ALP SERPL-CCNC: 96 U/L (ref 30–99)
ALPHA1 GLOB 24H UR QL ELPH: DETECTED
ALPHA1 GLOB SERPL ELPH-MCNC: 0.54 G/DL (ref 0.19–0.46)
ALPHA2 GLOB 24H UR QL ELPH: DETECTED
ALPHA2 GLOB SERPL ELPH-MCNC: 1.35 G/DL (ref 0.48–1.05)
ALT SERPL-CCNC: 16 U/L (ref 2–50)
ALT SERPL-CCNC: 16 U/L (ref 2–50)
ALT SERPL-CCNC: 17 U/L (ref 2–50)
ALT SERPL-CCNC: 19 U/L (ref 2–50)
ALT SERPL-CCNC: 23 U/L (ref 2–50)
ALT SERPL-CCNC: 23 U/L (ref 2–50)
ALT SERPL-CCNC: 24 U/L (ref 2–50)
ALT SERPL-CCNC: 25 U/L (ref 2–50)
ALT SERPL-CCNC: 26 U/L (ref 2–50)
ALT SERPL-CCNC: 27 U/L (ref 2–50)
ALT SERPL-CCNC: 30 U/L (ref 2–50)
ALT SERPL-CCNC: 30 U/L (ref 2–50)
ALT SERPL-CCNC: 33 U/L (ref 2–50)
ALT SERPL-CCNC: 33 U/L (ref 2–50)
ALT SERPL-CCNC: 37 U/L (ref 2–50)
ALT SERPL-CCNC: 37 U/L (ref 2–50)
ALT SERPL-CCNC: 38 U/L (ref 2–50)
AMBIGUOUS DTTM AMBI4: NORMAL
AMYLASE SERPL-CCNC: 63 U/L (ref 20–103)
ANCA IGG TITR SER IF: NORMAL {TITER}
ANION GAP SERPL CALC-SCNC: 10 MMOL/L (ref 0–11.9)
ANION GAP SERPL CALC-SCNC: 10 MMOL/L (ref 0–11.9)
ANION GAP SERPL CALC-SCNC: 11 MMOL/L (ref 0–11.9)
ANION GAP SERPL CALC-SCNC: 11 MMOL/L (ref 0–11.9)
ANION GAP SERPL CALC-SCNC: 12 MMOL/L (ref 0–11.9)
ANION GAP SERPL CALC-SCNC: 13 MMOL/L (ref 0–11.9)
ANION GAP SERPL CALC-SCNC: 13 MMOL/L (ref 0–11.9)
ANION GAP SERPL CALC-SCNC: 15 MMOL/L (ref 0–11.9)
ANION GAP SERPL CALC-SCNC: 15 MMOL/L (ref 0–11.9)
ANION GAP SERPL CALC-SCNC: 19 MMOL/L (ref 0–11.9)
ANION GAP SERPL CALC-SCNC: 4 MMOL/L (ref 0–11.9)
ANION GAP SERPL CALC-SCNC: 5 MMOL/L (ref 0–11.9)
ANION GAP SERPL CALC-SCNC: 6 MMOL/L (ref 0–11.9)
ANION GAP SERPL CALC-SCNC: 8 MMOL/L (ref 0–11.9)
ANION GAP SERPL CALC-SCNC: 8 MMOL/L (ref 0–11.9)
ANION GAP SERPL CALC-SCNC: 9 MMOL/L (ref 0–11.9)
ANISOCYTOSIS BLD QL SMEAR: ABNORMAL
APPEARANCE UR: CLEAR
AST SERPL-CCNC: 24 U/L (ref 12–45)
AST SERPL-CCNC: 24 U/L (ref 12–45)
AST SERPL-CCNC: 25 U/L (ref 12–45)
AST SERPL-CCNC: 25 U/L (ref 12–45)
AST SERPL-CCNC: 26 U/L (ref 12–45)
AST SERPL-CCNC: 31 U/L (ref 12–45)
AST SERPL-CCNC: 32 U/L (ref 12–45)
AST SERPL-CCNC: 32 U/L (ref 12–45)
AST SERPL-CCNC: 34 U/L (ref 12–45)
AST SERPL-CCNC: 35 U/L (ref 12–45)
AST SERPL-CCNC: 36 U/L (ref 12–45)
AST SERPL-CCNC: 37 U/L (ref 12–45)
AST SERPL-CCNC: 41 U/L (ref 12–45)
AST SERPL-CCNC: 41 U/L (ref 12–45)
AST SERPL-CCNC: 49 U/L (ref 12–45)
B-GLOBULIN SERPL ELPH-MCNC: 0.81 G/DL (ref 0.48–1.1)
B-GLOBULIN UR QL ELPH: DETECTED
BACTERIA #/AREA URNS HPF: NEGATIVE /HPF
BACTERIA BLD CULT: NORMAL
BACTERIA BLD CULT: NORMAL
BARCODED ABORH UBTYP: 600
BARCODED ABORH UBTYP: 8400
BARCODED ABORH UBTYP: 9500
BARCODED PRD CODE UBPRD: NORMAL
BARCODED UNIT NUM UBUNT: NORMAL
BASOPHILS # BLD AUTO: 0 % (ref 0–1.8)
BASOPHILS # BLD AUTO: 0.1 % (ref 0–1.8)
BASOPHILS # BLD AUTO: 0.2 % (ref 0–1.8)
BASOPHILS # BLD AUTO: 0.3 % (ref 0–1.8)
BASOPHILS # BLD AUTO: 0.3 % (ref 0–1.8)
BASOPHILS # BLD AUTO: 0.4 % (ref 0–1.8)
BASOPHILS # BLD: 0 K/UL (ref 0–0.12)
BASOPHILS # BLD: 0.01 K/UL (ref 0–0.12)
BASOPHILS # BLD: 0.02 K/UL (ref 0–0.12)
BASOPHILS # BLD: 0.03 K/UL (ref 0–0.12)
BILIRUB SERPL-MCNC: 0.4 MG/DL (ref 0.1–1.5)
BILIRUB SERPL-MCNC: 0.5 MG/DL (ref 0.1–1.5)
BILIRUB SERPL-MCNC: 0.6 MG/DL (ref 0.1–1.5)
BILIRUB SERPL-MCNC: 0.7 MG/DL (ref 0.1–1.5)
BILIRUB SERPL-MCNC: 1.1 MG/DL (ref 0.1–1.5)
BILIRUB UR QL STRIP.AUTO: NEGATIVE
BLD GP AB SCN SERPL QL: NORMAL
BM IGG SER QL IF: NEGATIVE
BUN SERPL-MCNC: 10 MG/DL (ref 8–22)
BUN SERPL-MCNC: 12 MG/DL (ref 8–22)
BUN SERPL-MCNC: 13 MG/DL (ref 8–22)
BUN SERPL-MCNC: 13 MG/DL (ref 8–22)
BUN SERPL-MCNC: 14 MG/DL (ref 8–22)
BUN SERPL-MCNC: 15 MG/DL (ref 8–22)
BUN SERPL-MCNC: 20 MG/DL (ref 8–22)
BUN SERPL-MCNC: 24 MG/DL (ref 8–22)
BUN SERPL-MCNC: 25 MG/DL (ref 8–22)
BUN SERPL-MCNC: 25 MG/DL (ref 8–22)
BUN SERPL-MCNC: 26 MG/DL (ref 8–22)
BUN SERPL-MCNC: 27 MG/DL (ref 8–22)
BUN SERPL-MCNC: 27 MG/DL (ref 8–22)
BUN SERPL-MCNC: 32 MG/DL (ref 8–22)
BUN SERPL-MCNC: 36 MG/DL (ref 8–22)
C3 SERPL-MCNC: 164 MG/DL (ref 87–200)
C4 SERPL-MCNC: 50 MG/DL (ref 19–52)
CALCIUM SERPL-MCNC: 7.7 MG/DL (ref 8.5–10.5)
CALCIUM SERPL-MCNC: 7.7 MG/DL (ref 8.5–10.5)
CALCIUM SERPL-MCNC: 7.8 MG/DL (ref 8.5–10.5)
CALCIUM SERPL-MCNC: 7.9 MG/DL (ref 8.5–10.5)
CALCIUM SERPL-MCNC: 8.3 MG/DL (ref 8.5–10.5)
CALCIUM SERPL-MCNC: 8.5 MG/DL (ref 8.5–10.5)
CALCIUM SERPL-MCNC: 8.6 MG/DL (ref 8.5–10.5)
CALCIUM SERPL-MCNC: 8.7 MG/DL (ref 8.5–10.5)
CALCIUM SERPL-MCNC: 8.8 MG/DL (ref 8.5–10.5)
CALCIUM SERPL-MCNC: 8.9 MG/DL (ref 8.5–10.5)
CALCIUM SERPL-MCNC: 8.9 MG/DL (ref 8.5–10.5)
CALCIUM SERPL-MCNC: 9 MG/DL (ref 8.5–10.5)
CALCIUM SERPL-MCNC: 9 MG/DL (ref 8.5–10.5)
CALCIUM SERPL-MCNC: 9.1 MG/DL (ref 8.4–10.2)
CALCIUM SERPL-MCNC: 9.1 MG/DL (ref 8.5–10.5)
CALCIUM SERPL-MCNC: 9.2 MG/DL (ref 8.5–10.5)
CALCIUM SERPL-MCNC: 9.2 MG/DL (ref 8.5–10.5)
CALCIUM SERPL-MCNC: 9.4 MG/DL (ref 8.5–10.5)
CHEMOTHERAPY INFUSION START DATE: NORMAL
CHEMOTHERAPY RECORDS: 2.5
CHEMOTHERAPY RECORDS: 2500
CHEMOTHERAPY RECORDS: 3
CHEMOTHERAPY RECORDS: 3000
CHEMOTHERAPY RECORDS: NORMAL
CHEMOTHERAPY RX CANCER: NORMAL
CHLORIDE SERPL-SCNC: 100 MMOL/L (ref 96–112)
CHLORIDE SERPL-SCNC: 102 MMOL/L (ref 96–112)
CHLORIDE SERPL-SCNC: 103 MMOL/L (ref 96–112)
CHLORIDE SERPL-SCNC: 104 MMOL/L (ref 96–112)
CHLORIDE SERPL-SCNC: 106 MMOL/L (ref 96–112)
CHLORIDE SERPL-SCNC: 107 MMOL/L (ref 96–112)
CHLORIDE SERPL-SCNC: 107 MMOL/L (ref 96–112)
CHLORIDE SERPL-SCNC: 108 MMOL/L (ref 96–112)
CHLORIDE SERPL-SCNC: 109 MMOL/L (ref 96–112)
CHLORIDE SERPL-SCNC: 109 MMOL/L (ref 96–112)
CHLORIDE SERPL-SCNC: 110 MMOL/L (ref 96–112)
CHLORIDE SERPL-SCNC: 111 MMOL/L (ref 96–112)
CHLORIDE SERPL-SCNC: 111 MMOL/L (ref 96–112)
CHLORIDE SERPL-SCNC: 112 MMOL/L (ref 96–112)
CHLORIDE SERPL-SCNC: 116 MMOL/L (ref 96–112)
CHLORIDE SERPL-SCNC: 119 MMOL/L (ref 96–112)
CO2 SERPL-SCNC: 15 MMOL/L (ref 20–33)
CO2 SERPL-SCNC: 15 MMOL/L (ref 20–33)
CO2 SERPL-SCNC: 16 MMOL/L (ref 20–33)
CO2 SERPL-SCNC: 16 MMOL/L (ref 20–33)
CO2 SERPL-SCNC: 18 MMOL/L (ref 20–33)
CO2 SERPL-SCNC: 19 MMOL/L (ref 20–33)
CO2 SERPL-SCNC: 19 MMOL/L (ref 20–33)
CO2 SERPL-SCNC: 20 MMOL/L (ref 20–33)
CO2 SERPL-SCNC: 23 MMOL/L (ref 20–33)
CO2 SERPL-SCNC: 23 MMOL/L (ref 20–33)
CO2 SERPL-SCNC: 24 MMOL/L (ref 20–33)
CO2 SERPL-SCNC: 25 MMOL/L (ref 20–33)
CO2 SERPL-SCNC: 28 MMOL/L (ref 20–33)
CO2 SERPL-SCNC: 28 MMOL/L (ref 20–33)
COLLECT DURATION TIME SPEC: ABNORMAL H
COLOR UR: YELLOW
COMMENT 1642: NORMAL
COMPONENT P 8504P: NORMAL
COMPONENT R 8504R: NORMAL
CREAT SERPL-MCNC: 0.66 MG/DL (ref 0.5–1.4)
CREAT SERPL-MCNC: 0.79 MG/DL (ref 0.5–1.4)
CREAT SERPL-MCNC: 0.85 MG/DL (ref 0.5–1.4)
CREAT SERPL-MCNC: 0.94 MG/DL (ref 0.5–1.4)
CREAT SERPL-MCNC: 0.96 MG/DL (ref 0.5–1.4)
CREAT SERPL-MCNC: 0.97 MG/DL (ref 0.5–1.4)
CREAT SERPL-MCNC: 1.11 MG/DL (ref 0.5–1.4)
CREAT SERPL-MCNC: 1.27 MG/DL (ref 0.5–1.4)
CREAT SERPL-MCNC: 1.32 MG/DL (ref 0.5–1.4)
CREAT SERPL-MCNC: 1.94 MG/DL (ref 0.5–1.4)
CREAT SERPL-MCNC: 1.96 MG/DL (ref 0.5–1.4)
CREAT SERPL-MCNC: 1.98 MG/DL (ref 0.5–1.4)
CREAT SERPL-MCNC: 2.18 MG/DL (ref 0.5–1.4)
CREAT SERPL-MCNC: 2.39 MG/DL (ref 0.5–1.4)
CREAT SERPL-MCNC: 2.55 MG/DL (ref 0.5–1.4)
CREAT SERPL-MCNC: 2.57 MG/DL (ref 0.5–1.4)
CREAT SERPL-MCNC: 2.81 MG/DL (ref 0.5–1.4)
CREAT SERPL-MCNC: 2.91 MG/DL (ref 0.5–1.4)
CREAT SERPL-MCNC: 3.07 MG/DL (ref 0.5–1.4)
CREAT SERPL-MCNC: 3.31 MG/DL (ref 0.5–1.4)
CREAT SERPL-MCNC: 3.65 MG/DL (ref 0.5–1.4)
CREAT UR-MCNC: 27.3 MG/DL
CREAT UR-MCNC: 44.2 MG/DL
CREAT UR-MCNC: 44.3 MG/DL
DAT C3D-SP REAG RBC QL: NORMAL
DAT IGG-SP REAG RBC QL: NORMAL
DOHLE BOD BLD QL SMEAR: NORMAL
DSDNA AB TITR SER CLIF: NORMAL {TITER}
EER PROT ELECT SER Q1092: ABNORMAL
EKG IMPRESSION: NORMAL
EOSINOPHIL # BLD AUTO: 0 K/UL (ref 0–0.51)
EOSINOPHIL # BLD AUTO: 0.01 K/UL (ref 0–0.51)
EOSINOPHIL # BLD AUTO: 0.01 K/UL (ref 0–0.51)
EOSINOPHIL # BLD AUTO: 0.03 K/UL (ref 0–0.51)
EOSINOPHIL # BLD AUTO: 0.06 K/UL (ref 0–0.51)
EOSINOPHIL # BLD AUTO: 0.09 K/UL (ref 0–0.51)
EOSINOPHIL # BLD AUTO: 0.09 K/UL (ref 0–0.51)
EOSINOPHIL # BLD AUTO: 0.1 K/UL (ref 0–0.51)
EOSINOPHIL # BLD AUTO: 0.13 K/UL (ref 0–0.51)
EOSINOPHIL # BLD AUTO: 0.14 K/UL (ref 0–0.51)
EOSINOPHIL # BLD AUTO: 0.18 K/UL (ref 0–0.51)
EOSINOPHIL # BLD AUTO: 0.2 K/UL (ref 0–0.51)
EOSINOPHIL # BLD AUTO: 0.21 K/UL (ref 0–0.51)
EOSINOPHIL NFR BLD: 0 % (ref 0–6.9)
EOSINOPHIL NFR BLD: 0.1 % (ref 0–6.9)
EOSINOPHIL NFR BLD: 0.1 % (ref 0–6.9)
EOSINOPHIL NFR BLD: 0.3 % (ref 0–6.9)
EOSINOPHIL NFR BLD: 0.6 % (ref 0–6.9)
EOSINOPHIL NFR BLD: 0.7 % (ref 0–6.9)
EOSINOPHIL NFR BLD: 0.9 % (ref 0–6.9)
EOSINOPHIL NFR BLD: 1.6 % (ref 0–6.9)
EOSINOPHIL NFR BLD: 1.6 % (ref 0–6.9)
EOSINOPHIL NFR BLD: 1.7 % (ref 0–6.9)
EOSINOPHIL NFR BLD: 1.7 % (ref 0–6.9)
EOSINOPHIL NFR BLD: 2.1 % (ref 0–6.9)
EOSINOPHIL NFR BLD: 2.8 % (ref 0–6.9)
EPI CELLS #/AREA URNS HPF: NEGATIVE /HPF
ERYTHROCYTE [DISTWIDTH] IN BLOOD BY AUTOMATED COUNT: 50.5 FL (ref 35.9–50)
ERYTHROCYTE [DISTWIDTH] IN BLOOD BY AUTOMATED COUNT: 50.9 FL (ref 35.9–50)
ERYTHROCYTE [DISTWIDTH] IN BLOOD BY AUTOMATED COUNT: 51.1 FL (ref 35.9–50)
ERYTHROCYTE [DISTWIDTH] IN BLOOD BY AUTOMATED COUNT: 53.4 FL (ref 35.9–50)
ERYTHROCYTE [DISTWIDTH] IN BLOOD BY AUTOMATED COUNT: 53.7 FL (ref 35.9–50)
ERYTHROCYTE [DISTWIDTH] IN BLOOD BY AUTOMATED COUNT: 55.5 FL (ref 35.9–50)
ERYTHROCYTE [DISTWIDTH] IN BLOOD BY AUTOMATED COUNT: 55.7 FL (ref 35.9–50)
ERYTHROCYTE [DISTWIDTH] IN BLOOD BY AUTOMATED COUNT: 55.7 FL (ref 35.9–50)
ERYTHROCYTE [DISTWIDTH] IN BLOOD BY AUTOMATED COUNT: 63 FL (ref 35.9–50)
ERYTHROCYTE [DISTWIDTH] IN BLOOD BY AUTOMATED COUNT: 67.3 FL (ref 35.9–50)
ERYTHROCYTE [DISTWIDTH] IN BLOOD BY AUTOMATED COUNT: 71.5 FL (ref 35.9–50)
ERYTHROCYTE [DISTWIDTH] IN BLOOD BY AUTOMATED COUNT: 75.8 FL (ref 35.9–50)
ERYTHROCYTE [DISTWIDTH] IN BLOOD BY AUTOMATED COUNT: 80.8 FL (ref 35.9–50)
ERYTHROCYTE [DISTWIDTH] IN BLOOD BY AUTOMATED COUNT: 81.5 FL (ref 35.9–50)
ERYTHROCYTE [DISTWIDTH] IN BLOOD BY AUTOMATED COUNT: 82.4 FL (ref 35.9–50)
ERYTHROCYTE [DISTWIDTH] IN BLOOD BY AUTOMATED COUNT: 82.9 FL (ref 35.9–50)
FASTING STATUS PATIENT QL REPORTED: NORMAL
FERRITIN SERPL-MCNC: 1064.8 NG/ML (ref 10–291)
FERRITIN SERPL-MCNC: 944.1 NG/ML (ref 10–291)
FOLATE SERPL-MCNC: >23.8 NG/ML
GAMMA GLOB SERPL ELPH-MCNC: 0.76 G/DL (ref 0.62–1.51)
GAMMA GLOB UR QL ELPH: DETECTED
GLOBULIN SER CALC-MCNC: 1.6 G/DL (ref 1.9–3.5)
GLOBULIN SER CALC-MCNC: 2.1 G/DL (ref 1.9–3.5)
GLOBULIN SER CALC-MCNC: 2.4 G/DL (ref 1.9–3.5)
GLOBULIN SER CALC-MCNC: 2.5 G/DL (ref 1.9–3.5)
GLOBULIN SER CALC-MCNC: 2.6 G/DL (ref 1.9–3.5)
GLOBULIN SER CALC-MCNC: 2.7 G/DL (ref 1.9–3.5)
GLOBULIN SER CALC-MCNC: 2.7 G/DL (ref 1.9–3.5)
GLOBULIN SER CALC-MCNC: 2.9 G/DL (ref 1.9–3.5)
GLOBULIN SER CALC-MCNC: 3 G/DL (ref 1.9–3.5)
GLOBULIN SER CALC-MCNC: 3.1 G/DL (ref 1.9–3.5)
GLOBULIN SER CALC-MCNC: 3.3 G/DL (ref 1.9–3.5)
GLOBULIN SER CALC-MCNC: 3.3 G/DL (ref 1.9–3.5)
GLUCOSE SERPL-MCNC: 103 MG/DL (ref 65–99)
GLUCOSE SERPL-MCNC: 106 MG/DL (ref 65–99)
GLUCOSE SERPL-MCNC: 112 MG/DL (ref 65–99)
GLUCOSE SERPL-MCNC: 116 MG/DL (ref 65–99)
GLUCOSE SERPL-MCNC: 123 MG/DL (ref 65–99)
GLUCOSE SERPL-MCNC: 124 MG/DL (ref 65–99)
GLUCOSE SERPL-MCNC: 125 MG/DL (ref 65–99)
GLUCOSE SERPL-MCNC: 126 MG/DL (ref 65–99)
GLUCOSE SERPL-MCNC: 127 MG/DL (ref 65–99)
GLUCOSE SERPL-MCNC: 128 MG/DL (ref 65–99)
GLUCOSE SERPL-MCNC: 130 MG/DL (ref 65–99)
GLUCOSE SERPL-MCNC: 133 MG/DL (ref 65–99)
GLUCOSE SERPL-MCNC: 89 MG/DL (ref 65–99)
GLUCOSE SERPL-MCNC: 90 MG/DL (ref 65–99)
GLUCOSE SERPL-MCNC: 91 MG/DL (ref 65–99)
GLUCOSE SERPL-MCNC: 92 MG/DL (ref 65–99)
GLUCOSE SERPL-MCNC: 94 MG/DL (ref 65–99)
GLUCOSE SERPL-MCNC: 97 MG/DL (ref 65–99)
GLUCOSE SERPL-MCNC: 98 MG/DL (ref 65–99)
GLUCOSE UR STRIP.AUTO-MCNC: NEGATIVE MG/DL
HAPTOGLOB SERPL-MCNC: 270 MG/DL (ref 30–200)
HAPTOGLOB SERPL-MCNC: 352 MG/DL (ref 30–200)
HAV IGM SERPL QL IA: NEGATIVE
HBV CORE IGM SER QL: NEGATIVE
HBV SURFACE AG SER QL: NEGATIVE
HCT VFR BLD AUTO: 20 % (ref 37–47)
HCT VFR BLD AUTO: 20.1 % (ref 37–47)
HCT VFR BLD AUTO: 20.4 % (ref 37–47)
HCT VFR BLD AUTO: 22 % (ref 37–47)
HCT VFR BLD AUTO: 24.3 % (ref 37–47)
HCT VFR BLD AUTO: 24.9 % (ref 37–47)
HCT VFR BLD AUTO: 25.3 % (ref 37–47)
HCT VFR BLD AUTO: 25.5 % (ref 37–47)
HCT VFR BLD AUTO: 26.4 % (ref 37–47)
HCT VFR BLD AUTO: 27 % (ref 37–47)
HCT VFR BLD AUTO: 27.2 % (ref 37–47)
HCT VFR BLD AUTO: 27.5 % (ref 37–47)
HCT VFR BLD AUTO: 27.9 % (ref 37–47)
HCT VFR BLD AUTO: 28.7 % (ref 37–47)
HCT VFR BLD AUTO: 29 % (ref 37–47)
HCT VFR BLD AUTO: 31.1 % (ref 37–47)
HCV AB SER QL: NEGATIVE
HGB BLD-MCNC: 6.7 G/DL (ref 12–16)
HGB BLD-MCNC: 6.8 G/DL (ref 12–16)
HGB BLD-MCNC: 6.8 G/DL (ref 12–16)
HGB BLD-MCNC: 7.5 G/DL (ref 12–16)
HGB BLD-MCNC: 7.6 G/DL (ref 12–16)
HGB BLD-MCNC: 7.7 G/DL (ref 12–16)
HGB BLD-MCNC: 8.2 G/DL (ref 12–16)
HGB BLD-MCNC: 8.5 G/DL (ref 12–16)
HGB BLD-MCNC: 8.6 G/DL (ref 12–16)
HGB BLD-MCNC: 8.7 G/DL (ref 12–16)
HGB BLD-MCNC: 8.8 G/DL (ref 12–16)
HGB BLD-MCNC: 8.9 G/DL (ref 12–16)
HGB BLD-MCNC: 9.1 G/DL (ref 12–16)
HGB BLD-MCNC: 9.1 G/DL (ref 12–16)
HGB BLD-MCNC: 9.2 G/DL (ref 12–16)
HGB BLD-MCNC: 9.8 G/DL (ref 12–16)
HYALINE CASTS #/AREA URNS LPF: ABNORMAL /LPF
IMM GRANULOCYTES # BLD AUTO: 0.01 K/UL (ref 0–0.11)
IMM GRANULOCYTES # BLD AUTO: 0.02 K/UL (ref 0–0.11)
IMM GRANULOCYTES # BLD AUTO: 0.04 K/UL (ref 0–0.11)
IMM GRANULOCYTES # BLD AUTO: 0.09 K/UL (ref 0–0.11)
IMM GRANULOCYTES # BLD AUTO: 0.11 K/UL (ref 0–0.11)
IMM GRANULOCYTES # BLD AUTO: 0.14 K/UL (ref 0–0.11)
IMM GRANULOCYTES # BLD AUTO: 0.14 K/UL (ref 0–0.11)
IMM GRANULOCYTES # BLD AUTO: 0.15 K/UL (ref 0–0.11)
IMM GRANULOCYTES # BLD AUTO: 0.16 K/UL (ref 0–0.11)
IMM GRANULOCYTES # BLD AUTO: 0.19 K/UL (ref 0–0.11)
IMM GRANULOCYTES # BLD AUTO: 0.23 K/UL (ref 0–0.11)
IMM GRANULOCYTES NFR BLD AUTO: 0.3 % (ref 0–0.9)
IMM GRANULOCYTES NFR BLD AUTO: 0.3 % (ref 0–0.9)
IMM GRANULOCYTES NFR BLD AUTO: 0.5 % (ref 0–0.9)
IMM GRANULOCYTES NFR BLD AUTO: 1 % (ref 0–0.9)
IMM GRANULOCYTES NFR BLD AUTO: 1.2 % (ref 0–0.9)
IMM GRANULOCYTES NFR BLD AUTO: 1.5 % (ref 0–0.9)
IMM GRANULOCYTES NFR BLD AUTO: 1.5 % (ref 0–0.9)
IMM GRANULOCYTES NFR BLD AUTO: 1.6 % (ref 0–0.9)
IMM GRANULOCYTES NFR BLD AUTO: 1.8 % (ref 0–0.9)
INR PPP: 1.12 (ref 0.87–1.13)
INR PPP: 1.19 (ref 0.87–1.13)
INTERPRETATION SERPL IFE-IMP: ABNORMAL
INTERPRETATION UR IFE-IMP: ABNORMAL
IRON SATN MFR SERPL: 31 % (ref 15–55)
IRON SATN MFR SERPL: 61 % (ref 15–55)
IRON SERPL-MCNC: 175 UG/DL (ref 40–170)
IRON SERPL-MCNC: 80 UG/DL (ref 40–170)
KAPPA LC FREE UR-MCNC: 23.1 MG/DL (ref 0.14–2.42)
KAPPA LC FREE/LAMBDA FREE UR: 4.94 RATIO (ref 2.04–10.37)
KETONES UR STRIP.AUTO-MCNC: NEGATIVE MG/DL
LAMBDA LC FREE UR-MCNC: 4.68 MG/DL (ref 0.02–0.67)
LDH SERPL L TO P-CCNC: 352 U/L (ref 107–266)
LEUKOCYTE ESTERASE UR QL STRIP.AUTO: ABNORMAL
LEUKOCYTE ESTERASE UR QL STRIP.AUTO: ABNORMAL
LEUKOCYTE ESTERASE UR QL STRIP.AUTO: NEGATIVE
LIPASE SERPL-CCNC: 72 U/L (ref 11–82)
LYMPHOCYTES # BLD AUTO: 0.45 K/UL (ref 1–4.8)
LYMPHOCYTES # BLD AUTO: 0.48 K/UL (ref 1–4.8)
LYMPHOCYTES # BLD AUTO: 0.59 K/UL (ref 1–4.8)
LYMPHOCYTES # BLD AUTO: 0.61 K/UL (ref 1–4.8)
LYMPHOCYTES # BLD AUTO: 0.67 K/UL (ref 1–4.8)
LYMPHOCYTES # BLD AUTO: 0.98 K/UL (ref 1–4.8)
LYMPHOCYTES # BLD AUTO: 1.02 K/UL (ref 1–4.8)
LYMPHOCYTES # BLD AUTO: 1.09 K/UL (ref 1–4.8)
LYMPHOCYTES # BLD AUTO: 1.31 K/UL (ref 1–4.8)
LYMPHOCYTES # BLD AUTO: 1.52 K/UL (ref 1–4.8)
LYMPHOCYTES # BLD AUTO: 1.62 K/UL (ref 1–4.8)
LYMPHOCYTES # BLD AUTO: 1.84 K/UL (ref 1–4.8)
LYMPHOCYTES # BLD AUTO: 2.2 K/UL (ref 1–4.8)
LYMPHOCYTES NFR BLD: 10.9 % (ref 22–41)
LYMPHOCYTES NFR BLD: 12.4 % (ref 22–41)
LYMPHOCYTES NFR BLD: 13.6 % (ref 22–41)
LYMPHOCYTES NFR BLD: 14.2 % (ref 22–41)
LYMPHOCYTES NFR BLD: 15 % (ref 22–41)
LYMPHOCYTES NFR BLD: 15 % (ref 22–41)
LYMPHOCYTES NFR BLD: 15.1 % (ref 22–41)
LYMPHOCYTES NFR BLD: 17.2 % (ref 22–41)
LYMPHOCYTES NFR BLD: 5.2 % (ref 22–41)
LYMPHOCYTES NFR BLD: 6.7 % (ref 22–41)
LYMPHOCYTES NFR BLD: 7.8 % (ref 22–41)
LYMPHOCYTES NFR BLD: 8.5 % (ref 22–41)
LYMPHOCYTES NFR BLD: 9.6 % (ref 22–41)
MACROCYTES BLD QL SMEAR: ABNORMAL
MAGNESIUM SERPL-MCNC: 1.9 MG/DL (ref 1.5–2.5)
MAGNESIUM SERPL-MCNC: 2.2 MG/DL (ref 1.5–2.5)
MANUAL DIFF BLD: NORMAL
MANUAL DIFF BLD: NORMAL
MCH RBC QN AUTO: 29.8 PG (ref 27–33)
MCH RBC QN AUTO: 30.1 PG (ref 27–33)
MCH RBC QN AUTO: 30.9 PG (ref 27–33)
MCH RBC QN AUTO: 30.9 PG (ref 27–33)
MCH RBC QN AUTO: 31.2 PG (ref 27–33)
MCH RBC QN AUTO: 31.2 PG (ref 27–33)
MCH RBC QN AUTO: 31.3 PG (ref 27–33)
MCH RBC QN AUTO: 31.6 PG (ref 27–33)
MCH RBC QN AUTO: 31.7 PG (ref 27–33)
MCH RBC QN AUTO: 31.8 PG (ref 27–33)
MCH RBC QN AUTO: 32.1 PG (ref 27–33)
MCH RBC QN AUTO: 32.3 PG (ref 27–33)
MCH RBC QN AUTO: 32.4 PG (ref 27–33)
MCH RBC QN AUTO: 33.3 PG (ref 27–33)
MCHC RBC AUTO-ENTMCNC: 30.5 G/DL (ref 33.6–35)
MCHC RBC AUTO-ENTMCNC: 31.5 G/DL (ref 33.6–35)
MCHC RBC AUTO-ENTMCNC: 31.7 G/DL (ref 33.6–35)
MCHC RBC AUTO-ENTMCNC: 31.9 G/DL (ref 33.6–35)
MCHC RBC AUTO-ENTMCNC: 32 G/DL (ref 33.6–35)
MCHC RBC AUTO-ENTMCNC: 32.2 G/DL (ref 33.6–35)
MCHC RBC AUTO-ENTMCNC: 32.6 G/DL (ref 33.6–35)
MCHC RBC AUTO-ENTMCNC: 32.6 G/DL (ref 33.6–35)
MCHC RBC AUTO-ENTMCNC: 33.1 G/DL (ref 33.6–35)
MCHC RBC AUTO-ENTMCNC: 33.2 G/DL (ref 33.6–35)
MCHC RBC AUTO-ENTMCNC: 33.3 G/DL (ref 33.6–35)
MCHC RBC AUTO-ENTMCNC: 33.6 G/DL (ref 33.6–35)
MCHC RBC AUTO-ENTMCNC: 34 G/DL (ref 33.6–35)
MCHC RBC AUTO-ENTMCNC: 34.1 G/DL (ref 33.6–35)
MCV RBC AUTO: 100.4 FL (ref 81.4–97.8)
MCV RBC AUTO: 101.8 FL (ref 81.4–97.8)
MCV RBC AUTO: 102.1 FL (ref 81.4–97.8)
MCV RBC AUTO: 103.7 FL (ref 81.4–97.8)
MCV RBC AUTO: 104.2 FL (ref 81.4–97.8)
MCV RBC AUTO: 88.8 FL (ref 81.4–97.8)
MCV RBC AUTO: 90.5 FL (ref 81.4–97.8)
MCV RBC AUTO: 90.9 FL (ref 81.4–97.8)
MCV RBC AUTO: 91.3 FL (ref 81.4–97.8)
MCV RBC AUTO: 92.5 FL (ref 81.4–97.8)
MCV RBC AUTO: 93.3 FL (ref 81.4–97.8)
MCV RBC AUTO: 93.9 FL (ref 81.4–97.8)
MCV RBC AUTO: 94 FL (ref 81.4–97.8)
MCV RBC AUTO: 95.8 FL (ref 81.4–97.8)
MCV RBC AUTO: 99 FL (ref 81.4–97.8)
MCV RBC AUTO: 99.7 FL (ref 81.4–97.8)
METAMYELOCYTES NFR BLD MANUAL: 0.9 %
MICRO URNS: ABNORMAL
MICROALBUMIN UR-MCNC: 13.5 MG/DL
MICROALBUMIN/CREAT UR: 305 MG/G (ref 0–30)
MICROCYTES BLD QL SMEAR: ABNORMAL
MICROCYTES BLD QL SMEAR: ABNORMAL
MONOCYTES # BLD AUTO: 0.11 K/UL (ref 0–0.85)
MONOCYTES # BLD AUTO: 0.29 K/UL (ref 0–0.85)
MONOCYTES # BLD AUTO: 0.42 K/UL (ref 0–0.85)
MONOCYTES # BLD AUTO: 0.49 K/UL (ref 0–0.85)
MONOCYTES # BLD AUTO: 0.49 K/UL (ref 0–0.85)
MONOCYTES # BLD AUTO: 0.55 K/UL (ref 0–0.85)
MONOCYTES # BLD AUTO: 0.66 K/UL (ref 0–0.85)
MONOCYTES # BLD AUTO: 0.68 K/UL (ref 0–0.85)
MONOCYTES # BLD AUTO: 0.68 K/UL (ref 0–0.85)
MONOCYTES # BLD AUTO: 0.69 K/UL (ref 0–0.85)
MONOCYTES # BLD AUTO: 0.73 K/UL (ref 0–0.85)
MONOCYTES # BLD AUTO: 0.73 K/UL (ref 0–0.85)
MONOCYTES # BLD AUTO: 0.79 K/UL (ref 0–0.85)
MONOCYTES NFR BLD AUTO: 0.9 % (ref 0–13.4)
MONOCYTES NFR BLD AUTO: 11.4 % (ref 0–13.4)
MONOCYTES NFR BLD AUTO: 4.9 % (ref 0–13.4)
MONOCYTES NFR BLD AUTO: 5.2 % (ref 0–13.4)
MONOCYTES NFR BLD AUTO: 5.3 % (ref 0–13.4)
MONOCYTES NFR BLD AUTO: 5.6 % (ref 0–13.4)
MONOCYTES NFR BLD AUTO: 5.6 % (ref 0–13.4)
MONOCYTES NFR BLD AUTO: 5.7 % (ref 0–13.4)
MONOCYTES NFR BLD AUTO: 6.1 % (ref 0–13.4)
MONOCYTES NFR BLD AUTO: 6.4 % (ref 0–13.4)
MONOCYTES NFR BLD AUTO: 7.8 % (ref 0–13.4)
MONOCYTES NFR BLD AUTO: 9 % (ref 0–13.4)
MONOCYTES NFR BLD AUTO: 9.9 % (ref 0–13.4)
MORPHOLOGY BLD-IMP: NORMAL
NEUTROPHILS # BLD AUTO: 10.79 K/UL (ref 2–7.15)
NEUTROPHILS # BLD AUTO: 13.98 K/UL (ref 2–7.15)
NEUTROPHILS # BLD AUTO: 2.33 K/UL (ref 2–7.15)
NEUTROPHILS # BLD AUTO: 4.54 K/UL (ref 2–7.15)
NEUTROPHILS # BLD AUTO: 5.54 K/UL (ref 2–7.15)
NEUTROPHILS # BLD AUTO: 5.72 K/UL (ref 2–7.15)
NEUTROPHILS # BLD AUTO: 6.34 K/UL (ref 2–7.15)
NEUTROPHILS # BLD AUTO: 6.62 K/UL (ref 2–7.15)
NEUTROPHILS # BLD AUTO: 7.66 K/UL (ref 2–7.15)
NEUTROPHILS # BLD AUTO: 8.3 K/UL (ref 2–7.15)
NEUTROPHILS # BLD AUTO: 9.39 K/UL (ref 2–7.15)
NEUTROPHILS # BLD AUTO: 9.42 K/UL (ref 2–7.15)
NEUTROPHILS # BLD AUTO: 9.45 K/UL (ref 2–7.15)
NEUTROPHILS NFR BLD: 72.6 % (ref 44–72)
NEUTROPHILS NFR BLD: 73.5 % (ref 44–72)
NEUTROPHILS NFR BLD: 76.2 % (ref 44–72)
NEUTROPHILS NFR BLD: 77.3 % (ref 44–72)
NEUTROPHILS NFR BLD: 77.5 % (ref 44–72)
NEUTROPHILS NFR BLD: 78.5 % (ref 44–72)
NEUTROPHILS NFR BLD: 78.8 % (ref 44–72)
NEUTROPHILS NFR BLD: 79.5 % (ref 44–72)
NEUTROPHILS NFR BLD: 80 % (ref 44–72)
NEUTROPHILS NFR BLD: 80.1 % (ref 44–72)
NEUTROPHILS NFR BLD: 81.7 % (ref 44–72)
NEUTROPHILS NFR BLD: 86.2 % (ref 44–72)
NEUTROPHILS NFR BLD: 92.2 % (ref 44–72)
NEUTS BAND NFR BLD MANUAL: 1.7 % (ref 0–10)
NITRITE UR QL STRIP.AUTO: NEGATIVE
NRBC # BLD AUTO: 0 K/UL
NRBC # BLD AUTO: 0.04 K/UL
NRBC # BLD AUTO: 0.04 K/UL
NRBC # BLD AUTO: 0.05 K/UL
NRBC # BLD AUTO: 0.09 K/UL
NRBC # BLD AUTO: 0.16 K/UL
NRBC # BLD AUTO: 0.18 K/UL
NRBC # BLD AUTO: 0.3 K/UL
NRBC BLD-RTO: 0 /100 WBC
NRBC BLD-RTO: 0.2 /100 WBC
NRBC BLD-RTO: 0.3 /100 WBC
NRBC BLD-RTO: 0.6 /100 WBC
NRBC BLD-RTO: 1 /100 WBC
NRBC BLD-RTO: 1.5 /100 WBC
NRBC BLD-RTO: 1.5 /100 WBC
NRBC BLD-RTO: 2.3 /100 WBC
NUCLEAR IGG SER QL IA: NORMAL
OVALOCYTES BLD QL SMEAR: NORMAL
OVALOCYTES BLD QL SMEAR: NORMAL
PATHOLOGY CONSULT NOTE: NORMAL
PH UR STRIP.AUTO: 5.5 [PH]
PH UR STRIP.AUTO: 6 [PH]
PH UR STRIP.AUTO: 7 [PH]
PHOSPHATE SERPL-MCNC: 3.2 MG/DL (ref 2.5–4.5)
PLATELET # BLD AUTO: 105 K/UL (ref 164–446)
PLATELET # BLD AUTO: 111 K/UL (ref 164–446)
PLATELET # BLD AUTO: 116 K/UL (ref 164–446)
PLATELET # BLD AUTO: 118 K/UL (ref 164–446)
PLATELET # BLD AUTO: 119 K/UL (ref 164–446)
PLATELET # BLD AUTO: 130 K/UL (ref 164–446)
PLATELET # BLD AUTO: 132 K/UL (ref 164–446)
PLATELET # BLD AUTO: 133 K/UL (ref 164–446)
PLATELET # BLD AUTO: 134 K/UL (ref 164–446)
PLATELET # BLD AUTO: 15 K/UL (ref 164–446)
PLATELET # BLD AUTO: 16 K/UL (ref 164–446)
PLATELET # BLD AUTO: 166 K/UL (ref 164–446)
PLATELET # BLD AUTO: 85 K/UL (ref 164–446)
PLATELET # BLD AUTO: 90 K/UL (ref 164–446)
PLATELET # BLD AUTO: 93 K/UL (ref 164–446)
PLATELET # BLD AUTO: 95 K/UL (ref 164–446)
PLATELET BLD QL SMEAR: NORMAL
PLATELETS.RETICULATED NFR BLD AUTO: 6.6 K/UL (ref 0.6–13.1)
PMV BLD AUTO: 10.1 FL (ref 9–12.9)
PMV BLD AUTO: 10.1 FL (ref 9–12.9)
PMV BLD AUTO: 10.2 FL (ref 9–12.9)
PMV BLD AUTO: 10.4 FL (ref 9–12.9)
PMV BLD AUTO: 10.6 FL (ref 9–12.9)
PMV BLD AUTO: 11 FL (ref 9–12.9)
PMV BLD AUTO: 12 FL (ref 9–12.9)
PMV BLD AUTO: 9.3 FL (ref 9–12.9)
PMV BLD AUTO: 9.4 FL (ref 9–12.9)
PMV BLD AUTO: 9.6 FL (ref 9–12.9)
PMV BLD AUTO: 9.7 FL (ref 9–12.9)
PMV BLD AUTO: 9.9 FL (ref 9–12.9)
PMV BLD AUTO: 9.9 FL (ref 9–12.9)
POIKILOCYTOSIS BLD QL SMEAR: NORMAL
POIKILOCYTOSIS BLD QL SMEAR: NORMAL
POTASSIUM SERPL-SCNC: 2.8 MMOL/L (ref 3.6–5.5)
POTASSIUM SERPL-SCNC: 3.2 MMOL/L (ref 3.6–5.5)
POTASSIUM SERPL-SCNC: 3.3 MMOL/L (ref 3.6–5.5)
POTASSIUM SERPL-SCNC: 3.5 MMOL/L (ref 3.6–5.5)
POTASSIUM SERPL-SCNC: 3.6 MMOL/L (ref 3.6–5.5)
POTASSIUM SERPL-SCNC: 3.7 MMOL/L (ref 3.6–5.5)
POTASSIUM SERPL-SCNC: 3.8 MMOL/L (ref 3.6–5.5)
POTASSIUM SERPL-SCNC: 3.8 MMOL/L (ref 3.6–5.5)
POTASSIUM SERPL-SCNC: 3.9 MMOL/L (ref 3.6–5.5)
POTASSIUM SERPL-SCNC: 4 MMOL/L (ref 3.6–5.5)
POTASSIUM SERPL-SCNC: 4.1 MMOL/L (ref 3.6–5.5)
POTASSIUM SERPL-SCNC: 4.1 MMOL/L (ref 3.6–5.5)
POTASSIUM SERPL-SCNC: 4.2 MMOL/L (ref 3.6–5.5)
POTASSIUM SERPL-SCNC: 4.5 MMOL/L (ref 3.6–5.5)
POTASSIUM SERPL-SCNC: 4.6 MMOL/L (ref 3.6–5.5)
POTASSIUM SERPL-SCNC: 5.1 MMOL/L (ref 3.6–5.5)
POTASSIUM SERPL-SCNC: 5.3 MMOL/L (ref 3.6–5.5)
PRODUCT TYPE UPROD: NORMAL
PROT 24H UR-MRATE: ABNORMAL MG/D (ref 10–140)
PROT SERPL-MCNC: 3.8 G/DL (ref 6–8.2)
PROT SERPL-MCNC: 5.9 G/DL (ref 6–8.2)
PROT SERPL-MCNC: 6.5 G/DL (ref 6–8.2)
PROT SERPL-MCNC: 6.5 G/DL (ref 6–8.2)
PROT SERPL-MCNC: 6.6 G/DL (ref 6–8.2)
PROT SERPL-MCNC: 6.7 G/DL (ref 6–8.2)
PROT SERPL-MCNC: 6.8 G/DL (ref 6–8.2)
PROT SERPL-MCNC: 7.1 G/DL (ref 6–8.2)
PROT SERPL-MCNC: 7.1 G/DL (ref 6–8.2)
PROT SERPL-MCNC: 7.2 G/DL (ref 6–8.2)
PROT SERPL-MCNC: 7.3 G/DL (ref 6–8.2)
PROT SERPL-MCNC: 7.5 G/DL (ref 6–8.2)
PROT SERPL-MCNC: 7.6 G/DL (ref 6–8.2)
PROT SERPL-MCNC: 7.6 G/DL (ref 6–8.3)
PROT UR QL STRIP: 100 MG/DL
PROT UR QL STRIP: 30 MG/DL
PROT UR QL STRIP: 30 MG/DL
PROT UR-MCNC: 91.4 MG/DL (ref 0–15)
PROTHROMBIN TIME: 14.7 SEC (ref 12–14.6)
PROTHROMBIN TIME: 15.2 SEC (ref 12–14.6)
RAD ONC ARIA COURSE TREATMENT ELAPSED DAYS: NORMAL
RAD ONC ARIA PLAN TREATMENT DATES: NORMAL
RAD ONC ARIA REFERENCE POINT DOSAGE GIVEN TO DATE: 10
RAD ONC ARIA REFERENCE POINT DOSAGE GIVEN TO DATE: 10.15
RAD ONC ARIA REFERENCE POINT DOSAGE GIVEN TO DATE: 12
RAD ONC ARIA REFERENCE POINT DOSAGE GIVEN TO DATE: 12.05
RAD ONC ARIA REFERENCE POINT DOSAGE GIVEN TO DATE: 12.5
RAD ONC ARIA REFERENCE POINT DOSAGE GIVEN TO DATE: 12.69
RAD ONC ARIA REFERENCE POINT DOSAGE GIVEN TO DATE: 15
RAD ONC ARIA REFERENCE POINT DOSAGE GIVEN TO DATE: 15
RAD ONC ARIA REFERENCE POINT DOSAGE GIVEN TO DATE: 15.06
RAD ONC ARIA REFERENCE POINT DOSAGE GIVEN TO DATE: 15.23
RAD ONC ARIA REFERENCE POINT DOSAGE GIVEN TO DATE: 17.5
RAD ONC ARIA REFERENCE POINT DOSAGE GIVEN TO DATE: 17.77
RAD ONC ARIA REFERENCE POINT DOSAGE GIVEN TO DATE: 18
RAD ONC ARIA REFERENCE POINT DOSAGE GIVEN TO DATE: 18.08
RAD ONC ARIA REFERENCE POINT DOSAGE GIVEN TO DATE: 2.5
RAD ONC ARIA REFERENCE POINT DOSAGE GIVEN TO DATE: 2.54
RAD ONC ARIA REFERENCE POINT DOSAGE GIVEN TO DATE: 20
RAD ONC ARIA REFERENCE POINT DOSAGE GIVEN TO DATE: 20.31
RAD ONC ARIA REFERENCE POINT DOSAGE GIVEN TO DATE: 21
RAD ONC ARIA REFERENCE POINT DOSAGE GIVEN TO DATE: 21.09
RAD ONC ARIA REFERENCE POINT DOSAGE GIVEN TO DATE: 22.5
RAD ONC ARIA REFERENCE POINT DOSAGE GIVEN TO DATE: 22.84
RAD ONC ARIA REFERENCE POINT DOSAGE GIVEN TO DATE: 24
RAD ONC ARIA REFERENCE POINT DOSAGE GIVEN TO DATE: 24.1
RAD ONC ARIA REFERENCE POINT DOSAGE GIVEN TO DATE: 25
RAD ONC ARIA REFERENCE POINT DOSAGE GIVEN TO DATE: 25
RAD ONC ARIA REFERENCE POINT DOSAGE GIVEN TO DATE: 25.38
RAD ONC ARIA REFERENCE POINT DOSAGE GIVEN TO DATE: 25.38
RAD ONC ARIA REFERENCE POINT DOSAGE GIVEN TO DATE: 27
RAD ONC ARIA REFERENCE POINT DOSAGE GIVEN TO DATE: 27.11
RAD ONC ARIA REFERENCE POINT DOSAGE GIVEN TO DATE: 3
RAD ONC ARIA REFERENCE POINT DOSAGE GIVEN TO DATE: 3.01
RAD ONC ARIA REFERENCE POINT DOSAGE GIVEN TO DATE: 30
RAD ONC ARIA REFERENCE POINT DOSAGE GIVEN TO DATE: 30.13
RAD ONC ARIA REFERENCE POINT DOSAGE GIVEN TO DATE: 30.13
RAD ONC ARIA REFERENCE POINT DOSAGE GIVEN TO DATE: 30.97
RAD ONC ARIA REFERENCE POINT DOSAGE GIVEN TO DATE: 5
RAD ONC ARIA REFERENCE POINT DOSAGE GIVEN TO DATE: 5.08
RAD ONC ARIA REFERENCE POINT DOSAGE GIVEN TO DATE: 6
RAD ONC ARIA REFERENCE POINT DOSAGE GIVEN TO DATE: 6.03
RAD ONC ARIA REFERENCE POINT DOSAGE GIVEN TO DATE: 7.5
RAD ONC ARIA REFERENCE POINT DOSAGE GIVEN TO DATE: 7.61
RAD ONC ARIA REFERENCE POINT DOSAGE GIVEN TO DATE: 9
RAD ONC ARIA REFERENCE POINT DOSAGE GIVEN TO DATE: 9.04
RAD ONC ARIA REFERENCE POINT ID: NORMAL
RAD ONC ARIA REFERENCE POINT SESSION DOSAGE GIVEN: 2.5
RAD ONC ARIA REFERENCE POINT SESSION DOSAGE GIVEN: 2.54
RAD ONC ARIA REFERENCE POINT SESSION DOSAGE GIVEN: 3
RAD ONC ARIA REFERENCE POINT SESSION DOSAGE GIVEN: 3.01
RBC # BLD AUTO: 2.14 M/UL (ref 4.2–5.4)
RBC # BLD AUTO: 2.15 M/UL (ref 4.2–5.4)
RBC # BLD AUTO: 2.2 M/UL (ref 4.2–5.4)
RBC # BLD AUTO: 2.38 M/UL (ref 4.2–5.4)
RBC # BLD AUTO: 2.39 M/UL (ref 4.2–5.4)
RBC # BLD AUTO: 2.43 M/UL (ref 4.2–5.4)
RBC # BLD AUTO: 2.46 M/UL (ref 4.2–5.4)
RBC # BLD AUTO: 2.71 M/UL (ref 4.2–5.4)
RBC # BLD AUTO: 2.85 M/UL (ref 4.2–5.4)
RBC # BLD AUTO: 2.85 M/UL (ref 4.2–5.4)
RBC # BLD AUTO: 2.87 M/UL (ref 4.2–5.4)
RBC # BLD AUTO: 2.88 M/UL (ref 4.2–5.4)
RBC # BLD AUTO: 2.89 M/UL (ref 4.2–5.4)
RBC # BLD AUTO: 2.92 M/UL (ref 4.2–5.4)
RBC # BLD AUTO: 2.99 M/UL (ref 4.2–5.4)
RBC # BLD AUTO: 3.14 M/UL (ref 4.2–5.4)
RBC # URNS HPF: ABNORMAL /HPF
RBC BLD AUTO: PRESENT
RBC UR QL AUTO: ABNORMAL
RH BLD: NORMAL
SIGNIFICANT IND 70042: NORMAL
SIGNIFICANT IND 70042: NORMAL
SITE SITE: NORMAL
SITE SITE: NORMAL
SODIUM SERPL-SCNC: 136 MMOL/L (ref 135–145)
SODIUM SERPL-SCNC: 137 MMOL/L (ref 135–145)
SODIUM SERPL-SCNC: 137 MMOL/L (ref 135–145)
SODIUM SERPL-SCNC: 138 MMOL/L (ref 135–145)
SODIUM SERPL-SCNC: 138 MMOL/L (ref 135–145)
SODIUM SERPL-SCNC: 139 MMOL/L (ref 135–145)
SODIUM SERPL-SCNC: 140 MMOL/L (ref 135–145)
SODIUM SERPL-SCNC: 140 MMOL/L (ref 135–145)
SODIUM SERPL-SCNC: 141 MMOL/L (ref 135–145)
SODIUM SERPL-SCNC: 142 MMOL/L (ref 135–145)
SODIUM SERPL-SCNC: 142 MMOL/L (ref 135–145)
SODIUM SERPL-SCNC: 143 MMOL/L (ref 135–145)
SODIUM SERPL-SCNC: 144 MMOL/L (ref 135–145)
SODIUM SERPL-SCNC: 144 MMOL/L (ref 135–145)
SODIUM UR-SCNC: 27 MMOL/L
SOURCE SOURCE: NORMAL
SOURCE SOURCE: NORMAL
SP GR UR STRIP.AUTO: 1.01
T4 FREE SERPL-MCNC: 0.85 NG/DL (ref 0.53–1.43)
TIBC SERPL-MCNC: 259 UG/DL (ref 250–450)
TIBC SERPL-MCNC: 288 UG/DL (ref 250–450)
TOTAL VOLUME 1105: ABNORMAL ML
TROPONIN I SERPL-MCNC: <0.01 NG/ML (ref 0–0.04)
TSH SERPL DL<=0.005 MIU/L-ACNC: 2.49 UIU/ML (ref 0.38–5.33)
TSH SERPL DL<=0.005 MIU/L-ACNC: 2.51 UIU/ML (ref 0.38–5.33)
TSH SERPL DL<=0.005 MIU/L-ACNC: 2.97 UIU/ML (ref 0.38–5.33)
TSH SERPL DL<=0.005 MIU/L-ACNC: 3.17 UIU/ML (ref 0.38–5.33)
TSH SERPL DL<=0.005 MIU/L-ACNC: 3.29 UIU/ML (ref 0.38–5.33)
TSH SERPL DL<=0.005 MIU/L-ACNC: 3.46 UIU/ML (ref 0.38–5.33)
TSH SERPL DL<=0.005 MIU/L-ACNC: 3.5 UIU/ML (ref 0.38–5.33)
TSH SERPL DL<=0.005 MIU/L-ACNC: 4.2 UIU/ML (ref 0.38–5.33)
UNIT STATUS USTAT: NORMAL
UROBILINOGEN UR STRIP.AUTO-MCNC: 0.2 MG/DL
VIT B12 SERPL-MCNC: 1283 PG/ML (ref 211–911)
WBC # BLD AUTO: 10.7 K/UL (ref 4.8–10.8)
WBC # BLD AUTO: 11.7 K/UL (ref 4.8–10.8)
WBC # BLD AUTO: 11.9 K/UL (ref 4.8–10.8)
WBC # BLD AUTO: 12.2 K/UL (ref 4.8–10.8)
WBC # BLD AUTO: 12.8 K/UL (ref 4.8–10.8)
WBC # BLD AUTO: 16.2 K/UL (ref 4.8–10.8)
WBC # BLD AUTO: 3.2 K/UL (ref 4.8–10.8)
WBC # BLD AUTO: 4.4 K/UL (ref 4.8–10.8)
WBC # BLD AUTO: 5.1 K/UL (ref 4.8–10.8)
WBC # BLD AUTO: 5.3 K/UL (ref 4.8–10.8)
WBC # BLD AUTO: 5.8 K/UL (ref 4.8–10.8)
WBC # BLD AUTO: 7 K/UL (ref 4.8–10.8)
WBC # BLD AUTO: 7 K/UL (ref 4.8–10.8)
WBC # BLD AUTO: 7.9 K/UL (ref 4.8–10.8)
WBC # BLD AUTO: 8.7 K/UL (ref 4.8–10.8)
WBC # BLD AUTO: 9.4 K/UL (ref 4.8–10.8)
WBC #/AREA URNS HPF: ABNORMAL /HPF

## 2019-01-01 PROCEDURE — 700102 HCHG RX REV CODE 250 W/ 637 OVERRIDE(OP): Performed by: INTERNAL MEDICINE

## 2019-01-01 PROCEDURE — 77280 THER RAD SIMULAJ FIELD SMPL: CPT | Performed by: RADIOLOGY

## 2019-01-01 PROCEDURE — 77387 GUIDANCE FOR RADJ TX DLVR: CPT | Performed by: RADIOLOGY

## 2019-01-01 PROCEDURE — A9270 NON-COVERED ITEM OR SERVICE: HCPCS | Performed by: FAMILY MEDICINE

## 2019-01-01 PROCEDURE — 99232 SBSQ HOSP IP/OBS MODERATE 35: CPT | Performed by: INTERNAL MEDICINE

## 2019-01-01 PROCEDURE — G0156 HHCP-SVS OF AIDE,EA 15 MIN: HCPCS

## 2019-01-01 PROCEDURE — 770004 HCHG ROOM/CARE - ONCOLOGY PRIVATE *

## 2019-01-01 PROCEDURE — 77336 RADIATION PHYSICS CONSULT: CPT | Performed by: RADIOLOGY

## 2019-01-01 PROCEDURE — A9270 NON-COVERED ITEM OR SERVICE: HCPCS | Performed by: INTERNAL MEDICINE

## 2019-01-01 PROCEDURE — 700102 HCHG RX REV CODE 250 W/ 637 OVERRIDE(OP): Performed by: ORTHOPAEDIC SURGERY

## 2019-01-01 PROCEDURE — S9126 HOSPICE CARE, IN THE HOME, P: HCPCS

## 2019-01-01 PROCEDURE — 83735 ASSAY OF MAGNESIUM: CPT

## 2019-01-01 PROCEDURE — 83540 ASSAY OF IRON: CPT

## 2019-01-01 PROCEDURE — 700111 HCHG RX REV CODE 636 W/ 250 OVERRIDE (IP): Performed by: ORTHOPAEDIC SURGERY

## 2019-01-01 PROCEDURE — 700102 HCHG RX REV CODE 250 W/ 637 OVERRIDE(OP): Performed by: NURSE PRACTITIONER

## 2019-01-01 PROCEDURE — 36415 COLL VENOUS BLD VENIPUNCTURE: CPT

## 2019-01-01 PROCEDURE — 84100 ASSAY OF PHOSPHORUS: CPT

## 2019-01-01 PROCEDURE — 82607 VITAMIN B-12: CPT

## 2019-01-01 PROCEDURE — 97110 THERAPEUTIC EXERCISES: CPT

## 2019-01-01 PROCEDURE — 306780 HCHG STAT FOR TRANSFUSION PER CASE

## 2019-01-01 PROCEDURE — A9270 NON-COVERED ITEM OR SERVICE: HCPCS | Performed by: ORTHOPAEDIC SURGERY

## 2019-01-01 PROCEDURE — 700111 HCHG RX REV CODE 636 W/ 250 OVERRIDE (IP): Performed by: INTERNAL MEDICINE

## 2019-01-01 PROCEDURE — 6650990 HC HOSPICE AND HOME CARE RX REV CODE 0250

## 2019-01-01 PROCEDURE — 77412 RADIATION TX DELIVERY LVL 3: CPT | Performed by: RADIOLOGY

## 2019-01-01 PROCEDURE — 99285 EMERGENCY DEPT VISIT HI MDM: CPT

## 2019-01-01 PROCEDURE — 77338 DESIGN MLC DEVICE FOR IMRT: CPT | Performed by: RADIOLOGY

## 2019-01-01 PROCEDURE — 80048 BASIC METABOLIC PNL TOTAL CA: CPT

## 2019-01-01 PROCEDURE — 80053 COMPREHEN METABOLIC PANEL: CPT

## 2019-01-01 PROCEDURE — 71045 X-RAY EXAM CHEST 1 VIEW: CPT

## 2019-01-01 PROCEDURE — 700105 HCHG RX REV CODE 258: Performed by: RADIOLOGY

## 2019-01-01 PROCEDURE — 36430 TRANSFUSION BLD/BLD COMPNT: CPT

## 2019-01-01 PROCEDURE — 99153 MOD SED SAME PHYS/QHP EA: CPT

## 2019-01-01 PROCEDURE — 770006 HCHG ROOM/CARE - MED/SURG/GYN SEMI*

## 2019-01-01 PROCEDURE — 93971 EXTREMITY STUDY: CPT | Mod: RT

## 2019-01-01 PROCEDURE — 84156 ASSAY OF PROTEIN URINE: CPT

## 2019-01-01 PROCEDURE — 99214 OFFICE O/P EST MOD 30 MIN: CPT | Performed by: INTERNAL MEDICINE

## 2019-01-01 PROCEDURE — 77014 PR CT GUIDANCE PLACEMENT RAD THERAPY FIELDS: CPT | Mod: 26 | Performed by: RADIOLOGY

## 2019-01-01 PROCEDURE — 88305 TISSUE EXAM BY PATHOLOGIST: CPT

## 2019-01-01 PROCEDURE — 92526 ORAL FUNCTION THERAPY: CPT

## 2019-01-01 PROCEDURE — 84443 ASSAY THYROID STIM HORMONE: CPT

## 2019-01-01 PROCEDURE — 77386 HCHG IMRT DELIVERY COMPLEX: CPT | Performed by: RADIOLOGY

## 2019-01-01 PROCEDURE — A4212 NON CORING NEEDLE OR STYLET: HCPCS

## 2019-01-01 PROCEDURE — A9270 NON-COVERED ITEM OR SERVICE: HCPCS | Performed by: HOSPITALIST

## 2019-01-01 PROCEDURE — 700102 HCHG RX REV CODE 250 W/ 637 OVERRIDE(OP): Performed by: FAMILY MEDICINE

## 2019-01-01 PROCEDURE — 11042 DBRDMT SUBQ TIS 1ST 20SQCM/<: CPT

## 2019-01-01 PROCEDURE — 99239 HOSP IP/OBS DSCHRG MGMT >30: CPT | Performed by: HOSPITALIST

## 2019-01-01 PROCEDURE — 700111 HCHG RX REV CODE 636 W/ 250 OVERRIDE (IP)

## 2019-01-01 PROCEDURE — 84484 ASSAY OF TROPONIN QUANT: CPT

## 2019-01-01 PROCEDURE — 77290 THER RAD SIMULAJ FIELD CPLX: CPT | Mod: 26 | Performed by: RADIOLOGY

## 2019-01-01 PROCEDURE — 99232 SBSQ HOSP IP/OBS MODERATE 35: CPT | Performed by: HOSPITALIST

## 2019-01-01 PROCEDURE — 85025 COMPLETE CBC W/AUTO DIFF WBC: CPT

## 2019-01-01 PROCEDURE — 86901 BLOOD TYPING SEROLOGIC RH(D): CPT

## 2019-01-01 PROCEDURE — 84160 ASSAY OF PROTEIN ANY SOURCE: CPT

## 2019-01-01 PROCEDURE — 99233 SBSQ HOSP IP/OBS HIGH 50: CPT | Performed by: INTERNAL MEDICINE

## 2019-01-01 PROCEDURE — 73502 X-RAY EXAM HIP UNI 2-3 VIEWS: CPT | Mod: RT

## 2019-01-01 PROCEDURE — P9016 RBC LEUKOCYTES REDUCED: HCPCS

## 2019-01-01 PROCEDURE — A9270 NON-COVERED ITEM OR SERVICE: HCPCS | Performed by: NURSE PRACTITIONER

## 2019-01-01 PROCEDURE — P9034 PLATELETS, PHERESIS: HCPCS

## 2019-01-01 PROCEDURE — 77427 RADIATION TX MANAGEMENT X5: CPT | Performed by: RADIOLOGY

## 2019-01-01 PROCEDURE — 82040 ASSAY OF SERUM ALBUMIN: CPT

## 2019-01-01 PROCEDURE — 86923 COMPATIBILITY TEST ELECTRIC: CPT

## 2019-01-01 PROCEDURE — 77301 RADIOTHERAPY DOSE PLAN IMRT: CPT | Performed by: RADIOLOGY

## 2019-01-01 PROCEDURE — 97165 OT EVAL LOW COMPLEX 30 MIN: CPT

## 2019-01-01 PROCEDURE — 73552 X-RAY EXAM OF FEMUR 2/>: CPT | Mod: RT

## 2019-01-01 PROCEDURE — 82570 ASSAY OF URINE CREATININE: CPT

## 2019-01-01 PROCEDURE — 86850 RBC ANTIBODY SCREEN: CPT

## 2019-01-01 PROCEDURE — 97116 GAIT TRAINING THERAPY: CPT

## 2019-01-01 PROCEDURE — 36591 DRAW BLOOD OFF VENOUS DEVICE: CPT

## 2019-01-01 PROCEDURE — 83883 ASSAY NEPHELOMETRY NOT SPEC: CPT | Mod: 91

## 2019-01-01 PROCEDURE — 83010 ASSAY OF HAPTOGLOBIN QUANT: CPT

## 2019-01-01 PROCEDURE — 700102 HCHG RX REV CODE 250 W/ 637 OVERRIDE(OP): Performed by: HOSPITALIST

## 2019-01-01 PROCEDURE — 99215 OFFICE O/P EST HI 40 MIN: CPT | Performed by: RADIOLOGY

## 2019-01-01 PROCEDURE — A9270 NON-COVERED ITEM OR SERVICE: HCPCS | Performed by: PHYSICIAN ASSISTANT

## 2019-01-01 PROCEDURE — 700105 HCHG RX REV CODE 258: Performed by: FAMILY MEDICINE

## 2019-01-01 PROCEDURE — 82728 ASSAY OF FERRITIN: CPT

## 2019-01-01 PROCEDURE — 81001 URINALYSIS AUTO W/SCOPE: CPT

## 2019-01-01 PROCEDURE — 700105 HCHG RX REV CODE 258: Performed by: EMERGENCY MEDICINE

## 2019-01-01 PROCEDURE — 77334 RADIATION TREATMENT AID(S): CPT | Performed by: RADIOLOGY

## 2019-01-01 PROCEDURE — 99284 EMERGENCY DEPT VISIT MOD MDM: CPT

## 2019-01-01 PROCEDURE — G0299 HHS/HOSPICE OF RN EA 15 MIN: HCPCS

## 2019-01-01 PROCEDURE — 97162 PT EVAL MOD COMPLEX 30 MIN: CPT

## 2019-01-01 PROCEDURE — 85055 RETICULATED PLATELET ASSAY: CPT

## 2019-01-01 PROCEDURE — 85027 COMPLETE CBC AUTOMATED: CPT

## 2019-01-01 PROCEDURE — 86900 BLOOD TYPING SEROLOGIC ABO: CPT

## 2019-01-01 PROCEDURE — 700105 HCHG RX REV CODE 258: Performed by: HOSPITALIST

## 2019-01-01 PROCEDURE — DW063ZZ BEAM RADIATION OF PELVIC REGION USING ELECTRONS: ICD-10-PCS | Performed by: RADIOLOGY

## 2019-01-01 PROCEDURE — 97597 DBRDMT OPN WND 1ST 20 CM/<: CPT

## 2019-01-01 PROCEDURE — 82746 ASSAY OF FOLIC ACID SERUM: CPT

## 2019-01-01 PROCEDURE — 77300 RADIATION THERAPY DOSE PLAN: CPT | Performed by: RADIOLOGY

## 2019-01-01 PROCEDURE — 72170 X-RAY EXAM OF PELVIS: CPT

## 2019-01-01 PROCEDURE — 93005 ELECTROCARDIOGRAM TRACING: CPT | Performed by: EMERGENCY MEDICINE

## 2019-01-01 PROCEDURE — 77263 THER RADIOLOGY TX PLNG CPLX: CPT | Performed by: RADIOLOGY

## 2019-01-01 PROCEDURE — 700101 HCHG RX REV CODE 250

## 2019-01-01 PROCEDURE — 99223 1ST HOSP IP/OBS HIGH 75: CPT | Performed by: RADIOLOGY

## 2019-01-01 PROCEDURE — 700101 HCHG RX REV CODE 250: Performed by: EMERGENCY MEDICINE

## 2019-01-01 PROCEDURE — 86225 DNA ANTIBODY NATIVE: CPT

## 2019-01-01 PROCEDURE — 77338 DESIGN MLC DEVICE FOR IMRT: CPT | Mod: 26 | Performed by: RADIOLOGY

## 2019-01-01 PROCEDURE — 700111 HCHG RX REV CODE 636 W/ 250 OVERRIDE (IP): Performed by: RADIOLOGY

## 2019-01-01 PROCEDURE — G0155 HHCP-SVS OF CSW,EA 15 MIN: HCPCS

## 2019-01-01 PROCEDURE — 83615 LACTATE (LD) (LDH) ENZYME: CPT

## 2019-01-01 PROCEDURE — 85007 BL SMEAR W/DIFF WBC COUNT: CPT

## 2019-01-01 PROCEDURE — 77470 SPECIAL RADIATION TREATMENT: CPT | Mod: 26 | Performed by: RADIOLOGY

## 2019-01-01 PROCEDURE — 99205 OFFICE O/P NEW HI 60 MIN: CPT | Performed by: INTERNAL MEDICINE

## 2019-01-01 PROCEDURE — 77295 3-D RADIOTHERAPY PLAN: CPT | Performed by: RADIOLOGY

## 2019-01-01 PROCEDURE — 83550 IRON BINDING TEST: CPT

## 2019-01-01 PROCEDURE — 86255 FLUORESCENT ANTIBODY SCREEN: CPT

## 2019-01-01 PROCEDURE — 77417 THER RADIOLOGY PORT IMAGE(S): CPT | Performed by: RADIOLOGY

## 2019-01-01 PROCEDURE — 77470 SPECIAL RADIATION TREATMENT: CPT | Performed by: RADIOLOGY

## 2019-01-01 PROCEDURE — 700105 HCHG RX REV CODE 258: Performed by: INTERNAL MEDICINE

## 2019-01-01 PROCEDURE — 11042 DBRDMT SUBQ TIS 1ST 20SQCM/<: CPT | Performed by: NURSE PRACTITIONER

## 2019-01-01 PROCEDURE — 86160 COMPLEMENT ANTIGEN: CPT

## 2019-01-01 PROCEDURE — 665036 HSPC NOTICE OF ELECTION NOE

## 2019-01-01 PROCEDURE — 99212 OFFICE O/P EST SF 10 MIN: CPT | Performed by: RADIOLOGY

## 2019-01-01 PROCEDURE — 73590 X-RAY EXAM OF LOWER LEG: CPT | Mod: RT

## 2019-01-01 PROCEDURE — 73552 X-RAY EXAM OF FEMUR 2/>: CPT | Mod: LT

## 2019-01-01 PROCEDURE — 85610 PROTHROMBIN TIME: CPT

## 2019-01-01 PROCEDURE — 30233N1 TRANSFUSION OF NONAUTOLOGOUS RED BLOOD CELLS INTO PERIPHERAL VEIN, PERCUTANEOUS APPROACH: ICD-10-PCS | Performed by: INTERNAL MEDICINE

## 2019-01-01 PROCEDURE — 82150 ASSAY OF AMYLASE: CPT

## 2019-01-01 PROCEDURE — 82043 UR ALBUMIN QUANTITATIVE: CPT

## 2019-01-01 PROCEDURE — 302252 SYSTEM PREVENA CANISTER 45ML DISP VAC: Performed by: INTERNAL MEDICINE

## 2019-01-01 PROCEDURE — 84165 PROTEIN E-PHORESIS SERUM: CPT

## 2019-01-01 PROCEDURE — 83690 ASSAY OF LIPASE: CPT

## 2019-01-01 PROCEDURE — 99497 ADVNCD CARE PLAN 30 MIN: CPT | Performed by: NURSE PRACTITIONER

## 2019-01-01 PROCEDURE — 160002 HCHG RECOVERY MINUTES (STAT)

## 2019-01-01 PROCEDURE — 77290 THER RAD SIMULAJ FIELD CPLX: CPT | Performed by: RADIOLOGY

## 2019-01-01 PROCEDURE — 700111 HCHG RX REV CODE 636 W/ 250 OVERRIDE (IP): Performed by: HOSPITALIST

## 2019-01-01 PROCEDURE — 77334 RADIATION TREATMENT AID(S): CPT | Mod: 26 | Performed by: RADIOLOGY

## 2019-01-01 PROCEDURE — 84439 ASSAY OF FREE THYROXINE: CPT

## 2019-01-01 PROCEDURE — 88300 SURGICAL PATH GROSS: CPT

## 2019-01-01 PROCEDURE — 303554 HCHG ELASTIC BANDAGE 6IN

## 2019-01-01 PROCEDURE — 76775 US EXAM ABDO BACK WALL LIM: CPT

## 2019-01-01 PROCEDURE — 700111 HCHG RX REV CODE 636 W/ 250 OVERRIDE (IP): Performed by: FAMILY MEDICINE

## 2019-01-01 PROCEDURE — P9016 RBC LEUKOCYTES REDUCED: HCPCS | Mod: 91

## 2019-01-01 PROCEDURE — 88346 IMFLUOR 1ST 1ANTB STAIN PX: CPT

## 2019-01-01 PROCEDURE — 77301 RADIOTHERAPY DOSE PLAN IMRT: CPT | Mod: 26 | Performed by: RADIOLOGY

## 2019-01-01 PROCEDURE — 97530 THERAPEUTIC ACTIVITIES: CPT

## 2019-01-01 PROCEDURE — 77300 RADIATION THERAPY DOSE PLAN: CPT | Mod: 26 | Performed by: RADIOLOGY

## 2019-01-01 PROCEDURE — 87040 BLOOD CULTURE FOR BACTERIA: CPT

## 2019-01-01 PROCEDURE — 77280 THER RAD SIMULAJ FIELD SMPL: CPT | Mod: 26 | Performed by: RADIOLOGY

## 2019-01-01 PROCEDURE — 86880 COOMBS TEST DIRECT: CPT

## 2019-01-01 PROCEDURE — 86038 ANTINUCLEAR ANTIBODIES: CPT

## 2019-01-01 PROCEDURE — 88313 SPECIAL STAINS GROUP 2: CPT

## 2019-01-01 PROCEDURE — 99223 1ST HOSP IP/OBS HIGH 75: CPT | Performed by: INTERNAL MEDICINE

## 2019-01-01 PROCEDURE — 88348 ELECTRON MICROSCOPY DX: CPT

## 2019-01-01 PROCEDURE — 93971 EXTREMITY STUDY: CPT | Mod: 26 | Performed by: SURGERY

## 2019-01-01 PROCEDURE — 86923 COMPATIBILITY TEST ELECTRIC: CPT | Mod: 91

## 2019-01-01 PROCEDURE — 700102 HCHG RX REV CODE 250 W/ 637 OVERRIDE(OP): Performed by: PHYSICIAN ASSISTANT

## 2019-01-01 PROCEDURE — 97602 WOUND(S) CARE NON-SELECTIVE: CPT

## 2019-01-01 PROCEDURE — 77012 CT SCAN FOR NEEDLE BIOPSY: CPT

## 2019-01-01 PROCEDURE — 84300 ASSAY OF URINE SODIUM: CPT

## 2019-01-01 PROCEDURE — 77295 3-D RADIOTHERAPY PLAN: CPT | Mod: 26 | Performed by: RADIOLOGY

## 2019-01-01 PROCEDURE — 80074 ACUTE HEPATITIS PANEL: CPT

## 2019-01-01 PROCEDURE — 82570 ASSAY OF URINE CREATININE: CPT | Mod: 91

## 2019-01-01 RX ORDER — METOPROLOL TARTRATE 50 MG/1
25-50 TABLET, FILM COATED ORAL 2 TIMES DAILY
COMMUNITY

## 2019-01-01 RX ORDER — HYDROMORPHONE HYDROCHLORIDE 2 MG/ML
0.5 INJECTION, SOLUTION INTRAMUSCULAR; INTRAVENOUS; SUBCUTANEOUS
Status: DISCONTINUED | OUTPATIENT
Start: 2019-01-01 | End: 2019-01-01 | Stop reason: HOSPADM

## 2019-01-01 RX ORDER — OXYCODONE HYDROCHLORIDE 5 MG/1
5 TABLET ORAL
Status: CANCELLED | OUTPATIENT
Start: 2019-01-01 | End: 2019-01-01

## 2019-01-01 RX ORDER — ONDANSETRON 8 MG/1
8 TABLET, ORALLY DISINTEGRATING ORAL EVERY 8 HOURS PRN
COMMUNITY
End: 2019-01-01

## 2019-01-01 RX ORDER — ACETAMINOPHEN 325 MG/1
650 TABLET ORAL PRN
Status: CANCELLED | OUTPATIENT
Start: 2019-01-01

## 2019-01-01 RX ORDER — LIDOCAINE HYDROCHLORIDE 10 MG/ML
20 INJECTION, SOLUTION INFILTRATION; PERINEURAL
Status: CANCELLED | OUTPATIENT
Start: 2019-01-01

## 2019-01-01 RX ORDER — DIPHENHYDRAMINE HCL 25 MG
25 TABLET ORAL ONCE
Status: CANCELLED | OUTPATIENT
Start: 2019-01-01 | End: 2019-01-01

## 2019-01-01 RX ORDER — MIDAZOLAM HYDROCHLORIDE 1 MG/ML
.5-2 INJECTION INTRAMUSCULAR; INTRAVENOUS PRN
Status: DISCONTINUED | OUTPATIENT
Start: 2019-01-01 | End: 2019-01-01 | Stop reason: HOSPADM

## 2019-01-01 RX ORDER — POLYETHYLENE GLYCOL 3350 17 G/17G
17 POWDER, FOR SOLUTION ORAL DAILY
Refills: 3
Start: 2019-01-01 | End: 2019-01-01

## 2019-01-01 RX ORDER — HYDROCODONE BITARTRATE AND ACETAMINOPHEN 5; 325 MG/1; MG/1
1 TABLET ORAL EVERY 6 HOURS
Status: DISCONTINUED | OUTPATIENT
Start: 2019-01-01 | End: 2019-01-01

## 2019-01-01 RX ORDER — SODIUM BICARBONATE 650 MG/1
650 TABLET ORAL 2 TIMES DAILY
Qty: 60 TAB | Refills: 6 | Status: SHIPPED | OUTPATIENT
Start: 2019-01-01 | End: 2019-01-01

## 2019-01-01 RX ORDER — AMOXICILLIN 250 MG
1 CAPSULE ORAL 2 TIMES DAILY
Qty: 30 TAB | Refills: 0 | Status: SHIPPED | OUTPATIENT
Start: 2019-01-01 | End: 2019-01-01

## 2019-01-01 RX ORDER — ONDANSETRON 2 MG/ML
4 INJECTION INTRAMUSCULAR; INTRAVENOUS EVERY 4 HOURS PRN
Status: DISCONTINUED | OUTPATIENT
Start: 2019-01-01 | End: 2019-01-01 | Stop reason: HOSPADM

## 2019-01-01 RX ORDER — ONDANSETRON 2 MG/ML
4 INJECTION INTRAMUSCULAR; INTRAVENOUS EVERY 8 HOURS PRN
Status: DISCONTINUED | OUTPATIENT
Start: 2019-01-01 | End: 2019-01-01 | Stop reason: HOSPADM

## 2019-01-01 RX ORDER — ACETAMINOPHEN 500 MG
500 TABLET ORAL
COMMUNITY
Start: 2019-01-01

## 2019-01-01 RX ORDER — SODIUM CHLORIDE 9 MG/ML
500 INJECTION, SOLUTION INTRAVENOUS ONCE
Status: CANCELLED | OUTPATIENT
Start: 2019-01-01 | End: 2019-01-01

## 2019-01-01 RX ORDER — POTASSIUM CHLORIDE 750 MG/1
10 CAPSULE, EXTENDED RELEASE ORAL 2 TIMES DAILY
Status: ON HOLD | COMMUNITY
End: 2019-01-01

## 2019-01-01 RX ORDER — DIPHENHYDRAMINE HCL 25 MG
25 TABLET ORAL ONCE
Status: COMPLETED | OUTPATIENT
Start: 2019-01-01 | End: 2019-01-01

## 2019-01-01 RX ORDER — ONDANSETRON 2 MG/ML
4 INJECTION INTRAMUSCULAR; INTRAVENOUS EVERY 8 HOURS PRN
Status: CANCELLED | OUTPATIENT
Start: 2019-01-01 | End: 2019-01-01

## 2019-01-01 RX ORDER — ACETAMINOPHEN 325 MG/1
650 TABLET ORAL EVERY 6 HOURS PRN
Status: DISCONTINUED | OUTPATIENT
Start: 2019-01-01 | End: 2019-01-01 | Stop reason: HOSPADM

## 2019-01-01 RX ORDER — OXYCODONE HYDROCHLORIDE 5 MG/1
5 TABLET ORAL
Status: DISCONTINUED | OUTPATIENT
Start: 2019-01-01 | End: 2019-01-01 | Stop reason: HOSPADM

## 2019-01-01 RX ORDER — LIDOCAINE HYDROCHLORIDE 10 MG/ML
INJECTION, SOLUTION EPIDURAL; INFILTRATION; INTRACAUDAL; PERINEURAL
Status: COMPLETED
Start: 2019-01-01 | End: 2019-01-01

## 2019-01-01 RX ORDER — ACETAMINOPHEN 325 MG/1
650 TABLET ORAL ONCE
Status: CANCELLED | OUTPATIENT
Start: 2019-01-01 | End: 2019-01-01

## 2019-01-01 RX ORDER — POLYETHYLENE GLYCOL 3350 17 G/17G
1 POWDER, FOR SOLUTION ORAL
Status: DISCONTINUED | OUTPATIENT
Start: 2019-01-01 | End: 2019-01-01 | Stop reason: HOSPADM

## 2019-01-01 RX ORDER — MORPHINE SULFATE 4 MG/ML
4 INJECTION, SOLUTION INTRAMUSCULAR; INTRAVENOUS
Status: CANCELLED | OUTPATIENT
Start: 2019-01-01 | End: 2019-01-01

## 2019-01-01 RX ORDER — GABAPENTIN 100 MG/1
200 CAPSULE ORAL 2 TIMES DAILY
Qty: 90 CAP
Start: 2019-01-01 | End: 2019-01-01

## 2019-01-01 RX ORDER — ROSUVASTATIN CALCIUM 40 MG/1
40 TABLET, COATED ORAL EVERY EVENING
Qty: 100 TAB | Refills: 2 | Status: SHIPPED | OUTPATIENT
Start: 2019-01-01 | End: 2019-01-01

## 2019-01-01 RX ORDER — OXYCODONE HYDROCHLORIDE 10 MG/1
10 TABLET ORAL
Status: CANCELLED | OUTPATIENT
Start: 2019-01-01 | End: 2019-01-01

## 2019-01-01 RX ORDER — ONDANSETRON 2 MG/ML
4 INJECTION INTRAMUSCULAR; INTRAVENOUS PRN
Status: DISCONTINUED | OUTPATIENT
Start: 2019-01-01 | End: 2019-01-01 | Stop reason: HOSPADM

## 2019-01-01 RX ORDER — METHYLPREDNISOLONE 4 MG/1
TABLET ORAL
COMMUNITY
Start: 2018-01-01 | End: 2019-01-01

## 2019-01-01 RX ORDER — ESCITALOPRAM OXALATE 10 MG/1
5 TABLET ORAL DAILY
Status: DISCONTINUED | OUTPATIENT
Start: 2019-01-01 | End: 2019-01-01 | Stop reason: HOSPADM

## 2019-01-01 RX ORDER — FOLIC ACID 1 MG/1
TABLET ORAL
COMMUNITY
Start: 2019-01-01 | End: 2019-01-01

## 2019-01-01 RX ORDER — SODIUM CHLORIDE 9 MG/ML
INJECTION, SOLUTION INTRAVENOUS CONTINUOUS
Status: DISCONTINUED | OUTPATIENT
Start: 2019-01-01 | End: 2019-01-01 | Stop reason: HOSPADM

## 2019-01-01 RX ORDER — SODIUM CHLORIDE 9 MG/ML
500 INJECTION, SOLUTION INTRAVENOUS
Status: DISCONTINUED | OUTPATIENT
Start: 2019-01-01 | End: 2019-01-01 | Stop reason: HOSPADM

## 2019-01-01 RX ORDER — LIDOCAINE HYDROCHLORIDE AND EPINEPHRINE BITARTRATE 20; .01 MG/ML; MG/ML
INJECTION, SOLUTION SUBCUTANEOUS
Status: COMPLETED
Start: 2019-01-01 | End: 2019-01-01

## 2019-01-01 RX ORDER — SODIUM CHLORIDE 9 MG/ML
1000 INJECTION, SOLUTION INTRAVENOUS
Status: DISCONTINUED | OUTPATIENT
Start: 2019-01-01 | End: 2019-01-01 | Stop reason: HOSPADM

## 2019-01-01 RX ORDER — MIDAZOLAM HYDROCHLORIDE 1 MG/ML
INJECTION INTRAMUSCULAR; INTRAVENOUS
Status: COMPLETED
Start: 2019-01-01 | End: 2019-01-01

## 2019-01-01 RX ORDER — MORPHINE SULFATE 15 MG/1
15 TABLET, FILM COATED, EXTENDED RELEASE ORAL EVERY 12 HOURS
Qty: 10 TAB | Refills: 0 | Status: SHIPPED | OUTPATIENT
Start: 2019-01-01 | End: 2019-01-01

## 2019-01-01 RX ORDER — BISACODYL 10 MG
10 SUPPOSITORY, RECTAL RECTAL
Status: DISCONTINUED | OUTPATIENT
Start: 2019-01-01 | End: 2019-01-01 | Stop reason: HOSPADM

## 2019-01-01 RX ORDER — SODIUM CHLORIDE 9 MG/ML
INJECTION, SOLUTION INTRAVENOUS
Status: DISCONTINUED | OUTPATIENT
Start: 2019-01-01 | End: 2019-01-01 | Stop reason: HOSPADM

## 2019-01-01 RX ORDER — MORPHINE SULFATE 15 MG/1
7.5 TABLET ORAL
Qty: 10 TAB | Refills: 0 | Status: SHIPPED | OUTPATIENT
Start: 2019-01-01 | End: 2019-01-01

## 2019-01-01 RX ORDER — ESCITALOPRAM OXALATE 5 MG/1
5 TABLET ORAL DAILY
Qty: 30 TAB | Refills: 3 | Status: SHIPPED | OUTPATIENT
Start: 2019-01-01 | End: 2019-01-01

## 2019-01-01 RX ORDER — HEPARIN SODIUM 5000 [USP'U]/ML
5000 INJECTION, SOLUTION INTRAVENOUS; SUBCUTANEOUS EVERY 8 HOURS
Status: DISCONTINUED | OUTPATIENT
Start: 2019-01-01 | End: 2019-01-01

## 2019-01-01 RX ORDER — SODIUM CHLORIDE 9 MG/ML
1000 INJECTION, SOLUTION INTRAVENOUS ONCE
Status: COMPLETED | OUTPATIENT
Start: 2019-01-01 | End: 2019-01-01

## 2019-01-01 RX ORDER — NALOXONE HYDROCHLORIDE 0.4 MG/ML
INJECTION, SOLUTION INTRAMUSCULAR; INTRAVENOUS; SUBCUTANEOUS
Status: COMPLETED
Start: 2019-01-01 | End: 2019-01-01

## 2019-01-01 RX ORDER — ROSUVASTATIN CALCIUM 40 MG/1
40 TABLET, COATED ORAL EVERY EVENING
COMMUNITY
End: 2019-01-01 | Stop reason: ALTCHOICE

## 2019-01-01 RX ORDER — SODIUM CHLORIDE 9 MG/ML
500 INJECTION, SOLUTION INTRAVENOUS ONCE
Status: COMPLETED | OUTPATIENT
Start: 2019-01-01 | End: 2019-01-01

## 2019-01-01 RX ORDER — DEXAMETHASONE 2 MG/1
2 TABLET ORAL DAILY
Qty: 10 TAB | Refills: 0 | Status: SHIPPED | OUTPATIENT
Start: 2019-01-01 | End: 2019-01-01

## 2019-01-01 RX ORDER — METOPROLOL TARTRATE 50 MG/1
50 TABLET, FILM COATED ORAL SEE ADMIN INSTRUCTIONS
Qty: 135 TAB | Refills: 2 | Status: SHIPPED | OUTPATIENT
Start: 2019-01-01 | End: 2019-01-01

## 2019-01-01 RX ORDER — ROSUVASTATIN CALCIUM 20 MG/1
40 TABLET, COATED ORAL EVERY EVENING
Status: DISCONTINUED | OUTPATIENT
Start: 2019-01-01 | End: 2019-01-01 | Stop reason: HOSPADM

## 2019-01-01 RX ORDER — ACETAMINOPHEN 325 MG/1
650 TABLET ORAL ONCE
Status: COMPLETED | OUTPATIENT
Start: 2019-01-01 | End: 2019-01-01

## 2019-01-01 RX ORDER — ESCITALOPRAM OXALATE 5 MG/1
TABLET ORAL
COMMUNITY
Start: 2019-01-01 | End: 2019-01-01

## 2019-01-01 RX ORDER — AMOXICILLIN 250 MG
2 CAPSULE ORAL 2 TIMES DAILY
Status: DISCONTINUED | OUTPATIENT
Start: 2019-01-01 | End: 2019-01-01 | Stop reason: HOSPADM

## 2019-01-01 RX ORDER — ONDANSETRON 4 MG/1
4 TABLET, ORALLY DISINTEGRATING ORAL EVERY 4 HOURS PRN
Status: DISCONTINUED | OUTPATIENT
Start: 2019-01-01 | End: 2019-01-01 | Stop reason: HOSPADM

## 2019-01-01 RX ORDER — MORPHINE SULFATE 4 MG/ML
INJECTION, SOLUTION INTRAMUSCULAR; INTRAVENOUS
Status: COMPLETED
Start: 2019-01-01 | End: 2019-01-01

## 2019-01-01 RX ORDER — SIMETHICONE 80 MG
80 TABLET,CHEWABLE ORAL 3 TIMES DAILY PRN
Qty: 30 TAB | Refills: 3 | Status: SHIPPED | OUTPATIENT
Start: 2019-01-01 | End: 2019-01-01

## 2019-01-01 RX ORDER — ONDANSETRON 4 MG/1
4 TABLET, ORALLY DISINTEGRATING ORAL EVERY 4 HOURS PRN
Qty: 10 TAB | Refills: 0 | Status: SHIPPED | OUTPATIENT
Start: 2019-01-01 | End: 2019-01-01

## 2019-01-01 RX ORDER — HYDROCODONE BITARTRATE AND ACETAMINOPHEN 5; 325 MG/1; MG/1
1 TABLET ORAL EVERY 6 HOURS
COMMUNITY
Start: 2019-01-01 | End: 2019-01-01

## 2019-01-01 RX ORDER — MIRTAZAPINE 15 MG/1
7.5 TABLET, FILM COATED ORAL
Qty: 30 TAB | Refills: 2 | Status: SHIPPED | OUTPATIENT
Start: 2019-01-01 | End: 2019-01-01

## 2019-01-01 RX ADMIN — MORPHINE SULFATE 15 MG: 15 TABLET, EXTENDED RELEASE ORAL at 18:05

## 2019-01-01 RX ADMIN — MORPHINE SULFATE 15 MG: 15 TABLET, EXTENDED RELEASE ORAL at 16:46

## 2019-01-01 RX ADMIN — SODIUM CHLORIDE 1000 ML: 9 INJECTION, SOLUTION INTRAVENOUS at 08:02

## 2019-01-01 RX ADMIN — METOPROLOL TARTRATE 25 MG: 25 TABLET ORAL at 05:45

## 2019-01-01 RX ADMIN — ALLOPURINOL 200 MG: 100 TABLET ORAL at 05:13

## 2019-01-01 RX ADMIN — SODIUM CHLORIDE: 9 INJECTION, SOLUTION INTRAVENOUS at 07:47

## 2019-01-01 RX ADMIN — STANDARDIZED SENNA CONCENTRATE AND DOCUSATE SODIUM 1 TABLET: 8.6; 5 TABLET, FILM COATED ORAL at 07:36

## 2019-01-01 RX ADMIN — POLYETHYLENE GLYCOL 3350 1 PACKET: 17 POWDER, FOR SOLUTION ORAL at 06:05

## 2019-01-01 RX ADMIN — METOPROLOL TARTRATE 25 MG: 25 TABLET, FILM COATED ORAL at 17:00

## 2019-01-01 RX ADMIN — MIDAZOLAM HYDROCHLORIDE 1 MG: 1 INJECTION, SOLUTION INTRAMUSCULAR; INTRAVENOUS at 09:33

## 2019-01-01 RX ADMIN — STANDARDIZED SENNA CONCENTRATE AND DOCUSATE SODIUM 1 TABLET: 8.6; 5 TABLET, FILM COATED ORAL at 16:54

## 2019-01-01 RX ADMIN — ACETAMINOPHEN 650 MG: 325 TABLET, FILM COATED ORAL at 20:22

## 2019-01-01 RX ADMIN — HEPARIN SODIUM 5000 UNITS: 5000 INJECTION, SOLUTION INTRAVENOUS; SUBCUTANEOUS at 05:45

## 2019-01-01 RX ADMIN — ALLOPURINOL 200 MG: 100 TABLET ORAL at 06:05

## 2019-01-01 RX ADMIN — ROSUVASTATIN CALCIUM 40 MG: 20 TABLET ORAL at 20:45

## 2019-01-01 RX ADMIN — STANDARDIZED SENNA CONCENTRATE AND DOCUSATE SODIUM 1 TABLET: 8.6; 5 TABLET, FILM COATED ORAL at 16:42

## 2019-01-01 RX ADMIN — MORPHINE SULFATE 15 MG: 15 TABLET, EXTENDED RELEASE ORAL at 18:45

## 2019-01-01 RX ADMIN — ROSUVASTATIN CALCIUM 40 MG: 20 TABLET ORAL at 21:32

## 2019-01-01 RX ADMIN — CALCIUM CARBONATE-CHOLECALCIFEROL TAB 250 MG-125 UNIT 1 TABLET: 250-125 TAB at 05:56

## 2019-01-01 RX ADMIN — MORPHINE SULFATE 15 MG: 15 TABLET, EXTENDED RELEASE ORAL at 16:54

## 2019-01-01 RX ADMIN — STANDARDIZED SENNA CONCENTRATE AND DOCUSATE SODIUM 1 TABLET: 8.6; 5 TABLET, FILM COATED ORAL at 05:57

## 2019-01-01 RX ADMIN — METOPROLOL TARTRATE 25 MG: 25 TABLET, FILM COATED ORAL at 16:54

## 2019-01-01 RX ADMIN — GABAPENTIN 200 MG: 100 CAPSULE ORAL at 17:00

## 2019-01-01 RX ADMIN — METOPROLOL TARTRATE 25 MG: 25 TABLET, FILM COATED ORAL at 05:56

## 2019-01-01 RX ADMIN — MORPHINE SULFATE 15 MG: 15 TABLET, EXTENDED RELEASE ORAL at 16:52

## 2019-01-01 RX ADMIN — ACETAMINOPHEN 650 MG: 325 TABLET, FILM COATED ORAL at 12:13

## 2019-01-01 RX ADMIN — SODIUM CHLORIDE 1000 ML: 9 INJECTION, SOLUTION INTRAVENOUS at 16:51

## 2019-01-01 RX ADMIN — DEXAMETHASONE 2 MG: 4 TABLET ORAL at 05:55

## 2019-01-01 RX ADMIN — ROSUVASTATIN CALCIUM 40 MG: 20 TABLET ORAL at 20:50

## 2019-01-01 RX ADMIN — ALLOPURINOL 200 MG: 100 TABLET ORAL at 05:54

## 2019-01-01 RX ADMIN — GABAPENTIN 200 MG: 100 CAPSULE ORAL at 16:54

## 2019-01-01 RX ADMIN — MORPHINE SULFATE 15 MG: 15 TABLET, EXTENDED RELEASE ORAL at 05:56

## 2019-01-01 RX ADMIN — ALLOPURINOL 200 MG: 100 TABLET ORAL at 05:56

## 2019-01-01 RX ADMIN — FENTANYL CITRATE 25 MCG: 50 INJECTION INTRAMUSCULAR; INTRAVENOUS at 09:33

## 2019-01-01 RX ADMIN — STANDARDIZED SENNA CONCENTRATE AND DOCUSATE SODIUM 1 TABLET: 8.6; 5 TABLET, FILM COATED ORAL at 06:33

## 2019-01-01 RX ADMIN — DEXAMETHASONE 2 MG: 4 TABLET ORAL at 06:34

## 2019-01-01 RX ADMIN — CALCIUM CARBONATE-CHOLECALCIFEROL TAB 250 MG-125 UNIT 1 TABLET: 250-125 TAB at 05:13

## 2019-01-01 RX ADMIN — ACETAMINOPHEN 650 MG: 325 TABLET ORAL at 15:49

## 2019-01-01 RX ADMIN — METOPROLOL TARTRATE 25 MG: 25 TABLET ORAL at 16:55

## 2019-01-01 RX ADMIN — MORPHINE SULFATE 15 MG: 15 TABLET, EXTENDED RELEASE ORAL at 06:33

## 2019-01-01 RX ADMIN — STANDARDIZED SENNA CONCENTRATE AND DOCUSATE SODIUM 1 TABLET: 8.6; 5 TABLET, FILM COATED ORAL at 06:05

## 2019-01-01 RX ADMIN — ROSUVASTATIN CALCIUM 40 MG: 20 TABLET, FILM COATED ORAL at 21:53

## 2019-01-01 RX ADMIN — SODIUM CHLORIDE: 9 INJECTION, SOLUTION INTRAVENOUS at 05:56

## 2019-01-01 RX ADMIN — SODIUM CHLORIDE: 9 INJECTION, SOLUTION INTRAVENOUS at 22:00

## 2019-01-01 RX ADMIN — MIDAZOLAM HYDROCHLORIDE 2 MG: 1 INJECTION INTRAMUSCULAR; INTRAVENOUS at 14:55

## 2019-01-01 RX ADMIN — LIDOCAINE HYDROCHLORIDE 1 APPLICATION: 20 JELLY TOPICAL at 22:06

## 2019-01-01 RX ADMIN — METOPROLOL TARTRATE 25 MG: 25 TABLET ORAL at 05:00

## 2019-01-01 RX ADMIN — ALLOPURINOL 200 MG: 100 TABLET ORAL at 05:00

## 2019-01-01 RX ADMIN — CALCIUM CARBONATE-CHOLECALCIFEROL TAB 250 MG-125 UNIT 1 TABLET: 250-125 TAB at 05:45

## 2019-01-01 RX ADMIN — STANDARDIZED SENNA CONCENTRATE AND DOCUSATE SODIUM 1 TABLET: 8.6; 5 TABLET, FILM COATED ORAL at 16:52

## 2019-01-01 RX ADMIN — ACETAMINOPHEN 650 MG: 325 TABLET, FILM COATED ORAL at 20:34

## 2019-01-01 RX ADMIN — ACETAMINOPHEN 650 MG: 325 TABLET, FILM COATED ORAL at 05:55

## 2019-01-01 RX ADMIN — ROSUVASTATIN CALCIUM 40 MG: 20 TABLET ORAL at 23:20

## 2019-01-01 RX ADMIN — LIDOCAINE HYDROCHLORIDE 2 ML: 10 INJECTION, SOLUTION EPIDURAL; INFILTRATION; INTRACAUDAL; PERINEURAL at 14:41

## 2019-01-01 RX ADMIN — CALCIUM CARBONATE-CHOLECALCIFEROL TAB 250 MG-125 UNIT 1 TABLET: 250-125 TAB at 06:33

## 2019-01-01 RX ADMIN — POLYETHYLENE GLYCOL 3350 1 PACKET: 17 POWDER, FOR SOLUTION ORAL at 07:38

## 2019-01-01 RX ADMIN — ACETAMINOPHEN 650 MG: 325 TABLET, FILM COATED ORAL at 17:00

## 2019-01-01 RX ADMIN — METOPROLOL TARTRATE 25 MG: 25 TABLET, FILM COATED ORAL at 16:52

## 2019-01-01 RX ADMIN — GABAPENTIN 200 MG: 100 CAPSULE ORAL at 16:52

## 2019-01-01 RX ADMIN — DEXAMETHASONE 2 MG: 4 TABLET ORAL at 05:13

## 2019-01-01 RX ADMIN — DIPHENHYDRAMINE HCL 25 MG: 25 TABLET ORAL at 11:14

## 2019-01-01 RX ADMIN — GABAPENTIN 200 MG: 100 CAPSULE ORAL at 06:33

## 2019-01-01 RX ADMIN — MORPHINE SULFATE 15 MG: 15 TABLET, EXTENDED RELEASE ORAL at 05:45

## 2019-01-01 RX ADMIN — CALCIUM CARBONATE-CHOLECALCIFEROL TAB 250 MG-125 UNIT 1 TABLET: 250-125 TAB at 06:05

## 2019-01-01 RX ADMIN — ROSUVASTATIN CALCIUM 40 MG: 20 TABLET ORAL at 20:08

## 2019-01-01 RX ADMIN — GABAPENTIN 200 MG: 100 CAPSULE ORAL at 18:40

## 2019-01-01 RX ADMIN — METOPROLOL TARTRATE 25 MG: 25 TABLET, FILM COATED ORAL at 06:33

## 2019-01-01 RX ADMIN — ROSUVASTATIN CALCIUM 40 MG: 20 TABLET, FILM COATED ORAL at 16:54

## 2019-01-01 RX ADMIN — MIDAZOLAM HYDROCHLORIDE 2 MG: 1 INJECTION INTRAMUSCULAR; INTRAVENOUS at 15:00

## 2019-01-01 RX ADMIN — ALLOPURINOL 200 MG: 100 TABLET ORAL at 06:33

## 2019-01-01 RX ADMIN — MORPHINE SULFATE 15 MG: 15 TABLET, EXTENDED RELEASE ORAL at 17:57

## 2019-01-01 RX ADMIN — METOPROLOL TARTRATE 25 MG: 25 TABLET, FILM COATED ORAL at 06:06

## 2019-01-01 RX ADMIN — METOPROLOL TARTRATE 25 MG: 25 TABLET, FILM COATED ORAL at 07:36

## 2019-01-01 RX ADMIN — MORPHINE SULFATE 15 MG: 15 TABLET, EXTENDED RELEASE ORAL at 07:36

## 2019-01-01 RX ADMIN — METOPROLOL TARTRATE 25 MG: 25 TABLET, FILM COATED ORAL at 18:41

## 2019-01-01 RX ADMIN — STANDARDIZED SENNA CONCENTRATE AND DOCUSATE SODIUM 1 TABLET: 8.6; 5 TABLET, FILM COATED ORAL at 16:59

## 2019-01-01 RX ADMIN — MIDAZOLAM 2 MG: 1 INJECTION INTRAMUSCULAR; INTRAVENOUS at 14:55

## 2019-01-01 RX ADMIN — HEPARIN 500 UNITS: 100 SYRINGE at 00:09

## 2019-01-01 RX ADMIN — STANDARDIZED SENNA CONCENTRATE AND DOCUSATE SODIUM 1 TABLET: 8.6; 5 TABLET, FILM COATED ORAL at 05:45

## 2019-01-01 RX ADMIN — GABAPENTIN 200 MG: 100 CAPSULE ORAL at 18:04

## 2019-01-01 RX ADMIN — ENOXAPARIN SODIUM 40 MG: 100 INJECTION SUBCUTANEOUS at 05:54

## 2019-01-01 RX ADMIN — DIPHENHYDRAMINE HCL 25 MG: 25 TABLET ORAL at 15:49

## 2019-01-01 RX ADMIN — MORPHINE SULFATE 15 MG: 15 TABLET, EXTENDED RELEASE ORAL at 05:13

## 2019-01-01 RX ADMIN — POLYETHYLENE GLYCOL 3350 1 PACKET: 17 POWDER, FOR SOLUTION ORAL at 05:54

## 2019-01-01 RX ADMIN — DEXAMETHASONE 2 MG: 4 TABLET ORAL at 05:56

## 2019-01-01 RX ADMIN — SODIUM CHLORIDE: 9 INJECTION, SOLUTION INTRAVENOUS at 15:03

## 2019-01-01 RX ADMIN — STANDARDIZED SENNA CONCENTRATE AND DOCUSATE SODIUM 1 TABLET: 8.6; 5 TABLET, FILM COATED ORAL at 17:57

## 2019-01-01 RX ADMIN — STANDARDIZED SENNA CONCENTRATE AND DOCUSATE SODIUM 1 TABLET: 8.6; 5 TABLET, FILM COATED ORAL at 05:02

## 2019-01-01 RX ADMIN — DEXAMETHASONE 2 MG: 4 TABLET ORAL at 05:45

## 2019-01-01 RX ADMIN — ACETAMINOPHEN 650 MG: 325 TABLET, FILM COATED ORAL at 05:13

## 2019-01-01 RX ADMIN — DEXAMETHASONE 2 MG: 4 TABLET ORAL at 07:37

## 2019-01-01 RX ADMIN — METOPROLOL TARTRATE 50 MG: 25 TABLET ORAL at 21:52

## 2019-01-01 RX ADMIN — METOPROLOL TARTRATE 50 MG: 25 TABLET ORAL at 20:22

## 2019-01-01 RX ADMIN — LIDOCAINE HYDROCHLORIDE AND EPINEPHRINE: 20; 10 INJECTION, SOLUTION INFILTRATION; PERINEURAL at 15:02

## 2019-01-01 RX ADMIN — KETOROLAC TROMETHAMINE 15 MG: 30 INJECTION, SOLUTION INTRAMUSCULAR at 17:00

## 2019-01-01 RX ADMIN — MORPHINE SULFATE 15 MG: 15 TABLET, EXTENDED RELEASE ORAL at 06:05

## 2019-01-01 RX ADMIN — FENTANYL CITRATE 50 MCG: 50 INJECTION, SOLUTION INTRAMUSCULAR; INTRAVENOUS at 14:55

## 2019-01-01 RX ADMIN — STANDARDIZED SENNA CONCENTRATE AND DOCUSATE SODIUM 1 TABLET: 8.6; 5 TABLET, FILM COATED ORAL at 05:55

## 2019-01-01 RX ADMIN — FENTANYL CITRATE 25 MCG: 50 INJECTION, SOLUTION INTRAMUSCULAR; INTRAVENOUS at 09:33

## 2019-01-01 RX ADMIN — ACETAMINOPHEN 650 MG: 325 TABLET, FILM COATED ORAL at 12:02

## 2019-01-01 RX ADMIN — KETOROLAC TROMETHAMINE 15 MG: 30 INJECTION, SOLUTION INTRAMUSCULAR at 05:56

## 2019-01-01 RX ADMIN — GABAPENTIN 200 MG: 100 CAPSULE ORAL at 17:57

## 2019-01-01 RX ADMIN — ACETAMINOPHEN 650 MG: 325 TABLET, FILM COATED ORAL at 18:40

## 2019-01-01 RX ADMIN — ACETAMINOPHEN 650 MG: 325 TABLET ORAL at 16:54

## 2019-01-01 RX ADMIN — ROSUVASTATIN CALCIUM 40 MG: 20 TABLET ORAL at 21:30

## 2019-01-01 RX ADMIN — GABAPENTIN 200 MG: 100 CAPSULE ORAL at 16:42

## 2019-01-01 RX ADMIN — ACETAMINOPHEN 650 MG: 325 TABLET, FILM COATED ORAL at 05:56

## 2019-01-01 RX ADMIN — HEPARIN 500 UNITS: 100 SYRINGE at 18:13

## 2019-01-01 RX ADMIN — MORPHINE SULFATE 15 MG: 15 TABLET, EXTENDED RELEASE ORAL at 16:59

## 2019-01-01 RX ADMIN — DEXAMETHASONE 2 MG: 4 TABLET ORAL at 06:05

## 2019-01-01 RX ADMIN — SODIUM CHLORIDE: 9 INJECTION, SOLUTION INTRAVENOUS at 16:59

## 2019-01-01 RX ADMIN — GABAPENTIN 200 MG: 100 CAPSULE ORAL at 05:55

## 2019-01-01 RX ADMIN — METOPROLOL TARTRATE 25 MG: 25 TABLET, FILM COATED ORAL at 05:45

## 2019-01-01 RX ADMIN — METOPROLOL TARTRATE 25 MG: 25 TABLET, FILM COATED ORAL at 16:43

## 2019-01-01 RX ADMIN — BISACODYL 10 MG: 10 SUPPOSITORY RECTAL at 13:47

## 2019-01-01 RX ADMIN — LIDOCAINE HYDROCHLORIDE 2 ML: 10 INJECTION, SOLUTION EPIDURAL; INFILTRATION; INTRACAUDAL; PERINEURAL at 16:53

## 2019-01-01 RX ADMIN — ACETAMINOPHEN 650 MG: 325 TABLET, FILM COATED ORAL at 16:41

## 2019-01-01 RX ADMIN — POLYETHYLENE GLYCOL 3350 1 PACKET: 17 POWDER, FOR SOLUTION ORAL at 05:57

## 2019-01-01 RX ADMIN — GABAPENTIN 200 MG: 100 CAPSULE ORAL at 05:13

## 2019-01-01 RX ADMIN — CALCIUM CARBONATE-CHOLECALCIFEROL TAB 250 MG-125 UNIT 1 TABLET: 250-125 TAB at 05:01

## 2019-01-01 RX ADMIN — KETOROLAC TROMETHAMINE 15 MG: 30 INJECTION, SOLUTION INTRAMUSCULAR at 08:43

## 2019-01-01 RX ADMIN — CALCIUM CARBONATE-CHOLECALCIFEROL TAB 250 MG-125 UNIT 1 TABLET: 250-125 TAB at 07:36

## 2019-01-01 RX ADMIN — METOPROLOL TARTRATE 25 MG: 25 TABLET ORAL at 05:04

## 2019-01-01 RX ADMIN — GABAPENTIN 200 MG: 100 CAPSULE ORAL at 05:56

## 2019-01-01 RX ADMIN — MORPHINE SULFATE 15 MG: 15 TABLET, EXTENDED RELEASE ORAL at 05:00

## 2019-01-01 RX ADMIN — METOPROLOL TARTRATE 25 MG: 25 TABLET, FILM COATED ORAL at 05:55

## 2019-01-01 RX ADMIN — POLYETHYLENE GLYCOL 3350 1 PACKET: 17 POWDER, FOR SOLUTION ORAL at 06:34

## 2019-01-01 RX ADMIN — MIDAZOLAM HYDROCHLORIDE 1 MG: 1 INJECTION INTRAMUSCULAR; INTRAVENOUS at 09:33

## 2019-01-01 RX ADMIN — ALLOPURINOL 200 MG: 100 TABLET ORAL at 05:45

## 2019-01-01 RX ADMIN — ACETAMINOPHEN 650 MG: 325 TABLET ORAL at 11:14

## 2019-01-01 RX ADMIN — METOPROLOL TARTRATE 25 MG: 25 TABLET, FILM COATED ORAL at 18:04

## 2019-01-01 RX ADMIN — ONDANSETRON 4 MG: 4 TABLET, ORALLY DISINTEGRATING ORAL at 20:08

## 2019-01-01 RX ADMIN — CALCIUM CARBONATE-CHOLECALCIFEROL TAB 250 MG-125 UNIT 1 TABLET: 250-125 TAB at 05:55

## 2019-01-01 RX ADMIN — GABAPENTIN 200 MG: 100 CAPSULE ORAL at 05:45

## 2019-01-01 RX ADMIN — DIPHENHYDRAMINE HCL 25 MG: 25 TABLET ORAL at 16:54

## 2019-01-01 RX ADMIN — GABAPENTIN 200 MG: 100 CAPSULE ORAL at 07:37

## 2019-01-01 RX ADMIN — FENTANYL CITRATE 25 MCG: 50 INJECTION, SOLUTION INTRAMUSCULAR; INTRAVENOUS at 09:37

## 2019-01-01 RX ADMIN — METOPROLOL TARTRATE 25 MG: 25 TABLET, FILM COATED ORAL at 05:14

## 2019-01-01 RX ADMIN — SODIUM CHLORIDE 500 ML: 9 INJECTION, SOLUTION INTRAVENOUS at 14:57

## 2019-01-01 RX ADMIN — METOPROLOL TARTRATE 25 MG: 25 TABLET, FILM COATED ORAL at 17:57

## 2019-01-01 RX ADMIN — GABAPENTIN 200 MG: 100 CAPSULE ORAL at 05:00

## 2019-01-01 RX ADMIN — MIDAZOLAM 2 MG: 1 INJECTION INTRAMUSCULAR; INTRAVENOUS at 15:00

## 2019-01-01 RX ADMIN — DEXAMETHASONE 2 MG: 4 TABLET ORAL at 05:01

## 2019-01-01 RX ADMIN — GABAPENTIN 200 MG: 100 CAPSULE ORAL at 06:05

## 2019-01-01 RX ADMIN — ACETAMINOPHEN 650 MG: 325 TABLET, FILM COATED ORAL at 01:47

## 2019-01-01 RX ADMIN — METOPROLOL TARTRATE 25 MG: 25 TABLET, FILM COATED ORAL at 05:02

## 2019-01-01 RX ADMIN — ASPIRIN 81 MG: 81 TABLET, COATED ORAL at 16:59

## 2019-01-01 RX ADMIN — HEPARIN SODIUM 5000 UNITS: 5000 INJECTION, SOLUTION INTRAVENOUS; SUBCUTANEOUS at 21:53

## 2019-01-01 RX ADMIN — ALLOPURINOL 200 MG: 100 TABLET ORAL at 07:37

## 2019-01-01 RX ADMIN — ACETAMINOPHEN 650 MG: 325 TABLET, FILM COATED ORAL at 13:47

## 2019-01-01 RX ADMIN — ROSUVASTATIN CALCIUM 40 MG: 20 TABLET, FILM COATED ORAL at 17:17

## 2019-01-01 RX ADMIN — HEPARIN: 100 SYRINGE at 15:02

## 2019-01-01 SDOH — ECONOMIC STABILITY: GENERAL

## 2019-01-01 SDOH — ECONOMIC STABILITY: HOUSING INSECURITY: HOME SAFETY: REVIEWED HOSPICE BINDER WITH PATIENT AND SPOUSE.

## 2019-01-01 ASSESSMENT — ENCOUNTER SYMPTOMS
CARDIOVASCULAR NEGATIVE: 1
HEARTBURN: 0
INSOMNIA: 0
VOMITING: 0
CLAUDICATION: 0
PHOTOPHOBIA: 0
NAUSEA: 0
BOWEL PATTERN NORMAL: 1
DEPRESSION: 0
SENSORY CHANGE: 0
SHORTNESS OF BREATH: 0
WEAKNESS: 1
SORE THROAT: 0
FEVER: 0
STRIDOR: 0
DYSPNEA ON EXERTION: 1
FATIGUES EASILY: 1
MYALGIAS: 0
DEPRESSION: 0
WEAKNESS: 1
VOMITING: 0
ABDOMINAL PAIN: 0
BACK PAIN: 0
DRY SKIN: 1
BLURRED VISION: 0
PHOTOPHOBIA: 0
LAST BOWEL MOVEMENT: 65210
COUGH: 0
HEARTBURN: 0
PHOTOPHOBIA: 0
CONSTIPATION: 0
CHILLS: 0
NERVOUS/ANXIOUS: 0
BLURRED VISION: 0
SPEECH CHANGE: 0
DIARRHEA: 0
BLURRED VISION: 0
PHOTOPHOBIA: 0
DIARRHEA: 0
MYALGIAS: 0
DIZZINESS: 0
COUGH: 0
LAST BOWEL MOVEMENT: 65234
STOOL FREQUENCY: DAILY
FEVER: 0
CARDIOVASCULAR NEGATIVE: 1
LAST BOWEL MOVEMENT: 65234
EYES NEGATIVE: 1
HEARTBURN: 0
FEVER: 0
HEADACHES: 0
HEADACHES: 0
INSOMNIA: 0
SORE THROAT: 0
FATIGUES EASILY: 1
DIZZINESS: 0
PHOTOPHOBIA: 0
FEVER: 0
STOOL FREQUENCY: LESS THAN DAILY
BLURRED VISION: 0
FEVER: 0
SPEECH CHANGE: 0
CONSTIPATION: 0
ABDOMINAL PAIN: 0
RESPIRATORY NEGATIVE: 1
CLAUDICATION: 0
STOOL FREQUENCY: DAILY
CONSTIPATION: 0
SENSORY CHANGE: 0
SOMNOLENCE: 1
COUGH: 0
PHOTOPHOBIA: 0
EYES NEGATIVE: 1
SHORTNESS OF BREATH: 0
FEVER: 0
HEARTBURN: 0
FEVER: 0
SHORTNESS OF BREATH: 0
SENSORY CHANGE: 0
COUGH: 0
SPEECH CHANGE: 0
FATIGUE: 1
NAUSEA: 0
CLAUDICATION: 0
ABDOMINAL PAIN: 0
SLEEP QUALITY: FAIR
ABDOMINAL PAIN: 0
SHORTNESS OF BREATH: 0
BOWEL PATTERN NORMAL: 1
BACK PAIN: 1
LAST BOWEL MOVEMENT: 65206
ABDOMINAL PAIN: 0
CHILLS: 0
BLURRED VISION: 0
DIARRHEA: 0
LAST BOWEL MOVEMENT: 65234
DEPRESSION: 0
CLAUDICATION: 0
VOMITING: 0
VOMITING: 0
SLEEP QUALITY: ADEQUATE
STOOL FREQUENCY: DAILY
NECK PAIN: 0
BACK PAIN: 1
WEAKNESS: 0
DIARRHEA: 0
BOWEL INCONTINENCE: 1
ABDOMINAL PAIN: 0
FATIGUES EASILY: 1
BLURRED VISION: 0
FATIGUE: 1
COUGH: 0
HEARTBURN: 0
ABDOMINAL PAIN: 0
SHORTNESS OF BREATH: 0
CHILLS: 0
HEARTBURN: 0
DEPRESSION: 0
SLEEP QUALITY: ADEQUATE
NERVOUS/ANXIOUS: 0
DIARRHEA: 0
HEADACHES: 0
NERVOUS/ANXIOUS: 0
DEPRESSION: 0
DIZZINESS: 0
DRY SKIN: 1
VOMITING: 0
DIZZINESS: 0
BACK PAIN: 1
EYE DISCHARGE: 0
WEAKNESS: 0
SLEEP QUALITY: FAIR
MYALGIAS: 1
BOWEL PATTERN NORMAL: 1
WEAKNESS: 0
NAUSEA: 0
DYSPNEA ON EXERTION: 1
CONSTIPATION: 0
WEIGHT LOSS: 0
DIARRHEA: 0
SEIZURES: 0
VOMITING: 0
SENSORY CHANGE: 0
SLEEP QUALITY: ADEQUATE
MYALGIAS: 0
HEARTBURN: 0
NAUSEA: 0
SLEEP QUALITY: ADEQUATE
FATIGUE: 1
NAUSEA: 0
MYALGIAS: 0
MYALGIAS: 0
CONSTIPATION: 1
DIZZINESS: 0
SPEECH CHANGE: 0
SENSORY CHANGE: 0
CONSTIPATION: 0
BACK PAIN: 1
FATIGUES EASILY: 1
NERVOUS/ANXIOUS: 0
WEAKNESS: 0
DIZZINESS: 0
HEADACHES: 0
SPUTUM PRODUCTION: 0
DIARRHEA: 0
NERVOUS/ANXIOUS: 0
HEADACHES: 0
SORE THROAT: 0
SPEECH CHANGE: 0
EYE PAIN: 0
SLEEP QUALITY: FAIR
SHORTNESS OF BREATH: 0
BACK PAIN: 1
BOWEL INCONTINENCE: 1
VOMITING: 0
STOOL FREQUENCY: LESS THAN DAILY
FEVER: 0
MUSCULOSKELETAL NEGATIVE: 1
DYSPNEA ON EXERTION: 1
INSOMNIA: 0
CLAUDICATION: 0
BOWEL PATTERN NORMAL: 1
LAST BOWEL MOVEMENT: 65223
INSOMNIA: 0
HEADACHES: 0
SORE THROAT: 0
FATIGUES EASILY: 1
INSOMNIA: 0
NAUSEA: 0
ORTHOPNEA: 0
SORE THROAT: 0
INSOMNIA: 0
EYE REDNESS: 0
SHORTNESS OF BREATH: 0
NAUSEA: 0
GASTROINTESTINAL NEGATIVE: 1
BLURRED VISION: 0
FLANK PAIN: 0
VOMITING: 0
HEADACHES: 0
MUSCULOSKELETAL NEGATIVE: 1
GASTROINTESTINAL NEGATIVE: 1
NAUSEA: 0
SORE THROAT: 0
STOOL FREQUENCY: LESS THAN DAILY
DEPRESSION: 0
VOMITING: 0
CHILLS: 0
CLAUDICATION: 0
HEADACHES: 0
ABDOMINAL PAIN: 0
BOWEL PATTERN NORMAL: 1
FOCAL WEAKNESS: 0
HEARTBURN: 0
SHORTNESS OF BREATH: 0
CLAUDICATION: 0
DEPRESSION: 0
BACK PAIN: 1
WEAKNESS: 0
WEAKNESS: 0
NERVOUS/ANXIOUS: 0
SHORTNESS OF BREATH: 0
DYSPNEA ON EXERTION: 1
SORE THROAT: 0
SLEEP QUALITY: FAIR
VOMITING: 0
MYALGIAS: 0
CHILLS: 0
STOOL FREQUENCY: DAILY
NERVOUS/ANXIOUS: 0
STOOL FREQUENCY: DAILY
SHORTNESS OF BREATH: 0
CHILLS: 0
SPEECH CHANGE: 0
FATIGUE: 1
DIZZINESS: 0
HEADACHES: 0
PHOTOPHOBIA: 0
BOWEL PATTERN NORMAL: 1
PALPITATIONS: 0
NERVOUS/ANXIOUS: 0
DIZZINESS: 0
DRY SKIN: 1
FEVER: 0
MYALGIAS: 0
LAST BOWEL MOVEMENT: 65223
DRY SKIN: 1
DEPRESSION: 0
PSYCHIATRIC NEGATIVE: 1
HEADACHES: 0
DEPRESSION: 0
CHILLS: 0
COUGH: 0
MYALGIAS: 0
INSOMNIA: 0
COUGH: 0
CONSTIPATION: 0
BLURRED VISION: 0
NAUSEA: 0
SPEECH CHANGE: 0
FEVER: 0
PSYCHIATRIC NEGATIVE: 1
CONSTIPATION: 0
BACK PAIN: 1
COUGH: 0
STOOL FREQUENCY: DAILY
SENSORY CHANGE: 0
DRY SKIN: 1
FATIGUE: 1
CONSTIPATION: 0
WEAKNESS: 0
VOMITING: 0
LAST BOWEL MOVEMENT: 65234
ABDOMINAL PAIN: 0
DIZZINESS: 0
NAUSEA: 0
CHILLS: 0
CHILLS: 0
INSOMNIA: 0
FLANK PAIN: 0
WEAKNESS: 0
DIARRHEA: 0
LAST BOWEL MOVEMENT: 65205
COUGH: 0
SHORTNESS OF BREATH: 0
DYSPNEA ON EXERTION: 1
ABDOMINAL PAIN: 0
NAUSEA: 0
DRY SKIN: 1
SENSORY CHANGE: 0
FEVER: 0
RESPIRATORY NEGATIVE: 1

## 2019-01-01 ASSESSMENT — PAIN SCALES - GENERAL
PAINLEVEL_OUTOF10: 0
PAINLEVEL_OUTOF10: 1
PAINLEVEL_OUTOF10: 0
PAINLEVEL: NO PAIN
PAINLEVEL_OUTOF10: 2
PAINLEVEL_OUTOF10: 0
PAINLEVEL_OUTOF10: 4
PAINLEVEL: NO PAIN
PAINLEVEL: 6=MODERATE PAIN
PAINLEVEL_OUTOF10: 0

## 2019-01-01 ASSESSMENT — COGNITIVE AND FUNCTIONAL STATUS - GENERAL
MOVING TO AND FROM BED TO CHAIR: A LITTLE
TURNING FROM BACK TO SIDE WHILE IN FLAT BAD: A LITTLE
HELP NEEDED FOR BATHING: A LITTLE
CLIMB 3 TO 5 STEPS WITH RAILING: A LOT
SUGGESTED CMS G CODE MODIFIER DAILY ACTIVITY: CI
SUGGESTED CMS G CODE MODIFIER MOBILITY: CH
STANDING UP FROM CHAIR USING ARMS: A LITTLE
WALKING IN HOSPITAL ROOM: A LITTLE
MOVING FROM LYING ON BACK TO SITTING ON SIDE OF FLAT BED: A LITTLE
MOBILITY SCORE: 17
MOBILITY SCORE: 24
SUGGESTED CMS G CODE MODIFIER MOBILITY: CK
DAILY ACTIVITIY SCORE: 23

## 2019-01-01 ASSESSMENT — GAIT ASSESSMENTS
DEVIATION: BRADYKINETIC;DECREASED HEEL STRIKE
ASSISTIVE DEVICE: FRONT WHEEL WALKER
DISTANCE (FEET): 200
GAIT LEVEL OF ASSIST: CONTACT GUARD ASSIST
GAIT LEVEL OF ASSIST: SUPERVISED
DISTANCE (FEET): 75
ASSISTIVE DEVICE: FRONT WHEEL WALKER

## 2019-01-01 ASSESSMENT — ACTIVITIES OF DAILY LIVING (ADL)
PHYSICAL_TRANSFER_REQUIRES_ASSISTANCE: 1
MONEY MANAGEMENT (EXPENSES/BILLS): NEEDS ASSISTANCE
BATHING_REQUIRES_ASSISTANCE: 1
AMBULATION_REQUIRES_ASSISTANCE: 1
MONEY MANAGEMENT (EXPENSES/BILLS): NEEDS ASSISTANCE
DRESSING_REQUIRES_ASSISTANCE: 1
MONEY MANAGEMENT (EXPENSES/BILLS): TOTALLY DEPENDENT
MONEY MANAGEMENT (EXPENSES/BILLS): NEEDS ASSISTANCE
MONEY MANAGEMENT (EXPENSES/BILLS): NEEDS ASSISTANCE
MONEY MANAGEMENT (EXPENSES/BILLS): TOTALLY DEPENDENT
TOILETING: INDEPENDENT

## 2019-01-01 ASSESSMENT — SOCIAL DETERMINANTS OF HEALTH (SDOH)
ACTIVE STRESSOR - HEALTH CHANGES: 1
EXPRESSED_FINANCIAL_NEED: 1
ACTIVE STRESSOR - HEALTH CHANGES: 1
ACTIVE STRESSOR - HEALTH CHANGES: 1
ACTIVE STRESSOR - EXPRESSED EMOTIONAL NEED: 1
ACTIVE STRESSOR - HEALTH CHANGES: 1
ACTIVE STRESSOR - HEALTH CHANGES: 1

## 2019-01-01 ASSESSMENT — PATIENT HEALTH QUESTIONNAIRE - PHQ9
CLINICAL INTERPRETATION OF PHQ2 SCORE: 0
CLINICAL INTERPRETATION OF PHQ2 SCORE: 0

## 2019-01-01 ASSESSMENT — LIFESTYLE VARIABLES: DO YOU DRINK ALCOHOL: NO

## 2019-01-01 ASSESSMENT — PAIN SCALES - WONG BAKER: WONGBAKER_NUMERICALRESPONSE: HURTS A WHOLE LOT

## 2019-01-01 NOTE — PROGRESS NOTES
Assumed care of the patient at 0715, received report from the NOC RN. Patient is AOx4 and appropriate at this time. Neuro assessment performed with no deficits noted. Patient updated on POC: ambulation, bowel regimen, assistance with ADL's as needed. All questions have been answered at this time.    Patient denies discomfort, VSS.    PIV w/ IVF.    Patient up x 2 w/ FWW to BSC. Fall precautions in place. Bed and chair alarm active.

## 2019-01-01 NOTE — CARE PLAN
Problem: Safety  Goal: Will remain free from injury    Intervention: Provide assistance with mobility  Patient and  educated on using call light for assistance. Bed alarm is in place. Necessary items kept within reach, will continue with POC.      Problem: Infection  Goal: Will remain free from infection    Intervention: Assess signs and symptoms of infection  Labs and VS monitored as appropriate, assessments performed. IV fluids continue as ordered. Will continue to monitor as appropriate and continue with POC.

## 2019-01-01 NOTE — PROGRESS NOTES
Patient is alert with confusion. Continues to be impulsive and unaware of personal safety. Bed alarm in place,  at bedside, and necessary items are kept within reach. Patient appears to have very poor coordination with holding glasses and manipulating movements to and from commode. Frequent reorientation is required and frequent education. Dressing is CDI on shift with no s/sx of bleeding or heavy drainage present. Surrounding skin is intact. Pain continues to be managed with routine Tylenol and Toradol. No s/sx of pain or discomfort present. Repositioning offered frequently and patient is able to elf reposition at times. Fluids and call light are kept within reach and patient continues to have  at bedside. Will continue to monitor as appropriate throughout shift.

## 2019-01-01 NOTE — WOUND TEAM
Wound consult placed for evaluation of coccyx wound.  With staff assistance, patient turned to side.  Coccyx skin is intact without injury; lumbar/back area with 2  purple bruised areas due to surgery(?) but not pressure related.  LDA updated and discussed with staff RN.

## 2019-01-01 NOTE — PROGRESS NOTES
DAVID Hu, updated regarding:    Drop in hgb overnight (from 8.4 to 6.8). Orders to transfuse 1 unit of RBC previously placed by Hospitalist team.    Middle incision continues to have moderate, sanguinous output.     Orders received to pressure dress and ace wrap around oozing incision.

## 2019-01-01 NOTE — CARE PLAN
Problem: Communication  Goal: The ability to communicate needs accurately and effectively will improve    Intervention: Educate patient and significant other/support system about the plan of care, procedures, treatments, medications and allow for questions  Patient and  have been updated on POC, all questions have been answered at this time.      Problem: Safety  Goal: Will remain free from injury    Intervention: Provide assistance with mobility  Patient is up x 2 w/ FWW. Frequent toileting offered. Bed alarm active. Call light within reach and hourly rounding implemented.

## 2019-01-01 NOTE — PROGRESS NOTES
Pressure dressing changed x 2 this shift due to saturation. Middle incision is oozing blood through the staples. New dressing applied and ace wrap in place. RN will continue to monitor surgical incisions.

## 2019-01-01 NOTE — PROGRESS NOTES
"   Orthopaedic Progress Note    Interval changes:  Patient doing well  Drainage decreasing at proximal incision    ROS - Patient denies any new issues.  Pain well controlled.    Blood pressure 148/64, pulse 70, temperature 36.7 °C (98 °F), temperature source Temporal, resp. rate 20, height 1.549 m (5' 1\"), weight 58.1 kg (128 lb 1.4 oz), last menstrual period 07/06/2011, SpO2 94 %, not currently breastfeeding.      Patient at bone scan at time of exam  LLE surgical dressing is clean, dry, and intact. Patient clearly fires tibialis anterior, EHL, and gastrocnemius/soleus. Sensation is intact to light touch throughout superficial peroneal, deep peroneal, tibial, saphenous, and sural nerve distributions. Strong and palpable 2+ dorsalis pedis and posterior tibial pulses with capillary refill less than 2 seconds. No lower leg tenderness or discomfort.     Recent Labs      12/30/18   1259  12/31/18   0010  12/31/18   0917   WBC  15.3*  13.0*  13.6*   RBC  2.73*  2.04*  2.69*   HEMOGLOBIN  8.5*  6.2*  8.4*   HEMATOCRIT  27.0*  19.4*  25.2*   MCV  97.1  97.1  93.7   MCH  31.1  30.4  31.2   MCHC  32.1*  31.3*  33.3*   RDW  50.5*  50.3*  48.7   PLATELETCT  116*  101*  105*   MPV  10.5  11.2  10.9       Active Hospital Problems    Diagnosis   • Lytic bone lesion of hip [M89.8X5]     Priority: High   • Gout [M10.9]     Priority: Medium   • Thrombocytopenia (HCC) [D69.6]     Priority: Low     8/12/09 plt 195  3/28/13 plt 152  11/7/16 plt 115  5/12/17 plt 155  9/25/17 plt 139     • Leukocytosis [D72.829]   • Lung cancer (HCC) [C34.90]   • Lesion of right femur [M89.9]   • DNR (do not resuscitate) discussion [Z71.89]   • Acute cystitis [N30.00]   • Metabolic encephalopathy [G93.41]   • Hyperglycemia [R73.9]   • CKD (chronic kidney disease) stage 3, GFR 30-59 ml/min (HCC) [N18.3]   • Elevated alkaline phosphatase level [R74.8]   • Elevated BUN [R79.9]   • Elevated INR (international normalized ratio) [R79.1]   • Intractable low " back pain [M54.5]     4/20/18 MRI lumbar spine mild to moderate central canal stenosis and moderate right-sided neuroforaminal narrowing L3-L4     • Coronary artery disease [I25.10]       Assessment/Plan:  Doing well   POD#2 S/P Intramedullary nailing, right hip  Wt bearing status - WBAT  Wound care/Drains - dressing change every other day by nursing or PRN for saturation  Future Procedures - none planned   Sutures/Staples out- 10-14 days post operatively  PT/OT-initiated  Antibiotics: completed  DVT Prophylaxis- TEDS/SCDs/Foot pumps/aspirin/heparin  Hatch-none  Case Coordination for Discharge Planning - Disposition home

## 2019-01-02 NOTE — PROGRESS NOTES
Assumed care of pt at 0715. Patient is alert with some confusion. Pt conversation not within topic and inappropriately laughs. Dressing clean, dry, and intact with some shadowing. Ace bandage compression wrap removed d/t soiling with stool. Will continue to monitor for signs of bleeding.     Bed alarm in place,  at beside, belongings & call light with in reach. Side rails up and bed in lowest position

## 2019-01-02 NOTE — PROGRESS NOTES
Patient admitted for multiple bone lytic lesions. ORIF to right hip performed 12/29/18. Patient has continued to have episodes of bleeding at site and does require transfusions for low hemoglobin. Patient also has moments of poor coordination skills, however for most of shift patient has been able to follow commands appropriately and has shown little s/sx of confusion present. Patients labs were drawn and hemoglobin was at 6.7, this Nurse went into patients room to check on dressing and dressing appeared to have a moderate amount of drainage. This Nurse educated patient that dressing would need to be changed for better assessment and that a blood transfusion was most likely going to be ordered. Encouraged patient to use restroom prior to these activities. CNA and this Nurse assisted patient to bedside commode, Patient tolerated well. Patient used restroom and then was educated on the dressing change. Pain medications were offered but patient did decline. Educated patient that she would stand next to bed with walker while this Nurse placed a new dressing to right hip with a new banadage with improved compression and for a better assessment of the incision/drainage. Patient stood from commode with FWW and was tolerating well. CNA removed urine from commode and commode was kept behind patient in case she started to felt any dizziness for easy sitting ability. Because of patients height it is not appropriate for the bed to be behind her due to her having to stand on toes to sit on bed appropriately. Trash can brought close for the dressing change as well. Old dressing removed and a large amount of drainage was noted underneath. New dressing applied. This Nurse asked patient if she was dizzy and she reassured me she was not. A compression wrap was being prepared and about to apply to patient. This Nurse saw patient sway a little forward and again asked if the patient felt any dizziness. The patient did not respond. This  Jaye quickly moved the trash can and commode out of the way and got behind the patient. Continuing to communicate with patient with no response patient started to lean backwards on to this Nurse. This Nurses right knew was placed between buttocks and slowly guided patient to the floor.  was at bedside and awoke when this Nurse quickly moved the trash can and the commode. At this time the  used the call light and staff arrived into the room to assist. VS were taken and were stable. Patient had no c/o worsening pain. Patient was at first shocked but was pleasant and appreciative of the care provided so quickly. Patient was asissted to a sitting position and then assisted by this Nurse, the charge nurse, and the assigned CNA to a standing position. Patient was able to pivot to the edge of the bed and sat down appropriately. Patient did continue to complain of some minor dizziness and was kept at the edge of the bed where 2LNC was applied and water was provided. Once the dizziness improved, patient was assisted to a standing position once more with all three staff members for the compression wrap, patient tolerated well. Then assisted back into bed. VS remain stable and patient has no worsening c/o pain noted. Small skin tear was noted to left ring finger, cleansed and steri strips were applied. This most likely occured with the FWW as the patient was leaning back. No s/sx of complications noted. Patient has since recieived a blood transfusion and continues to have no more s/sx of complications.

## 2019-01-02 NOTE — PROGRESS NOTES
[facility] Medicine Daily Progress Note    Date of Service  1/1/2019    Chief Complaint  83 y.o. female admitted 12/21/2018 with worsening back pain to the point of intractable few days prior to admission, had been seen by pcp, pain management, ortho without significant relief other than temporary improvement.    Hospital Course    Patient had scans showing lytic lesions in Right hip and lower back.  Biopsy ordered on admission but will not be done until after [place] d/t weekend and holiday.  Bone marrow biopsy completed and normal  SPEP and UPEP collected and normal.  L3 lesion biopsied and adenocarcinoma favoring lung origin.  IM nailing of impending pathologic fracture R hip 12/29.  Patient seen by oncology and awaiting foundation testing to determine course of treatment.  Pending radiation oncology consultation.      Interval Problem Update  12/22 Patient with pain, responding well to narcotic though is in extreme pain between doses.  I increased her steroids and gabapentin for better overall pain control, continue with narcotic medication.  Skeletal survey ordered to r/o impending fracture - long bones are okay but she does have lytic lesion in right pelvis which is likely the source of the majority of her pain.  12/23 Patient states she has no pain when still but still having significant pain when moving - responding appropriate to pain medications received - sedating at times.  Electrophoresis results should be back tomorrow.  Bone marrow biopsy pending Wednesday - is scheduled.  12/24 Patient seen after bone marrow biopsy earlier today, histology department was able to coordinate earlier.  SPEP and UPEP still pending at this time.  Patient slightly confused and speaking in gibberish following sedation for BM but resolving with time.  She has improved pain control with steroids and is tolerating higher dose gabapentin well.  Oncology consult pending labs and bone marrow results.   and son at bedside  when evaluated - explained current treatment and anticipated turnaround for test results.  1/1/19 Patient states she is feeling much better today.  Per RN the confusion she had last few days has significantly improved.  She is up to chair when evaluated and course of anticipated treatment outlined for patient and family.  Pending XRT evaluation tomorrow.    Consultants/Specialty  [facility] - oncology - f/u OP  [facility] - to consult 1/2    Code Status  full    Disposition  Likely SNF prior to dc home, pending further PT/OT evaluations.    Review of Systems  Review of Systems   Constitutional: Positive for malaise/fatigue. Negative for chills and fever.   HENT: Negative for congestion and sore throat.    Eyes: Negative for blurred vision and photophobia.   Respiratory: Negative for cough and shortness of breath.    Cardiovascular: Negative for chest pain, claudication and leg swelling.   Gastrointestinal: Negative for abdominal pain, constipation, diarrhea, heartburn, nausea and vomiting.   Genitourinary: Negative for dysuria and hematuria.   Musculoskeletal: Positive for back pain (better with meds) and joint pain (right hip and groin - better post op.). Negative for myalgias.   Skin: Negative for itching and rash.   Neurological: Negative for dizziness, sensory change, speech change, weakness and headaches.   Psychiatric/Behavioral: Negative for depression. The patient is not nervous/anxious and does not have insomnia.         Physical Exam  Temp:  [36.6 °C (97.8 °F)-37 °C (98.6 °F)] 36.7 °C (98.1 °F)  Pulse:  [68-76] 70  Resp:  [16-20] 18  BP: (134-152)/(60-71) 140/70    Physical Exam   Constitutional: She is oriented to person, place, and time. She appears well-developed and well-nourished. No distress.   HENT:   Head: Normocephalic and atraumatic.   Eyes: Conjunctivae are normal. No scleral icterus.   Neck: Neck supple. No JVD present.   Cardiovascular: Normal rate, regular rhythm and normal heart sounds.  Exam  reveals no gallop and no friction rub.    No murmur heard.  Pulmonary/Chest: Effort normal and breath sounds normal. No respiratory distress. She exhibits no tenderness.   Abdominal: Soft. Bowel sounds are normal. She exhibits no distension. There is no guarding.   Musculoskeletal: She exhibits no edema or tenderness.   Right hip dressing, bloody and saturated.     Lymphadenopathy:     She has no cervical adenopathy.   Neurological: She is alert and oriented to person, place, and time. No cranial nerve deficit.   Skin: Skin is warm and dry. She is not diaphoretic. No erythema. No pallor.   Psychiatric: She has a normal mood and affect. Her behavior is normal.   Nursing note and vitals reviewed.      Fluids    Intake/Output Summary (Last 24 hours) at 01/01/19 1732  Last data filed at 01/01/19 1503   Gross per 24 hour   Intake              417 ml   Output             1050 ml   Net             -633 ml       Laboratory  Recent Labs      12/31/18   0010  12/31/18   0917  01/01/19   0947   WBC  13.0*  13.6*  16.2*   RBC  2.04*  2.69*  2.15*   HEMOGLOBIN  6.2*  8.4*  6.8*   HEMATOCRIT  19.4*  25.2*  20.4*   MCV  97.1  93.7  94.0   MCH  30.4  31.2  31.2   MCHC  31.3*  33.3*  33.2*   RDW  50.3*  48.7  50.5*   PLATELETCT  101*  105*  116*   MPV  11.2  10.9  11.0     Recent Labs      12/31/18   0010  01/01/19   0947   SODIUM  138  139   POTASSIUM  4.6  4.6   CHLORIDE  108  109   CO2  26  24   GLUCOSE  152*  128*   BUN  27*  27*   CREATININE  0.93  0.97   CALCIUM  7.8*  7.7*                   Imaging  DX-PORTABLE FLUORO > 1 HOUR   Final Result         Portable fluoroscopy utilized for 18 seconds.         IR-US GUIDED PIV   Final Result    Ultrasound-guided PERIPHERAL IV INSERTION performed by    qualified nursing staff as above.            NM-BONE SCAN WHOLE BODY   Final Result      Extensive bony metastatic disease as described on prior CT exams.      MR-BRAIN-WITH & W/O   Final Result      1.  No evidence of intracranial  metastatic disease   2.  Calvarial and RIGHT C2 lesions highly suspicious for osseous metastatic disease   3.  Mild white matter changes   4.  Mild atrophy      DX-CHEST-PORTABLE (1 VIEW)   Final Result         1. No acute cardiopulmonary abnormalities are identified.      DX-HIP-UNILATERAL-W/O PELVIS-2/3 VIEWS RIGHT   Final Result      Digitized intraoperative radiograph is submitted for review.  This examination is not for diagnostic purpose but for guidance during a surgical procedure.      DX-HIP-BILATERAL-WITH PELVIS-3/4 VIEWS   Final Result      1.  There are lytic lesions seen involving the right inferior ilium and right inferior obturator ring but there is no plain film evidence of pathologic fracture.      CT-NEEDLE BX-DEEP BONE   Final Result      1.  Successful CT-guided core biopsy of expansile mass involving right L3 transverse process.      CT-ABDOMEN-PELVIS WITH   Final Result      1.  Multiple lytic expansile bone lesions involving the pelvis and thoracolumbar spine as described above. There are areas of cortical disruption and extension into the surrounding soft tissues involving multiple of these lesions. The largest involves    the right ilium and demonstrates some speckled calcification. Differential diagnosis includes metastatic disease as well as multiple myeloma.      2.  Destructive lesion involving the right proximal femur with some faint associated sclerosis. This is located within the intertrochanteric region and extends to an area of focal cortical breakthrough/disruption at the base of the right femoral neck    superiorly.      3.  Fatty heterogeneous liver likely representing presence of hepatocellular dysfunction.      4.  Bibasilar atelectasis and small bilateral pleural effusions.      5.  Ill-defined pulmonary nodules within the right lung base which of previously been evaluated with CT scan.      6.  6 mm left adrenal apex nodule      CT-CHEST (THORAX) WITH   Final Result      1.   2 noncalcified nodules in the right upper lobe. The largest is spiculated measuring 11 mm in maximum diameter. Differential diagnosis includes primary or secondary lung carcinoma.      2.  3 mm noncalcified nodule in the right middle lobe.      3.  No thoracic adenopathy.      4.  Small bilateral pleural effusions, left greater than right.      5.  Heterogeneous liver which may represent primary or secondary carcinoma.            DX-BONE SURVEY-METASTATIC LTD   Final Result      1.  Lytic foci within the calvaria, right iliac bone, and right distal clavicle      2.  Multiple additional lytic foci identified on recent CT are not apparent on conventional radiography      3.  Different diagnosis includes metastatic malignancy or multiple myeloma      CT-LSPINE W/O PLUS RECONS   Final Result      Destructive lesions within the right pedicle and transverse process of L3, the right sacral wing, and right iliac crest consistent with metastatic disease or multiple myeloma.   Multilevel degenerative changes.   Moderate spinal stenosis at L4-5.      CT-HIP W/O PLUS RECONS RIGHT   Final Result         1.  Multiple lytic expansile lesions in the pelvis and spine as described.   2.  Hairline fracture of the acetabular roof on the right, likely pathologic fracture.   3.  Hairline lucency through the right femoral neck suggests hairline fracture           Assessment/Plan  * Intractable low back pain- (present on admission)   Assessment & Plan    Palliative following for pain management. Delirium improved with medication adjustments  Pain improved with medications and nailing of R femur.       Lytic bone lesion of hip- (present on admission)   Assessment & Plan    With hairline fracture of the right acetabular roof and femur neck.  Likely pathological.  BEATRICE with no increase in immunoglobulins.  Bone marrow biopsy 12/24 was negative for any abnormalities.  bone biopsy of L3 today 12/27 - adenocarcinoma favoring lung, pathology from  IM nailing hip 12/29 pending.  CT scan of the chest showed 2 nodules in the right upper lobe with the largest one looks spiculated and measures around 11 mm.  MRI brain showed no intracranial metastasis but calvarial and C2 possible bony metastasis.  Bone scan showed extensive foci of activity in the spine, ribs, pelvis, calvarium, and femora.  Oncology will f/u outpatient with results of immunotherapy foundation testing.  Dr. Pantoja from radiology oncology will see the patient 1/2/19 for recommendations       Gout- (present on admission)   Assessment & Plan    -Continue allopurinol  -Uric acid normal       Acute blood loss anemia   Assessment & Plan    Continues to bleed from surgical incision, transfuse to keep hgb >7.0  Ortho aware - continue with pressure dressing.       Lesion of right femur- (present on admission)   Assessment & Plan    Destructive metastatic bone lesion on the right femur.  Orthopedic surgery consulted.  Status post intramedullary nailing of the right femur on 12/29.   PT/OT.  WBAT.      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  MRI of the brain showed no intracranial metastasis but rather calvarial and C2 possible bone metastasis.  Pending bone scan.  Oncology follow up after discharge from hospital.  Pending mutation and genetic testing on biopsy.       Leukocytosis- (present on admission)   Assessment & Plan    Multifactorial: Steroid induced, UTI, leukemoid reaction  Afebrile       Acute cystitis- (present on admission)   Assessment & Plan    Completed abx         CKD (chronic kidney disease) stage 3, GFR 30-59 ml/min ([facility])- (present on admission)   Assessment & Plan    Stable for now.  Avoid nephrotoxins.  Renal dose all medications.     Hyperglycemia- (present on admission)   Assessment & Plan    Suspecting steroids induced.  No history of diabetes.  Continue to monitor.     Metabolic  encephalopathy- (present on admission)   Assessment & Plan    UA positive for UTI - completed 5 day course of abx.  Medication induced - meds adjusted and mentation improved         Elevated INR (international normalized ratio)- (present on admission)   Assessment & Plan    - not on anticoagulants as an outpt         Elevated BUN- (present on admission)   Assessment & Plan    -Rehydrating with IV fluid     Elevated alkaline phosphatase level- (present on admission)   Assessment & Plan    -Likely related to bone lesions     Coronary artery disease- (present on admission)   Assessment & Plan    Status post CABG.  Continue with aspirin and statin.     Thrombocytopenia (HCC)- (present on admission)   Assessment & Plan    Chronic.  Continue to monitor.  Discontinue chemical anticoagulation if platelets drop below 50k  Transfuse if platelets drops below 10 K          VTE prophylaxis: heparin

## 2019-01-02 NOTE — PROGRESS NOTES
Received report from [facility], assumed care of pt 0700.  Pt is A&Ox4,  at bedside. Dressing to r hip is CDI, some shadowing noted, SPICA dressing via traction ordered by Dr. Marks. Right hip and rt lower back purple/red. Bruising noted to arms.   Pt with multiple loose stool, incontinent at times. Did get pt up to commode 2 assist.   PIV to left arm patent, fluids infusing, dressing CDI.  Sequentials on, machine running.   Pt denies pain at rest, rates pain 5/10 with movement, states scheduled Tylenol as being given is working fine for her.  Bed alarm on. Treaded socks on. All needs met at this time.

## 2019-01-02 NOTE — CONSULTS
RADIATION ONCOLOGY CONSULT    DATE OF SERVICE: 1/2/2019    IDENTIFICATION:   Metastatic adenocarcinoma favoring lung cancer    HISTORY OF PRESENT ILLNESS: I had the pleasure of seeing Ms. Craig today in consultation at the request of Dr. Marks  for her metastatic adenocarcinoma.  Patient is an 83-year-old woman whose been dealing with worsening back and leg pain.  She was receiving injections through pain management for sciatica.  When this did not respond to conservative measures and she she saw notable progression she presented to the emergency room on 12/21.  As a part of the workup she was found to have notable lytic lesions throughout the skeletal system.  Most notably L3-4 and 5 had expansile lesions, the right sacrum and right iliac wing had expansile destructive lesions, and there was impending fracture in the right femur.  Patient had CT scan of the chest that showed 2 nodules in the right upper lobe the largest being a spiculated 11 mm lesion concerning for malignancy.  There was not any significant lymphadenopathy in the chest but there were areas concerning for liver metastasis.  Biopsy was done of the L3 bone lesion that favored an adenocarcinoma of lung origin.  Foundation 1 testing has been sent to further characterize.  She underwent a hip nail on 1229 for stability of the right femur.  Multiple myeloma workup was negative.  MRI scan of the brain was negative.  Patient states since surgery her pain is much improved of note she is still on notable pain medication.  She did have some difficulty with postoperative bleeding yesterday.  She currently has a compression dressing on.  Evaluating under compression breast dressing shows no active bleeding but her hemoglobin is still less than 7.  I been asked to see her for consideration of palliative radiotherapy to the right femur, pelvis, and low lumbar spine.    PAST MEDICAL HISTORY:   Past Medical History:   Diagnosis Date   • Arthritis    • Back pain     • Breath shortness     with exertion   • CAD (coronary artery disease)     CABG X3 in '16 with graft occlusion post-op   • Cataract     bilat IOL    • Gout    • Heart attack (HCC)    • Heart burn    • High cholesterol    • Hypertension    • Indigestion    • MEDICAL HOME    • Stroke (HCC)     TIA        PAST SURGICAL HISTORY:  Past Surgical History:   Procedure Laterality Date   • HIP NAILING INTRAMEDULLARY Right 12/29/2018    Procedure: HIP NAILING INTRAMEDULLARY;  Surgeon: Alejo Savage M.D.;  Location: SURGERY [facility];  Service: Orthopedics   • MULTIPLE CORONARY ARTERY BYPASS ENDO VEIN HARVEST  11/6/2016    Procedure: MULTIPLE CORONARY ARTERY BYPASS ENDO VEIN HARVEST;  Surgeon: Jeff Naranjo M.D.;  Location: SURGERY [facility];  Service:    • ABIODUN  11/6/2016    Procedure: ABIODUN;  Surgeon: Jeff Naranjo M.D.;  Location: SURGERY [facility];  Service:    • CATARACT PHACO WITH IOL  8/31/2009    Performed by SOLOMON THOMAS at SURGERY SAME DAY [facility]   • CATARACT PHACO WITH IOL  8/20/2009    Performed by SOLOMON THOMAS at SURGERY SAME DAY [facility]   • GYN SURGERY  1951    Hysterectomy during last C Section    • ABDOMINAL HYSTERECTOMY TOTAL     • ANGIOPLASTY  85, 97   • CARDIAC CATH     • GYN SURGERY      C Section x4   • OTHER      c-sections x4   • PRIMARY C SECTION         CURRENT MEDICATIONS:  Current Facility-Administered Medications   Medication Dose Route Frequency Provider Last Rate Last Dose   • oyster shell calcium/vitamin D 250-125 MG-UNIT tablet 1 Tab  1 Tab Oral DAILY Cristine Matthews M.D.   1 Tab at 01/02/19 0556   • enoxaparin (LOVENOX) inj 40 mg  40 mg Subcutaneous DAILY Cristine Matthews M.D.   Stopped at 01/03/19 0600   • dexamethasone (DECADRON) tablet 2 mg  2 mg Oral DAILY Catherine Haro M.D.   2 mg at 01/02/19 0556   • gabapentin (NEURONTIN) capsule 200 mg  200 mg Oral BID Catherine Haro M.D.   200 mg at 01/02/19 0556   • senna-docusate (PERICOLACE or  SENOKOT S) 8.6-50 MG per tablet 1 Tab  1 Tab Oral BID Catherine Haro M.D.   1 Tab at 01/02/19 0557   • polyethylene glycol/lytes (MIRALAX) PACKET 1 Packet  1 Packet Oral DAILY Catherine Haro M.D.   1 Packet at 01/02/19 0557   • simethicone (MYLICON) chewable tab 80 mg  80 mg Oral TID PRN Cristine Matthews M.D.   80 mg at 12/30/18 1357   • Pharmacy Consult Request ...Pain Management Review 1 Each  1 Each Other PRN Alejo Savage M.D.       • ondansetron (ZOFRAN) syringe/vial injection 4 mg  4 mg Intravenous Q4HRS PRN Alejo Savage M.D.       • diphenhydrAMINE (BENADRYL) injection 25 mg  25 mg Intravenous Q6HRS PRN Alejo Savage M.D.       • haloperidol lactate (HALDOL) injection 1 mg  1 mg Intravenous Q6HRS PRN Alejo Savage M.D.       • scopolamine (TRANSDERM-SCOP) patch 1 Patch  1 Patch Transdermal Q72HRS PRN Alejo Savage M.D.       • magnesium hydroxide (MILK OF MAGNESIA) suspension 30 mL  30 mL Oral QDAY PRN Alejo Savage M.D.   30 mL at 12/30/18 1638   • bisacodyl (DULCOLAX) suppository 10 mg  10 mg Rectal Q24HRS PRN Alejo Savage M.D.   10 mg at 01/01/19 1347   • fleet enema 133 mL  1 Each Rectal Once PRN Alejo Savage M.D.       • acetaminophen (TYLENOL) tablet 650 mg  650 mg Oral Q6HRS Alejo Savage M.D.   650 mg at 01/02/19 0556   • HYDROmorphone (DILAUDID) injection 0.5 mg  0.5 mg Intravenous Q3HRS PRN Alejo Savage M.D.       • morphine ER (MS CONTIN) tablet 15 mg  15 mg Oral Q12HRS Nela Wild A.P.RPeterNPeter   15 mg at 01/02/19 0556   • morphine (pf) 4 mg/ml injection 3 mg  3 mg Intravenous Q3HRS PRN Nela Wild, A.P.R.N.       • morphine (MS IR) tablet 7.5 mg  7.5 mg Oral Q3HRS PRN Nela Wild, A.P.R.N.       • metoprolol (LOPRESSOR) tablet 25 mg  25 mg Oral Q EVENING Cristine Matthews M.D.   25 mg at 01/01/19 1700    And   • metoprolol (LOPRESSOR) tablet 25 mg  25 mg Oral QAM Cristine Matthews M.D.   25 mg at 01/02/19 0556   •  rosuvastatin (CRESTOR) tablet 40 mg  40 mg Oral QDAY Galdino Gupta M.D.   40 mg at 19 2130   • aspirin EC (ECOTRIN) tablet 81 mg  81 mg Oral Q EVENING Galdino Gupta M.D.   81 mg at 19 1659   • allopurinol (ZYLOPRIM) tablet 200 mg  200 mg Oral DAILY Galdino Gupta M.D.   200 mg at 19 0556   • ondansetron (ZOFRAN ODT) dispertab 4 mg  4 mg Oral Q4HRS PRN Galdino Gupta M.D.       • NS infusion   Intravenous Continuous Cristine Matthews M.D. 83 mL/hr at 19 1659         ALLERGIES:    Atorvastatin    FAMILY HISTORY:    Family History   Problem Relation Age of Onset   • Cancer Mother         uterine   • Arthritis Mother         gout   • Asthma Mother    • Heart Disease Father         MI age 50s   • Heart Disease Brother          40 MI   • Hyperlipidemia Brother    • Hyperlipidemia Sister    • Arthritis Sister        SOCIAL HISTORY:    Social History   Substance Use Topics   • Smoking status: Former Smoker     Packs/day: 0.50     Years: 32.00     Types: Cigarettes     Quit date: 1988   • Smokeless tobacco: Never Used      Comment: quit , approx 30pyh   • Alcohol use 4.2 oz/week     7 Glasses of wine per week     Patient is retired from Work outside the home  Lives with:  who was present during this discussion    REVIEW OF SYSTEMS:  A complete review of systems was completed in patient's chart on 2019.  All are negative with relationship to this diagnosis with the exception of:  Patient is alert and oriented and very understanding of her condition.  She is not exhibiting any active bleeding.  They are planning on transfusion later today.  She states the pain is much improved since surgery and pain medication.  She does wish to pursue active therapy.    PHYSICAL EXAM:    Vitals:    19 0130 19 0235 19 0248 19 0804   BP: 156/93 140/70 140/63 144/66   Pulse: 72 62 60 69   Resp: 20 18 18 18   Temp: 36.2 °C (97.2 °F) 36.6 °C (97.8 °F) 36.5 °C (97.7 °F) 36.9 °C (98.5  °F)   TempSrc: Temporal Tympanic Tympanic Oral   SpO2: 99% 99% 99% 97%   Weight:       Height:       Location: Leg  Description: Dull, Aching      3 = Capable of only limited self care, confined to bed or chair more than 50% of waking hours mostly related to recent surgery    GENERAL: No apparent distress.  Sitting comfortably in the bed  HEENT:  Pupils are equal, round, and reactive to light.  Extraocular muscles   are intact. Sclerae nonicteric.  Conjunctivae pink.  Oral cavity, tongue   protrudes midline.   NECK:  Supple without evidence of thyromegaly.  NODES:  No peripheral adenopathy of the neck, supraclavicular fossa or axillae   bilaterally.  LUNGS:  Clear to ascultation bilaterally   HEART:  Regular rate and rhythm.  No murmur appreciated  ABDOMEN:  Soft. No evidence of hepatosplenomegaly.  Positive bowel sounds.  EXTREMITIES:  Without Edema.  NEUROLOGIC:  Cranial nerves II through XII were intact.       IMPRESSION:   Metastatic adenocarcinoma favoring lung cancer    RECOMMENDATIONS:   I discussed the diagnosis, prognosis, and treatment options over a 1 hr 5 min time period, 95% of that time dedicated to ongoing treatment management.  I discussed with the patient, her , and her nursing staff the nature of her current condition.  We discussed that this is a systemic malignancy and is therefore treatable but not curable.  Patient expressed understanding of this concept and was aware of that information.  We discussed further the role of foundation one testing to better characterize the origin of her adenocarcinoma.  Currently the working hypothesis is lung cancer.  Less usual however is the size of the primary without adenopathy yet the burden of disease outside the thorax.  Therefore we want to ensure origin before picking a systemic therapy.  In the meantime we discussed palliative radiotherapy directed at the L3-4-5 right sacrum right iliac crest and right femur.  I anticipate 10 fractions of  radiotherapy given Monday through Friday to a total dose of 3000 cGy.  Currently we are planning on simulation tomorrow as long as her bleeding and hemoglobin are stable.    We discussed the risks, benefits and side effects of treatment and the patient is amenable to treatment.  If patient has any questions or concerns, she should feel free to contact me.    Thank you for the opportunity to participate in her care.  If any questions or comments, please do not hesitate in calling.

## 2019-01-02 NOTE — ASSESSMENT & PLAN NOTE
Continues to bleed from surgical incision, transfuse to keep hgb >7.0  Lovenox discontinued  Ortho aware - continue with pressure dressing.  hgb 8.5 - 1/5/19 - likely the result yesterday was falsely low  1 unit transfused 1/4/19 - hgb 6.8  hgb 7.5 - 1/3/19  1 unit transfused 1/2/19 - hgb 6.7  1 unit transfused 1/1/19 - hgb 6.8  1 unit transfused 12/31/18 - hgb 6.2

## 2019-01-02 NOTE — PROGRESS NOTES
A new dressing was applied to patients incision site at start of shift and another replaced at time of fall around 0200. Both had large amounts of serosanguinous drainage. There is no visible signs of drainage at this time. Compression has been applied to site with an ACE wrap. Patient did require a transfusion this AM. MD contacted regarding staff assisted fall and concerns regarding bleeding at incision site. Instructed to hold Lovenox, which was also requested by ortho. MD also ordered a unit to be transfused and to keep compression dressing on patient as best able. Will update next shift to assess for any worsening s/sx of bleeding present and report them as appropriate.

## 2019-01-02 NOTE — THERAPY
Hold PT secondary to low H&H, will re attempt as able/appropriate     Bernadette Post PT/DPT  Pager# [phone]

## 2019-01-02 NOTE — CARE PLAN
Problem: Infection  Goal: Will remain free from infection    Intervention: Assess signs and symptoms of infection  Patient continues to have site assessed for any infection. Dressing changes performed as ordered. Labs and VS monitored as ordered, assessed frequently. Will continue with POC as appropriate.       Problem: Fluid Volume:  Goal: Will maintain balanced intake and output    Intervention: Monitor, educate, and encourage compliance with therapeutic intake of liquids  Patient educated on bleeding risks and fluid volume. Encouraged to drink fluids as appropriate and assist in monitoring site for any s/sx of complications. IV fluids continue as ordered. Will continue with POC as appropriate.

## 2019-01-02 NOTE — PROGRESS NOTES
"   Orthopaedic Progress Note    Interval changes:  Patient doing well  Patient restarted on lovenox- now with worsening drainage from middle incision- lovenox to be held for 48 hours  Traction to place compressive hip spica ace wrap    ROS - Patient denies any new issues.  Pain well controlled.    Blood pressure 140/70, pulse 70, temperature 36.7 °C (98.1 °F), temperature source Oral, resp. rate 18, height 1.549 m (5' 1\"), weight 58.1 kg (128 lb 1.4 oz), last menstrual period 07/06/2011, SpO2 97 %, not currently breastfeeding.      LLE dressing with mod sanguinous exudate from middle incision. Patient clearly fires tibialis anterior, EHL, and gastrocnemius/soleus. Sensation is intact to light touch throughout superficial peroneal, deep peroneal, tibial, saphenous, and sural nerve distributions. Strong and palpable 2+ dorsalis pedis and posterior tibial pulses with capillary refill less than 2 seconds. No lower leg tenderness or discomfort.     Recent Labs      12/31/18   0010  12/31/18   0917  01/01/19   0947   WBC  13.0*  13.6*  16.2*   RBC  2.04*  2.69*  2.15*   HEMOGLOBIN  6.2*  8.4*  6.8*   HEMATOCRIT  19.4*  25.2*  20.4*   MCV  97.1  93.7  94.0   MCH  30.4  31.2  31.2   MCHC  31.3*  33.3*  33.2*   RDW  50.3*  48.7  50.5*   PLATELETCT  101*  105*  116*   MPV  11.2  10.9  11.0       Active Hospital Problems    Diagnosis   • Lytic bone lesion of hip [M89.8X5]     Priority: High   • Gout [M10.9]     Priority: Medium   • Thrombocytopenia (HCC) [D69.6]     Priority: Low     8/12/09 plt 195  3/28/13 plt 152  11/7/16 plt 115  5/12/17 plt 155  9/25/17 plt 139     • Acute blood loss anemia [D62]   • Leukocytosis [D72.829]   • Lung cancer (HCC) [C34.90]   • Lesion of right femur [M89.9]   • DNR (do not resuscitate) discussion [Z71.89]   • Acute cystitis [N30.00]   • Metabolic encephalopathy [G93.41]   • Hyperglycemia [R73.9]   • CKD (chronic kidney disease) stage 3, GFR 30-59 ml/min ([facility]) [N18.3]   • Elevated alkaline " phosphatase level [R74.8]   • Elevated BUN [R79.9]   • Elevated INR (international normalized ratio) [R79.1]   • Intractable low back pain [M54.5]     4/20/18 MRI lumbar spine mild to moderate central canal stenosis and moderate right-sided neuroforaminal narrowing L3-L4     • Coronary artery disease [I25.10]       Assessment/Plan:  lovenox to be held for 48 hours  Traction to place compressive hip spica ace wrap  POD#3 S/P Intramedullary nailing, right hip  Wt bearing status - WBAT  Wound care/Drains - dressing change every other day by nursing or PRN for saturation  Future Procedures - none planned   Sutures/Staples out- 10-14 days post operatively  PT/OT-initiated  Antibiotics: completed  DVT Prophylaxis- TEDS/SCDs/Foot pumps/aspirin/heparin  Hatch-none  Case Coordination for Discharge Planning - Disposition home

## 2019-01-02 NOTE — PROGRESS NOTES
POD#4  S/P Hip nail  Wound drainage as expected due to lovenox  WBAT  PT/OT  Incisional vac if needed

## 2019-01-03 NOTE — PROGRESS NOTES
Patient did continue to have a large amount of drainage from incision site to right hip on shift. At this time the last dressing change was performed at 1700, currently dressing in place is intact with small amount of drainage noted on the exterior ABD pads. Compression is applied with an ACE wrap. Patient had Lovenox DC'd today. Has been cooperative with care on shift. All fall precautions are in place. No breakthrough pain has been present. Patient is currently resting comfortably at this time. Will continue to monitor site as appropriate throughout shift and treat accordingly. Fluids and call light are within reach.

## 2019-01-03 NOTE — DISCHARGE PLANNING
Agency/Facility Name: [facility]  Outcome: Pt declined. unable to transport for radiation treatments.     Florinda (RNCM) notified.

## 2019-01-03 NOTE — PROGRESS NOTES
[facility] Medicine Daily Progress Note    Date of Service  1/2/2019    Chief Complaint  83 y.o. female admitted 12/21/2018 with worsening back pain to the point of intractable few days prior to admission, had been seen by pcp, pain management, ortho without significant relief other than temporary improvement.    Hospital Course    Patient had scans showing lytic lesions in Right hip and lower back.  Biopsy ordered on admission but will not be done until after [place] d/t weekend and holiday.  Bone marrow biopsy completed and normal  SPEP and UPEP collected and normal.  L3 lesion biopsied and adenocarcinoma favoring lung origin.  IM nailing of impending pathologic fracture R hip 12/29.  Patient seen by oncology and awaiting foundation testing to determine course of treatment.  Pending radiation oncology consultation.      Interval Problem Update  12/22 Patient with pain, responding well to narcotic though is in extreme pain between doses.  I increased her steroids and gabapentin for better overall pain control, continue with narcotic medication.  Skeletal survey ordered to r/o impending fracture - long bones are okay but she does have lytic lesion in right pelvis which is likely the source of the majority of her pain.  12/23 Patient states she has no pain when still but still having significant pain when moving - responding appropriate to pain medications received - sedating at times.  Electrophoresis results should be back tomorrow.  Bone marrow biopsy pending Wednesday - is scheduled.  12/24 Patient seen after bone marrow biopsy earlier today, histology department was able to coordinate earlier.  SPEP and UPEP still pending at this time.  Patient slightly confused and speaking in gibberish following sedation for BM but resolving with time.  She has improved pain control with steroids and is tolerating higher dose gabapentin well.  Oncology consult pending labs and bone marrow results.   and son at bedside  when evaluated - explained current treatment and anticipated turnaround for test results.  1/1/19 Patient states she is feeling much better today.  Per RN the confusion she had last few days has significantly improved.  She is up to chair when evaluated and course of anticipated treatment outlined for patient and family.  Pending XRT evaluation tomorrow.  1/2/19 Patient states she is feeling okay but did have an assisted fall overnight, patient reports she just fell backward but RN reports patient was not responsive during fall - likely syncope from anemia, medications and position.  No injury reported/suspected.  1 unit of blood transfused this am.  She does not appear to be actively bleeding now but does have significant bruising in the right dependent areas.      Consultants/Specialty  [facility] - oncology - f/u OP  [facility] - XRT    Code Status  full    Disposition  SNF prior to dc home, pending further PT/OT evaluations, radiation therapy schedule    Review of Systems  Review of Systems   Constitutional: Positive for malaise/fatigue. Negative for chills and fever.   HENT: Negative for congestion and sore throat.    Eyes: Negative for blurred vision and photophobia.   Respiratory: Negative for cough and shortness of breath.    Cardiovascular: Negative for chest pain, claudication and leg swelling.   Gastrointestinal: Negative for abdominal pain, constipation, diarrhea, heartburn, nausea and vomiting.   Genitourinary: Negative for dysuria and hematuria.   Musculoskeletal: Positive for back pain (better with meds) and joint pain (right hip and groin - better post op.). Negative for myalgias.   Skin: Negative for itching and rash.   Neurological: Negative for dizziness, sensory change, speech change, weakness and headaches.   Psychiatric/Behavioral: Negative for depression. The patient is not nervous/anxious and does not have insomnia.         Physical Exam  Temp:  [36.2 °C (97.2 °F)-36.9 °C (98.5 °F)] 36.9 °C (98.5  °F)  Pulse:  [60-72] 69  Resp:  [18-20] 18  BP: (111-156)/(52-93) 144/66    Physical Exam   Constitutional: She is oriented to person, place, and time. She appears well-developed and well-nourished. No distress.   HENT:   Head: Normocephalic and atraumatic.   Eyes: Conjunctivae are normal. No scleral icterus.   Neck: Neck supple. No JVD present.   Cardiovascular: Normal rate, regular rhythm and normal heart sounds.  Exam reveals no gallop and no friction rub.    No murmur heard.  Pulmonary/Chest: Effort normal and breath sounds normal. No respiratory distress. She exhibits no tenderness.   Abdominal: Soft. Bowel sounds are normal. She exhibits no distension. There is no guarding.   Musculoskeletal: She exhibits no edema or tenderness.   Right hip dressing with dark dried blood, does not appear to be active bleeding.   Lymphadenopathy:     She has no cervical adenopathy.   Neurological: She is alert and oriented to person, place, and time. No cranial nerve deficit.   Skin: Skin is warm and dry. She is not diaphoretic. No erythema. No pallor.   Psychiatric: She has a normal mood and affect. Her behavior is normal.   Nursing note and vitals reviewed.      Fluids    Intake/Output Summary (Last 24 hours) at 01/02/19 1642  Last data filed at 01/02/19 0804   Gross per 24 hour   Intake          1348.33 ml   Output                0 ml   Net          1348.33 ml       Laboratory  Recent Labs      12/31/18   0917  01/01/19   0947  01/02/19   0024   WBC  13.6*  16.2*  11.9*   RBC  2.69*  2.15*  2.14*   HEMOGLOBIN  8.4*  6.8*  6.7*   HEMATOCRIT  25.2*  20.4*  20.1*   MCV  93.7  94.0  93.9   MCH  31.2  31.2  31.3   MCHC  33.3*  33.2*  33.3*   RDW  48.7  50.5*  50.9*   PLATELETCT  105*  116*  90*   MPV  10.9  11.0  10.4     Recent Labs      12/31/18   0010  01/01/19   0947  01/02/19   0024   SODIUM  138  139  140   POTASSIUM  4.6  4.6  4.5   CHLORIDE  108  109  111   CO2  26  24  25   GLUCOSE  152*  128*  116*   BUN  27*  27*  25*    CREATININE  0.93  0.97  0.96   CALCIUM  7.8*  7.7*  7.7*                   Imaging  DX-PORTABLE FLUORO > 1 HOUR   Final Result         Portable fluoroscopy utilized for 18 seconds.         IR-US GUIDED PIV   Final Result    Ultrasound-guided PERIPHERAL IV INSERTION performed by    qualified nursing staff as above.            NM-BONE SCAN WHOLE BODY   Final Result      Extensive bony metastatic disease as described on prior CT exams.      MR-BRAIN-WITH & W/O   Final Result      1.  No evidence of intracranial metastatic disease   2.  Calvarial and RIGHT C2 lesions highly suspicious for osseous metastatic disease   3.  Mild white matter changes   4.  Mild atrophy      DX-CHEST-PORTABLE (1 VIEW)   Final Result         1. No acute cardiopulmonary abnormalities are identified.      DX-HIP-UNILATERAL-W/O PELVIS-2/3 VIEWS RIGHT   Final Result      Digitized intraoperative radiograph is submitted for review.  This examination is not for diagnostic purpose but for guidance during a surgical procedure.      DX-HIP-BILATERAL-WITH PELVIS-3/4 VIEWS   Final Result      1.  There are lytic lesions seen involving the right inferior ilium and right inferior obturator ring but there is no plain film evidence of pathologic fracture.      CT-NEEDLE BX-DEEP BONE   Final Result      1.  Successful CT-guided core biopsy of expansile mass involving right L3 transverse process.      CT-ABDOMEN-PELVIS WITH   Final Result      1.  Multiple lytic expansile bone lesions involving the pelvis and thoracolumbar spine as described above. There are areas of cortical disruption and extension into the surrounding soft tissues involving multiple of these lesions. The largest involves    the right ilium and demonstrates some speckled calcification. Differential diagnosis includes metastatic disease as well as multiple myeloma.      2.  Destructive lesion involving the right proximal femur with some faint associated sclerosis. This is located within  the intertrochanteric region and extends to an area of focal cortical breakthrough/disruption at the base of the right femoral neck    superiorly.      3.  Fatty heterogeneous liver likely representing presence of hepatocellular dysfunction.      4.  Bibasilar atelectasis and small bilateral pleural effusions.      5.  Ill-defined pulmonary nodules within the right lung base which of previously been evaluated with CT scan.      6.  6 mm left adrenal apex nodule      CT-CHEST (THORAX) WITH   Final Result      1.  2 noncalcified nodules in the right upper lobe. The largest is spiculated measuring 11 mm in maximum diameter. Differential diagnosis includes primary or secondary lung carcinoma.      2.  3 mm noncalcified nodule in the right middle lobe.      3.  No thoracic adenopathy.      4.  Small bilateral pleural effusions, left greater than right.      5.  Heterogeneous liver which may represent primary or secondary carcinoma.            DX-BONE SURVEY-METASTATIC LTD   Final Result      1.  Lytic foci within the calvaria, right iliac bone, and right distal clavicle      2.  Multiple additional lytic foci identified on recent CT are not apparent on conventional radiography      3.  Different diagnosis includes metastatic malignancy or multiple myeloma      CT-LSPINE W/O PLUS RECONS   Final Result      Destructive lesions within the right pedicle and transverse process of L3, the right sacral wing, and right iliac crest consistent with metastatic disease or multiple myeloma.   Multilevel degenerative changes.   Moderate spinal stenosis at L4-5.      CT-HIP W/O PLUS RECONS RIGHT   Final Result         1.  Multiple lytic expansile lesions in the pelvis and spine as described.   2.  Hairline fracture of the acetabular roof on the right, likely pathologic fracture.   3.  Hairline lucency through the right femoral neck suggests hairline fracture           Assessment/Plan  * Intractable low back pain- (present on admission)    Assessment & Plan    Palliative following for pain management. Delirium improved with medication adjustments  Pain improved with medications and nailing of R femur.       Lytic bone lesion of hip- (present on admission)   Assessment & Plan    With hairline fracture of the right acetabular roof and femur neck.  Likely pathological.  BEATRICE with no increase in immunoglobulins.  Bone marrow biopsy 12/24 was negative for any abnormalities.  bone biopsy of L3 today 12/27 - adenocarcinoma favoring lung, pathology from IM nailing hip 12/29 pending.  CT scan of the chest showed 2 nodules in the right upper lobe with the largest one looks spiculated and measures around 11 mm.  MRI brain showed no intracranial metastasis but calvarial and C2 possible bony metastasis.  Bone scan showed extensive foci of activity in the spine, ribs, pelvis, calvarium, and femora.  Oncology will f/u outpatient with results of immunotherapy foundation testing.  Dr. Pantoja recommends simulation and treatment following stabilization of bleeding from hip - likely Thursday/friday.         Gout- (present on admission)   Assessment & Plan    -Continue allopurinol  -Uric acid normal       Acute blood loss anemia   Assessment & Plan    Continues to bleed from surgical incision, transfuse to keep hgb >7.0  Lovenox discontinued  Ortho aware - continue with pressure dressing.  1 unit transfused 1/2/19 - hgb 6.7  1 unit transfused 1/1/19 - hgb 6.8  1 unit transfused 12/31/18 - hgb 6.2       Lesion of right femur- (present on admission)   Assessment & Plan    Destructive metastatic bone lesion on the right femur.  Orthopedic surgery consulted.  Status post intramedullary nailing of the right femur on 12/29.   PT/OT.  WBAT.      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  MRI of the brain showed no intracranial metastasis but rather calvarial and C2 possible bone  metastasis.  Pending bone scan.  Oncology follow up after discharge from hospital.  Pending mutation and genetic testing on biopsy.       Leukocytosis- (present on admission)   Assessment & Plan    Multifactorial: Steroid induced, UTI, leukemoid reaction  Afebrile       Acute cystitis- (present on admission)   Assessment & Plan    Completed abx         CKD (chronic kidney disease) stage 3, GFR 30-59 ml/min (HCC)- (present on admission)   Assessment & Plan    Stable for now.  Avoid nephrotoxins.  Renal dose all medications.     Hyperglycemia- (present on admission)   Assessment & Plan    Suspecting steroids induced.  No history of diabetes.  Continue to monitor.     Metabolic encephalopathy- (present on admission)   Assessment & Plan    UA positive for UTI - completed 5 day course of abx.  Medication induced - meds adjusted and mentation improved         Elevated INR (international normalized ratio)- (present on admission)   Assessment & Plan    - not on anticoagulants as an outpt         Elevated BUN- (present on admission)   Assessment & Plan    -Rehydrating with IV fluid     Elevated alkaline phosphatase level- (present on admission)   Assessment & Plan    -Likely related to bone lesions     Coronary artery disease- (present on admission)   Assessment & Plan    Status post CABG.  Continue with aspirin and statin.     Thrombocytopenia (HCC)- (present on admission)   Assessment & Plan    Chronic.  Continue to monitor.  Discontinue chemical anticoagulation if platelets drop below 50k  Transfuse if platelets drops below 10 K          VTE prophylaxis: heparin

## 2019-01-03 NOTE — CARE PLAN
Problem: Safety  Goal: Will remain free from injury    Intervention: Provide assistance with mobility  Patient had a staff assisted fall on 1/2/19. Patient has all necessary items kept within reach, encouraged to use call light for all assistance and has been cooperative. Bed alarm in place and proper signs in place for patient. Cooperative with all care thus far, will continue with POC.      Problem: Infection  Goal: Will remain free from infection  Patient continues to be monitored for any s/sx of infection present. Dressing changed as appropriate throughout shift. VS and labs continued to be monitored, will continue with current POC.

## 2019-01-03 NOTE — DISCHARGE PLANNING
Rehab monitoring PMR referral. Per Dr. Shah consult 1/02/19 -     anticipate 10 fractions of radiotherapy given Monday through Friday to a total dose of 3000 cGy.  Currently we are planning on simulation tomorrow as long as her bleeding and hemoglobin are stable.    Radiation therapy is barrier to IRF. No Physiatry consult ordered per protocol at this time. TCC will follow.

## 2019-01-03 NOTE — PROGRESS NOTES
Patient had large amount of drainage noted again at surgical site to right hip. Serosanguineous drainage present. Patient had a new dressing applied to site with a new compression wrap. Tolerated well. Hemoglobin this AM is 7.5, and patient has no c/o dizziness present. MD is aware of the active bleeding to site. This Nurse did have CNA assist for balance concerns with patient due to patient needing to stand for dressing changes r/t compression. No c/o pain noted at this time. Fluids and call light within reach, will continue to monitor as appropriate throughout shift.

## 2019-01-03 NOTE — PROGRESS NOTES
Pt called appropriately to use restroom. 2 person assist, up to the commode, tolerated well. Small BM and void.  Bleeding through dressing on R hip, removed old dressing and applied a new one.  Middle incision actively bleeding once old dressing was removed, applied pressure w/ dry gauze. Layered with ABD dressing. Hypa-fix from old dressing difficult & painful to remove. Applied medially to only intact non-bruised skin. Hypa-fix tore through very fragile bruised skin on R buttox, causing 2 possibly 3 small 2-3 cm skin tears.   Cloth tape applied to rest of dressing to avoid further skin damage.  Traction applied SPICA dressing. Pt up & OOB for application, tolerated well. At this time new linens and chucks were changed.  Will continue to monitor for bleeding.

## 2019-01-03 NOTE — DISCHARGE PLANNING
Received Choice form at 1157 from Coleen (RNCM)  Agency/Facility Name: [facility]  Referral sent per Choice form @ 0189    Attached Radiation Schedule as of 1-3.

## 2019-01-03 NOTE — DISCHARGE PLANNING
Agency/Facility Name: [facility]  Spoke To: Dominic  Outcome: Pt accepted    Agency/Facility Name: [facility]  Spoke To: Regis  Outcome: referral still under review.     Florinda (RNCM) notified.

## 2019-01-03 NOTE — DISCHARGE PLANNING
Agency/Facility Name: [facility]  Spoke To: Linnette (admissions)  Outcome: Pt accepted. No beds until next week.     Florinda (RNCM) notified.

## 2019-01-04 NOTE — THERAPY
"Speech Language Therapy dysphagia treatment completed.   Functional Status:  Pt seen on this date for dysphagia treatment. Pt awake and agreeable to treatment and pt's family present at bedside. Pt currently on regular/thin liquid diet and per RN and pt, tolerating diet with no concerns of aspiration. Pt reporting that she is enjoying meals and has only had one meal where she's had difficulty chewing (pork) other than that, no difficulty. PO trials of thin liquids via consecutive swallows and solids were presented to pt and no overt s/sx of aspiration noted. Mastication and swallow trigger were timely and vocal quality remained clear. At this time, pt appears to be tolerating current diet so would recommend continuation on regular/thin liquids at this time. Dysphagia and role of SLP education provided to pt and pt's family and they verbalized understanding and all questions answered. SLP to follow x1 for dysphagia therapy.    Recommendations: continuation of regular/thin liquid diet  Plan of Care: Will benefit from Speech Therapy 3 times per week  Post-Acute Therapy: Recommend inpatient transitional care services for continued speech therapy services.        See \"Rehab Therapy-Acute\" Patient Summary Report for complete documentation.     "

## 2019-01-04 NOTE — PROGRESS NOTES
Report received from Day RN and assumed care at 1915. Patient is A&O x4, forgetful at times.  at bedside. Bed alarm in place. 2 assist to commode. Room air. Plan to have wound vac placed tomorrow morning (1/4).     Patient denies pain. Patient denies nausea.   Compression dressing with ACE to right hip; intact, small amount drainage noted, monitoring for bleeding. Bruising noted to right flank and right hip.     PIV intact and flushes with + blood return, saline locked.     POC discussed. All needs met at this time. Call light within reach. Bed locked, in lowest position. Hourly rounding in place.

## 2019-01-04 NOTE — DISCHARGE PLANNING
Agency/Facility Name: [facility]  Spoke To: Regis (admissions)  Outcome: Pt accepted.     Agency/Facility Name: [facility]  Spoke To: Trixie (admissions)  Outcome: Pt accepted.     Agency/Facility Name: [facility]  Outcome: Pt declined. Declined for wound vac care and Radiation treatments.       Agency/Facility Name: [facility]  Outcome: no answer. Left message to call back LAYNE Neely (RNCM) notified.

## 2019-01-04 NOTE — DISCHARGE PLANNING
Anticipated Discharge Disposition: SNF    Action: Met with pt's son Harry, discussed discharge POC.  Advised recommendations for discharge planning include SNF for rehab.  Notified of acceptance at [facility].    Harry concerned about pt's financial obligations r/t this hospital stay.  Discussed possible screening for Medicaid as Harry sts they only receive Social Security Benefits. Harry also requesting to s/w hospitalist for in regards to medical POC.        Barriers to Discharge: none    Plan: Pt accepted at [facility]. Harry to provide needed information r/t monthly income for possible Medicaid screening.

## 2019-01-04 NOTE — DISCHARGE PLANNING
Anticipated Discharge Disposition: SNF    Action: S/w pt's son Harry.  Pt and family would like [facility].  Notified [facility] of this.     Barriers to Discharge: none    Plan: Pt accepted at [facility].  Anticipated discharge to this facility once medically cleared.

## 2019-01-04 NOTE — PROGRESS NOTES
"   Orthopaedic Progress Note    Interval changes:  Patient doing well  Incisional wound vac to be placed    ROS - Patient denies any new issues.  Pain well controlled.    Blood pressure 156/81, pulse 80, temperature 36.7 °C (98 °F), temperature source Temporal, resp. rate 14, height 1.549 m (5' 1\"), weight 58.1 kg (128 lb 1.4 oz), last menstrual period 07/06/2011, SpO2 92 %, not currently breastfeeding.      LLE dressing with min sanguinous exudate from middle incision. Patient clearly fires tibialis anterior, EHL, and gastrocnemius/soleus. Sensation is intact to light touch throughout superficial peroneal, deep peroneal, tibial, saphenous, and sural nerve distributions. Strong and palpable 2+ dorsalis pedis and posterior tibial pulses with capillary refill less than 2 seconds. No lower leg tenderness or discomfort.     Recent Labs      01/01/19   0947  01/02/19   0024  01/03/19   0024   WBC  16.2*  11.9*  12.2*   RBC  2.15*  2.14*  2.43*   HEMOGLOBIN  6.8*  6.7*  7.5*   HEMATOCRIT  20.4*  20.1*  22.0*   MCV  94.0  93.9  90.5   MCH  31.2  31.3  30.9   MCHC  33.2*  33.3*  34.1   RDW  50.5*  50.9*  55.7*   PLATELETCT  116*  90*  93*   MPV  11.0  10.4  10.6       Active Hospital Problems    Diagnosis   • Lytic bone lesion of hip [M89.8X5]     Priority: High   • Gout [M10.9]     Priority: Medium   • Thrombocytopenia (HCC) [D69.6]     Priority: Low     8/12/09 plt 195  3/28/13 plt 152  11/7/16 plt 115  5/12/17 plt 155  9/25/17 plt 139     • Acute blood loss anemia [D62]   • Leukocytosis [D72.829]   • Lung cancer (HCC) [C34.90]   • Lesion of right femur [M89.9]   • DNR (do not resuscitate) discussion [Z71.89]   • Acute cystitis [N30.00]   • Metabolic encephalopathy [G93.41]   • Hyperglycemia [R73.9]   • CKD (chronic kidney disease) stage 3, GFR 30-59 ml/min (HCC) [N18.3]   • Elevated alkaline phosphatase level [R74.8]   • Elevated BUN [R79.9]   • Elevated INR (international normalized ratio) [R79.1]   • Intractable low " back pain [M54.5]     4/20/18 MRI lumbar spine mild to moderate central canal stenosis and moderate right-sided neuroforaminal narrowing L3-L4     • Coronary artery disease [I25.10]       Assessment/Plan:  Pending incisional vac placement  Aspirin stopped   POD#5 S/P Intramedullary nailing, right hip  Wt bearing status - WBAT  Wound care/Drains - pending   Future Procedures - none planned   Sutures/Staples out- 10-14 days post operatively  PT/OT-initiated  Antibiotics: completed  DVT Prophylaxis- TEDS/SCDs/Foot pumps  Hatch-none  Case Coordination for Discharge Planning - Disposition home

## 2019-01-04 NOTE — CARE PLAN
Problem: Safety  Goal: Will remain free from falls  Bed locked in lowest position. Call light within reach. Assistance of 2 to commode, shuffle gait with FWW. Bed alarm in place.  Hourly rounding in place.     Problem: Bowel/Gastric:  Goal: Normal bowel function is maintained or improved  LBM 1/4.

## 2019-01-04 NOTE — PROGRESS NOTES
[facility] Medicine Daily Progress Note    Date of Service  1/3/2019    Chief Complaint  83 y.o. female admitted 12/21/2018 with worsening back pain to the point of intractable few days prior to admission, had been seen by pcp, pain management, ortho without significant relief other than temporary improvement.    Hospital Course    Patient had scans showing lytic lesions in Right hip and lower back.  Biopsy ordered on admission but will not be done until after [place] d/t weekend and holiday.  Bone marrow biopsy completed and normal  SPEP and UPEP collected and normal.  L3 lesion biopsied and adenocarcinoma favoring lung origin.  IM nailing of impending pathologic fracture R hip 12/29.  Patient seen by oncology and awaiting foundation testing to determine course of treatment.  Pending radiation oncology consultation.      Interval Problem Update  12/22 Patient with pain, responding well to narcotic though is in extreme pain between doses.  I increased her steroids and gabapentin for better overall pain control, continue with narcotic medication.  Skeletal survey ordered to r/o impending fracture - long bones are okay but she does have lytic lesion in right pelvis which is likely the source of the majority of her pain.  12/23 Patient states she has no pain when still but still having significant pain when moving - responding appropriate to pain medications received - sedating at times.  Electrophoresis results should be back tomorrow.  Bone marrow biopsy pending Wednesday - is scheduled.  12/24 Patient seen after bone marrow biopsy earlier today, histology department was able to coordinate earlier.  SPEP and UPEP still pending at this time.  Patient slightly confused and speaking in gibberish following sedation for BM but resolving with time.  She has improved pain control with steroids and is tolerating higher dose gabapentin well.  Oncology consult pending labs and bone marrow results.   and son at bedside  when evaluated - explained current treatment and anticipated turnaround for test results.  1/1/19 Patient states she is feeling much better today.  Per RN the confusion she had last few days has significantly improved.  She is up to chair when evaluated and course of anticipated treatment outlined for patient and family.  Pending XRT evaluation tomorrow.  1/2/19 Patient states she is feeling okay but did have an assisted fall overnight, patient reports she just fell backward but RN reports patient was not responsive during fall - likely syncope from anemia, medications and position.  No injury reported/suspected.  1 unit of blood transfused this am.  She does not appear to be actively bleeding now but does have significant bruising in the right dependent areas.    1/3/19 Patient feeling better and maintaining hgb following last transfusion and stopping of lovenox. She is still having serosang drainage and ortho had requested wound incision vac to be placed.  She had simulation for radiation done today and will have her first treatment on Monday - 1/7/19.  I want to make sure she doesn't have breakdown related to her wound and that she no longer has bleeding, likely she will be medically cleared to transfer on Monday to SNF and can continue with daily radiation through the 10 fractions planned.    Consultants/Specialty  [facility] - oncology - f/u OP  [facility] - XRT    Code Status  full    Disposition  SNF likely Monday  Daily m-f radiation starting 1/7/19.    Review of Systems  Review of Systems   Constitutional: Positive for malaise/fatigue (better). Negative for chills and fever.   HENT: Negative for congestion and sore throat.    Eyes: Negative for blurred vision and photophobia.   Respiratory: Negative for cough and shortness of breath.    Cardiovascular: Negative for chest pain, claudication and leg swelling.   Gastrointestinal: Negative for abdominal pain, constipation, diarrhea, heartburn, nausea and vomiting.    Genitourinary: Negative for dysuria and hematuria.   Musculoskeletal: Positive for back pain (better with meds) and joint pain (right hip and groin - better post op.). Negative for myalgias.   Skin: Negative for itching and rash.   Neurological: Negative for dizziness, sensory change, speech change, weakness and headaches.   Psychiatric/Behavioral: Negative for depression. The patient is not nervous/anxious and does not have insomnia.         Physical Exam  Temp:  [36.4 °C (97.6 °F)-36.7 °C (98 °F)] 36.7 °C (98 °F)  Pulse:  [69-81] 70  Resp:  [16-18] 16  BP: (117-146)/(67-82) 128/71    Physical Exam   Constitutional: She is oriented to person, place, and time. She appears well-developed and well-nourished. No distress.   HENT:   Head: Normocephalic and atraumatic.   Eyes: Conjunctivae are normal. No scleral icterus.   Neck: Neck supple. No JVD present.   Cardiovascular: Normal rate, regular rhythm and normal heart sounds.  Exam reveals no gallop and no friction rub.    No murmur heard.  Pulmonary/Chest: Effort normal and breath sounds normal. No respiratory distress. She exhibits no tenderness.   Abdominal: Soft. Bowel sounds are normal. She exhibits no distension. There is no guarding.   Musculoskeletal: She exhibits no edema or tenderness.   Right hip dressing with incision vac.     Lymphadenopathy:     She has no cervical adenopathy.   Neurological: She is alert and oriented to person, place, and time. No cranial nerve deficit.   Skin: Skin is warm and dry. She is not diaphoretic. No erythema. No pallor.   Psychiatric: She has a normal mood and affect. Her behavior is normal.   Nursing note and vitals reviewed.      Fluids    Intake/Output Summary (Last 24 hours) at 01/03/19 1626  Last data filed at 01/03/19 0626   Gross per 24 hour   Intake              400 ml   Output                1 ml   Net              399 ml       Laboratory  Recent Labs      01/01/19   0947  01/02/19   0024  01/03/19   0024   WBC  16.2*   11.9*  12.2*   RBC  2.15*  2.14*  2.43*   HEMOGLOBIN  6.8*  6.7*  7.5*   HEMATOCRIT  20.4*  20.1*  22.0*   MCV  94.0  93.9  90.5   MCH  31.2  31.3  30.9   MCHC  33.2*  33.3*  34.1   RDW  50.5*  50.9*  55.7*   PLATELETCT  116*  90*  93*   MPV  11.0  10.4  10.6     Recent Labs      01/01/19   0947  01/02/19   0024   SODIUM  139  140   POTASSIUM  4.6  4.5   CHLORIDE  109  111   CO2  24  25   GLUCOSE  128*  116*   BUN  27*  25*   CREATININE  0.97  0.96   CALCIUM  7.7*  7.7*                   Imaging  DX-PORTABLE FLUORO > 1 HOUR   Final Result         Portable fluoroscopy utilized for 18 seconds.         IR-US GUIDED PIV   Final Result    Ultrasound-guided PERIPHERAL IV INSERTION performed by    qualified nursing staff as above.            NM-BONE SCAN WHOLE BODY   Final Result      Extensive bony metastatic disease as described on prior CT exams.      MR-BRAIN-WITH & W/O   Final Result      1.  No evidence of intracranial metastatic disease   2.  Calvarial and RIGHT C2 lesions highly suspicious for osseous metastatic disease   3.  Mild white matter changes   4.  Mild atrophy      DX-CHEST-PORTABLE (1 VIEW)   Final Result         1. No acute cardiopulmonary abnormalities are identified.      DX-HIP-UNILATERAL-W/O PELVIS-2/3 VIEWS RIGHT   Final Result      Digitized intraoperative radiograph is submitted for review.  This examination is not for diagnostic purpose but for guidance during a surgical procedure.      DX-HIP-BILATERAL-WITH PELVIS-3/4 VIEWS   Final Result      1.  There are lytic lesions seen involving the right inferior ilium and right inferior obturator ring but there is no plain film evidence of pathologic fracture.      CT-NEEDLE BX-DEEP BONE   Final Result      1.  Successful CT-guided core biopsy of expansile mass involving right L3 transverse process.      CT-ABDOMEN-PELVIS WITH   Final Result      1.  Multiple lytic expansile bone lesions involving the pelvis and thoracolumbar spine as described above.  There are areas of cortical disruption and extension into the surrounding soft tissues involving multiple of these lesions. The largest involves    the right ilium and demonstrates some speckled calcification. Differential diagnosis includes metastatic disease as well as multiple myeloma.      2.  Destructive lesion involving the right proximal femur with some faint associated sclerosis. This is located within the intertrochanteric region and extends to an area of focal cortical breakthrough/disruption at the base of the right femoral neck    superiorly.      3.  Fatty heterogeneous liver likely representing presence of hepatocellular dysfunction.      4.  Bibasilar atelectasis and small bilateral pleural effusions.      5.  Ill-defined pulmonary nodules within the right lung base which of previously been evaluated with CT scan.      6.  6 mm left adrenal apex nodule      CT-CHEST (THORAX) WITH   Final Result      1.  2 noncalcified nodules in the right upper lobe. The largest is spiculated measuring 11 mm in maximum diameter. Differential diagnosis includes primary or secondary lung carcinoma.      2.  3 mm noncalcified nodule in the right middle lobe.      3.  No thoracic adenopathy.      4.  Small bilateral pleural effusions, left greater than right.      5.  Heterogeneous liver which may represent primary or secondary carcinoma.            DX-BONE SURVEY-METASTATIC LTD   Final Result      1.  Lytic foci within the calvaria, right iliac bone, and right distal clavicle      2.  Multiple additional lytic foci identified on recent CT are not apparent on conventional radiography      3.  Different diagnosis includes metastatic malignancy or multiple myeloma      CT-LSPINE W/O PLUS RECONS   Final Result      Destructive lesions within the right pedicle and transverse process of L3, the right sacral wing, and right iliac crest consistent with metastatic disease or multiple myeloma.   Multilevel degenerative changes.    Moderate spinal stenosis at L4-5.      CT-HIP W/O PLUS RECONS RIGHT   Final Result         1.  Multiple lytic expansile lesions in the pelvis and spine as described.   2.  Hairline fracture of the acetabular roof on the right, likely pathologic fracture.   3.  Hairline lucency through the right femoral neck suggests hairline fracture           Assessment/Plan  * Intractable low back pain- (present on admission)   Assessment & Plan    Palliative following for pain management. Delirium improved with medication adjustments  Pain improved with medications and nailing of R femur.       Lytic bone lesion of hip- (present on admission)   Assessment & Plan    With hairline fracture of the right acetabular roof and femur neck.  Likely pathological.  BEATRICE with no increase in immunoglobulins.  Bone marrow biopsy 12/24 was negative for any abnormalities.  bone biopsy of L3 today 12/27 - adenocarcinoma favoring lung, pathology from IM nailing hip 12/29 pending.  CT scan of the chest showed 2 nodules in the right upper lobe with the largest one looks spiculated and measures around 11 mm.  MRI brain showed no intracranial metastasis but calvarial and C2 possible bony metastasis.  Bone scan showed extensive foci of activity in the spine, ribs, pelvis, calvarium, and femora.  Oncology will f/u outpatient with results of immunotherapy foundation testing.  Dr. Pantoja recommends simulation (done 1/3) and treatment starting 1/7 for 10 fractions.         Gout- (present on admission)   Assessment & Plan    -Continue allopurinol  -Uric acid normal       Acute blood loss anemia   Assessment & Plan    Continues to bleed from surgical incision, transfuse to keep hgb >7.0  Lovenox discontinued  Ortho aware - continue with pressure dressing.  hgb 7.5 - 1/3/19  1 unit transfused 1/2/19 - hgb 6.7  1 unit transfused 1/1/19 - hgb 6.8  1 unit transfused 12/31/18 - hgb 6.2       Lesion of right femur- (present on admission)   Assessment & Plan     Destructive metastatic bone lesion on the right femur.  Orthopedic surgery consulted.  Status post intramedullary nailing of the right femur on 12/29.   PT/OT.  WBAT.      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  MRI of the brain showed no intracranial metastasis but rather calvarial and C2 possible bone metastasis.  Pending bone scan.  Oncology follow up after discharge from hospital.  Pending mutation and genetic testing on biopsy.       Leukocytosis- (present on admission)   Assessment & Plan    Multifactorial: Steroid induced, UTI, leukemoid reaction  Afebrile       Acute cystitis- (present on admission)   Assessment & Plan    Completed abx         CKD (chronic kidney disease) stage 3, GFR 30-59 ml/min ([facility])- (present on admission)   Assessment & Plan    Stable for now.  Avoid nephrotoxins.  Renal dose all medications.     Hyperglycemia- (present on admission)   Assessment & Plan    Suspecting steroids induced.  No history of diabetes.  Continue to monitor.     Metabolic encephalopathy- (present on admission)   Assessment & Plan    UA positive for UTI - completed 5 day course of abx.  Medication induced - meds adjusted and mentation improved         Elevated INR (international normalized ratio)- (present on admission)   Assessment & Plan    - not on anticoagulants as an outpt         Elevated BUN- (present on admission)   Assessment & Plan    -Rehydrating with IV fluid     Elevated alkaline phosphatase level- (present on admission)   Assessment & Plan    -Likely related to bone lesions     Coronary artery disease- (present on admission)   Assessment & Plan    Status post CABG.  Continue with aspirin and statin.     Thrombocytopenia (HCC)- (present on admission)   Assessment & Plan    Chronic.  Continue to monitor.  Discontinue chemical anticoagulation if platelets drop below 50k  Transfuse if platelets drops below 10 K           VTE prophylaxis: heparin

## 2019-01-04 NOTE — DISCHARGE PLANNING
Agency/Facility Name: [facility]  Spoke To: Regis (admissions)  Outcome: Updated Regis that Pt has choosen [facility]. Regis said no beds will be available until next week. Reigs asked CCA to call her back on Monday.     Sis (RNCM) notified.

## 2019-01-04 NOTE — DISCHARGE PLANNING
Agency[facility]ility Name: [facility]  Spoke To: Juvencio (admissions)  Outcome: PT declined. unable to accomidate radiation schedule.       Florinda (RNCM) notified.

## 2019-01-04 NOTE — DISCHARGE PLANNING
Anticipated Discharge Disposition: SNF    Action: Notified of acceptance at [facility], and [facility].    Met with pt at bedside.  Provided above information. Pt would like to discuss with her  and sons.  Family will be visiting tomorrow, and they will make a decision at that time.     Barriers to Discharge: none    Plan: Pt and family to provide choice r/t SNF tomorrow and notify oncoming LSW/CCA of this.  Anticipated discharge on Monday 1/7/19.

## 2019-01-04 NOTE — PROGRESS NOTES
"Pt A&Ox4. VS: /66   Pulse 82   Temp 36.7 °C (98 °F) (Oral)   Resp 17   Ht 1.549 m (5' 1\")   Wt 58.1 kg (128 lb 1.4 oz)   LMP 07/06/2011   SpO2 96%   Breastfeeding? No   BMI 24.20 kg/m² . Pt denies n/v, numbness, tingling, SOB and chest pain. Pt states pain with movement, otherwise no pain. Wound nurse to place wound vac today. PIV patent but with some difficulty flushing, will try to reassess again. Pt needs met at this time, call light within reach, hourly rounding in effect, and will continue to monitor.       "

## 2019-01-04 NOTE — WOUND TEAM
Wound team to see patient for placement of Prevena to right hip incisions per order. Gauze and hypafix tape removed, incisions and right thigh cleansed with normal saline and gauze. No sting skin prep and benzoin to entire right thigh and amrita incisions (3 separate incision sites across thigh). Prevena plus incisional vac applied, NPWT started at 125 mm Hg continuous, no leaks noted. Patient, spouse and staff LEIF Pizarro educated on use of Prevena. Educational booklet and extra drape left at bedside.

## 2019-01-04 NOTE — CARE PLAN
Problem: Safety  Goal: Will remain free from injury  Hourly rounding in effect, pt instructed to call for assistance, bed locked and in lowest position. Bed alarm on. Pt calls appropriately for assistance.       Problem: Knowledge Deficit  Goal: Knowledge of disease process/condition, treatment plan, diagnostic tests, and medications will improve  Pt and  updated and educated on nursing interventions, medications and POC

## 2019-01-04 NOTE — PROGRESS NOTES
Hgb 6.8, patient asymptomatic; old drainage noted on dressing to right hip incision.    MD mercer.    MD Cuellar returned page. No new orders obtained. Will continue to monitor.

## 2019-01-05 NOTE — PROGRESS NOTES
"   Orthopaedic Progress Note    Interval changes:  Patient doing well  Vac in place with no leak    ROS - Patient denies any new issues.  Pain well controlled.    Blood pressure 145/82, pulse 74, temperature 36.4 °C (97.6 °F), temperature source Oral, resp. rate 18, height 1.549 m (5' 1\"), weight 58.1 kg (128 lb 1.4 oz), last menstrual period 07/06/2011, SpO2 94 %, not currently breastfeeding.      LLE vac dressing in place with no leak. Patient clearly fires tibialis anterior, EHL, and gastrocnemius/soleus. Sensation is intact to light touch throughout superficial peroneal, deep peroneal, tibial, saphenous, and sural nerve distributions. Strong and palpable 2+ dorsalis pedis and posterior tibial pulses with capillary refill less than 2 seconds. No lower leg tenderness or discomfort.     Recent Labs      01/02/19   0024  01/03/19   0024  01/04/19   0015   WBC  11.9*  12.2*  10.7   RBC  2.14*  2.43*  2.20*   HEMOGLOBIN  6.7*  7.5*  6.8*   HEMATOCRIT  20.1*  22.0*  20.0*   MCV  93.9  90.5  90.9   MCH  31.3  30.9  30.9   MCHC  33.3*  34.1  34.0   RDW  50.9*  55.7*  51.1*   PLATELETCT  90*  93*  95*   MPV  10.4  10.6  10.6       Active Hospital Problems    Diagnosis   • Lytic bone lesion of hip [M89.8X5]     Priority: High   • Gout [M10.9]     Priority: Medium   • Thrombocytopenia (HCC) [D69.6]     Priority: Low     8/12/09 plt 195  3/28/13 plt 152  11/7/16 plt 115  5/12/17 plt 155  9/25/17 plt 139     • Acute blood loss anemia [D62]   • Leukocytosis [D72.829]   • Lung cancer (HCC) [C34.90]   • Lesion of right femur [M89.9]   • DNR (do not resuscitate) discussion [Z71.89]   • Acute cystitis [N30.00]   • Metabolic encephalopathy [G93.41]   • Hyperglycemia [R73.9]   • CKD (chronic kidney disease) stage 3, GFR 30-59 ml/min (HCC) [N18.3]   • Elevated alkaline phosphatase level [R74.8]   • Elevated BUN [R79.9]   • Elevated INR (international normalized ratio) [R79.1]   • Intractable low back pain [M54.5]     4/20/18 MRI " lumbar spine mild to moderate central canal stenosis and moderate right-sided neuroforaminal narrowing L3-L4     • Coronary artery disease [I25.10]       Assessment/Plan:  Vac in place   POD#6 S/P Intramedullary nailing, right hip  Wt bearing status - WBAT  Wound care/Drains - vac  Future Procedures - none planned   Sutures/Staples out- 10-14 days post operatively  PT/OT-initiated  Antibiotics: completed  DVT Prophylaxis- TEDS/SCDs/Foot pumps  Hatch-none  Case Coordination for Discharge Planning - Disposition home

## 2019-01-05 NOTE — PROGRESS NOTES
Pt continued to leak from IV without fluids infusing. PIV d/c-ed by CNA. Pt refusing for this RN to start an IV and wants an ultrasound guided, since the PIV removed was ultrasound guided. Rapid nurse notified and stated to call back in a little bit.

## 2019-01-05 NOTE — PROGRESS NOTES
[facility] Medicine Daily Progress Note    Date of Service  1/4/2019    Chief Complaint  83 y.o. female admitted 12/21/2018 with worsening back pain to the point of intractable few days prior to admission, had been seen by pcp, pain management, ortho without significant relief other than temporary improvement.    Hospital Course    Patient had scans showing lytic lesions in Right hip and lower back.  Biopsy ordered on admission but will not be done until after Calvin d/t weekend and holiday.  Bone marrow biopsy completed and normal  SPEP and UPEP collected and normal.  L3 lesion biopsied and adenocarcinoma favoring lung origin.  IM nailing of impending pathologic fracture R hip 12/29.  Patient seen by oncology and awaiting foundation testing to determine course of treatment.  Pending radiation oncology consultation.      Interval Problem Update  12/22 Patient with pain, responding well to narcotic though is in extreme pain between doses.  I increased her steroids and gabapentin for better overall pain control, continue with narcotic medication.  Skeletal survey ordered to r/o impending fracture - long bones are okay but she does have lytic lesion in right pelvis which is likely the source of the majority of her pain.  12/23 Patient states she has no pain when still but still having significant pain when moving - responding appropriate to pain medications received - sedating at times.  Electrophoresis results should be back tomorrow.  Bone marrow biopsy pending Wednesday - is scheduled.  12/24 Patient seen after bone marrow biopsy earlier today, histology department was able to coordinate earlier.  SPEP and UPEP still pending at this time.  Patient slightly confused and speaking in gibberish following sedation for BM but resolving with time.  She has improved pain control with steroids and is tolerating higher dose gabapentin well.  Oncology consult pending labs and bone marrow results.   and son at bedside  when evaluated - explained current treatment and anticipated turnaround for test results.  1/1/19 Patient states she is feeling much better today.  Per RN the confusion she had last few days has significantly improved.  She is up to chair when evaluated and course of anticipated treatment outlined for patient and family.  Pending XRT evaluation tomorrow.  1/2/19 Patient states she is feeling okay but did have an assisted fall overnight, patient reports she just fell backward but RN reports patient was not responsive during fall - likely syncope from anemia, medications and position.  No injury reported/suspected.  1 unit of blood transfused this am.  She does not appear to be actively bleeding now but does have significant bruising in the right dependent areas.    1/3/19 Patient feeling better and maintaining hgb following last transfusion and stopping of lovenox. She is still having serosang drainage and ortho had requested wound incision vac to be placed.  She had simulation for radiation done today and will have her first treatment on Monday - 1/7/19.  I want to make sure she doesn't have breakdown related to her wound and that she no longer has bleeding, likely she will be medically cleared to transfer on Monday to SNF and can continue with daily radiation through the 10 fractions planned.  1/4/19 Patient states she feels okay, blood count dropped again, transfusion given.  Wound incision vac applied today.    Consultants/Specialty  [facility] - oncology - f/u OP  [facility] - XRT    Code Status  full    Disposition  [facility] likely Monday  Daily m-f radiation starting 1/7/19.    Review of Systems  Review of Systems   Constitutional: Positive for malaise/fatigue (better). Negative for chills and fever.   HENT: Negative for congestion and sore throat.    Eyes: Negative for blurred vision and photophobia.   Respiratory: Negative for cough and shortness of breath.    Cardiovascular: Negative for chest pain, claudication and  leg swelling.   Gastrointestinal: Negative for abdominal pain, constipation, diarrhea, heartburn, nausea and vomiting.   Genitourinary: Negative for dysuria and hematuria.   Musculoskeletal: Positive for back pain (better) and joint pain (right hip and groin - better post op.). Negative for myalgias.   Skin: Negative for itching and rash.   Neurological: Negative for dizziness, sensory change, speech change, weakness and headaches.   Psychiatric/Behavioral: Negative for depression. The patient is not nervous/anxious and does not have insomnia.         Physical Exam  Temp:  [36.4 °C (97.6 °F)-36.7 °C (98.1 °F)] 36.4 °C (97.6 °F)  Pulse:  [72-82] 74  Resp:  [17-18] 18  BP: (120-147)/(61-82) 145/82    Physical Exam   Constitutional: She is oriented to person, place, and time. She appears well-developed and well-nourished. No distress.   HENT:   Head: Normocephalic and atraumatic.   Eyes: Conjunctivae are normal. No scleral icterus.   Neck: Neck supple. No JVD present.   Cardiovascular: Normal rate, regular rhythm and normal heart sounds.  Exam reveals no gallop and no friction rub.    No murmur heard.  Pulmonary/Chest: Effort normal and breath sounds normal. No respiratory distress. She exhibits no tenderness.   Abdominal: Soft. Bowel sounds are normal. She exhibits no distension. There is no guarding.   Musculoskeletal: She exhibits no edema or tenderness.   Right hip dressing with incision vac.     Lymphadenopathy:     She has no cervical adenopathy.   Neurological: She is alert and oriented to person, place, and time. No cranial nerve deficit.   Skin: Skin is warm and dry. She is not diaphoretic. No erythema. No pallor.   Psychiatric: She has a normal mood and affect. Her behavior is normal.   Nursing note and vitals reviewed.      Fluids    Intake/Output Summary (Last 24 hours) at 01/04/19 1855  Last data filed at 01/04/19 1600   Gross per 24 hour   Intake              350 ml   Output              600 ml   Net              -250 ml       Laboratory  Recent Labs      01/02/19   0024  01/03/19   0024  01/04/19   0015   WBC  11.9*  12.2*  10.7   RBC  2.14*  2.43*  2.20*   HEMOGLOBIN  6.7*  7.5*  6.8*   HEMATOCRIT  20.1*  22.0*  20.0*   MCV  93.9  90.5  90.9   MCH  31.3  30.9  30.9   MCHC  33.3*  34.1  34.0   RDW  50.9*  55.7*  51.1*   PLATELETCT  90*  93*  95*   MPV  10.4  10.6  10.6     Recent Labs      01/02/19   0024   SODIUM  140   POTASSIUM  4.5   CHLORIDE  111   CO2  25   GLUCOSE  116*   BUN  25*   CREATININE  0.96   CALCIUM  7.7*                   Imaging  DX-PORTABLE FLUORO > 1 HOUR   Final Result         Portable fluoroscopy utilized for 18 seconds.         IR-US GUIDED PIV   Final Result    Ultrasound-guided PERIPHERAL IV INSERTION performed by    qualified nursing staff as above.            NM-BONE SCAN WHOLE BODY   Final Result      Extensive bony metastatic disease as described on prior CT exams.      MR-BRAIN-WITH & W/O   Final Result      1.  No evidence of intracranial metastatic disease   2.  Calvarial and RIGHT C2 lesions highly suspicious for osseous metastatic disease   3.  Mild white matter changes   4.  Mild atrophy      DX-CHEST-PORTABLE (1 VIEW)   Final Result         1. No acute cardiopulmonary abnormalities are identified.      DX-HIP-UNILATERAL-W/O PELVIS-2/3 VIEWS RIGHT   Final Result      Digitized intraoperative radiograph is submitted for review.  This examination is not for diagnostic purpose but for guidance during a surgical procedure.      DX-HIP-BILATERAL-WITH PELVIS-3/4 VIEWS   Final Result      1.  There are lytic lesions seen involving the right inferior ilium and right inferior obturator ring but there is no plain film evidence of pathologic fracture.      CT-NEEDLE BX-DEEP BONE   Final Result      1.  Successful CT-guided core biopsy of expansile mass involving right L3 transverse process.      CT-ABDOMEN-PELVIS WITH   Final Result      1.  Multiple lytic expansile bone lesions involving the  pelvis and thoracolumbar spine as described above. There are areas of cortical disruption and extension into the surrounding soft tissues involving multiple of these lesions. The largest involves    the right ilium and demonstrates some speckled calcification. Differential diagnosis includes metastatic disease as well as multiple myeloma.      2.  Destructive lesion involving the right proximal femur with some faint associated sclerosis. This is located within the intertrochanteric region and extends to an area of focal cortical breakthrough/disruption at the base of the right femoral neck    superiorly.      3.  Fatty heterogeneous liver likely representing presence of hepatocellular dysfunction.      4.  Bibasilar atelectasis and small bilateral pleural effusions.      5.  Ill-defined pulmonary nodules within the right lung base which of previously been evaluated with CT scan.      6.  6 mm left adrenal apex nodule      CT-CHEST (THORAX) WITH   Final Result      1.  2 noncalcified nodules in the right upper lobe. The largest is spiculated measuring 11 mm in maximum diameter. Differential diagnosis includes primary or secondary lung carcinoma.      2.  3 mm noncalcified nodule in the right middle lobe.      3.  No thoracic adenopathy.      4.  Small bilateral pleural effusions, left greater than right.      5.  Heterogeneous liver which may represent primary or secondary carcinoma.            DX-BONE SURVEY-METASTATIC LTD   Final Result      1.  Lytic foci within the calvaria, right iliac bone, and right distal clavicle      2.  Multiple additional lytic foci identified on recent CT are not apparent on conventional radiography      3.  Different diagnosis includes metastatic malignancy or multiple myeloma      CT-LSPINE W/O PLUS RECONS   Final Result      Destructive lesions within the right pedicle and transverse process of L3, the right sacral wing, and right iliac crest consistent with metastatic disease or  multiple myeloma.   Multilevel degenerative changes.   Moderate spinal stenosis at L4-5.      CT-HIP W/O PLUS RECONS RIGHT   Final Result         1.  Multiple lytic expansile lesions in the pelvis and spine as described.   2.  Hairline fracture of the acetabular roof on the right, likely pathologic fracture.   3.  Hairline lucency through the right femoral neck suggests hairline fracture           Assessment/Plan  * Intractable low back pain- (present on admission)   Assessment & Plan    Palliative following for pain management. Delirium improved with medication adjustments  Pain improved with medications and nailing of R femur.       Lytic bone lesion of hip- (present on admission)   Assessment & Plan    With hairline fracture of the right acetabular roof and femur neck.  Likely pathological.  BEATRICE with no increase in immunoglobulins.  Bone marrow biopsy 12/24 was negative for any abnormalities.  bone biopsy of L3 12/27 - adenocarcinoma favoring lung, pathology from IM nailing hip 12/29 pending.  CT scan of the chest showed 2 nodules in the right upper lobe with the largest one looks spiculated and measures around 11 mm.  MRI brain showed no intracranial metastasis but calvarial and C2 possible bony metastasis.  Bone scan showed extensive foci of activity in the spine, ribs, pelvis, calvarium, and femora.  Oncology will f/u outpatient with results of immunotherapy foundation testing.  Dr. Pantoja recommends simulation (done 1/3) and treatment starting 1/7 for 10 fractions.         Gout- (present on admission)   Assessment & Plan    -Continue allopurinol  -Uric acid normal       Acute blood loss anemia   Assessment & Plan    Continues to bleed from surgical incision, transfuse to keep hgb >7.0  Lovenox discontinued  Ortho aware - continue with pressure dressing.  1 unit transfused 1/4/19 - hgb 6.8  hgb 7.5 - 1/3/19  1 unit transfused 1/2/19 - hgb 6.7  1 unit transfused 1/1/19 - hgb 6.8  1 unit transfused 12/31/18 -  hgb 6.2       Lesion of right femur- (present on admission)   Assessment & Plan    Destructive metastatic bone lesion on the right femur.  Orthopedic surgery consulted.  Status post intramedullary nailing of the right femur on 12/29.   PT/OT.  WBAT.      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  MRI of the brain showed no intracranial metastasis but rather calvarial and C2 possible bone metastasis.  Pending bone scan.  Oncology follow up after discharge from hospital.  Pending mutation and genetic testing on biopsy.       Leukocytosis- (present on admission)   Assessment & Plan    Multifactorial: Steroid induced, UTI, leukemoid reaction  Afebrile       Acute cystitis- (present on admission)   Assessment & Plan    Completed abx         CKD (chronic kidney disease) stage 3, GFR 30-59 ml/min ([facility])- (present on admission)   Assessment & Plan    Stable for now.  Avoid nephrotoxins.  Renal dose all medications.     Hyperglycemia- (present on admission)   Assessment & Plan    Suspecting steroids induced.  No history of diabetes.  Continue to monitor.     Metabolic encephalopathy- (present on admission)   Assessment & Plan    UA positive for UTI - completed 5 day course of abx.  Medication induced - meds adjusted and mentation improved         Elevated INR (international normalized ratio)- (present on admission)   Assessment & Plan    - not on anticoagulants as an outpt         Elevated BUN- (present on admission)   Assessment & Plan    -Rehydrating with IV fluid     Elevated alkaline phosphatase level- (present on admission)   Assessment & Plan    -Likely related to bone lesions     Coronary artery disease- (present on admission)   Assessment & Plan    Status post CABG.  Continue with aspirin and statin.     Thrombocytopenia (HCC)- (present on admission)   Assessment & Plan    Chronic.  Continue to monitor.  Discontinue chemical  anticoagulation if platelets drop below 50k  Transfuse if platelets drops below 10 K          VTE prophylaxis: heparin

## 2019-01-05 NOTE — PROGRESS NOTES
Prevena wound vac seal on patient leaking; reinforced with Tegarderm.   Left message with wound team.

## 2019-01-05 NOTE — PROGRESS NOTES
Report received from Day RN and assumed care at 1915. Patient is A&O x4, forgetful at times.  at bedside. Bed alarm in place. 2 assist to commode. Room air.     Prevena wound vac in place.      Patient denies pain. Patient denies nausea.      Patient currently has no IV access, ultra sound guided attempt unsuccessful. Patient refusing IV at this time. Will continue to assess.      POC discussed. All needs met at this time. Call light within reach. Bed locked, in lowest position. Hourly rounding in place.

## 2019-01-06 NOTE — PROGRESS NOTES
"   Orthopaedic Progress Note    Interval changes:  No interval change  Patient doing well  Vac in place with no leak    ROS - Patient denies any new issues.  Pain well controlled.    Blood pressure 142/61, pulse 97, temperature 36.7 °C (98 °F), temperature source Oral, resp. rate 16, height 1.549 m (5' 1\"), weight 58.1 kg (128 lb 1.4 oz), last menstrual period 07/06/2011, SpO2 94 %, not currently breastfeeding.      LLE vac dressing in place with no leak. Patient clearly fires tibialis anterior, EHL, and gastrocnemius/soleus. Sensation is intact to light touch throughout superficial peroneal, deep peroneal, tibial, saphenous, and sural nerve distributions. Strong and palpable 2+ dorsalis pedis and posterior tibial pulses with capillary refill less than 2 seconds. No lower leg tenderness or discomfort.     Recent Labs      01/03/19   0024  01/04/19   0015  01/05/19   0050   WBC  12.2*  10.7  12.8*   RBC  2.43*  2.20*  2.85*   HEMOGLOBIN  7.5*  6.8*  8.5*   HEMATOCRIT  22.0*  20.0*  25.3*   MCV  90.5  90.9  88.8   MCH  30.9  30.9  29.8   MCHC  34.1  34.0  33.6   RDW  55.7*  51.1*  55.7*   PLATELETCT  93*  95*  105*   MPV  10.6  10.6  10.6       Active Hospital Problems    Diagnosis   • Lytic bone lesion of hip [M89.8X5]     Priority: High   • Gout [M10.9]     Priority: Medium   • Thrombocytopenia (HCC) [D69.6]     Priority: Low     8/12/09 plt 195  3/28/13 plt 152  11/7/16 plt 115  5/12/17 plt 155  9/25/17 plt 139     • Acute blood loss anemia [D62]   • Leukocytosis [D72.829]   • Lung cancer (HCC) [C34.90]   • Lesion of right femur [M89.9]   • DNR (do not resuscitate) discussion [Z71.89]   • Acute cystitis [N30.00]   • Metabolic encephalopathy [G93.41]   • Hyperglycemia [R73.9]   • CKD (chronic kidney disease) stage 3, GFR 30-59 ml/min (HCC) [N18.3]   • Elevated alkaline phosphatase level [R74.8]   • Elevated BUN [R79.9]   • Elevated INR (international normalized ratio) [R79.1]   • Intractable low back pain [M54.5]    "  4/20/18 MRI lumbar spine mild to moderate central canal stenosis and moderate right-sided neuroforaminal narrowing L3-L4     • Coronary artery disease [I25.10]       Assessment/Plan:  Vac in place   POD#7 S/P Intramedullary nailing, right hip  Wt bearing status - WBAT  Wound care/Drains - vac  Future Procedures - none planned   Sutures/Staples out- 14-21 days post operatively  PT/OT-initiated  Antibiotics: completed  DVT Prophylaxis- TEDS/SCDs/Foot pumps  Hatch-none  Case Coordination for Discharge Planning - Disposition home

## 2019-01-06 NOTE — PROGRESS NOTES
"   Orthopaedic Progress Note    Interval changes:  No interval change  Patient doing well  Vac in place with no leak    ROS - Patient denies any new issues.  Pain well controlled.    Blood pressure 139/58, pulse 83, temperature 36.7 °C (98.1 °F), temperature source Oral, resp. rate 18, height 1.549 m (5' 1\"), weight 63.5 kg (139 lb 15.9 oz), last menstrual period 07/06/2011, SpO2 94 %, not currently breastfeeding.      LLE vac dressing in place with no leak. Patient clearly fires tibialis anterior, EHL, and gastrocnemius/soleus. Sensation is intact to light touch throughout superficial peroneal, deep peroneal, tibial, saphenous, and sural nerve distributions. Strong and palpable 2+ dorsalis pedis and posterior tibial pulses with capillary refill less than 2 seconds. No lower leg tenderness or discomfort.     Recent Labs      01/04/19   0015  01/05/19   0050  01/06/19   0046   WBC  10.7  12.8*  9.4   RBC  2.20*  2.85*  2.89*   HEMOGLOBIN  6.8*  8.5*  8.6*   HEMATOCRIT  20.0*  25.3*  26.4*   MCV  90.9  88.8  91.3   MCH  30.9  29.8  29.8   MCHC  34.0  33.6  32.6*   RDW  51.1*  55.7*  55.5*   PLATELETCT  95*  105*  111*   MPV  10.6  10.6  9.9       Active Hospital Problems    Diagnosis   • Lytic bone lesion of hip [M89.8X5]     Priority: High   • Gout [M10.9]     Priority: Medium   • Thrombocytopenia (HCC) [D69.6]     Priority: Low     8/12/09 plt 195  3/28/13 plt 152  11/7/16 plt 115  5/12/17 plt 155  9/25/17 plt 139     • Acute blood loss anemia [D62]   • Leukocytosis [D72.829]   • Lung cancer (HCC) [C34.90]   • Lesion of right femur [M89.9]   • DNR (do not resuscitate) discussion [Z71.89]   • Acute cystitis [N30.00]   • Metabolic encephalopathy [G93.41]   • Hyperglycemia [R73.9]   • CKD (chronic kidney disease) stage 3, GFR 30-59 ml/min (HCC) [N18.3]   • Elevated alkaline phosphatase level [R74.8]   • Elevated BUN [R79.9]   • Elevated INR (international normalized ratio) [R79.1]   • Intractable low back pain [M54.5] "     4/20/18 MRI lumbar spine mild to moderate central canal stenosis and moderate right-sided neuroforaminal narrowing L3-L4     • Coronary artery disease [I25.10]       Assessment/Plan:  Vac in place   POD#8 S/P Intramedullary nailing, right hip  Wt bearing status - WBAT  Wound care/Drains - vac  Future Procedures - none planned   Sutures/Staples out- 14-21 days post operatively  PT/OT-initiated  Antibiotics: completed  DVT Prophylaxis- TEDS/SCDs/Foot pumps  Hatch-none  Case Coordination for Discharge Planning - Disposition home

## 2019-01-06 NOTE — PROGRESS NOTES
Wound vac leaking, Site reinforced using the reinforcement material given by wound care. Air seal in place following reinforcement. Patient denying significant pain.

## 2019-01-06 NOTE — CARE PLAN
Problem: Communication  Goal: The ability to communicate needs accurately and effectively will improve  Outcome: PROGRESSING AS EXPECTED      Problem: Safety  Goal: Will remain free from injury  Outcome: PROGRESSING AS EXPECTED      Problem: Pain Management  Goal: Pain level will decrease to patient's comfort goal  Outcome: PROGRESSING AS EXPECTED  Patient reporting minimal to no pain. Patient has mild pain with mobilization. Patient is able to ambulate to restroom and stand from elevated toilet.     Problem: Mobility  Goal: Risk for activity intolerance will decrease  Outcome: PROGRESSING AS EXPECTED      Problem: Skin Integrity  Goal: Risk for impaired skin integrity will decrease  Outcome: PROGRESSING AS EXPECTED  Patient encouraged to mobilize. Waffle overlay on bed. Patient Ambulating to bathroom. Turning self from side to side. Wound vac in place.

## 2019-01-06 NOTE — PROGRESS NOTES
[facility] Medicine Daily Progress Note    Date of Service  1/5/2019    Chief Complaint  83 y.o. female admitted 12/21/2018 with worsening back pain to the point of intractable few days prior to admission, had been seen by pcp, pain management, ortho without significant relief other than temporary improvement.    Hospital Course    Patient had scans showing lytic lesions in Right hip and lower back.  Biopsy ordered on admission but will not be done until after [place] d/t weekend and holiday.  Bone marrow biopsy completed and normal  SPEP and UPEP collected and normal.  L3 lesion biopsied and adenocarcinoma favoring lung origin.  IM nailing of impending pathologic fracture R hip 12/29.  Patient seen by oncology and awaiting foundation testing to determine course of treatment.  Pending radiation oncology consultation.      Interval Problem Update  12/22 Patient with pain, responding well to narcotic though is in extreme pain between doses.  I increased her steroids and gabapentin for better overall pain control, continue with narcotic medication.  Skeletal survey ordered to r/o impending fracture - long bones are okay but she does have lytic lesion in right pelvis which is likely the source of the majority of her pain.  12/23 Patient states she has no pain when still but still having significant pain when moving - responding appropriate to pain medications received - sedating at times.  Electrophoresis results should be back tomorrow.  Bone marrow biopsy pending Wednesday - is scheduled.  12/24 Patient seen after bone marrow biopsy earlier today, histology department was able to coordinate earlier.  SPEP and UPEP still pending at this time.  Patient slightly confused and speaking in gibberish following sedation for BM but resolving with time.  She has improved pain control with steroids and is tolerating higher dose gabapentin well.  Oncology consult pending labs and bone marrow results.   and son at bedside  when evaluated - explained current treatment and anticipated turnaround for test results.  1/1/19 Patient states she is feeling much better today.  Per RN the confusion she had last few days has significantly improved.  She is up to chair when evaluated and course of anticipated treatment outlined for patient and family.  Pending XRT evaluation tomorrow.  1/2/19 Patient states she is feeling okay but did have an assisted fall overnight, patient reports she just fell backward but RN reports patient was not responsive during fall - likely syncope from anemia, medications and position.  No injury reported/suspected.  1 unit of blood transfused this am.  She does not appear to be actively bleeding now but does have significant bruising in the right dependent areas.    1/3/19 Patient feeling better and maintaining hgb following last transfusion and stopping of lovenox. She is still having serosang drainage and ortho had requested wound incision vac to be placed.  She had simulation for radiation done today and will have her first treatment on Monday - 1/7/19.  I want to make sure she doesn't have breakdown related to her wound and that she no longer has bleeding, likely she will be medically cleared to transfer on Monday to SNF and can continue with daily radiation through the 10 fractions planned.  1/4/19 Patient states she feels okay, blood count dropped again, transfusion given.  Wound incision vac applied today.  1/5/19 Patient in good spirits, 3 of her sons were at bedside and came to find me for an update.  HGB 8.5 today following transfusion yesterday, I doubt the accuracy of hgb 6.8 reported yesterday.  Patient encouraged to ambulate and she has been using bedside commode due to height of toilet in bathroom, she is agreeable to use commode in the bathroom to improve strength.  She should be ready to dc to SNF on Monday following radiation.  Sons are also concerned about having code status talk with their mother.   I will address this with patient tomorrow.    Consultants/Specialty  [facility] - oncology - f/u OP  [facility] - XRT    Code Status  full    Disposition  SNF - [facility] - waiting for bed,  Daily m-f radiation starting 1/7/19.    Review of Systems  Review of Systems   Constitutional: Positive for malaise/fatigue (better). Negative for chills and fever.   HENT: Negative for congestion and sore throat.    Eyes: Negative for blurred vision and photophobia.   Respiratory: Negative for cough and shortness of breath.    Cardiovascular: Negative for chest pain, claudication and leg swelling.   Gastrointestinal: Negative for abdominal pain, constipation, diarrhea, heartburn, nausea and vomiting.   Genitourinary: Negative for dysuria and hematuria.   Musculoskeletal: Positive for back pain (better) and joint pain (right hip and groin - better post op.). Negative for myalgias.   Skin: Negative for itching and rash.   Neurological: Negative for dizziness, sensory change, speech change, weakness and headaches.   Psychiatric/Behavioral: Negative for depression. The patient is not nervous/anxious and does not have insomnia.         Physical Exam  Temp:  [36.7 °C (98 °F)-36.9 °C (98.5 °F)] 36.7 °C (98 °F)  Pulse:  [79-99] 99  Resp:  [16-19] 19  BP: (142-149)/(61-72) 145/71    Physical Exam   Constitutional: She is oriented to person, place, and time. She appears well-developed and well-nourished. No distress.   HENT:   Head: Normocephalic and atraumatic.   Eyes: Conjunctivae are normal. No scleral icterus.   Neck: Neck supple. No JVD present.   Cardiovascular: Normal rate, regular rhythm and normal heart sounds.  Exam reveals no gallop and no friction rub.    No murmur heard.  Pulmonary/Chest: Effort normal and breath sounds normal. No respiratory distress. She exhibits no tenderness.   Abdominal: Soft. Bowel sounds are normal. She exhibits no distension. There is no guarding.   Musculoskeletal: She exhibits no edema or  tenderness.   Right hip dressing with incision vac.     Lymphadenopathy:     She has no cervical adenopathy.   Neurological: She is alert and oriented to person, place, and time. No cranial nerve deficit.   Skin: Skin is warm and dry. She is not diaphoretic. No erythema. No pallor.   Psychiatric: She has a normal mood and affect. Her behavior is normal.   Nursing note and vitals reviewed.      Fluids    Intake/Output Summary (Last 24 hours) at 01/05/19 1714  Last data filed at 01/05/19 1600   Gross per 24 hour   Intake              240 ml   Output             2500 ml   Net            -2260 ml       Laboratory  Recent Labs      01/03/19   0024  01/04/19   0015  01/05/19   0050   WBC  12.2*  10.7  12.8*   RBC  2.43*  2.20*  2.85*   HEMOGLOBIN  7.5*  6.8*  8.5*   HEMATOCRIT  22.0*  20.0*  25.3*   MCV  90.5  90.9  88.8   MCH  30.9  30.9  29.8   MCHC  34.1  34.0  33.6   RDW  55.7*  51.1*  55.7*   PLATELETCT  93*  95*  105*   MPV  10.6  10.6  10.6                       Imaging  DX-PORTABLE FLUORO > 1 HOUR   Final Result         Portable fluoroscopy utilized for 18 seconds.         IR-US GUIDED PIV   Final Result    Ultrasound-guided PERIPHERAL IV INSERTION performed by    qualified nursing staff as above.            NM-BONE SCAN WHOLE BODY   Final Result      Extensive bony metastatic disease as described on prior CT exams.      MR-BRAIN-WITH & W/O   Final Result      1.  No evidence of intracranial metastatic disease   2.  Calvarial and RIGHT C2 lesions highly suspicious for osseous metastatic disease   3.  Mild white matter changes   4.  Mild atrophy      DX-CHEST-PORTABLE (1 VIEW)   Final Result         1. No acute cardiopulmonary abnormalities are identified.      DX-HIP-UNILATERAL-W/O PELVIS-2/3 VIEWS RIGHT   Final Result      Digitized intraoperative radiograph is submitted for review.  This examination is not for diagnostic purpose but for guidance during a surgical procedure.      DX-HIP-BILATERAL-WITH PELVIS-3/4  VIEWS   Final Result      1.  There are lytic lesions seen involving the right inferior ilium and right inferior obturator ring but there is no plain film evidence of pathologic fracture.      CT-NEEDLE BX-DEEP BONE   Final Result      1.  Successful CT-guided core biopsy of expansile mass involving right L3 transverse process.      CT-ABDOMEN-PELVIS WITH   Final Result      1.  Multiple lytic expansile bone lesions involving the pelvis and thoracolumbar spine as described above. There are areas of cortical disruption and extension into the surrounding soft tissues involving multiple of these lesions. The largest involves    the right ilium and demonstrates some speckled calcification. Differential diagnosis includes metastatic disease as well as multiple myeloma.      2.  Destructive lesion involving the right proximal femur with some faint associated sclerosis. This is located within the intertrochanteric region and extends to an area of focal cortical breakthrough/disruption at the base of the right femoral neck    superiorly.      3.  Fatty heterogeneous liver likely representing presence of hepatocellular dysfunction.      4.  Bibasilar atelectasis and small bilateral pleural effusions.      5.  Ill-defined pulmonary nodules within the right lung base which of previously been evaluated with CT scan.      6.  6 mm left adrenal apex nodule      CT-CHEST (THORAX) WITH   Final Result      1.  2 noncalcified nodules in the right upper lobe. The largest is spiculated measuring 11 mm in maximum diameter. Differential diagnosis includes primary or secondary lung carcinoma.      2.  3 mm noncalcified nodule in the right middle lobe.      3.  No thoracic adenopathy.      4.  Small bilateral pleural effusions, left greater than right.      5.  Heterogeneous liver which may represent primary or secondary carcinoma.            DX-BONE SURVEY-METASTATIC LTD   Final Result      1.  Lytic foci within the calvaria, right iliac  bone, and right distal clavicle      2.  Multiple additional lytic foci identified on recent CT are not apparent on conventional radiography      3.  Different diagnosis includes metastatic malignancy or multiple myeloma      CT-LSPINE W/O PLUS RECONS   Final Result      Destructive lesions within the right pedicle and transverse process of L3, the right sacral wing, and right iliac crest consistent with metastatic disease or multiple myeloma.   Multilevel degenerative changes.   Moderate spinal stenosis at L4-5.      CT-HIP W/O PLUS RECONS RIGHT   Final Result         1.  Multiple lytic expansile lesions in the pelvis and spine as described.   2.  Hairline fracture of the acetabular roof on the right, likely pathologic fracture.   3.  Hairline lucency through the right femoral neck suggests hairline fracture           Assessment/Plan  * Intractable low back pain- (present on admission)   Assessment & Plan    Palliative following for pain management. Delirium improved with medication adjustments  Pain improved with medications and nailing of R femur.       Lytic bone lesion of hip- (present on admission)   Assessment & Plan    With hairline fracture of the right acetabular roof and femur neck.  Likely pathological.  BEATRICE with no increase in immunoglobulins.  Bone marrow biopsy 12/24 was negative for any abnormalities.  bone biopsy of L3 12/27 - adenocarcinoma favoring lung, pathology from IM nailing hip 12/29 pending.  CT scan of the chest showed 2 nodules in the right upper lobe with the largest one looks spiculated and measures around 11 mm.  MRI brain showed no intracranial metastasis but calvarial and C2 possible bony metastasis.  Bone scan showed extensive foci of activity in the spine, ribs, pelvis, calvarium, and femora.  Oncology will f/u outpatient with results of immunotherapy foundation testing.  Dr. Shah recommends simulation (done 1/3) and treatment starting 1/7 for 10 fractions.         Gout-  (present on admission)   Assessment & Plan    -Continue allopurinol  -Uric acid normal       Acute blood loss anemia   Assessment & Plan    Continues to bleed from surgical incision, transfuse to keep hgb >7.0  Lovenox discontinued  Ortho aware - continue with pressure dressing.  hgb 8.5 - 1/5/19 - likely the result yesterday was falsely low  1 unit transfused 1/4/19 - hgb 6.8  hgb 7.5 - 1/3/19  1 unit transfused 1/2/19 - hgb 6.7  1 unit transfused 1/1/19 - hgb 6.8  1 unit transfused 12/31/18 - hgb 6.2       Lesion of right femur- (present on admission)   Assessment & Plan    Destructive metastatic bone lesion on the right femur.  Orthopedic surgery consulted.  Status post intramedullary nailing of the right femur on 12/29.   PT/OT.  WBAT.      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  MRI of the brain showed no intracranial metastasis but rather calvarial and C2 possible bone metastasis.  Pending bone scan.  Oncology follow up after discharge from hospital.  Pending mutation and genetic testing on biopsy.       Leukocytosis- (present on admission)   Assessment & Plan    Multifactorial: Steroid induced, UTI, leukemoid reaction  Afebrile       Acute cystitis- (present on admission)   Assessment & Plan    Completed abx         CKD (chronic kidney disease) stage 3, GFR 30-59 ml/min ([facility])- (present on admission)   Assessment & Plan    Stable for now.  Avoid nephrotoxins.  Renal dose all medications.     Hyperglycemia- (present on admission)   Assessment & Plan    Suspecting steroids induced.  No history of diabetes.  Continue to monitor.     Metabolic encephalopathy- (present on admission)   Assessment & Plan    UA positive for UTI - completed 5 day course of abx.  Medication induced - meds adjusted and mentation improved         Elevated INR (international normalized ratio)- (present on admission)   Assessment & Plan    - not on  anticoagulants as an outpt         Elevated BUN- (present on admission)   Assessment & Plan    -Rehydrating with IV fluid     Elevated alkaline phosphatase level- (present on admission)   Assessment & Plan    -Likely related to bone lesions     Coronary artery disease- (present on admission)   Assessment & Plan    Status post CABG.  Continue with aspirin and statin.     Thrombocytopenia (HCC)- (present on admission)   Assessment & Plan    Chronic.  Continue to monitor.  Discontinue chemical anticoagulation if platelets drop below 50k  Transfuse if platelets drops below 10 K          VTE prophylaxis: heparin

## 2019-01-06 NOTE — PROGRESS NOTES
Pt A&Ox4. Pleasant mood.  at bedside. Assisted patient up to bathroom with one person assist with FWW. Pt tolerated well. Wound vac in place to left hip/leg. Denies any pain. Safety precautions in place.

## 2019-01-06 NOTE — PROGRESS NOTES
Reinforced patient wound vac seal with Tegaderm. Pt got up to the bathroom and it came off.  Left a voicemail with wound team to come re-assess. Utilized booklet at bedside as well.

## 2019-01-06 NOTE — WOUND TEAM
Received call at 0530 that Prevena wound vac was leaking. This RN up to assess. Discussed with bedside RN who stated the pt got up to walk to the BR last night and the vac pulled off. NOC shift attempted to repair with tegaderms. Per Day RN she removed the tegaderms and re-secured the vac with drape. No leaks were identified. Discussed care with nursing including notifying surgeon if vac continues to leak for POC. No further advanced wound care needs indicated at this time. No further follow up at this time.

## 2019-01-06 NOTE — PROGRESS NOTES
[facility] Medicine Daily Progress Note    Date of Service  1/6/2019    Chief Complaint  83 y.o. female admitted 12/21/2018 with worsening back pain to the point of intractable few days prior to admission, had been seen by pcp, pain management, ortho without significant relief other than temporary improvement.    Hospital Course    Patient had scans showing lytic lesions in Right hip and lower back.  Biopsy ordered on admission but will not be done until after [place] d/t weekend and holiday.  Bone marrow biopsy completed and normal  SPEP and UPEP collected and normal.  L3 lesion biopsied and adenocarcinoma favoring lung origin.  IM nailing of impending pathologic fracture R hip 12/29.  Patient seen by oncology and awaiting foundation testing to determine course of treatment.  Pending radiation oncology consultation.      Interval Problem Update  12/22 Patient with pain, responding well to narcotic though is in extreme pain between doses.  I increased her steroids and gabapentin for better overall pain control, continue with narcotic medication.  Skeletal survey ordered to r/o impending fracture - long bones are okay but she does have lytic lesion in right pelvis which is likely the source of the majority of her pain.  12/23 Patient states she has no pain when still but still having significant pain when moving - responding appropriate to pain medications received - sedating at times.  Electrophoresis results should be back tomorrow.  Bone marrow biopsy pending Wednesday - is scheduled.  12/24 Patient seen after bone marrow biopsy earlier today, histology department was able to coordinate earlier.  SPEP and UPEP still pending at this time.  Patient slightly confused and speaking in gibberish following sedation for BM but resolving with time.  She has improved pain control with steroids and is tolerating higher dose gabapentin well.  Oncology consult pending labs and bone marrow results.   and son at bedside  when evaluated - explained current treatment and anticipated turnaround for test results.  1/1/19 Patient states she is feeling much better today.  Per RN the confusion she had last few days has significantly improved.  She is up to chair when evaluated and course of anticipated treatment outlined for patient and family.  Pending XRT evaluation tomorrow.  1/2/19 Patient states she is feeling okay but did have an assisted fall overnight, patient reports she just fell backward but RN reports patient was not responsive during fall - likely syncope from anemia, medications and position.  No injury reported/suspected.  1 unit of blood transfused this am.  She does not appear to be actively bleeding now but does have significant bruising in the right dependent areas.    1/3/19 Patient feeling better and maintaining hgb following last transfusion and stopping of lovenox. She is still having serosang drainage and ortho had requested wound incision vac to be placed.  She had simulation for radiation done today and will have her first treatment on Monday - 1/7/19.  I want to make sure she doesn't have breakdown related to her wound and that she no longer has bleeding, likely she will be medically cleared to transfer on Monday to SNF and can continue with daily radiation through the 10 fractions planned.  1/4/19 Patient states she feels okay, blood count dropped again, transfusion given.  Wound incision vac applied today.  1/5/19 Patient in good spirits, 3 of her sons were at bedside and came to find me for an update.  HGB 8.5 today following transfusion yesterday, I doubt the accuracy of hgb 6.8 reported yesterday.  Patient encouraged to ambulate and she has been using bedside commode due to height of toilet in bathroom, she is agreeable to use commode in the bathroom to improve strength.  She should be ready to dc to SNF on Monday following radiation.  Sons are also concerned about having code status talk with their mother.   "I will address this with patient tomorrow.  1/6/19 Patient feeling well, very happy with her ability to ambulate to the toilet as many times as she did.  She will have radiation starting tomorrow and will see if it can be done earlier to facilitate a transfer earlier in the day.  No further bleeding noted, h/h stable.  Incision vac is in place - 2 of the 7 days have counted down on the machine - will discuss with wound care if this machine needs to go with the patient or if another one supplied by [facility] needs to be placed.  I also had a blanca discussion with patient and  at bedside what her wishes were if her heart should stop or if she were to stop breathing.  She wanted to make sure she was a DNR as she did not want resucitation at that point.  \"If I can't talk, I don't want to live.\"   She said this to her , he agreed and I've changed her code status to DNR.  I will update the children as they visit as sons were concerned about having this conversation with their mother.    Consultants/Specialty  [facility] - oncology - f/u OP  [facility] - XRT    Code Status  full    Disposition  SNF - [facility] - waiting for bed,  Daily m-f radiation starting 1/7/19.    Review of Systems  Review of Systems   Constitutional: Positive for malaise/fatigue (better). Negative for chills and fever.   HENT: Negative for congestion and sore throat.    Eyes: Negative for blurred vision and photophobia.   Respiratory: Negative for cough and shortness of breath.    Cardiovascular: Negative for chest pain, claudication and leg swelling.   Gastrointestinal: Negative for abdominal pain, constipation, diarrhea, heartburn, nausea and vomiting.   Genitourinary: Negative for dysuria and hematuria.   Musculoskeletal: Positive for back pain (better) and joint pain (right hip and groin - better post op.). Negative for myalgias.   Skin: Negative for itching and rash.   Neurological: Negative for dizziness, sensory " change, speech change, weakness and headaches.   Psychiatric/Behavioral: Negative for depression. The patient is not nervous/anxious and does not have insomnia.         Physical Exam  Temp:  [36.6 °C (97.9 °F)-36.7 °C (98.1 °F)] 36.7 °C (98.1 °F)  Pulse:  [] 83  Resp:  [18-20] 18  BP: (112-160)/(58-82) 139/58    Physical Exam   Constitutional: She is oriented to person, place, and time. She appears well-developed and well-nourished. No distress.   HENT:   Head: Normocephalic and atraumatic.   Eyes: Conjunctivae are normal. No scleral icterus.   Neck: Neck supple. No JVD present.   Cardiovascular: Normal rate, regular rhythm and normal heart sounds.  Exam reveals no gallop and no friction rub.    No murmur heard.  Pulmonary/Chest: Effort normal and breath sounds normal. No respiratory distress. She exhibits no tenderness.   Abdominal: Soft. Bowel sounds are normal. She exhibits no distension. There is no guarding.   Musculoskeletal: She exhibits no edema or tenderness.   Right hip dressing with incision vac.     Lymphadenopathy:     She has no cervical adenopathy.   Neurological: She is alert and oriented to person, place, and time. No cranial nerve deficit.   Skin: Skin is warm and dry. She is not diaphoretic. No erythema. No pallor.   Psychiatric: She has a normal mood and affect. Her behavior is normal.   Nursing note and vitals reviewed.      Fluids    Intake/Output Summary (Last 24 hours) at 01/06/19 1530  Last data filed at 01/06/19 1033   Gross per 24 hour   Intake                0 ml   Output             3050 ml   Net            -3050 ml       Laboratory  Recent Labs      01/04/19   0015  01/05/19   0050  01/06/19   0046   WBC  10.7  12.8*  9.4   RBC  2.20*  2.85*  2.89*   HEMOGLOBIN  6.8*  8.5*  8.6*   HEMATOCRIT  20.0*  25.3*  26.4*   MCV  90.9  88.8  91.3   MCH  30.9  29.8  29.8   MCHC  34.0  33.6  32.6*   RDW  51.1*  55.7*  55.5*   PLATELETCT  95*  105*  111*   MPV  10.6  10.6  9.9                        Imaging  DX-PORTABLE FLUORO > 1 HOUR   Final Result         Portable fluoroscopy utilized for 18 seconds.         IR-US GUIDED PIV   Final Result    Ultrasound-guided PERIPHERAL IV INSERTION performed by    qualified nursing staff as above.            NM-BONE SCAN WHOLE BODY   Final Result      Extensive bony metastatic disease as described on prior CT exams.      MR-BRAIN-WITH & W/O   Final Result      1.  No evidence of intracranial metastatic disease   2.  Calvarial and RIGHT C2 lesions highly suspicious for osseous metastatic disease   3.  Mild white matter changes   4.  Mild atrophy      DX-CHEST-PORTABLE (1 VIEW)   Final Result         1. No acute cardiopulmonary abnormalities are identified.      DX-HIP-UNILATERAL-W/O PELVIS-2/3 VIEWS RIGHT   Final Result      Digitized intraoperative radiograph is submitted for review.  This examination is not for diagnostic purpose but for guidance during a surgical procedure.      DX-HIP-BILATERAL-WITH PELVIS-3/4 VIEWS   Final Result      1.  There are lytic lesions seen involving the right inferior ilium and right inferior obturator ring but there is no plain film evidence of pathologic fracture.      CT-NEEDLE BX-DEEP BONE   Final Result      1.  Successful CT-guided core biopsy of expansile mass involving right L3 transverse process.      CT-ABDOMEN-PELVIS WITH   Final Result      1.  Multiple lytic expansile bone lesions involving the pelvis and thoracolumbar spine as described above. There are areas of cortical disruption and extension into the surrounding soft tissues involving multiple of these lesions. The largest involves    the right ilium and demonstrates some speckled calcification. Differential diagnosis includes metastatic disease as well as multiple myeloma.      2.  Destructive lesion involving the right proximal femur with some faint associated sclerosis. This is located within the intertrochanteric region and extends to an area of focal cortical  breakthrough/disruption at the base of the right femoral neck    superiorly.      3.  Fatty heterogeneous liver likely representing presence of hepatocellular dysfunction.      4.  Bibasilar atelectasis and small bilateral pleural effusions.      5.  Ill-defined pulmonary nodules within the right lung base which of previously been evaluated with CT scan.      6.  6 mm left adrenal apex nodule      CT-CHEST (THORAX) WITH   Final Result      1.  2 noncalcified nodules in the right upper lobe. The largest is spiculated measuring 11 mm in maximum diameter. Differential diagnosis includes primary or secondary lung carcinoma.      2.  3 mm noncalcified nodule in the right middle lobe.      3.  No thoracic adenopathy.      4.  Small bilateral pleural effusions, left greater than right.      5.  Heterogeneous liver which may represent primary or secondary carcinoma.            DX-BONE SURVEY-METASTATIC LTD   Final Result      1.  Lytic foci within the calvaria, right iliac bone, and right distal clavicle      2.  Multiple additional lytic foci identified on recent CT are not apparent on conventional radiography      3.  Different diagnosis includes metastatic malignancy or multiple myeloma      CT-LSPINE W/O PLUS RECONS   Final Result      Destructive lesions within the right pedicle and transverse process of L3, the right sacral wing, and right iliac crest consistent with metastatic disease or multiple myeloma.   Multilevel degenerative changes.   Moderate spinal stenosis at L4-5.      CT-HIP W/O PLUS RECONS RIGHT   Final Result         1.  Multiple lytic expansile lesions in the pelvis and spine as described.   2.  Hairline fracture of the acetabular roof on the right, likely pathologic fracture.   3.  Hairline lucency through the right femoral neck suggests hairline fracture           Assessment/Plan  * Intractable low back pain- (present on admission)   Assessment & Plan    Palliative following for pain management.  Delirium improved with medication adjustments  Pain improved with medications and nailing of R femur.       Lytic bone lesion of hip- (present on admission)   Assessment & Plan    With hairline fracture of the right acetabular roof and femur neck.  Likely pathological.  BEATRICE with no increase in immunoglobulins.  Bone marrow biopsy 12/24 was negative for any abnormalities.  bone biopsy of L3 12/27 - adenocarcinoma favoring lung, pathology from IM nailing hip 12/29 pending.  CT scan of the chest showed 2 nodules in the right upper lobe with the largest one looks spiculated and measures around 11 mm.  MRI brain showed no intracranial metastasis but calvarial and C2 possible bony metastasis.  Bone scan showed extensive foci of activity in the spine, ribs, pelvis, calvarium, and femora.  Oncology will f/u outpatient with results of immunotherapy foundation testing.  Dr. Shah recommends simulation (done 1/3) and treatment starting 1/7 for 10 fractions.         Gout- (present on admission)   Assessment & Plan    -Continue allopurinol  -Uric acid normal       Acute blood loss anemia   Assessment & Plan    Continues to bleed from surgical incision, transfuse to keep hgb >7.0  Lovenox discontinued  Ortho aware - continue with pressure dressing.  hgb 8.5 - 1/5/19 - likely the result yesterday was falsely low  1 unit transfused 1/4/19 - hgb 6.8  hgb 7.5 - 1/3/19  1 unit transfused 1/2/19 - hgb 6.7  1 unit transfused 1/1/19 - hgb 6.8  1 unit transfused 12/31/18 - hgb 6.2       Lesion of right femur- (present on admission)   Assessment & Plan    Destructive metastatic bone lesion on the right femur.  Orthopedic surgery consulted.  Status post intramedullary nailing of the right femur on 12/29.   PT/OT.  WBAT.      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  MRI of the brain showed no intracranial metastasis but rather calvarial and C2  possible bone metastasis.  Pending bone scan.  Oncology follow up after discharge from hospital.  Pending mutation and genetic testing on biopsy.       Leukocytosis- (present on admission)   Assessment & Plan    Multifactorial: Steroid induced, UTI, leukemoid reaction  Afebrile       Acute cystitis- (present on admission)   Assessment & Plan    Completed abx         CKD (chronic kidney disease) stage 3, GFR 30-59 ml/min (HCC)- (present on admission)   Assessment & Plan    Stable for now.  Avoid nephrotoxins.  Renal dose all medications.     Hyperglycemia- (present on admission)   Assessment & Plan    Suspecting steroids induced.  No history of diabetes.  Continue to monitor.     Metabolic encephalopathy- (present on admission)   Assessment & Plan    UA positive for UTI - completed 5 day course of abx.  Medication induced - meds adjusted and mentation improved         Elevated INR (international normalized ratio)- (present on admission)   Assessment & Plan    - not on anticoagulants as an outpt         Elevated BUN- (present on admission)   Assessment & Plan    -Rehydrating with IV fluid     Elevated alkaline phosphatase level- (present on admission)   Assessment & Plan    -Likely related to bone lesions     Coronary artery disease- (present on admission)   Assessment & Plan    Status post CABG.  Continue with aspirin and statin.     Thrombocytopenia (HCC)- (present on admission)   Assessment & Plan    Chronic.  Continue to monitor.  Discontinue chemical anticoagulation if platelets drop below 50k  Transfuse if platelets drops below 10 K          VTE prophylaxis: heparin

## 2019-01-06 NOTE — PROGRESS NOTES
Pt denies pain, n/v and remains afebrile. Pt is up with 1 assist to bathroom, orders to take patient to bathroom and not use commode at bedside/urinal in order to help patient build strength- pt is tolerating this very well. Wound vac dressing is CDI, wound care assessed it today as well. Fall precautions maintained.

## 2019-01-07 NOTE — PROGRESS NOTES
Radiation therapy contacted to re schedule radiation appointment to an earlier time. Radiation appointment rescheduled to 1100. Patient notified of schedule change. Mepilex in place to prevent excoriation. Denies pain or nausea. Patient agreeable to plan of care. Patient educated regarding prevena dressing. Dressing to come off and be thrown away when all green lights around the on button have stopped glowing. Patient educated that these lights indicate days in use. The machine will stop after 7 days.

## 2019-01-07 NOTE — DISCHARGE PLANNING
"Anticipated Discharge Disposition: SNF    Action: [facility] unable to [facility] this date due to no bed available (they had previously stated they might have beds available Monday), s/w patient regarding going to [facility] as [facility] confirmed they have a bed available today, explained to patient that [facility] is unable to take her today and offered [facility], she called her son while this nurse was in the room and they were discouraged about going to [facility] as it is \"so far out in [place]\" and refused this option, they asked about [facility] explained that they also do not have a bed available until possibly Friday. Patient and her son stated they would like to wait until tomorrow to see if [facility] will have a bed available. Explained again that there is not a guarantee a bed will be available tomorrow at [facility] and we may have to revisit the option of [facility] if they still have availability. Patient agreed to this.     Barriers to Discharge: Availability of SNF     Plan: Anticipate discharge to SNF once bed available.   "

## 2019-01-07 NOTE — DISCHARGE SUMMARY
Discharge Summary    CHIEF COMPLAINT ON ADMISSION  Chief Complaint   Patient presents with   • Low Back Pain     Right lower back pain, hx of sciatica       Reason for Admission  Back Pain     CODE STATUS  DNAR/DNI    HPI & HOSPITAL COURSE  This is a 83 y.o. female here with uncontrolled back pain and she had concerning findings on CTs.   Patient had scans showing lytic lesions in Right hip and lower back. .  Bone marrow biopsy completed and normal.  SPEP and UPEP collected and normal.  L3 lesion biopsied and adenocarcinoma favoring lung origin.  IM nailing of impending pathologic fracture R hip 12/29.  Patient seen by oncology and awaiting foundation testing to determine course of treatment.  She was also seen by radiation oncology and started on radiation for lumbar spine, right pelvis and right femur.  She will start first of 10 radiation treatments today prior to discharge.  She has been having large volume wound drainage and incisional vac is in place and will remain in place until completed (has 4 more days remaining).  She did have some bleeding following surgery and required transfusions but has been stable for 3 days.  She is not on anticoagulation or ASA because of this.  She will continue therapies at [facility] and will follow up with Dr Mccollum in 1-2 weeks for results of foundation testing to see if she is a candidate for immunotherapy.  She has daily radiation set up and separate schedule of times will be provided.       Therefore, she is discharged in good and stable condition to skilled nursing facility.    The patient met 2-midnight criteria for an inpatient stay at the time of discharge.      FOLLOW UP ITEMS POST DISCHARGE  Dr Mccollum 1-2 weeks for f/u  PCP aftr dc from [facility] - Dr Ocasio    DISCHARGE DIAGNOSES  Principal Problem:    Intractable low back pain POA: Yes      Overview: 4/20/18 MRI lumbar spine mild to moderate central canal stenosis and       moderate right-sided  neuroforaminal narrowing L3-L4  Active Problems:    Lytic bone lesion of hip POA: Yes    Gout POA: Yes    Thrombocytopenia (HCC) POA: Yes      Overview: 8/12/09 plt 195      3/28/13 plt 152      11/7/16 plt 115      5/12/17 plt 155      9/25/17 plt 139    Coronary artery disease POA: Yes    Elevated alkaline phosphatase level POA: Yes    Elevated BUN POA: Yes    Elevated INR (international normalized ratio) POA: Yes    Metabolic encephalopathy POA: Yes    Hyperglycemia POA: Yes    CKD (chronic kidney disease) stage 3, GFR 30-59 ml/min (HCC) (Chronic) POA: Yes    DNR (do not resuscitate) discussion POA: Unknown    Acute cystitis POA: Yes    Leukocytosis POA: Yes    Lung cancer (HCC) POA: Yes    Lesion of right femur POA: Yes    Acute blood loss anemia POA: Unknown  Resolved Problems:    * No resolved hospital problems. *      FOLLOW UP  Future Appointments  Date Time Provider Department Center   1/7/2019 3:00 PM RADIATION THERAPY RADT None   1/8/2019 11:15 AM RADIATION THERAPY RADT None   1/9/2019 9:15 AM RADIATION THERAPY RADT None   1/10/2019 9:15 AM RADIATION THERAPY RADT None   1/11/2019 9:15 AM RADIATION THERAPY RADT None   1/14/2019 9:15 AM RADIATION THERAPY RADT None   1/15/2019 9:15 AM RADIATION THERAPY RADT None   1/16/2019 9:15 AM RADIATION THERAPY RADT None   1/17/2019 8:45 AM LAB TOO [facility] None   1/17/2019 9:15 AM RADIATION THERAPY RADT None   1/18/2019 9:15 AM RADIATION THERAPY RADT None   1/29/2019 1:00 PM Lesli Ocasio M.D. [facility] None   10/1/2019 3:00 PM Carla Goode M.D. [facility] None     [facility] ([facility])  [address] [place] [id number] [phone]        Lesli Ocasio M.D.  [address]  [address]  [facility] [id number] [phone]          Crispin Mccollum III, M.D.  [address]  [address]  [facility] [id number] [phone]    In 2 weeks        MEDICATIONS ON DISCHARGE     Medication List      START taking these medications      Instructions   dexamethasone 2  MG tablet  Start taking on:  1/8/2019  Commonly known as:  DECADRON   Take 1 Tab by mouth every day.  Dose:  2 mg     * morphine ER 15 MG Tbcr tablet  Commonly known as:  MS CONTIN   Take 1 Tab by mouth every 12 hours for 5 days.  Dose:  15 mg     * morphine 15 MG tablet  Commonly known as:  MS IR   Take 0.5 Tabs by mouth every 3 hours as needed for up to 5 days.  Dose:  7.5 mg     ondansetron 4 MG Tbdp  Commonly known as:  ZOFRAN ODT   Take 1 Tab by mouth every four hours as needed (give PO if IV route is unavailable. May give per feeding tube.).  Dose:  4 mg     polyethylene glycol/lytes Pack  Start taking on:  1/8/2019  Commonly known as:  MIRALAX   Take 1 Packet by mouth every day.  Dose:  17 g     senna-docusate 8.6-50 MG Tabs  Commonly known as:  PERICOLACE or SENOKOT S   Take 1 Tab by mouth 2 Times a Day.  Dose:  1 Tab     simethicone 80 MG Chew  Commonly known as:  MYLICON   Take 1 Tab by mouth 3 times a day as needed.  Dose:  80 mg        * This list has 2 medication(s) that are the same as other medications prescribed for you. Read the directions carefully, and ask your doctor or other care provider to review them with you.            CHANGE how you take these medications      Instructions   gabapentin 100 MG Caps  What changed:  · how much to take  · when to take this  Commonly known as:  NEURONTIN   Take 2 Caps by mouth 2 Times a Day.  Dose:  200 mg     * metoprolol 25 MG Tabs  What changed:  · medication strength  · how much to take  · when to take this  · additional instructions  Commonly known as:  LOPRESSOR   Take 1 Tab by mouth every evening.  Dose:  25 mg     * metoprolol 25 MG Tabs  Start taking on:  1/8/2019  What changed:  You were already taking a medication with the same name, and this prescription was added. Make sure you understand how and when to take each.  Commonly known as:  LOPRESSOR   Take 1 Tab by mouth every morning.  Dose:  25 mg        * This list has 2 medication(s) that are the  same as other medications prescribed for you. Read the directions carefully, and ask your doctor or other care provider to review them with you.            CONTINUE taking these medications      Instructions   allopurinol 100 MG Tabs  Commonly known as:  ZYLOPRIM   Take 200 mg by mouth every day.  Dose:  200 mg     CRESTOR 40 MG tablet  Generic drug:  rosuvastatin   Take 40 mg by mouth every evening.  Dose:  40 mg        STOP taking these medications    aspirin EC 81 MG Tbec  Commonly known as:  ECOTRIN     HYDROcodone-acetaminophen 5-325 MG Tabs per tablet  Commonly known as:  NORCO     methylPREDNISolone 4 MG Tabs  Commonly known as:  MEDROL            Allergies  Allergies   Allergen Reactions   • Atorvastatin      Elevated LFT  RXN = long time ago        DIET  Orders Placed This Encounter   Procedures   • Diet Order Regular     Standing Status:   Standing     Number of Occurrences:   1     Order Specific Question:   Diet:     Answer:   Regular [1]       ACTIVITY  As tolerated.  Weight bearing as tolerated    LINES, DRAINS, AND WOUNDS  This is an automated list. Peripheral IVs will be removed prior to discharge.       Surgical Incision  Incision Heart Prep (Active)       Surgical Incision  Incision Right Leg Lower (Active)       Surgical Incision  Incision Right Leg Upper (Active)                  MENTAL STATUS ON TRANSFER  Level of Consciousness: Alert  Orientation : Oriented x 4  Speech: Speech Clear    CONSULTATIONS  Dr Mccollum - oncology  Dr Savage - orthopedic surgery  Dr Shah - radiation oncology    PROCEDURES  12/24/18 - Bone marrow biopsy - Michael  12/27/18 - L3 bone biospy - Dada  12/29/18 IM nailing right proximal femur, pathologic fracture - Hussein    LABORATORY  Lab Results   Component Value Date    SODIUM 140 01/02/2019    POTASSIUM 4.5 01/02/2019    CHLORIDE 111 01/02/2019    CO2 25 01/02/2019    GLUCOSE 116 (H) 01/02/2019    BUN 25 (H) 01/02/2019    CREATININE 0.96 01/02/2019        Lab  Results   Component Value Date    WBC 8.7 01/07/2019    HEMOGLOBIN 8.8 (L) 01/07/2019    HEMATOCRIT 27.0 (L) 01/07/2019    PLATELETCT 119 (L) 01/07/2019        Total time of the discharge process exceeds 35 minutes.

## 2019-01-07 NOTE — PROGRESS NOTES
POD#8  S/P Hip Nail  Pathology positive for poorly differentiated carcinoma  WBAT  SCDs  Wound vac until dry  PT/OT

## 2019-01-07 NOTE — PROGRESS NOTES
Patient is alert and oriented Up with one assist and steady gait. Spouse at bedside. Patient in good spirits appropriately calls for assistance.

## 2019-01-07 NOTE — PROGRESS NOTES
PT WAS TRANSPORTED TO THE DEPARTMENT FOR RADIATION TX 1 OF 10 TO SPINE AND RT FEMUR. PT WAS GIVEN HER FULL RADIATION SCHEDULE. AFTER TX SHE WILL BE TRANSPORTED BACK TO HER ROOM.

## 2019-01-07 NOTE — DISCHARGE PLANNING
Agency/Facility Name: [facility]  Spoke To: Regis (admissions)   Outcome: Bed still pending. Regis will call [facility] back when she has an updated on available beds.     Florinda (RNCM) notified.

## 2019-01-07 NOTE — PROGRESS NOTES
Pt A&Ox4.  at bedside. Denies any pain. Up 1 person assist to bathroom with FWW. Wound vac in place; monitoring output; no leaks. Safety precautions in place. All needs met at this time.

## 2019-01-07 NOTE — PROGRESS NOTES
Replaced cannister without needing to replace the whole vac. Previous cannister was full with 150 cc of sanguinous fluid/output.

## 2019-01-07 NOTE — DISCHARGE PLANNING
Agency/Facility Name: [facility]  Outcome: no beds are avalible today.     Florinda (RNCM) notified.

## 2019-01-07 NOTE — PROGRESS NOTES
Pt wound vac cannister is full. Patient supply does not carry these cannisters. Called OR and they have the entire kit available, but they are expensive. Paged on call PA for Dr. Savage, Shayy Lux, and updated her on patient discharge to another facility and situation with the Prevena Plus wound vac. Per Shayy, replace with the kit.

## 2019-01-08 NOTE — PROGRESS NOTES
Anticipated Discharge Disposition: Discharged to SNF    Action: Discharge at 1700 to [facility], they will transport patient to their facility    Barriers to Discharge: none    Plan: Discharge to [facility] at 1700

## 2019-01-08 NOTE — DISCHARGE PLANNING
Agency/Facility Name: [facility]  Spoke To: Regis  Outcome: possible beds today. Regis will call [facility] back after meeting this morning to confirm.     Florinda Horne (RNCM) notified.

## 2019-01-08 NOTE — PROGRESS NOTES
Patient is alert and oriented. Up with one assist and steady gait. PT saw patient today. Patient ambulating with walker and steady gait. Patient in good spirits. Denying pain or nausea.

## 2019-01-08 NOTE — PROGRESS NOTES
"   Orthopaedic Progress Note    Interval changes:  Cleared by ortho for DC to SNF pending medicine clearance  DC incisional vac 7 days post application  Patient doing well  Vac in place with no leak    ROS - Patient denies any new issues.  Pain well controlled.    Blood pressure 140/73, pulse 89, temperature 36.6 °C (97.9 °F), temperature source Temporal, resp. rate 18, height 1.549 m (5' 1\"), weight 63.5 kg (139 lb 15.9 oz), last menstrual period 07/06/2011, SpO2 94 %, not currently breastfeeding.      LLE vac dressing in place with no leak. Patient clearly fires tibialis anterior, EHL, and gastrocnemius/soleus. Sensation is intact to light touch throughout superficial peroneal, deep peroneal, tibial, saphenous, and sural nerve distributions. Strong and palpable 2+ dorsalis pedis and posterior tibial pulses with capillary refill less than 2 seconds. No lower leg tenderness or discomfort.     Recent Labs      01/05/19   0050  01/06/19   0046  01/07/19   0012   WBC  12.8*  9.4  8.7   RBC  2.85*  2.89*  2.92*   HEMOGLOBIN  8.5*  8.6*  8.8*   HEMATOCRIT  25.3*  26.4*  27.0*   MCV  88.8  91.3  92.5   MCH  29.8  29.8  30.1   MCHC  33.6  32.6*  32.6*   RDW  55.7*  55.5*  53.7*   PLATELETCT  105*  111*  119*   MPV  10.6  9.9  10.2       Active Hospital Problems    Diagnosis   • Lytic bone lesion of hip [M89.8X5]     Priority: High   • Gout [M10.9]     Priority: Medium   • Thrombocytopenia (HCC) [D69.6]     Priority: Low     8/12/09 plt 195  3/28/13 plt 152  11/7/16 plt 115  5/12/17 plt 155  9/25/17 plt 139     • Acute blood loss anemia [D62]   • Leukocytosis [D72.829]   • Lung cancer (HCC) [C34.90]   • Lesion of right femur [M89.9]   • DNR (do not resuscitate) discussion [Z71.89]   • Acute cystitis [N30.00]   • Metabolic encephalopathy [G93.41]   • Hyperglycemia [R73.9]   • CKD (chronic kidney disease) stage 3, GFR 30-59 ml/min ([facility]) [N18.3]   • Elevated alkaline phosphatase level [R74.8]   • Elevated BUN [R79.9]   • " Elevated INR (international normalized ratio) [R79.1]   • Intractable low back pain [M54.5]     4/20/18 MRI lumbar spine mild to moderate central canal stenosis and moderate right-sided neuroforaminal narrowing L3-L4     • Coronary artery disease [I25.10]       Assessment/Plan:  Vac in place   POD#9 S/P Intramedullary nailing, right hip  Wt bearing status - WBAT  Wound care/Drains - vac  Future Procedures - none planned   Sutures/Staples out- 14-21 days post operatively  PT/OT-initiated  Antibiotics: completed  DVT Prophylaxis- TEDS/SCDs/Foot pumps  Hatch-none  Case Coordination for Discharge Planning - Disposition home

## 2019-01-08 NOTE — PROGRESS NOTES
[facility] Medicine Daily Progress Note    Date of Service  1/7/2019    Chief Complaint  83 y.o. female admitted 12/21/2018 with worsening back pain to the point of intractable few days prior to admission, had been seen by pcp, pain management, ortho without significant relief other than temporary improvement.    Hospital Course    Patient had scans showing lytic lesions in Right hip and lower back.  Biopsy ordered on admission but will not be done until after [place] d/t weekend and holiday.  Bone marrow biopsy completed and normal  SPEP and UPEP collected and normal.  L3 lesion biopsied and adenocarcinoma favoring lung origin.  IM nailing of impending pathologic fracture R hip 12/29.  Patient seen by oncology and awaiting foundation testing to determine course of treatment.  Pending radiation oncology consultation.      Interval Problem Update  12/22 Patient with pain, responding well to narcotic though is in extreme pain between doses.  I increased her steroids and gabapentin for better overall pain control, continue with narcotic medication.  Skeletal survey ordered to r/o impending fracture - long bones are okay but she does have lytic lesion in right pelvis which is likely the source of the majority of her pain.  12/23 Patient states she has no pain when still but still having significant pain when moving - responding appropriate to pain medications received - sedating at times.  Electrophoresis results should be back tomorrow.  Bone marrow biopsy pending Wednesday - is scheduled.  12/24 Patient seen after bone marrow biopsy earlier today, histology department was able to coordinate earlier.  SPEP and UPEP still pending at this time.  Patient slightly confused and speaking in gibberish following sedation for BM but resolving with time.  She has improved pain control with steroids and is tolerating higher dose gabapentin well.  Oncology consult pending labs and bone marrow results.   and son at bedside  when evaluated - explained current treatment and anticipated turnaround for test results.  1/1/19 Patient states she is feeling much better today.  Per RN the confusion she had last few days has significantly improved.  She is up to chair when evaluated and course of anticipated treatment outlined for patient and family.  Pending XRT evaluation tomorrow.  1/2/19 Patient states she is feeling okay but did have an assisted fall overnight, patient reports she just fell backward but RN reports patient was not responsive during fall - likely syncope from anemia, medications and position.  No injury reported/suspected.  1 unit of blood transfused this am.  She does not appear to be actively bleeding now but does have significant bruising in the right dependent areas.    1/3/19 Patient feeling better and maintaining hgb following last transfusion and stopping of lovenox. She is still having serosang drainage and ortho had requested wound incision vac to be placed.  She had simulation for radiation done today and will have her first treatment on Monday - 1/7/19.  I want to make sure she doesn't have breakdown related to her wound and that she no longer has bleeding, likely she will be medically cleared to transfer on Monday to SNF and can continue with daily radiation through the 10 fractions planned.  1/4/19 Patient states she feels okay, blood count dropped again, transfusion given.  Wound incision vac applied today.  1/5/19 Patient in good spirits, 3 of her sons were at bedside and came to find me for an update.  HGB 8.5 today following transfusion yesterday, I doubt the accuracy of hgb 6.8 reported yesterday.  Patient encouraged to ambulate and she has been using bedside commode due to height of toilet in bathroom, she is agreeable to use commode in the bathroom to improve strength.  She should be ready to dc to SNF on Monday following radiation.  Sons are also concerned about having code status talk with their mother.   "I will address this with patient tomorrow.  1/6/19 Patient feeling well, very happy with her ability to ambulate to the toilet as many times as she did.  She will have radiation starting tomorrow and will see if it can be done earlier to facilitate a transfer earlier in the day.  No further bleeding noted, h/h stable.  Incision vac is in place - 2 of the 7 days have counted down on the machine - will discuss with wound care if this machine needs to go with the patient or if another one supplied by [facility] needs to be placed.  I also had a blanca discussion with patient and  at bedside what her wishes were if her heart should stop or if she were to stop breathing.  She wanted to make sure she was a DNR as she did not want resucitation at that point.  \"If I can't talk, I don't want to live.\"   She said this to her , he agreed and I've changed her code status to DNR.  I will update the children as they visit as sons were concerned about having this conversation with their mother.  1/7/19 Patient in good spirits today, she had her first radiation today with no ill-effect.  The bed at SNF she was supposed to have today had fallen through.  DC summary and narcotic rx completed and in the patient's transfer paperwork.  Son at bedside, notified of code status discussion that was had with patient yesterday.    Consultants/Specialty  [facility] - oncology - f/u OP  [facility] - XRT    Code Status  full    Disposition  SNF transfer tomorrow following XRT  Daily m-f radiation starting 1/7/19.    Review of Systems  Review of Systems   Constitutional: Positive for malaise/fatigue (better). Negative for chills and fever.   HENT: Negative for congestion and sore throat.    Eyes: Negative for blurred vision and photophobia.   Respiratory: Negative for cough and shortness of breath.    Cardiovascular: Negative for chest pain, claudication and leg swelling.   Gastrointestinal: Negative for abdominal pain, " constipation, diarrhea, heartburn, nausea and vomiting.   Genitourinary: Negative for dysuria and hematuria.   Musculoskeletal: Positive for back pain (better) and joint pain (right hip and groin - better post op.). Negative for myalgias.   Skin: Negative for itching and rash.   Neurological: Negative for dizziness, sensory change, speech change, weakness and headaches.   Psychiatric/Behavioral: Negative for depression. The patient is not nervous/anxious and does not have insomnia.         Physical Exam  Temp:  [36.4 °C (97.5 °F)-36.8 °C (98.2 °F)] 36.6 °C (97.9 °F)  Pulse:  [74-93] 89  Resp:  [18] 18  BP: (135-143)/(62-82) 140/73    Physical Exam   Constitutional: She is oriented to person, place, and time. She appears well-developed and well-nourished. No distress.   HENT:   Head: Normocephalic and atraumatic.   Eyes: Conjunctivae are normal. No scleral icterus.   Neck: Neck supple. No JVD present.   Cardiovascular: Normal rate, regular rhythm and normal heart sounds.  Exam reveals no gallop and no friction rub.    No murmur heard.  Pulmonary/Chest: Effort normal and breath sounds normal. No respiratory distress. She exhibits no tenderness.   Abdominal: Soft. Bowel sounds are normal. She exhibits no distension. There is no guarding.   Musculoskeletal: She exhibits no edema or tenderness.   Right hip dressing with incision vac.     Lymphadenopathy:     She has no cervical adenopathy.   Neurological: She is alert and oriented to person, place, and time. No cranial nerve deficit.   Skin: Skin is warm and dry. She is not diaphoretic. No erythema. No pallor.   Psychiatric: She has a normal mood and affect. Her behavior is normal.   Nursing note and vitals reviewed.      Fluids    Intake/Output Summary (Last 24 hours) at 01/07/19 2618  Last data filed at 01/07/19 0615   Gross per 24 hour   Intake                0 ml   Output             2150 ml   Net            -2150 ml       Laboratory  Recent Labs      01/05/19   0050   01/06/19   0046  01/07/19   0012   WBC  12.8*  9.4  8.7   RBC  2.85*  2.89*  2.92*   HEMOGLOBIN  8.5*  8.6*  8.8*   HEMATOCRIT  25.3*  26.4*  27.0*   MCV  88.8  91.3  92.5   MCH  29.8  29.8  30.1   MCHC  33.6  32.6*  32.6*   RDW  55.7*  55.5*  53.7*   PLATELETCT  105*  111*  119*   MPV  10.6  9.9  10.2                       Imaging  DX-PORTABLE FLUORO > 1 HOUR   Final Result         Portable fluoroscopy utilized for 18 seconds.         IR-US GUIDED PIV   Final Result    Ultrasound-guided PERIPHERAL IV INSERTION performed by    qualified nursing staff as above.            NM-BONE SCAN WHOLE BODY   Final Result      Extensive bony metastatic disease as described on prior CT exams.      MR-BRAIN-WITH & W/O   Final Result      1.  No evidence of intracranial metastatic disease   2.  Calvarial and RIGHT C2 lesions highly suspicious for osseous metastatic disease   3.  Mild white matter changes   4.  Mild atrophy      DX-CHEST-PORTABLE (1 VIEW)   Final Result         1. No acute cardiopulmonary abnormalities are identified.      DX-HIP-UNILATERAL-W/O PELVIS-2/3 VIEWS RIGHT   Final Result      Digitized intraoperative radiograph is submitted for review.  This examination is not for diagnostic purpose but for guidance during a surgical procedure.      DX-HIP-BILATERAL-WITH PELVIS-3/4 VIEWS   Final Result      1.  There are lytic lesions seen involving the right inferior ilium and right inferior obturator ring but there is no plain film evidence of pathologic fracture.      CT-NEEDLE BX-DEEP BONE   Final Result      1.  Successful CT-guided core biopsy of expansile mass involving right L3 transverse process.      CT-ABDOMEN-PELVIS WITH   Final Result      1.  Multiple lytic expansile bone lesions involving the pelvis and thoracolumbar spine as described above. There are areas of cortical disruption and extension into the surrounding soft tissues involving multiple of these lesions. The largest involves    the right ilium  and demonstrates some speckled calcification. Differential diagnosis includes metastatic disease as well as multiple myeloma.      2.  Destructive lesion involving the right proximal femur with some faint associated sclerosis. This is located within the intertrochanteric region and extends to an area of focal cortical breakthrough/disruption at the base of the right femoral neck    superiorly.      3.  Fatty heterogeneous liver likely representing presence of hepatocellular dysfunction.      4.  Bibasilar atelectasis and small bilateral pleural effusions.      5.  Ill-defined pulmonary nodules within the right lung base which of previously been evaluated with CT scan.      6.  6 mm left adrenal apex nodule      CT-CHEST (THORAX) WITH   Final Result      1.  2 noncalcified nodules in the right upper lobe. The largest is spiculated measuring 11 mm in maximum diameter. Differential diagnosis includes primary or secondary lung carcinoma.      2.  3 mm noncalcified nodule in the right middle lobe.      3.  No thoracic adenopathy.      4.  Small bilateral pleural effusions, left greater than right.      5.  Heterogeneous liver which may represent primary or secondary carcinoma.            DX-BONE SURVEY-METASTATIC LTD   Final Result      1.  Lytic foci within the calvaria, right iliac bone, and right distal clavicle      2.  Multiple additional lytic foci identified on recent CT are not apparent on conventional radiography      3.  Different diagnosis includes metastatic malignancy or multiple myeloma      CT-LSPINE W/O PLUS RECONS   Final Result      Destructive lesions within the right pedicle and transverse process of L3, the right sacral wing, and right iliac crest consistent with metastatic disease or multiple myeloma.   Multilevel degenerative changes.   Moderate spinal stenosis at L4-5.      CT-HIP W/O PLUS RECONS RIGHT   Final Result         1.  Multiple lytic expansile lesions in the pelvis and spine as  described.   2.  Hairline fracture of the acetabular roof on the right, likely pathologic fracture.   3.  Hairline lucency through the right femoral neck suggests hairline fracture           Assessment/Plan  * Intractable low back pain- (present on admission)   Assessment & Plan    Palliative following for pain management. Delirium improved with medication adjustments  Pain improved with medications and nailing of R femur.       Lytic bone lesion of hip- (present on admission)   Assessment & Plan    With hairline fracture of the right acetabular roof and femur neck.  Likely pathological.  BEATRICE with no increase in immunoglobulins.  Bone marrow biopsy 12/24 was negative for any abnormalities.  bone biopsy of L3 12/27 - adenocarcinoma favoring lung, pathology from IM nailing hip 12/29 pending.  CT scan of the chest showed 2 nodules in the right upper lobe with the largest one looks spiculated and measures around 11 mm.  MRI brain showed no intracranial metastasis but calvarial and C2 possible bony metastasis.  Bone scan showed extensive foci of activity in the spine, ribs, pelvis, calvarium, and femora.  Oncology will f/u outpatient with results of immunotherapy foundation testing.  Dr. Shah recommends simulation (done 1/3) and treatment started 1/7 for 10 fractions.         Gout- (present on admission)   Assessment & Plan    -Continue allopurinol  -Uric acid normal       Acute blood loss anemia   Assessment & Plan    Continues to bleed from surgical incision, transfuse to keep hgb >7.0  Lovenox discontinued  Ortho aware - continue with pressure dressing.  hgb 8.5 - 1/5/19 - likely the result yesterday was falsely low  1 unit transfused 1/4/19 - hgb 6.8  hgb 7.5 - 1/3/19  1 unit transfused 1/2/19 - hgb 6.7  1 unit transfused 1/1/19 - hgb 6.8  1 unit transfused 12/31/18 - hgb 6.2       Lesion of right femur- (present on admission)   Assessment & Plan    Destructive metastatic bone lesion on the right  femur.  Orthopedic surgery consulted.  Status post intramedullary nailing of the right femur on 12/29.   PT/OT.  WBAT.      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  MRI of the brain showed no intracranial metastasis but rather calvarial and C2 possible bone metastasis.  Pending bone scan.  Oncology follow up after discharge from hospital.  Pending mutation and genetic testing on biopsy.       Leukocytosis- (present on admission)   Assessment & Plan    Multifactorial: Steroid induced, UTI, leukemoid reaction  Afebrile       Acute cystitis- (present on admission)   Assessment & Plan    Completed abx         CKD (chronic kidney disease) stage 3, GFR 30-59 ml/min ([facility])- (present on admission)   Assessment & Plan    Stable for now.  Avoid nephrotoxins.  Renal dose all medications.     Hyperglycemia- (present on admission)   Assessment & Plan    Suspecting steroids induced.  No history of diabetes.  Continue to monitor.     Metabolic encephalopathy- (present on admission)   Assessment & Plan    UA positive for UTI - completed 5 day course of abx.  Medication induced - meds adjusted and mentation improved         Elevated INR (international normalized ratio)- (present on admission)   Assessment & Plan    - not on anticoagulants as an outpt         Elevated BUN- (present on admission)   Assessment & Plan    -Rehydrating with IV fluid     Elevated alkaline phosphatase level- (present on admission)   Assessment & Plan    -Likely related to bone lesions     Coronary artery disease- (present on admission)   Assessment & Plan    Status post CABG.  Continue with aspirin and statin.     Thrombocytopenia (HCC)- (present on admission)   Assessment & Plan    Chronic.  Continue to monitor.  Discontinue chemical anticoagulation if platelets drop below 50k  Transfuse if platelets drops below 10 K          VTE prophylaxis: heparin

## 2019-01-08 NOTE — PROGRESS NOTES
POD#9  S/P Hip Nail  Pathology positive for poorly differentiated carcinoma  WBAT  SCDs  Wound vac until dry  PT/OT

## 2019-01-08 NOTE — DISCHARGE INSTRUCTIONS
Discharge Instructions    Discharged to other by medical transportation with self. Discharged via wheelchair, hospital escort: Refused.  Special equipment needed: Wound VAC    Be sure to schedule a follow-up appointment with your primary care doctor or any specialists as instructed.     Discharge Plan:   Diet Plan: Discussed  Activity Level: Discussed  Confirmed Follow up Appointment: Appointment Scheduled  Medication Reconciliation Updated: Yes  Influenza Vaccine Indication: Not indicated: Previously immunized this influenza season and > 8 years of age    I understand that a diet low in cholesterol, fat, and sodium is recommended for good health. Unless I have been given specific instructions below for another diet, I accept this instruction as my diet prescription.   Other diet: Regular    Special Instructions: None    · Is patient discharged on Warfarin / Coumadin?   No     Depression / Suicide Risk    As you are discharged from this [facility] facility, it is important to learn how to keep safe from harming yourself.    Recognize the warning signs:  · Abrupt changes in personality, positive or negative- including increase in energy   · Giving away possessions  · Change in eating patterns- significant weight changes-  positive or negative  · Change in sleeping patterns- unable to sleep or sleeping all the time   · Unwillingness or inability to communicate  · Depression  · Unusual sadness, discouragement and loneliness  · Talk of wanting to die  · Neglect of personal appearance   · Rebelliousness- reckless behavior  · Withdrawal from people/activities they love  · Confusion- inability to concentrate     If you or a loved one observes any of these behaviors or has concerns about self-harm, here's what you can do:  · Talk about it- your feelings and reasons for harming yourself  · Remove any means that you might use to hurt yourself (examples: pills, rope, extension cords, firearm)  · Get professional help from  the community (Mental Health, Substance Abuse, psychological counseling)  · Do not be alone:Call your Safe Contact- someone whom you trust who will be there for you.  · Call your local CRISIS HOTLINE 566-2089 or 903-642-4473  · Call your local Children's Mobile Crisis Response Team [place] (682) 772-5414 or www.[employer]  · Call the toll free National Suicide Prevention Hotlines   · National Suicide Prevention Lifeline 163-979-YFGY (5936)  · National Hope Line Network 800-SUICIDE (308-0492)

## 2019-01-08 NOTE — PROGRESS NOTES
No issues overnight. Wound vac in place. Cannister filled at 90 cc with sanguinous fluid. Pt eager and happy for discharge soon. Pt calls appropriately for needs. Up one person with FWW to bathroom for safety. All needs met at this time.

## 2019-01-08 NOTE — PROGRESS NOTES
Palliative Care Advance Care Planning Progress Note  Received phone call from patient stating she has completed [address] [address] form and is ready for completion.  [address] form completed with choices as stated in previous note today.  Instructions provided that patient will discharge with REM[facility] form to SNF and advocated that she requested upon discharge.  Instructions given to place REMIGIO [address] form on refrigerator once home.  Completed form scanned into epic and original given to bedside nurse.    Thank you for allowing Palliative Care to participate in this patient's care. Please call our team with questions and/or additional needs.    Total visit time was 5 minutes discussing advance care planning and completing POLST.    JORGE LUIS Hooper.  Palliative Care Nurse Practitioner  [phone]

## 2019-01-08 NOTE — PROGRESS NOTES
Palliative Care Progress Note    General: Susie Craig is an 83 year old female admitted 12/21/18 for intractable lower back pain.  She has since been diagnosed with stage IV adenocarcinoma of the lung with extensive bony metastasis.  POD #9 s/p hip nailing.  She has started palliative radiation therapy and received treatment #2/10 today.  Discharge planning underway for SNF but barrier is transport to radiation therapy treatments.  Patient was originally seen by palliative care for pain management 12/26/18-12/31/18.  Pain has been well controlled since that time.  Dr. Marks had a discussion with patient on 1/6/19 and code status was changed to DNR/DNI.     Discussion:  Patient sitting up in bed eating lunch.  Explained I returned to assist with review/completion of POLST since code status change.  Patient confirmed her wishes for DNR/DNI.  She was unsure of section B on POLST and wanted to review it with her husbands/sons.  POLST partially completed for DNR/DNI and no feeding tube.  Patient is unsure about IVF.  She requested I stephan in pencil selective treatment and trail of IVF so she can review with her family.  Discussed that I can return to assist with completion and left business card.  Also expressed that hospitalist can assist with POLST and or provider at SNF if patient discharges she is not ready to complete.  All questions answered.     Provided therapeutic communication including open ended questions, reflective listening, and normalization of thoughts/feelings throughout encounter. Patient encouraged to call with any questions or needs.     Outcome: POLST partially completed.  Patient wishes to complete after review with her family.     Plan: [place] back if able prior to d/c or if patient calls to complete POLST.     Recommendations:   #2/10 radiation treatments today  Recommend discontinuation of steroids 1 week following end of radiation therapy treatments (1/25/19)    Thank you for allowing  Palliative Care to participate in this patient's care. Please call our team with questions and/or additional needs.    Total visit time was 18 minutes discussing advance care planning.    JORGE LUIS Hooper.  Palliative Care Nurse Practitioner  [phone]

## 2019-01-08 NOTE — THERAPY
"Physical Therapy Treatment completed.   Bed Mobility:  Supine to Sit: Contact Guard Assist  Transfers: Sit to Stand: Contact Guard Assist  Gait: Level Of Assist: Contact Guard Assist with Front-Wheel Walker       Plan of Care: Will benefit from Physical Therapy 3 times per week  Discharge Recommendations: Equipment: Will Continue to Assess for Equipment Needs. Post-acute therapy Recommend inpatient transitional care services for continued physical therapy services.      See \"Rehab Therapy-Acute\" Patient Summary Report for complete documentation.     Pt progressing well w/ therapy. Pt is very motivated to participate and gets very eager about her progress. Sometimes pts eagerness does turn into poor safety awareness. Pt continuously taking hands off the FWW while ambulating to show how far she has walked previous times. Pt was able to tolerate increased distances. She was provided w/ an HEP to assist w/ LE weakness. As per initial eval, pt will benefit from post acute therapy.  "

## 2019-01-08 NOTE — PROGRESS NOTES
Patient was brought down to Radiation Therapy department by transport.  Patient received treatment number  2 Of 10

## 2019-01-08 NOTE — DISCHARGE SUMMARY
Discharge Summary    CHIEF COMPLAINT ON ADMISSION  Chief Complaint   Patient presents with   • Low Back Pain     Right lower back pain, hx of sciatica       Reason for Admission  Back Pain     CODE STATUS  DNAR/DNI    HPI & HOSPITAL COURSE  Please see original H&P and consult note for specific information, patient is a 83 y.o. female admitted due to  uncontrolled back pain and CT showing lytic lesions in Right hip and lower back.  Bone marrow biopsy completed and normal.  SPEP and UPEP collected and normal.  L3 lesion biopsied and adenocarcinoma favoring lung origin.  IM nailing of impending pathologic fracture R hip 12/29.  Patient seen by oncology and awaiting foundation testing to determine course of treatment.  She was also seen by radiation oncology and started on radiation for lumbar spine, right pelvis and right femur.  She will need 10 radiation treatments started on 1/7/19.  She has been having large volume wound drainage and incisional vac is in place and will remain in place until dry as per ortho.  She did have some bleeding following surgery and required transfusions but has been stable.  She is not on anticoagulation or ASA because of this.  She will continue therapies at [facility] and will follow up with Dr Mccollum in 1-2 weeks for results of foundation testing to see if she is a candidate for immunotherapy.  She has daily radiation set up and separate schedule of times will be provided.       Therefore, she is discharged in fair and stable condition to skilled nursing facility.    The patient met 2-midnight criteria for an inpatient stay at the time of discharge.     Physical exam is unchanged.     FOLLOW UP ITEMS POST DISCHARGE  Dr Mccollum 1-2 weeks for f/u  PCP aftr dc from [facility] - Dr Ocasio    DISCHARGE DIAGNOSES  Principal Problem:    Intractable low back pain POA: Yes      Overview: 4/20/18 MRI lumbar spine mild to moderate central canal stenosis and       moderate right-sided neuroforaminal  narrowing L3-L4  Active Problems:    Lytic bone lesion of hip POA: Yes    Gout POA: Yes    Thrombocytopenia (HCC) POA: Yes      Overview: 8/12/09 plt 195      3/28/13 plt 152      11/7/16 plt 115      5/12/17 plt 155      9/25/17 plt 139    Coronary artery disease POA: Yes    Elevated alkaline phosphatase level POA: Yes    Elevated BUN POA: Yes    Elevated INR (international normalized ratio) POA: Yes    Metabolic encephalopathy POA: Yes    Hyperglycemia POA: Yes    CKD (chronic kidney disease) stage 3, GFR 30-59 ml/min (HCC) (Chronic) POA: Yes    DNR (do not resuscitate) discussion POA: Unknown    Acute cystitis POA: Yes    Leukocytosis POA: Yes    Lung cancer (HCC) POA: Yes    Lesion of right femur POA: Yes    Acute blood loss anemia POA: Unknown  Resolved Problems:    * No resolved hospital problems. *      FOLLOW UP  Future Appointments  Date Time Provider Department Center   1/9/2019 9:15 AM RADIATION THERAPY [facility] None   1/9/2019 9:45 AM Freddy Shah M.D. [facility] None   1/10/2019 9:15 AM RADIATION THERAPY [facility] None   1/11/2019 9:15 AM RADIATION THERAPY [facility] None   1/14/2019 9:15 AM RADIATION THERAPY [facility] None   1/15/2019 9:15 AM RADIATION THERAPY [facility] None   1/16/2019 9:15 AM RADIATION THERAPY RAD[facility] None   1/17/2019 8:45 AM [facility] None   1/17/2019 9:15 AM RADIATION THERAPY RADT None   1/18/2019 9:15 AM RADIATION THERAPY RADT None   1/29/2019 1:00 PM Lesli Ocasio M.D. [facility] None   10/1/2019 3:00 PM Carla Goode M.D. [facility] None     Lesli Ocasio M.D.  [address]  [address]  [facility] [id number] [phone]          Crispin Mccollum III, M.D.  [address]  [address]  [facility] [id number] [phone]    In 2 weeks        MEDICATIONS ON DISCHARGE     Medication List      START taking these medications      Instructions   dexamethasone 2 MG tablet  Commonly known as:  DECADRON   Take 1 Tab by mouth every day.  Dose:  2 mg     * morphine ER 15 MG Tbcr tablet  Commonly known as:  MS  CONTIN   Take 1 Tab by mouth every 12 hours for 5 days.  Dose:  15 mg     * morphine 15 MG tablet  Commonly known as:  MS IR   Take 0.5 Tabs by mouth every 3 hours as needed for up to 5 days.  Dose:  7.5 mg     ondansetron 4 MG Tbdp  Commonly known as:  ZOFRAN ODT   Take 1 Tab by mouth every four hours as needed (give PO if IV route is unavailable. May give per feeding tube.).  Dose:  4 mg     polyethylene glycol/lytes Pack  Commonly known as:  MIRALAX   Take 1 Packet by mouth every day.  Dose:  17 g     senna-docusate 8.6-50 MG Tabs  Commonly known as:  PERICOLACE or SENOKOT S   Take 1 Tab by mouth 2 Times a Day.  Dose:  1 Tab     simethicone 80 MG Chew  Commonly known as:  MYLICON   Take 1 Tab by mouth 3 times a day as needed.  Dose:  80 mg        * This list has 2 medication(s) that are the same as other medications prescribed for you. Read the directions carefully, and ask your doctor or other care provider to review them with you.            CHANGE how you take these medications      Instructions   gabapentin 100 MG Caps  What changed:  · how much to take  · when to take this  Commonly known as:  NEURONTIN   Take 2 Caps by mouth 2 Times a Day.  Dose:  200 mg     * metoprolol 25 MG Tabs  What changed:  · medication strength  · how much to take  · when to take this  · additional instructions  Commonly known as:  LOPRESSOR   Take 1 Tab by mouth every evening.  Dose:  25 mg     * metoprolol 25 MG Tabs  What changed:  You were already taking a medication with the same name, and this prescription was added. Make sure you understand how and when to take each.  Commonly known as:  LOPRESSOR   Take 1 Tab by mouth every morning.  Dose:  25 mg        * This list has 2 medication(s) that are the same as other medications prescribed for you. Read the directions carefully, and ask your doctor or other care provider to review them with you.            CONTINUE taking these medications      Instructions   allopurinol 100 MG  Tabs  Commonly known as:  ZYLOPRIM   Take 200 mg by mouth every day.  Dose:  200 mg     CRESTOR 40 MG tablet  Generic drug:  rosuvastatin   Take 40 mg by mouth every evening.  Dose:  40 mg        STOP taking these medications    aspirin EC 81 MG Tbec  Commonly known as:  ECOTRIN     HYDROcodone-acetaminophen 5-325 MG Tabs per tablet  Commonly known as:  NORCO     methylPREDNISolone 4 MG Tabs  Commonly known as:  MEDROL            Allergies  Allergies   Allergen Reactions   • Atorvastatin      Elevated LFT  RXN = long time ago        DIET  Orders Placed This Encounter   Procedures   • Diet Order Regular     Standing Status:   Standing     Number of Occurrences:   1     Order Specific Question:   Diet:     Answer:   Regular [1]       ACTIVITY  As tolerated.  Weight bearing as tolerated    LINES, DRAINS, AND WOUNDS  This is an automated list. Peripheral IVs will be removed prior to discharge.       Surgical Incision  Incision Heart Prep (Active)       Surgical Incision  Incision Right Leg Lower (Active)       Surgical Incision  Incision Right Leg Upper (Active)                  MENTAL STATUS ON TRANSFER  Level of Consciousness: Alert  Orientation : Oriented x 4  Speech: Speech Clear    CONSULTATIONS  Dr Chun Shah     PROCEDURES  12/24/18 - Bone marrow biopsy - Michael  12/27/18 - L3 bone biospy - Dada  12/29/18 IM nailing right proximal femur, pathologic fracture - Hussein    LABORATORY  Lab Results   Component Value Date    SODIUM 140 01/02/2019    POTASSIUM 4.5 01/02/2019    CHLORIDE 111 01/02/2019    CO2 25 01/02/2019    GLUCOSE 116 (H) 01/02/2019    BUN 25 (H) 01/02/2019    CREATININE 0.96 01/02/2019        Lab Results   Component Value Date    WBC 7.9 01/08/2019    HEMOGLOBIN 8.9 (L) 01/08/2019    HEMATOCRIT 27.9 (L) 01/08/2019    PLATELETCT 134 (L) 01/08/2019        Total time of the discharge process exceeds 32 minutes.

## 2019-01-09 NOTE — PROGRESS NOTES
Patient transported with Kalia from [facility]. Identification verified. Patient verbalizes understanding of plan of care. POLST sent with patient. Appointment scheduled with Dr. Savage. Patient notified, and appointment written down. Patient pottied prior to discharge.

## 2019-01-09 NOTE — DISCHARGE PLANNING
PT discharged to [facility] discharge packet with scripts given to her nurse.

## 2019-01-09 NOTE — DISCHARGE PLANNING
Agency/Facility Name: [facility]  Outcome: Lori (admissions) request CCA to fax DC Summary. Faxed @ [phone].

## 2019-01-10 NOTE — PROGRESS NOTES
Transitional Care Navigator:    Situation    Pt is receiving therapies at [facility] after being hospitalized from 12/21/18 to 1/8/19. She was admitted to [facility] after an ER visit r/t unrelieved pain revealed lytic bone lesions  contributing to a pathologic fracture in the right hip. She underwent hip nailing on 12/19. Pt was later found to have non-small cell lung cancer.   Background       Pt lives locally with her . They have four adult children and multiple grand and great-grand children.   She will receive therapies r/t hip nailing, as well as radiation tx.   Assessment    Plan d/c home when appropriate, with family support.   Recommendation Mrs. Craig does not meet the criteria for the CCM program, however, will be followed for 30 days post discharge from SNF.

## 2019-01-15 NOTE — OP THERAPY DISCHARGE SUMMARY
Outpatient Physical Therapy  DISCHARGE SUMMARY NOTE      [facility] Therapy [address]  [address]  [place] [address]  Phone:  [phone]  Fax:  [phone]    Date of Visit: 01/15/2019    Patient: Susie Carig  YOB: 1935  MRN: [id number]     Referring Provider: No referring provider defined for this encounter.   Referring Diagnosis No admission diagnoses are documented for this encounter.     Physical Therapy Occurrence Codes    Date physical therapy care plan established or reviewed:  12/6/18   Date physical therapy treatment started:  12/6/18          Functional Limitations and Severity Modifiers      Goal:     Discharge:         Your patient is being discharged from Physical Therapy with the following comments:   · Goals not met    Comments:  Patient seen for  3 visits with no change in symptoms.      Limitations Remaining:  Intractable lumbar and RLE radiculopathy, Right hip weakness and impaired gait secondary difficulty weightbearing on RLE secondary to pain and weakness.  Per Epic chart, patient admitted to hospital with dx of lung CA and pathological fx right hip.      Recommendations:  Discharge from PT     Argelia Umaña PT, MSPT    Date: 1/15/2019

## 2019-01-15 NOTE — LETTER
January 15, 2019    Dear DR. Bronson      Re:  Susie Craig          Dear Dr. Bronson,    It was a pleasure seeing your patient, Susie Craig, on 1/15/2019.      Please find enclosed a copy of this visit encounter which includes the history I obtained from Ms. Craig, my physical examination findings, my impression and recommendations.      Once again, it was a pleasure participating in your patient's care.  Please feel free to contact me if you have any questions or if I can be of any further assistance to your patients.        Sincerely,          Argelia Umaña PT, MSPT                           Outpatient Physical Therapy  DISCHARGE SUMMARY NOTE      [address] [address]  Phone:  [phone]  Fax:  [phone]    Date of Visit: 01/15/2019    Patient: Susie Craig  YOB: 1935  MRN: [id number]     Referring Provider: No referring provider defined for this encounter.   Referring Diagnosis No admission diagnoses are documented for this encounter.     Physical Therapy Occurrence Codes    Date physical therapy care plan established or reviewed:  12/6/18   Date physical therapy treatment started:  12/6/18          Functional Limitations and Severity Modifiers      Goal:     Discharge:         Your patient is being discharged from Physical Therapy with the following comments:   · Goals not met    Comments:  Patient seen for  3 visits with no change in symptoms.      Limitations Remaining:  Intractable lumbar and RLE radiculopathy, Right hip weakness and impaired gait secondary difficulty weightbearing on RLE secondary to pain and weakness.  Per Epic chart, patient admitted to hospital with dx of lung CA and pathological fx right hip.      Recommendations:  Discharge from PT     Argelia Umaña PT, MSPT    Date: 1/15/2019

## 2019-01-22 NOTE — PROGRESS NOTES
Phone call to letter that was sent out from navigation.  Patient stated that it was not a good time for her to talk she is going to take a nap.  She said that she would call me back after reading it so she could better understand it.

## 2019-01-28 NOTE — PROGRESS NOTES
Transitional Care Navigator:    Situation    Second post SNF follow up call.   Background       Pt discharged from [facility] 1/19/19 after short SNF stay for therapies following ORIF.   Assessment    Pt states that she has ample support from her family and the Cancer nurse navigator.   Recommendation TCN will close this episode at the patient's request.

## 2019-01-29 NOTE — PROGRESS NOTES
Established Patient    Susie presents today with the following:    CC: Follow up on medical problmes    HPI:   83 year old lady with PMH of CAD. Hyperlipidemia, HTN, has been recently diagnosed with LUNG CA.    Patient was recently admitted to hospital with severe back pain , lytic lesions over R hip and lower back were shown on CT. Biopsy of L3 shows adenocarcinoma with lung origin. IM nailing was also completed for impending path alogic fracture of right hip. She has also had 10 radiation treatments for her lung CA so far. She will be seen by Oncology on Monday next week to discuss chemo treatment.  She has no SOB, no cough, no fever or chills, she states that her appetite has worsened and she is losing weight.     She also feels sore over her tail bone and she is requesting me to look at it, She also has swelling of RLE since her surgery which is not resolving, US or RLE was done on 1/23 which r/o ed any obvious thrombosis.    She states that her diarrhea and her insomnia resolved and she does not need to take Mirtazapine any more.        Patient Active Problem List    Diagnosis Date Noted   • Lytic bone lesion of hip 12/21/2018     Priority: High   • Gout 11/26/2018     Priority: Medium   • Thrombocytopenia (HCC) 11/07/2016     Priority: Low   • Acute blood loss anemia 01/01/2019   • Leukocytosis 12/29/2018   • Lung cancer (HCC) 12/29/2018   • Lesion of right femur 12/29/2018   • DNR (do not resuscitate) discussion 12/28/2018   • Acute cystitis 12/28/2018   • Metabolic encephalopathy 12/25/2018   • Hyperglycemia 12/25/2018   • CKD (chronic kidney disease) stage 3, GFR 30-59 ml/min ([facility]) 12/25/2018   • Elevated alkaline phosphatase level 12/21/2018   • Elevated BUN 12/21/2018   • Elevated INR (international normalized ratio) 12/21/2018   • DDD (degenerative disc disease), lumbar 08/16/2018   • Intractable low back pain 04/20/2018   • Hip arthritis 03/23/2018   • Osteopenia 12/27/2017   • Subclinical  hypothyroidism 09/29/2017   • Coronary artery disease 09/24/2017   • Pulmonary nodule 05/16/2017   • Essential hypertension 03/26/2017   • Renal dysfunction 11/05/2015   • Primary insomnia 06/04/2012   • LBBB (left bundle branch block) 07/27/2011   • Dyslipidemia 07/26/2011   • 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 30 Tab 2   • gabapentin (NEURONTIN) 100 MG Cap Take 2 Caps by mouth 2 Times a Day. 90 Cap    • metoprolol (LOPRESSOR) 25 MG Tab Take 1 Tab by mouth every evening. 60 Tab    • metoprolol (LOPRESSOR) 25 MG Tab Take 1 Tab by mouth every morning. 60 Tab    • dexamethasone (DECADRON) 2 MG tablet Take 1 Tab by mouth every day. 10 Tab 0   • ondansetron (ZOFRAN ODT) 4 MG TABLET DISPERSIBLE Take 1 Tab by mouth every four hours as needed (give PO if IV route is unavailable. May give per feeding tube.). 10 Tab 0   • polyethylene glycol/lytes (MIRALAX) Pack Take 1 Packet by mouth every day.  3   • senna-docusate (PERICOLACE OR SENOKOT S) 8.6-50 MG Tab Take 1 Tab by mouth 2 Times a Day. 30 Tab 0   • simethicone (MYLICON) 80 MG Chew Tab Take 1 Tab by mouth 3 times a day as needed. 30 Tab 3   • allopurinol (ZYLOPRIM) 100 MG Tab Take 200 mg by mouth every day.     • rosuvastatin (CRESTOR) 40 MG tablet Take 40 mg by mouth every evening.       No current facility-administered medications for this visit.        ROS: As per HPI. Additional pertinent symptoms as noted below.    14 points of ROS were reviewed with patient and were negative except the ones mentioned in HPI.    LMP 07/06/2011     Physical Exam   Constitutional:  oriented to person, place, and time. No distress.   Eyes: Pupils are equal, round, and reactive to light. No scleral icterus.  Neck: Neck supple. No thyromegaly present.   Cardiovascular: Normal rate, regular rhythm and normal heart sounds.  Exam reveals no gallop and no  friction rub.  No murmur heard.  Pulmonary/Chest: Breath sounds normal. Chest wall is not dull to percussion.   Musculoskeletal:   Has +1 edema over RLE.edema.   Has stage 2 pressure ulcer 1.2 cm x 1.2 cm over her tail bone.  Has some erythema around it but no signs of infection.  Lymphadenopathy: no cervical adenopathy  Neurological: alert and oriented to person, place, and time.   Skin: No cyanosis. Nails show no clubbing.      Note: I have reviewed all pertinent labs and diagnostic tests associated with this visit with specific comments listed under the assessment and plan below  Results for WILVER PRETTY (MRN [id number]) as of 1/29/2019 13:32   Ref. Range 1/21/2019 13:00   Sodium Latest Ref Range: 135 - 145 mmol/L 139   Potassium Latest Ref Range: 3.6 - 5.5 mmol/L 4.0   Chloride Latest Ref Range: 96 - 112 mmol/L 106   Co2 Latest Ref Range: 20 - 33 mmol/L 24   Anion Gap Latest Ref Range: 0.0 - 11.9  9.0   Glucose Latest Ref Range: 65 - 99 mg/dL 98   Bun Latest Ref Range: 8 - 22 mg/dL 10   Creatinine Latest Ref Range: 0.50 - 1.40 mg/dL 0.66   GFR If  Latest Ref Range: >60 mL/min/1.73 m 2 >60   GFR If Non  Latest Ref Range: >60 mL/min/1.73 m 2 >60   Calcium Latest Ref Range: 8.5 - 10.5 mg/dL 9.4   AST(SGOT) Latest Ref Range: 12 - 45 U/L 25   ALT(SGPT) Latest Ref Range: 2 - 50 U/L 33   Alkaline Phosphatase Latest Ref Range: 30 - 99 U/L 237 (H)   Total Bilirubin Latest Ref Range: 0.1 - 1.5 mg/dL 1.1   Albumin Latest Ref Range: 3.2 - 4.9 g/dL 3.8   Total Protein Latest Ref Range: 6.0 - 8.2 g/dL 5.9 (L)   Globulin Latest Ref Range: 1.9 - 3.5 g/dL 2.1   A-G Ratio Latest Units: g/dL 1.8     Results for WILVER PRETTY (MRN [id number]) as of 1/29/2019 13:32   Ref. Range 1/8/2019 00:14   WBC Latest Ref Range: 4.8 - 10.8 K/uL 7.9   RBC Latest Ref Range: 4.20 - 5.40 M/uL 2.99 (L)   Hemoglobin Latest Ref Range: 12.0 - 16.0 g/dL 8.9 (L)   Hematocrit Latest Ref Range: 37.0 - 47.0 %  27.9 (L)   MCV Latest Ref Range: 81.4 - 97.8 fL 93.3   MCH Latest Ref Range: 27.0 - 33.0 pg 29.8   MCHC Latest Ref Range: 33.6 - 35.0 g/dL 31.9 (L)   RDW Latest Ref Range: 35.9 - 50.0 fL 53.4 (H)   Platelet Count Latest Ref Range: 164 - 446 K/uL 134 (L)   MPV Latest Ref Range: 9.0 - 12.9 fL 10.1     Assessment and Plan    1. Malignant neoplasm of upper lobe of lung, unspecified laterality (HCC)  Please look at [facility] for more information, she is s/p radiation treatment x 10 sessions. She will she her oncologist next Monday to discuss starting Chemo.     2. Dyslipidemia  On Crestor. Last AST and ALT were in normal range. Last LDL was 69 on 10/1/2018.    3. S/P CABG x 3  On metoprolol and Crestor. No symptoms, under care of cardiology.    4. Primary insomnia   Used to be on Ambien. Started Mirtazapine recently.     5. Essential hypertension  She brought a BP log and his BP monitor to [facility] clinic. Her Monitor was showing BP higher that ours in clinic and her BP on log was in good range , so will not make any changes.    6. Gout, unspecified cause, unspecified chronicity, unspecified site  On allopurinol. No recent attacks.    7- anemia  Last HGb was 8.9 on 1/8/19. She declines another CBC for follow up.    8- Pressure ulcer.  Urgently referred to wound clinic. ADA Miramontes called to make appointment for today.    Followup: 2 weeks.      Signed by: Lesli Ocasio M.D.

## 2019-01-30 NOTE — PROGRESS NOTES
Could you please make sure we obtain urgent pre authorization for wound clinic for her today and schedule in wound clinic for today. Thank you

## 2019-01-31 NOTE — TELEPHONE ENCOUNTER
I called and spoke with Ms. Shirley Vincent in referral office. She stated that she checked the status this morning and it is pending RN review. I discussed with her that this is urgent and we need approval and appointment with wound clinic today. She will kindly work on it and let us know.

## 2019-02-01 NOTE — PATIENT INSTRUCTIONS
Pt instr increased protein diet, use of MVI and Arginaid supplementation (check if ok with PCP first); instr s/s infection, increased erythema and edema/fever/chills/N+V when to call MD/go to ER. Instr rational for wound care products. Instr to make appts for 1 x week. Instr to keep dressings clean and dry, shower on clinic days right before coming in. Wound care folder with written instr (including fall prevention) given to pt. Should you experience any significant changes in your wound(s) such as infection (redness, swelling, localized heat, increased pain, fever >101 F, chills) or have any questions regarding your home care instructions, please contact the wound center [phone]. If after hours, contact your primary care physician or go the hospital emergency room.  Keep dressing clean and dry and cover while bathing. Change dressing if over saturated, soiled or its falling off, or after 4-5 days. Cleanse wound with saline and gauze, dry skin with gauze then apply skin prep, then zinc oxide to skin around wound to protect it. Next, cut a small piece of honey dressing and apply this to wound area. Cover with a foam dressing, then secure with tape.           Pt with good understanding.

## 2019-02-01 NOTE — CERTIFICATION
"Advanced Wound Care  [facility]  [address]  [address]  [place] [address]  [phone] Fax: [phone]      Initial Evaluation  For Certification Period:02/01/2019 - 04/21/2019      Referring Physician: Lesli Lynn MD  Primary Physician: same       Consulting Physicians:         Wound(s):stage 2 pressure injury coccyx L89.302  Start of Care: 02/01/2019       Subjective:       Pt is an 83 year old lady who has a hx of pathologic fracture, right proximal femur and underwent  Intramedullary nailing, right hip on 12/29/2018 by Dr. Savage. She was dc'd from hosp to rehab, and at some point developed a stage 2 pressure injury of her coccyx. She is now home and ambulatory, and was referred to AWC by her PCP.                   Pain:   Pt c/o area \"sore if I sit on it\", otherwise denies pain         Past Medical History:  Past Medical History:   Diagnosis Date   • Arthritis    • Back pain    • Breath shortness     with exertion   • CAD (coronary artery disease)     CABG X3 in '16 with graft occlusion post-op   • Cataract     bilat IOL    • Gout    • Heart attack (HCC)    • Heart burn    • High cholesterol    • Hypertension    • Indigestion    • MEDICAL HOME    • Stroke (HCC)     TIA      Current Medications:  Current Outpatient Prescriptions:   •  MethylPREDNISolone (MEDROL DOSEPAK) 4 MG Tablet Therapy Pack, , Disp: , Rfl:   •  mirtazapine (REMERON) 15 MG Tab, Take 0.5 Tabs by mouth every bedtime., Disp: 30 Tab, Rfl: 2  •  gabapentin (NEURONTIN) 100 MG Cap, Take 2 Caps by mouth 2 Times a Day. (Patient not taking: Reported on 1/29/2019), Disp: 90 Cap, Rfl:   •  metoprolol (LOPRESSOR) 25 MG Tab, Take 1 Tab by mouth every evening., Disp: 60 Tab, Rfl:   •  metoprolol (LOPRESSOR) 25 MG Tab, Take 1 Tab by mouth every morning. (Patient not taking: Reported on 1/29/2019), Disp: 60 Tab, Rfl:   •  dexamethasone (DECADRON) 2 MG tablet, Take 1 Tab by mouth every day., Disp: 10 Tab, Rfl: 0  •  " ondansetron (ZOFRAN ODT) 4 MG TABLET DISPERSIBLE, Take 1 Tab by mouth every four hours as needed (give PO if IV route is unavailable. May give per feeding tube.). (Patient not taking: Reported on 1/29/2019), Disp: 10 Tab, Rfl: 0  •  polyethylene glycol/lytes (MIRALAX) Pack, Take 1 Packet by mouth every day. (Patient not taking: Reported on 1/29/2019), Disp: , Rfl: 3  •  senna-docusate (PERICOLACE OR SENOKOT S) 8.6-50 MG Tab, Take 1 Tab by mouth 2 Times a Day. (Patient not taking: Reported on 1/29/2019), Disp: 30 Tab, Rfl: 0  •  simethicone (MYLICON) 80 MG Chew Tab, Take 1 Tab by mouth 3 times a day as needed. (Patient not taking: Reported on 1/29/2019), Disp: 30 Tab, Rfl: 3  •  allopurinol (ZYLOPRIM) 100 MG Tab, Take 200 mg by mouth every day., Disp: , Rfl:   •  rosuvastatin (CRESTOR) 40 MG tablet, Take 40 mg by mouth every evening., Disp: , Rfl:   Allergies: Atorvastatin    Past Surgical History:   Past Surgical History:   Procedure Laterality Date   • HIP NAILING INTRAMEDULLARY Right 12/29/2018    Procedure: HIP NAILING INTRAMEDULLARY;  Surgeon: Alejo Savage M.D.;  Location: [facility];  Service: Orthopedics   • MULTIPLE CORONARY ARTERY BYPASS ENDO VEIN HARVEST  11/6/2016    Procedure: MULTIPLE CORONARY ARTERY BYPASS ENDO VEIN HARVEST;  Surgeon: Jeff Naranjo M.D.;  Location: [facility];  Service:    • ABIODUN  11/6/2016    Procedure: ABIODUN;  Surgeon: Jeff Naranjo M.D.;  Location: [facility];  Service:    • CATARACT PHACO WITH IOL  8/31/2009    Performed by SOLOMON THOMAS at SURGERY SAME DAY [facility] ORS   • CATARACT PHACO WITH IOL  8/20/2009    Performed by SOLOMON THOMAS at SURGERY SAME DAY [facility] ORS   • GYN SURGERY  1951    Hysterectomy during last C Section    • ABDOMINAL HYSTERECTOMY TOTAL     • ANGIOPLASTY  85, 97   • CARDIAC CATH     • GYN SURGERY      C Section x4   • OTHER      c-sections x4   • PRIMARY C SECTION       Social History:    Social History      Social History   • Marital status:      Spouse name: N/A   • Number of children: N/A   • Years of education: N/A     Occupational History   • 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 Culture deferred as there are no clinical ss infection     Orthotic, protective, supportive devices: pt says she has a memory foam pressure relieving cushion (not a donut)    Fall Risk Assessment (stephan all that apply with an X):pt not identified as a fall risk            X  65 years or older                   Fall within the last 2 years, uses      Ambulatory devices       Loss of protective sensation in feet,        Use of prostethic/orthotic, years                   Presence of lower extremity/foot/toe amputation                 Taking medication that increases risk (per facility policy)               Pressure Injury 02/01/19 Coccyx stage 2 pressure injury of coccyx  (Active)   Wound Image   2/1/2019 10:00 AM   Pressure Injury Stage 2 2/1/2019 10:00 AM   State of Healing Early/partial granulation 2/1/2019 10:00 AM   Site Assessment Pink;Yellow 2/1/2019 10:00 AM   Nina-wound Assessment Clean;Dry;Intact 2/1/2019 10:00 AM   Margins Attached edges;Defined edges 2/1/2019 10:00 AM   Wound Length (cm) 2 cm 2/1/2019 10:00 AM   Wound Width (cm) 0.8 cm 2/1/2019 10:00 AM   Wound Depth (cm) 0.1 cm 2/1/2019 10:00 AM   Wound Surface Area (cm^2) 1.6 cm^2 2/1/2019 10:00 AM   Tunneling 0 cm 2/1/2019 10:00 AM   Undermining 0 cm 2/1/2019 10:00 AM   Closure Secondary intention 2/1/2019 10:00 AM    Drainage Amount Scant 2/1/2019 10:00 AM   Drainage Description Serosanguineous 2/1/2019 10:00 AM   Non-staged Wound Description Not applicable 2/1/2019 10:00 AM   Treatments Cleansed 2/1/2019 10:00 AM   Cleansing Approved Wound Cleanser 2/1/2019 10:00 AM   Periwound Protectant Barrier Paste;Skin Protectant wipes to Periwound 2/1/2019 10:00 AM   Dressing Options Adhesive Foam;Honey Colloid;Hypafix Tape 2/1/2019 10:00 AM   Dressing Cleansing/Solutions Normal Saline 2/1/2019 10:00 AM   Dressing Changed New 2/1/2019 10:00 AM   Dressing Status Clean;Dry;Intact 2/1/2019 10:00 AM   Dressing Change Frequency Every 72 hrs 2/1/2019 10:00 AM   WOUND NURSE ONLY - Odor None 2/1/2019 10:00 AM   WOUND NURSE ONLY - Pulses Not palpable 2/1/2019 10:00 AM   WOUND NURSE ONLY - Exposed Structures None 2/1/2019 10:00 AM   WOUND NURSE ONLY - Tissue Type and Percentage 80% pink viable, 20% adherent yellow post CSWD 2/1/2019 10:00 AM        Incision 12/29/18 Leg (Active)     Negative Pressure Wound Therapy (Active)          Patient Education: Pt instr increased protein diet, use of MVI and Arginaid supplementation (check if ok with PCP first); instr s/s infection, increased erythema and edema/fever/chills/N+V when to call MD/go to ER. Instr rational for wound care products. Instr to make appts for 2 x week. Instr to keep dressings clean and dry, shower on clinic days right before coming in. Wound care folder with written instr (including fall prevention) given to pt. Pt and spouse instr handwashing and dressing change technique - good understanding.       Professional Collaboration: Eval sent to referring provider via EPIC      Assessment:      Wound etiology: pressure injury    Wound Progress:  Intial eval - TBD    Rationale for Treatment:Medihoney colloid to provide moist wound environment, and facilitate autolytic debridement    Patient tolerance/compliance: Pt tolerated care well. Appears receptive to instr, and motivated to  heal    Complicating factors:age    Need for ongoing Advanced Wound Care services:continued skilled wound care for debridement as needed, dressing management and skilled clinical observation to prevent complications and expedite healing.        Plan:      Treatment Plan and Recommendations:  Diagnosis/ICD9: stage 2 pressure injury coccyx L89.302    Procedures/CPT: Debridement, selective <20cm2 RN    Frequency: 1 x week      At the time of each visit a thorough assessment of the patient is completed to assure the  appropriateness of our plan of care.  The dressings or modalities may need to be adapted   from the original plan to address any significant changes in the wound environment.          Clinician Signature:_______________________________Date__________________      Physician Signature:______________________________Date:__________________

## 2019-02-01 NOTE — PROCEDURES
Wound was selectively debrided with a curette to remove thick biofilm. Area debrided < 20cm2. Pt casandra well.

## 2019-02-06 NOTE — PATIENT INSTRUCTIONS
Discussed POC, wound care rationale, dressing selection and to return to AWC weekly for appts. Pt instructed to keep dressing CDI and to return to ER for any s/s infection, n/v, fever or chills. Pt verbalized understanding to all.

## 2019-02-06 NOTE — PROCEDURES
cswd using scalpel to remove ~1cm2 slough from coccyx wound.  Pt tolerated well and reported small amt of pain with procedure.

## 2019-02-11 NOTE — TELEPHONE ENCOUNTER
Diesy Talavera R.N.   Phone Number: [phone]             LA/sumit     Pt calling for clarification of dosing instructions on metoprolol (LOPRESSOR) 25 MG.     Please call Susie at [phone]      Called pt, pt reports she has been taking Metoprolol 50mg 1/2 tab in AM and 1 tab at night for a long time, recently her Rx has been changed, informed pt that looks like in her recent discharged from hospital it was changed to Metoprolol 25mg PO BID, pt reports she has been taking Metoprolol as her previous dose, she does not checked her BP and HR at home but denies any palpitations or heart racing. She would like to know if she should changed on the new dose or continue with what she's been taking. She also mentioned that she starts chemo for bone cancer on Fri. Informed pt will clarify dosing w/ Dr Goode and will let her know her recommendations, pt verbalizes understanding    To Dr Goode

## 2019-02-12 NOTE — TELEPHONE ENCOUNTER
Carla Goode M.D.   You 4 minutes ago (4:44 PM)      Continue current dosing as long as she’s feeling well. (Routing comment)        Attempted to call pt, no answer, detailed vm left regarding above information     New Rx for Metoprolol 50mg 1/2 tab in AM and 1 tab QPM sent to [facility].

## 2019-02-13 PROBLEM — R54 ADVANCED AGE: Status: ACTIVE | Noted: 2019-01-01

## 2019-02-13 PROBLEM — L89.153 PRESSURE INJURY OF COCCYGEAL REGION, STAGE 3 (HCC): Status: ACTIVE | Noted: 2019-01-01

## 2019-02-13 PROBLEM — C79.9 METASTATIC ADENOCARCINOMA (HCC): Status: ACTIVE | Noted: 2019-01-01

## 2019-02-13 NOTE — PROGRESS NOTES
Outcome: Left Message    Please transfer to Patient Outreach Team at [phone] when patient returns call.    WebIZ Checked & Epic Updated:  yes    [facility] Verified: yes    Attempt # 1

## 2019-02-13 NOTE — PROGRESS NOTES
" Provider Encounter- Pressure Injury          HISTORY OF PRESENT ILLNESS     START OF CARE IN CLINIC:2/1/19    REFERRING PROVIDER: Lesli Lynn MD     WOUND- Pressure injury   STAGE:III,    LOCATION: Coccyx   WOUND HISTORY: Patient was hospitalized on 12/21/18 for intractable low back pain.  She underwent an IM nailing of a pathological fracture to her right hip on 12/29.  During this hospitalization she was found to have lung cancer and underwent radiation therapy.  She was discharged on 1/8 to a skilled nursing facility for 7 days, and then to home.  Once she was home she noticed a, \"scab\" to her coccyx.  She was seen by her PCP and was referred to the wound clinic.   PREVIOUS TREATMENT: Medical   PERTINENT PMH: Newly diagnosed with lung cancer, CKD stage III, coronary artery disease, pathological fractures     IMAGING: None for this wound   LAST  WOUND CULTURE:  DATE : None found in epic                OFFLOADING: Patient states she does not lie on her backside in bed, leans to one side or the other when sitting.    DIABETES: None  TOBACCO USE: Quit smoking over 30 years ago      2/13: Patient presents today for initial provider evaluation.  This wound was previously documented as a stage II by wound nurse.  However due to the presence of slough, this is actually a shallow stage III.  She states that she is pretty active, has continue most of her daily activities.  She is eating well.  She will be starting chemotherapy next week.    RESULTS:    N/A      PAST MEDICAL HISTORY:   Past Medical History:   Diagnosis Date   • Arthritis    • Back pain    • Breath shortness     with exertion   • CAD (coronary artery disease)     CABG X3 in '16 with graft occlusion post-op   • Cataract     bilat IOL    • Gout    • Heart attack (HCC)    • Heart burn    • High cholesterol    • Hypertension    • Indigestion    • MEDICAL HOME    • Stroke (HCC)     TIA        PAST SURGICAL HISTORY:   Past Surgical History:   Procedure " Laterality Date   • HIP NAILING INTRAMEDULLARY Right 12/29/2018    Procedure: HIP NAILING INTRAMEDULLARY;  Surgeon: Alejo Savage M.D.;  Location: SURGERY [facility];  Service: Orthopedics   • MULTIPLE CORONARY ARTERY BYPASS ENDO VEIN HARVEST  11/6/2016    Procedure: MULTIPLE CORONARY ARTERY BYPASS ENDO VEIN HARVEST;  Surgeon: Jeff Naranjo M.D.;  Location: SURGERY [facility];  Service:    • ABIODUN  11/6/2016    Procedure: ABIODUN;  Surgeon: Jeff Naranjo M.D.;  Location: SURGERY [facility];  Service:    • CATARACT PHACO WITH IOL  8/31/2009    Performed by SOLOMON THOMAS at SURGERY SAME DAY [facility] ORS   • CATARACT PHACO WITH IOL  8/20/2009    Performed by SOLOMON THOMAS at SURGERY SAME DAY [facility] ORS   • GYN SURGERY  1951    Hysterectomy during last C Section    • ABDOMINAL HYSTERECTOMY TOTAL     • ANGIOPLASTY  85, 97   • CARDIAC CATH     • GYN SURGERY      C Section x4   • OTHER      c-sections x4   • PRIMARY C SECTION          MEDICATIONS:   Current Outpatient Prescriptions   Medication   • metoprolol (LOPRESSOR) 50 MG Tab   • MethylPREDNISolone (MEDROL DOSEPAK) 4 MG Tablet Therapy Pack   • mirtazapine (REMERON) 15 MG Tab   • gabapentin (NEURONTIN) 100 MG Cap   • dexamethasone (DECADRON) 2 MG tablet   • ondansetron (ZOFRAN ODT) 4 MG TABLET DISPERSIBLE   • polyethylene glycol/lytes (MIRALAX) Pack   • senna-docusate (PERICOLACE OR SENOKOT S) 8.6-50 MG Tab   • simethicone (MYLICON) 80 MG Chew Tab   • allopurinol (ZYLOPRIM) 100 MG Tab   • rosuvastatin (CRESTOR) 40 MG tablet     No current facility-administered medications for this visit.        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     Social History Main  Topics   • Smoking status: Former Smoker     Packs/day: 0.50     Years: 32.00     Types: Cigarettes     Quit date: 1988   • Smokeless tobacco: Never Used      Comment: quit , approx 30pyh   • Alcohol use 4.2 oz/week     7 Glasses of wine per week   • Drug use: No   • Sexual activity: No     Other Topics Concern   • Not on file     Social History Narrative   • No narrative on file       FAMILY HISTORY:   Family History   Problem Relation Age of Onset   • Cancer Mother         uterine   • Arthritis Mother         gout   • Asthma Mother    • Heart Disease Father         MI age 50s   • Heart Disease Brother          40 MI   • Hyperlipidemia Brother    • Hyperlipidemia Sister    • Arthritis Sister           REVIEW OF SYSTEMS:   Review of Systems   Constitutional: Negative for chills and fever.   Respiratory: Negative for cough and shortness of breath.    Cardiovascular: Negative for chest pain and leg swelling.   Gastrointestinal: Negative for constipation, nausea and vomiting.   Musculoskeletal: Positive for joint pain.   Neurological: Negative for dizziness.   Psychiatric/Behavioral: Negative for depression.       PHYSICAL EXAMINATION:   /73   Pulse 78   Temp 36.6 °C (97.8 °F) (Temporal)   Resp 16   LMP 2011   SpO2 97%   Physical Exam   Constitutional: She is oriented to person, place, and time and well-developed, well-nourished, and in no distress.   Appears younger than her stated age   HENT:   Head: Normocephalic.   Eyes: Pupils are equal, round, and reactive to light.   Pulmonary/Chest: Effort normal.   Musculoskeletal: Normal range of motion.   Neurological: She is alert and oriented to person, place, and time.   Skin: Skin is warm.   Stage III pressure ulcer to coccyx  Refer to wound flowsheet and photos   Psychiatric: Mood, memory, affect and judgment normal.       WOUND ASSESSMENT- Refer to wound flowsheet    Pre-Debridement photo        PROCEDURE  -2% viscous lidocaine applied  topically to wound bed for approximately 5 minutes prior to debridement  -  4m curette used to debride wound bed.  Excisional debridement was performed to remove devitalized tissue until healthy, bleeding tissue was visualized.   Entire surface of wound, 0.5 cm2, debrided  Tissue debrided into the subcutaneous layer.    -Bleeding controlled with manual pressure   -wound care completed by  Brooke June RN, per orders-Honey colloid, foam cover dressing      Post-debridement photo            Measurements (post-debridement)-coccyx, stage III pressure injury     DATE: 2/13/2019   Length (cm)  0.9   Width (cm)  0.6   Depth (cm)  0.1   Area (cm2)  0.54   Tract/undermine  none       PATIENT EDUCATION  -Etiology of pressure injury  -Importance of offloading  -Strategies for offloading in bed and when seated discussed and demonstrated  - Importance of adequate nutrition for wound healing  -Increase protein intake (unless contraindicated by renal status)   - Advised to go to ER for any increased redness, swelling, drainage or odor, or if patient develops fever, chills, nausea or vomiting.    ASSESSMENT AND PLAN:     1. Pressure injury of coccygeal region, stage 3 ([facility])  Comments: Patient noticed wound to her coccyx after being discharged from hospital and skilled nursing facility.  She was in medical facilities for approximately 1 month prior to being discharged home.    2/13: Initial provider evaluation.  Excisional debridement of wound, medically necessary to promote wound healing.  Medical honey to promote autolytic debridement and to manage bioburden.  Patient is to return to clinic 1 time per week for assessment debridement.  She and her  are to change dressing 1-2 times per week in between clinic visits.  She is allowed to shower with the dressing off, replace dressing as soon as she gets out of shower.  Offloading strategies discussed and demonstrated.  Wound care: Honey hydrocolloid, foam cover  dressing    2. Malignant neoplasm of upper lobe of lung, unspecified laterality (HCC)  Comments: Oh abnormal findings on CT scan during hospitalization.  She completed 10-day course of radiation.  Will be starting chemotherapy the week of 2/18.  Chemotherapy may or may not impair wound healing.    3. Advanced age  Comments: Female in her 80s.  Impaired wound healing potential.        Please note that this dictation was created using voice recognition software. I have worked with technical experts from [facility] to optimize the interface.  I have made every reasonable attempt to correct obvious errors, but there may be errors of grammar and possibly content that I did not .

## 2019-02-20 NOTE — PROCEDURES
Wound was selectively debrided with a curette to remove thick biofilm. Area debrided < 20cm2. Pt casandra well

## 2019-02-20 NOTE — PATIENT INSTRUCTIONS
Should you experience any significant changes in your wound(s) such as infection (redness, swelling, localized heat, increased pain, fever >101 F, chills) or have any questions regarding your home care instructions, please contact the wound center [phone]. If after hours, contact your primary care physician or go the hospital emergency room.  Keep dressing clean and dry and cover while bathing. Only change dressing if over saturated, soiled or its falling off.

## 2019-02-27 NOTE — PATIENT INSTRUCTIONS
Should you experience any significant changes in your wound(s) such as infection (redness, swelling, localized heat, increased pain, fever >101 F, chills) or have any questions regarding your home care instructions, please contact the wound center [phone]. If after hours, contact your primary care physician or go the hospital emergency room.  Keep dressing clean and dry and cover while bathing. Only change dressing if over saturated, soiled or its falling off.

## 2019-02-27 NOTE — NON-PROVIDER
Wound healing well, per pt request decreased visits to every 2 weeks, spouse will perform dressing changes 2 x week

## 2019-03-04 NOTE — PROGRESS NOTES
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 Craig today in follow up for her lung cancer.  Patient does state that her pain had improved after completing radiotherapy she is able to ambulate without assistance.  Over the last week or so she still has some discomfort however in the right femur which is intermittent in nature.  She received her first cycle of chemotherapy and tolerated it well.  She does states she had some notable fatigue for several days afterwards but then steadily improved.  She does continue to assess whether her quality of life versus quality of life goals will be met depending on how her chemotherapy goes.    PHYSICAL EXAM:    Vitals:    03/04/19 1133   BP: 142/68   BP Location: Left arm   Patient Position: Sitting   BP Cuff Size: Adult   Pulse: 73   Temp: 36.3 °C (97.4 °F)   TempSrc: Temporal   SpO2: 100%   Weight: 52.4 kg (115 lb 8 oz)   Pain Score: No pain      1= Restricted in physically strenuous activity, but ambulatory and able to carry out work of a light sedentary nature, e.g., light housework, office work.    GENERAL: No apparent distress.  HEENT:  Pupils are equal, round, and reactive to light.  Extraocular muscles   are intact. Sclerae nonicteric.  Conjunctivae pink.  Oral cavity, tongue   protrudes midline.   NECK:  Supple without evidence of thyromegaly.  NODES:  No peripheral adenopathy of the neck, supraclavicular fossa or axillae   bilaterally.  LUNGS:  Clear to ascultation bilaterally   HEART:  Regular rate and rhythm.  No murmur appreciated  ABDOMEN:  Soft. No evidence of hepatosplenomegaly.  Positive bowel sounds.  EXTREMITIES:  Without Edema.  NEUROLOGIC:  Cranial nerves II through XII  were intact. 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  While she continues to be followed in medical oncology for her chemotherapy I will release her from active follow-up in radiation oncology.    If patient has any questions or concerns, she should feel free to contact me.

## 2019-04-03 NOTE — PROGRESS NOTES
Patient arrives to [facility] for blood transfusion.  Patient had labs on 4/01/19 Hgb was 7.8.  Patient ordered to receive 2 units irradiated PRBC if hgb <8.0.  Blood consents signed by patient.  PIV placed and COD drawn via PIV site.  Pre-medications given as ordered. Patient tolerated infusion of 2 units PRBC without adverse effects.  VS remain stable throughout transfusions.  PIV removed and patient dc home with spouse.

## 2019-04-09 NOTE — OR SURGEON
Immediate Post- Operative Note        PostOp Diagnosis: Lung ca      Procedure(s): Port placement.       Estimated Blood Loss: Less than 5 ml        Complications: None            4/9/2019     1515 PM     Sen Kim

## 2019-04-09 NOTE — PROGRESS NOTES
IR Procedure RN's Note:    Patient consented by Dr. Kim prior to start of procedure; pt and MD signed consent.     Implanted port placement done by Dr. Kim; RIGHT IJ access site; pt assessed upon arrival, pt A/Ox4, pt aware of the procedure; BARD Power Port ClearVue Slim port 8f x 25cm REF#[id number] LOT#[id number]; pt appears comfortable throughout the procedure with no signs of distress or discomfort, pt exhibited some confusion with sedation, but was re-oriented after explanation; ETCO2 monitored with a baseline of 28mmHg and ranged between 17-32mHg throughout the procedure; sutured shut by Dr. Kim; access site CDI, soft with no signs of hematoma or bleeding, gauze and tegaderm for dressing; recovered by this RN; pt is awake and on 2L O2.

## 2019-04-09 NOTE — DISCHARGE INSTRUCTIONS
ACTIVITY: Rest and take it easy for the first 24 hours.  A responsible adult is recommended to remain with you during that time.  It is normal to feel sleepy.  We encourage you to not do anything that requires balance, judgment or coordination.    MILD FLU-LIKE SYMPTOMS ARE NORMAL. YOU MAY EXPERIENCE GENERALIZED MUSCLE ACHES, THROAT IRRITATION, HEADACHE AND/OR SOME NAUSEA.    FOR 24 HOURS DO NOT:  Drive, operate machinery or run household appliances.  Drink beer or alcoholic beverages.   Make important decisions or sign legal documents.    SPECIAL INSTRUCTIONS: Avoid lifting your arm over your head prolonged period of time.  Avoid lifting anything heavier than 10 pounds for 1 week.    DIET: To avoid nausea, slowly advance diet as tolerated, avoiding spicy or greasy foods for the first day.  Add more substantial food to your diet according to your physician's instructions.  Babies can be fed formula or breast milk as soon as they are hungry.  INCREASE FLUIDS AND FIBER TO AVOID CONSTIPATION.    SURGICAL DRESSING/BATHING: No soaking in a hot tub or shower for 1 week.  Sponge bathe only.    FOLLOW-UP APPOINTMENT:  A follow-up appointment should be arranged with your doctor in 1 week; call to schedule.    You should CALL YOUR PHYSICIAN if you develop:  Fever greater than 101 degrees F.  Pain not relieved by medication, or persistent nausea or vomiting.  Excessive bleeding (blood soaking through dressing) or unexpected drainage from the wound.  Extreme redness or swelling around the incision site, drainage of pus or foul smelling drainage.  Inability to urinate or empty your bladder within 8 hours.  Problems with breathing or chest pain.    You should call 911 if you develop problems with breathing or chest pain.  If you are unable to contact your doctor or surgical center, you should go to the nearest emergency room or urgent care center.  Physician's telephone #: [phone]    If any questions arise, call your doctor.   If your doctor is not available, please feel free to call the Surgical Center at [phone].  The Center is open Monday through Friday from 7AM to 7PM.  You can also call the HEALTH HOTLINE open 24 hours/day, 7 days/week and speak to a nurse at [phone], or toll free at [phone].    A registered nurse may call you a few days after your surgery to see how you are doing after your procedure.    MEDICATIONS: Resume taking daily medication.  Take prescribed pain medication with food.  If no medication is prescribed, you may take non-aspirin pain medication if needed.  PAIN MEDICATION CAN BE VERY CONSTIPATING.  Take a stool softener or laxative such as senokot, pericolace, or milk of magnesia if needed.    Utilize pain management per Dr Rizzo's orders.    If your physician has prescribed pain medication that includes Acetaminophen (Tylenol), do not take additional Acetaminophen (Tylenol) while taking the prescribed medication.    Depression / Suicide Risk    As you are discharged from this [facility] facility, it is important to learn how to keep safe from harming yourself.    Recognize the warning signs:  · Abrupt changes in personality, positive or negative- including increase in energy   · Giving away possessions  · Change in eating patterns- significant weight changes-  positive or negative  · Change in sleeping patterns- unable to sleep or sleeping all the time   · Unwillingness or inability to communicate  · Depression  · Unusual sadness, discouragement and loneliness  · Talk of wanting to die  · Neglect of personal appearance   · Rebelliousness- reckless behavior  · Withdrawal from people/activities they love  · Confusion- inability to concentrate     If you or a loved one observes any of these behaviors or has concerns about self-harm, here's what you can do:  · Talk about it- your feelings and reasons for harming yourself  · Remove any means that you might use to hurt yourself (examples:  pills, rope, extension cords, firearm)  · Get professional help from the community (Mental Health, Substance Abuse, psychological counseling)  · Do not be alone:Call your Safe Contact- someone whom you trust who will be there for you.  · Call your local CRISIS HOTLINE 067-5904 or 399-407-3433  · Call your local Children's Mobile Crisis Response Team [place] (391) 270-0904 or www.[place]  · Call the toll free National Suicide Prevention Hotlines   · National Suicide Prevention Lifeline 387-121-UPOX (6677)  [place] [facility] 800-SUICIDE (491-1212)  Implanted Port Insertion  An implanted port is a central line that has a round shape and is placed under the skin. It is used as a long-term IV access for:   Medicines, such as chemotherapy.    Fluids.    Liquid nutrition, such as total parenteral nutrition (TPN).    Blood samples.    LET YOUR HEALTH CARE PROVIDER KNOW ABOUT:  Allergies to food or medicine.    Medicines taken, including vitamins, herbs, eye drops, creams, and over-the-counter medicines.    Any allergies to heparin.  Use of steroids (by mouth or creams).    Previous problems with anesthetics or numbing medicines.    History of bleeding problems or blood clots.    Previous surgery.    Other health problems, including diabetes and kidney problems.    Possibility of pregnancy, if this applies.  RISKS AND COMPLICATIONS  Generally, this is a safe procedure. However, as with any procedure, problems can occur. Possible problems include:  Damage to the blood vessel, bruising, or bleeding at the puncture site.    Infection.  Blood clot in the vessel that the port is in.  Breakdown of the skin over your port.  Very rarely a person may develop a condition called a pneumothorax, a collection of air in the chest that may cause one of the lungs to collapse. The placement of these catheters with the appropriate imaging guidance significantly decreases the risk of a pneumothorax.    BEFORE THE  PROCEDURE   Your health care provider may want you to have blood tests. These tests can help tell how well your kidneys and liver are working. They can also show how well your blood clots.    If you take blood thinners (anticoagulant medicines), ask your health care provider when you should stop taking them.    Make arrangements for someone to drive you home. This is necessary if you have been sedated for your procedure.    PROCEDURE   Port insertion usually takes about 30-45 minutes.   An IV needle will be inserted in your arm. Medicine for pain and medicine to help relax you (sedative) will flow directly into your body through this needle.    You will lie on an exam table, and you will be connected to monitors to keep track of your heart rate, blood pressure, and breathing throughout the procedure.  An oxygen monitoring device may be attached to your finger. Oxygen will be given.    Everything will be kept as germ free (sterile) as possible during the procedure. The skin near the point of the incision will be cleansed with antiseptic, and the area will be draped with sterile towels. The skin and deeper tissues over the port area will be made numb with a local anesthetic.  Two small cuts (incisions) will be made in the skin to insert the port. One will be made in the neck to obtain access to the vein where the catheter will lie.    Because the port reservoir will be placed under the skin, a small skin incision will be made in the upper chest, and a small pocket for the port will be made under the skin. The catheter that will be connected to the port tunnels to a large central vein in the chest. A small, raised area will remain on your body at the site of the reservoir when the procedure is complete.  The port placement will be done under imaging guidance to ensure the proper placement.    The reservoir has a silicone covering that can be punctured with a special needle.    The port will be flushed with normal  saline, and blood will be drawn to make sure it is working properly.  There will be nothing remaining outside the skin when the procedure is finished.    Incisions will be held together by stitches, surgical glue, or a special tape.  AFTER THE PROCEDURE  You will stay in a recovery area until the anesthesia has worn off. Your blood pressure and pulse will be checked.  A final chest X-ray will be taken to check the placement of the port and to ensure that there is no injury to your lung.  This information is not intended to replace advice given to you by your health care provider. Make sure you discuss any questions you have with your health care provider.  Document Released: 10/08/2014 Document Revised: 01/08/2016 Document Reviewed: 10/08/2014  Elsevier Interactive Patient Education © 2017 [employer]  ·

## 2019-04-25 NOTE — NON-PROVIDER
Patient was seen today in clinic with Dr. Shah for Follow up.  Vitals signs and weight were obtained and pain assessment was completed.  Allergies and medications were reviewed with the patient.  Toxicities of treatment assessed.     Vitals/Pain:  Vitals:    04/25/19 0953   BP: 130/65   Pulse: 71   Temp: 36.7 °C (98.1 °F)   SpO2: 98%   Weight: 53.1 kg (117 lb)   Pain Score: 6=Moderate Pain (o)        Allergies:   Atorvastatin    Current Medications:  Current Outpatient Prescriptions   Medication Sig Dispense Refill   • metoprolol (LOPRESSOR) 50 MG Tab Take 50 mg by mouth 2 times a day. 1/2 tab am     • folic acid (FOLVITE) 1 MG Tab      • HYDROcodone-acetaminophen (NORCO) 5-325 MG Tab per tablet every 8 hours as needed.     • polyethylene glycol/lytes (MIRALAX) Pack Take 1 Packet by mouth every day. (Patient taking differently: Take 17 g by mouth as needed.)  3   • simethicone (MYLICON) 80 MG Chew Tab Take 1 Tab by mouth 3 times a day as needed. 30 Tab 3   • allopurinol (ZYLOPRIM) 100 MG Tab Take 200 mg by mouth every day.     • rosuvastatin (CRESTOR) 40 MG tablet Take 40 mg by mouth every evening.       No current facility-administered medications for this encounter.          PCP:  Amarjit Sanabria, Med Ass't

## 2019-04-27 NOTE — ED NOTES
Pt presents to the ED with cc of fever over 100 taken at home today. Pt is currently undergoing chemo for cancer located in bone marrow and lungs. Pt states she has had loose stools x3days after taking miralax to help with opoid induced constipation.

## 2019-04-27 NOTE — ED NOTES
During triage pt was verbally aggressive to staff, reporting dissatisfaction about ER process, wait times and staff.     RN attempted to educate patient. PT was not receptive to education.

## 2019-04-27 NOTE — DISCHARGE INSTRUCTIONS
Continue your current treatment  Follow-up with Dr. Mccollum this next week for recheck.  Keep close tabs on your temperature  Increase fluids  Return if any problems or worsening.

## 2019-04-27 NOTE — ED TRIAGE NOTES
Susie Craig  84 y.o. female  Chief Complaint   Patient presents with   • Other     PT is a cancer patient. Pt reports pain in R leg. Reports that she is unable to walk on it. Pt had chemo on Monday.   • Diarrhea     Past 3 days.   • Fever     Reports that temperature at home was 101.4 F today. Pt took a [place] PTA.     Mask on pt, placed in [facility], charge notified.      Pt amb to triage with steady gait for above complaint.   Pt is alert and oriented, speaking in full sentences, follows commands and responds appropriately to questions. Resp are even and unlabored. No behavioral indicators of pain.   Pt placed in lobby. Pt educated on triage process. Pt encouraged to alert staff for any changes.

## 2019-04-27 NOTE — ED NOTES
Pt discharged; Pt verbalized understanding of discharge information and medication education. Pt brought to lobby via wheelchair with family.

## 2019-04-27 NOTE — ED PROVIDER NOTES
CHIEF COMPLAINT  Chief Complaint   Patient presents with   • Other     PT is a cancer patient. Pt reports pain in R leg. Reports that she is unable to walk on it. Pt had chemo on Monday.   • Diarrhea     Past 3 days.   • Fever     Reports that temperature at home was 101.4 F today. Pt took a [place] PTA.       HPI  Susie Craig is a 84 y.o. Female with a history of metastatic lung cancer who presents with complaints of  fever that started earlier today. The patient states that she felt hot earlier today and had a temperature of 101. She called her oncologist for advice, he recommended that the patient come to the emergency department for further evaluation. The patient has associated fatigue and weakness. She denies nausea, vomiting, or cough. The patient last chemo treatment was on Tuesday (three days ago). The patient is currently being treated for lung cancer that has metastasized to the bone marrow in her legs. She currently take hydrocodone for her pain management. The patient states that she currently sees Dr. Mccollum for her oncology needs.    REVIEW OF SYSTEMS  See HPI for further details. All other system reviews are negative.    PAST MEDICAL HISTORY  Past Medical History:   Diagnosis Date   • Arthritis    • Back pain    • Breath shortness     with exertion   • CAD (coronary artery disease)     CABG X3 in  with graft occlusion post-op   • Cancer (HCC)     lung   • Cataract     bilat IOL    • Gout    • Heart attack (HCC)    • Heart burn    • High cholesterol    • Hypertension    • Indigestion    • MEDICAL HOME    • Pain     R leg/hip   • Stroke (HCC)     TIA        FAMILY HISTORY  Family History   Problem Relation Age of Onset   • Cancer Mother         uterine   • Arthritis Mother         gout   • Asthma Mother    • Heart Disease Father         MI age 50s   • Heart Disease Brother          40 MI   • Hyperlipidemia Brother    • Hyperlipidemia Sister    • Arthritis Sister        SOCIAL  "HISTORY  Social History     Social History   • Marital status:      Spouse name: N/A   • Number of children: N/A   • Years of education: N/A     Occupational History   • 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     Social History Main Topics   • Smoking status: Former Smoker     Packs/day: 0.50     Years: 32.00     Types: Cigarettes     Quit date: 8/31/1988   • Smokeless tobacco: Never Used      Comment: quit 1988, approx 30pyh   • Alcohol use No   • Drug use: No   • Sexual activity: No     Other Topics Concern   • Not on file     Social History Narrative   • No narrative on file       SURGICAL HISTORY  Past Surgical History:   Procedure Laterality Date   • HIP NAILING INTRAMEDULLARY Right 12/29/2018    Procedure: HIP NAILING INTRAMEDULLARY;  Surgeon: Alejo Savage M.D.;  Location: SURGERY [facility];  Service: Orthopedics   • MULTIPLE CORONARY ARTERY BYPASS ENDO VEIN HARVEST  11/6/2016    Procedure: MULTIPLE CORONARY ARTERY BYPASS ENDO VEIN HARVEST;  Surgeon: Jeff Naranjo M.D.;  Location: SURGERY [facility];  Service:    • ABIODUN  11/6/2016    Procedure: ABIODUN;  Surgeon: Jeff Naranjo M.D.;  Location: SURGERY [facility];  Service:    • CATARACT PHACO WITH IOL  8/31/2009    Performed by SOLOMON THOMAS at SURGERY SAME DAY [facility] ORS   • CATARACT PHACO WITH IOL  8/20/2009    Performed by SOLOMON THOMAS at SURGERY SAME DAY [facility] ORS   • GYN SURGERY  1951    Hysterectomy during last C Section    • ABDOMINAL HYSTERECTOMY TOTAL     • ANGIOPLASTY  85, 97   • GYN SURGERY      C Section x4   • OTHER      c-sections x4   • PRIMARY C SECTION     • ZZZ CARDIAC CATH         CURRENT MEDICATIONS  See nurse's note    ALLERGIES  Allergies   Allergen Reactions   • Atorvastatin      Elevated LFT  RXN = long time ago        PHYSICAL EXAM  VITAL SIGNS: /72   Pulse 83   Temp 37.1 °C (98.7 °F) (Axillary)   Resp 14   Ht 1.676 m (5' 6\")   Wt 54.4 kg (120 lb)   LMP 07/06/2011   SpO2 94%   BMI 19.37 " kg/m²     Constitutional: Patient is a petite elderly female in mild distress.  HENT: Normocephalic, atraumatic, Oropharynx moist without erythema or exudates, nose normal with no mucosal edema or drainage.   Eyes: PERRL, EOMI, Conjunctiva without erythema or exudates.   Neck: Supple with no cervical adenopathy. Normal range of motion in flexion, extension and lateral rotation. No tenderness along the bony prominences or paraspinal muscles.  Lymphatic: No lymphadenopathy noted.   Cardiovascular: Normal heart rate and rhythm. No murmur, Good heart tones.  Thorax & Lungs: Clear and equal breath sounds with good excursion. No respiratory distress, no rhonchi, wheezing or rales. No chest tenderness.  Abdomen: Bowel sounds normal in all four quadrants. Soft,nontender, no rebound , guarding, palpable masses.   Skin: Pale, Warm, Dry, No erythema, No rashes.   Back: No cervical, thoracic, or lumbosacral tenderness.   Extremities: Peripheral pulses 4/4 No edema, bilateral lower extremity tenderness on palpation particularly in the lateral aspects of her hips.  There is no increased erythema, warmth, rashes or lesions.  Neurologic: Alert & oriented x 3, Normal motor function, Normal sensory function, No lateralizing or focal deficits noted. DTR's 4/4 bilaterally.  Psychiatric: Affect normal, Judgment normal, Mood normal.       RADIOLOGY/PROCEDURES  LABS  Results for orders placed or performed during the hospital encounter of 04/26/19   CBC WITH DIFFERENTIAL   Result Value Ref Range    WBC 11.7 (H) 4.8 - 10.8 K/uL    RBC 3.14 (L) 4.20 - 5.40 M/uL    Hemoglobin 9.8 (L) 12.0 - 16.0 g/dL    Hematocrit 31.1 (L) 37.0 - 47.0 %    MCV 99.0 (H) 81.4 - 97.8 fL    MCH 31.2 27.0 - 33.0 pg    MCHC 31.5 (L) 33.6 - 35.0 g/dL    RDW 75.8 (H) 35.9 - 50.0 fL    Platelet Count 118 (L) 164 - 446 K/uL    MPV 9.9 9.0 - 12.9 fL    Neutrophils-Polys 92.20 (H) 44.00 - 72.00 %    Lymphocytes 5.20 (L) 22.00 - 41.00 %    Monocytes 0.90 0.00 - 13.40 %     Eosinophils 1.70 0.00 - 6.90 %    Basophils 0.00 0.00 - 1.80 %    Nucleated RBC 0.00 /100 WBC    Neutrophils (Absolute) 10.79 (H) 2.00 - 7.15 K/uL    Lymphs (Absolute) 0.61 (L) 1.00 - 4.80 K/uL    Monos (Absolute) 0.11 0.00 - 0.85 K/uL    Eos (Absolute) 0.20 0.00 - 0.51 K/uL    Baso (Absolute) 0.00 0.00 - 0.12 K/uL    NRBC (Absolute) 0.00 K/uL    Anisocytosis 1+     Macrocytosis 1+     Microcytosis 1+    COMP METABOLIC PANEL   Result Value Ref Range    Sodium 138 135 - 145 mmol/L    Potassium 3.8 3.6 - 5.5 mmol/L    Chloride 104 96 - 112 mmol/L    Co2 24 20 - 33 mmol/L    Anion Gap 10.0 0.0 - 11.9    Glucose 123 (H) 65 - 99 mg/dL    Bun 26 (H) 8 - 22 mg/dL    Creatinine 1.32 0.50 - 1.40 mg/dL    Calcium 8.6 8.5 - 10.5 mg/dL    AST(SGOT) 49 (H) 12 - 45 U/L    ALT(SGPT) 37 2 - 50 U/L    Alkaline Phosphatase 145 (H) 30 - 99 U/L    Total Bilirubin 0.7 0.1 - 1.5 mg/dL    Albumin 3.9 3.2 - 4.9 g/dL    Total Protein 6.6 6.0 - 8.2 g/dL    Globulin 2.7 1.9 - 3.5 g/dL    A-G Ratio 1.4 g/dL   URINALYSIS CULTURE, IF INDICATED   Result Value Ref Range    Color Yellow     Character Clear     Specific Gravity 1.006 <1.035    Ph 7.0 5.0 - 8.0    Glucose Negative Negative mg/dL    Ketones Negative Negative mg/dL    Protein 30 (A) Negative mg/dL    Bilirubin Negative Negative    Urobilinogen, Urine 0.2 Negative    Nitrite Negative Negative    Leukocyte Esterase Negative Negative    Occult Blood Trace (A) Negative    Micro Urine Req Microscopic    URINE MICROSCOPIC (W/UA)   Result Value Ref Range    WBC 0-2 /hpf    RBC 5-10 (A) /hpf    Bacteria Negative None /hpf    Epithelial Cells Negative /hpf    Hyaline Cast 0-2 /lpf   ESTIMATED GFR   Result Value Ref Range    GFR If  47 (A) >60 mL/min/1.73 m 2    GFR If Non  39 (A) >60 mL/min/1.73 m 2   DIFFERENTIAL MANUAL   Result Value Ref Range    Manual Diff Status PERFORMED    PERIPHERAL SMEAR REVIEW   Result Value Ref Range    Peripheral Smear Review see  below    PLATELET ESTIMATE   Result Value Ref Range    Plt Estimation Decreased    MORPHOLOGY   Result Value Ref Range    RBC Morphology Present     Poikilocytosis 1+     Ovalocytes 1+      Radiology  DX-CHEST-PORTABLE (1 VIEW)   Final Result         1. No acute cardiopulmonary abnormalities are identified.            COURSE & MEDICAL DECISION MAKING  The patient will be treated with NS infusion, xylocine jelly    The patient will be assessed with DX-Chest, CBC with Diffs, SMP, Blood culture, UA    11:40 PM- I spoke with Dr. Miller, the oncologist on call for Dr. Mccollum, based on the patient's work up he feels that it would be appropriate to send the patient home at this time.    11:41 PM- I reevaluated the patient at bedside. We discussed the results of her work up and the plan for discharge. The patient understood and is agreeable to discharge at this time.    Pertinent Labs & Imaging studies reviewed. (See chart for details)  Patient received an IV via her Port-A-Cath.  Laboratories were drawn and she does have a white blood cell count slightly elevated with 92% neutrophils.  Remaining labs are unremarkable.  I discussed her care with the oncologist on-call and he stated that at this time he would send her home as there is less risk of infection.  She also is not neutropenic.  She will be discharged home in the care of her  to follow-up with Dr. Mccollum this week for recheck.  She is stable upon discharge    Fluid bolus initiated for chemotherapy, fever, and weakness. On reassessment her symptoms have improved.    FINAL IMPRESSION  1.  Subjective fever  2.  Metastatic lung cancer  3.  Anemia of chronic disease     The patient will return for new or worsening symptoms and is stable at the time of discharge.    The patient is referred to a primary physician for blood pressure management, diabetic screening, and for all other preventative health concerns.    DISPOSITION:  Patient will be discharged home in  stable condition.    FOLLOW UP:  No follow-up provider specified.    OUTPATIENT MEDICATIONS:  New Prescriptions    No medications on file       Electronically signed by: Eileen Cleaning DO, 4/26/2019 4:24 am

## 2019-05-05 NOTE — ED NOTES
"Pt reports feeling progressively more tired and more weak. Pt reports falling asleep \"just sitting down on the couch,\" reports \"legs give out.\" Pt recently had chemo and is scheduled to restart radiation this week. Pt has concerns that with increasing weakness,  may not be able to pick her up off of floor.  "

## 2019-05-05 NOTE — ED PROVIDER NOTES
ED Provider Note    HPI: Patient is an 84-year-old female who presented to the emergency department May 5, 2019 at 3:34 PM with a chief complaint of weakness.    Patient is being treated from lung cancer.  Patient has been very sleepy.  She recently completed chemotherapy and is to start radiation treatment this week.  She reports her legs have been giving out.  She has not had a fever chills or cough.  She is not especially short of breath.  No chest pain or abdominal pain.  No change in bladder bowel habits.  No other somatic complaints    Review of Systems: Positive for weakness negative for fever chills cough shortness of breath chest pain abdominal pain change in bladder bowel habits.  Review of systems reviewed with patient, all other systems negative    Past medical/surgical history: Lung cancer hypertension hysterectomy coronary artery bypass grafting coronary artery disease    Medications: Lopressor Norco MiraLAX Mylicon Zyloprim Crestor    Allergies: Atorvastatin    Social History: 16-pack-year history of smoking quit in 1988 no alcohol use      Physical exam: Constitutional: Pleasant female awake alert appeared tired  Vital signs:  IF HYPERTENSIVE AND D?C DOCUMENT FU  120/80  EYES: PERRL, EOMI, Conjunctivae and sclera normal, eyelids normal bilaterally.  Neck: Trachea midline. No cervical masses seen or palpated. Normal range of motion, supple. No meningeal signs elicited.  Cardiac: Regular rate and rhythm. S1-S2 present. No S3 or S4 present. No murmurs, rubs, or gallops heard. No edema or varicosities were seen.   Lungs: Clear to auscultation with good aeration throughout. No wheezes, rales, or rhonchi heard. Patient's chest wall moved symmetrically with each respiratory effort. Patient was not making use of accessory muscles of respiration in breathing.  Abdomen: Soft nontender to palpation. No rebound or guarding elicited. No organomegaly identified. No pulsatile abdominal masses identified.    Musculoskeletal:  no  pain with palpitation or movement of muscle, bone or joint , no obvious musculoskeletal deformities identified.  Neurologic: alert and awake answers questions appropriately. Moves all four extremities independently, no gross focal abnormalities identified.  Diffusely but symmetrically weak  Skin: no rash or lesion seen, no palpable dermatologic lesions identified.  Patient appears pale  Psychiatric: Patient appeared to be slightly anxious.  She was not delusional or hallucinating    Medical decision making: Laboratory studies obtained (please see lab sheet for all results) significant findings included anemia with hemoglobin 9.1.  Market thrombocytopenia is present with a platelet count of 16.  Patient is dehydrated with BUN of 24 and creatinine 1.94.    I spoke with patient's oncology group.  At this time the patient is not actively bleeding so per protocol platelet transfusion would not be indicated.  Oncologist will see the patient in the office tomorrow and repeat her platelet count.  I suspect her diminished platelets are secondary to her chemotherapy.  The patient will be discharged.  She is told to push fluids.  She is notified about the importance of not falling or sustaining any traumas she will be very prone to bleed until her platelet count normalizes.  The patient is carefully counseled return to ED for increasing weakness vomiting any bleeding or any other problems.  The patient does not appear to be infected at this time.  Outpatient treatment and follow-up seems reasonable    Patient verbalized understanding of these instructions and states she will comply    Impression 1) weakness  2) from cytopenia  3) dehydration

## 2019-05-05 NOTE — ED TRIAGE NOTES
"Chief Complaint   Patient presents with   • Weakness     generalized weakness x2 weeks. seen on 4/26 and had complete work-up. states she had labs done after that revealing she had a \"low white count and red count\".    • Cancer     metastatic lung cancer      Pt to triage for above. States she is scared to be home so weak with possible poor labs.     Pt to senior [facility]. Educated on triage process and to inform staff of any changes.     /87   Pulse 84   Temp 36.6 °C (97.8 °F) (Temporal)   Resp 15   Ht 1.676 m (5' 6\")   Wt 50.3 kg (110 lb 14.3 oz)   LMP 07/06/2011   SpO2 98%   BMI 17.90 kg/m²     "

## 2019-05-06 NOTE — DISCHARGE PLANNING
Medical Social Work    MSW received notification from ERP to assist pt at home. Pt is concerned she is going to fall. MSW met with pt and her . Per pt, she was recently diagnosed with lung cancer in December. She re-starts radiation treatment tomorrow at [facility]. Pt is concerned that her weakness will cause her to fall and her  will be unable to pick her up. Pt has a FWW at home that she is having hard time using. Pt has lots of support from her 4 sons that live in [place]. They are going to request pt's doctor write and order for a wheelchair.    Pt requested MSW talk to her son Harry ([phone]). Harry stated he is working to get pt more help at home. He needs more help navigating the resources in [place]. He is also working to put grab bars and assistance in the home to help out. He has already been talking with [facility] for meals on wheels and light housekeeping for pt and her . Pt makes $700 a month in SSI and her  makes $1880. Their modular home is paid for. MSW emailed pt's son Harry list of care giving and other community resources to assistance.    Pt and  consented to MSW contacting outpatient oncology DELORIS Hutchins for f/u. MSW sent Liset referral.     MSW updated ERP on conversation. ERP waiting to see if pt will be admitted or not. Will remain available for support.

## 2019-05-06 NOTE — ED NOTES
Med rec partially completed per pt. Pt on an unknown antidepressant. Pharmacy closed. If pt gets admitted, [employer] will follow up tomorrow.

## 2019-05-09 NOTE — PROGRESS NOTES
Returns for platelets. Reports had blood drawn two days ago, but could not get a bed even in E.D. Port accessed using sterile technique.  Waited for today.  Recent hospitalization and stabilization of hip met, now receiving XRT per pt. Walks with walker.  Gait stable.  MD contacted to confirm irradiated cells are not needed.  Premeds given.  Platelets infused.  Reports has f/u for next chemotherapy and more labs then.  Port saline and hep lock flush before de access. DC to care of .

## 2019-05-14 NOTE — TELEPHONE ENCOUNTER
Pt called LM notifying us she has recently heard about about our de integration and she asked if she could please keep her current PCP as Dr. Ocasio and if we will be currently taking her insurance or if not how we could make it easy for her to find a new PCP.  Called pt back and gave her the information required pt was grateful and relieved she could be keeping her current PCP.

## 2019-06-03 PROBLEM — N17.9 ACUTE RENAL FAILURE SUPERIMPOSED ON STAGE 4 CHRONIC KIDNEY DISEASE (HCC): Status: ACTIVE | Noted: 2019-01-01

## 2019-06-03 PROBLEM — D53.9 MACROCYTIC ANEMIA: Status: ACTIVE | Noted: 2019-01-01

## 2019-06-03 PROBLEM — N18.4 ACUTE RENAL FAILURE SUPERIMPOSED ON STAGE 4 CHRONIC KIDNEY DISEASE (HCC): Status: ACTIVE | Noted: 2019-01-01

## 2019-06-03 NOTE — ED NOTES
Pt arrives to triage via wheelchair.  Sent to ER by PCP for abnormal labs- elevated BUN & creatinine.  Pt currently received chemo & radiation for lung cancer.  A&ox4.  Pt to [facility] with  & advised to inform RN of any changes.

## 2019-06-03 NOTE — ED PROVIDER NOTES
ED Provider Note    Scribed for Jose A Turpin M.D. by Migdalia Gupta. 6/3/2019, 4:15 PM.    Primary care provider: Lesli Ocasio  Means of arrival: Walk in  History obtained from: Patient  History limited by: None     CHIEF COMPLAINT  Chief Complaint   Patient presents with   • Sent by MD     elevated BUN & creatinine    • Abnormal Labs       HPI  Ssuie Craig is a 84 y.o. female with a history of stage IV lung cancer, CAD s/p stent placement, MI, hypertension, hypercholesterolemia, and stroke who presents to the Emergency Department for evaluation of possible renal failure. Patient was instructed to visit the ED by her PCP as she was noted to have elevated creatinine of 3.65. Patient was diagnosed with stage IV lung cancer with bone metastasis in December and is currently undergoing chemotherapy and radiation. Her chemotherapy cycle #3 under Dr. Mccollum was 1 month ago and was subsequently held and she has not been responding. Patient states she stopped taking narcotic pain medications and is now using medical marijuana. She denies history of renal disorder. She denies abdominal pain, chest pain, shortness of breath, vomiting, diarrhea, or weakness.     REVIEW OF SYSTEMS  Pertinent positives include abnormal labs.   Pertinent negatives include no abdominal pain, chest pain, shortness of breath, vomiting, diarrhea, or weakness.    All other systems reviewed and negative.    PAST MEDICAL HISTORY   has a past medical history of Arthritis; Back pain; Breath shortness; CAD (coronary artery disease); Cancer (HCC); Cataract; Gout; Heart attack (HCC); Heart burn; High cholesterol; Hypertension; Indigestion; MEDICAL HOME; Pain; and Stroke (HCC).    SURGICAL HISTORY   has a past surgical history that includes angioplasty (85, 97); other; cataract phaco with iol (8/20/2009); cataract phaco with iol (8/31/2009); gyn surgery; gyn surgery (1953); multiple coronary artery bypass endo vein harvest (11/6/2016); pollo  (2016); zzz cardiac cath; primary c section; abdominal hysterectomy total; and hip nailing intramedullary (Right, 2018).    SOCIAL HISTORY  Social History   Substance Use Topics   • Smoking status: Former Smoker     Packs/day: 0.50     Years: 32.00     Types: Cigarettes     Quit date: 1988   • Smokeless tobacco: Never Used      Comment: quit , approx 30pyh   • Alcohol use No      History   Drug Use No       FAMILY HISTORY  Family History   Problem Relation Age of Onset   • Cancer Mother         uterine   • Arthritis Mother         gout   • Asthma Mother    • Heart Disease Father         MI age 50s   • Heart Disease Brother          40 MI   • Hyperlipidemia Brother    • Hyperlipidemia Sister    • Arthritis Sister        CURRENT MEDICATIONS  No current facility-administered medications on file prior to encounter.      Current Outpatient Prescriptions on File Prior to Encounter   Medication Sig Dispense Refill   • FOLIC ACID PO Take 1 Tab by mouth every morning.     • NON SPECIFIED Take 1 Tab by mouth every day. **ANTI DEPRESSANT**     • metoprolol (LOPRESSOR) 50 MG Tab Take 25-50 mg by mouth 2 times a day. 25 mg in AM and 50 mg at bedtime     • HYDROcodone-acetaminophen (NORCO) 5-325 MG Tab per tablet Take 1 Tab by mouth every 6 hours.     • polyethylene glycol/lytes (MIRALAX) Pack Take 1 Packet by mouth every day.  3   • simethicone (MYLICON) 80 MG Chew Tab Take 1 Tab by mouth 3 times a day as needed. 30 Tab 3   • allopurinol (ZYLOPRIM) 100 MG Tab Take 200 mg by mouth every morning.     • rosuvastatin (CRESTOR) 40 MG tablet Take 40 mg by mouth every evening.       ALLERGIES  Allergies   Allergen Reactions   • Atorvastatin      Elevated LFT  RXN = long time ago        PHYSICAL EXAM  VITAL SIGNS: /100   Pulse 66   Temp 36.6 °C (97.9 °F) (Temporal)   Resp 20   Ht 1.524 m (5')   Wt 48.5 kg (107 lb)   LMP 2011   SpO2 94%   BMI 20.90 kg/m²     Constitutional: Well developed, Well  nourished, No acute distress, Non-toxic appearance.   HENT: Normocephalic, Atraumatic, TMs normal, mucous membranes moist, no erythema, exudates, swelling, or masses, nares patent  Eyes: nonicteric  Neck: Supple, no meningismus  Cardiovascular: Regular rate and rhythm, no gallops rubs or murmurs  Lungs: Clear bilaterally   Abdomen: Bowel sounds normal, Soft, No tenderness  Skin: Warm, Dry, no rash  Back: No tenderness, No CVA tenderness.   Extremities: No edema  Neurologic: Alert, appropriate, follows commands, moving all extremities, normal speech   Psychiatric: Affect normal    DIAGNOSTIC STUDIES / PROCEDURES    LABS  Results for orders placed or performed during the hospital encounter of 06/03/19   CBC WITH DIFFERENTIAL   Result Value Ref Range    WBC 5.8 4.8 - 10.8 K/uL    RBC 2.85 (L) 4.20 - 5.40 M/uL    Hemoglobin 9.2 (L) 12.0 - 16.0 g/dL    Hematocrit 29.0 (L) 37.0 - 47.0 %    .8 (H) 81.4 - 97.8 fL    MCH 32.3 27.0 - 33.0 pg    MCHC 31.7 (L) 33.6 - 35.0 g/dL    RDW 81.5 (H) 35.9 - 50.0 fL    Platelet Count 166 164 - 446 K/uL    MPV 9.7 9.0 - 12.9 fL    Neutrophils-Polys 78.50 (H) 44.00 - 72.00 %    Lymphocytes 7.80 (L) 22.00 - 41.00 %    Monocytes 11.40 0.00 - 13.40 %    Eosinophils 1.70 0.00 - 6.90 %    Basophils 0.30 0.00 - 1.80 %    Immature Granulocytes 0.30 0.00 - 0.90 %    Nucleated RBC 0.00 /100 WBC    Neutrophils (Absolute) 4.54 2.00 - 7.15 K/uL    Lymphs (Absolute) 0.45 (L) 1.00 - 4.80 K/uL    Monos (Absolute) 0.66 0.00 - 0.85 K/uL    Eos (Absolute) 0.10 0.00 - 0.51 K/uL    Baso (Absolute) 0.02 0.00 - 0.12 K/uL    Immature Granulocytes (abs) 0.02 0.00 - 0.11 K/uL    NRBC (Absolute) 0.00 K/uL    Anisocytosis 3+     Macrocytosis 2+    COMP METABOLIC PANEL   Result Value Ref Range    Sodium 137 135 - 145 mmol/L    Potassium 5.1 3.6 - 5.5 mmol/L    Chloride 110 96 - 112 mmol/L    Co2 16 (L) 20 - 33 mmol/L    Anion Gap 11.0 0.0 - 11.9    Glucose 97 65 - 99 mg/dL    Bun 36 (H) 8 - 22 mg/dL     Creatinine 3.31 (H) 0.50 - 1.40 mg/dL    Calcium 9.2 8.5 - 10.5 mg/dL    AST(SGOT) 24 12 - 45 U/L    ALT(SGPT) 16 2 - 50 U/L    Alkaline Phosphatase 110 (H) 30 - 99 U/L    Total Bilirubin 0.5 0.1 - 1.5 mg/dL    Albumin 4.5 3.2 - 4.9 g/dL    Total Protein 7.6 6.0 - 8.2 g/dL    Globulin 3.1 1.9 - 3.5 g/dL    A-G Ratio 1.5 g/dL   TROPONIN   Result Value Ref Range    Troponin I <0.01 0.00 - 0.04 ng/mL   ESTIMATED GFR   Result Value Ref Range    GFR If  16 (A) >60 mL/min/1.73 m 2    GFR If Non  13 (A) >60 mL/min/1.73 m 2   PERIPHERAL SMEAR REVIEW   Result Value Ref Range    Peripheral Smear Review see below    PLATELET ESTIMATE   Result Value Ref Range    Plt Estimation Normal    MORPHOLOGY   Result Value Ref Range    RBC Morphology Present     Poikilocytosis 1+     Ovalocytes 1+    DIFFERENTIAL COMMENT   Result Value Ref Range    Comments-Diff see below    EKG   Result Value Ref Range    Report       [facility] Emergency Dept.    Test Date:  2019  Pt Name:    WILVER PRETTY              Department: ER  MRN:        [id number]                      Room:       RD 12  Gender:     Female                       Technician: [id number]  :        [id number]                   Requested By:SCOTTIE RASCON  Order #:    [id number]                    Reading MD:    Measurements  Intervals                                Axis  Rate:       64                           P:          80  VT:         172                          QRS:        56  QRSD:       122                          T:          70  QT:         448  QTc:        463    Interpretive Statements  SINUS RHYTHM  NONSPECIFIC INTRAVENTRICULAR CONDUCTION DELAY  BORDERLINE R WAVE PROGRESSION, ANTERIOR LEADS  Compared to ECG 2017 11:47:50  Intraventricular conduction delay now present  Left bundle-branch block no longer present  Myocardial infarct finding no longer present  Possible ischemia no longer present     All labs reviewed  by me.    EKG  Obtained at 4:40 PM  Normal Sinus rhythm   Rate 64  Nonspecific interventricular conduction delay  Borderline ST depression inferiorly  Biphasic T wave in V6  Appearance of left bundle branch block      RADIOLOGY  DX-CHEST-PORTABLE (1 VIEW)   Final Result      No acute cardiopulmonary process is seen.      Atherosclerotic plaque.      US-RENAL    (Results Pending)   The radiologist's interpretation of all radiological studies have been reviewed by me.    COURSE & MEDICAL DECISION MAKING  Nursing notes, VS, PMSFHx reviewed in chart.     Review of past medical records shows the patient's renal function has been worsening over the last month, most recent creatinine on 6/01/19 was 3.65.     4:15 PM Patient seen and examined at bedside. Ordered for DX chest, EKG, Estimated GFR, CBC, CMP, Troponin, and other labs to evaluate. Patient will be given IV fluids for acute renal failure.     5:50 PM Discussed patient's case and diagnostic results with Dr. Shen, Hospitalist, who agreed to admit patient. Patient's care was transferred at this time.     5:59 PM Patient reevaluated at bedside. Her condition remains stable after treatment with IV fluids Discussed diagnostic results with patient as noted above. Patient and family member were update on plan of care including admission for acute renal failure. They understand and are agreeable.      Decision Making:  This is a 84 y.o. year old female who presents with what appears to be progressive acute kidney injury.  She has a history of metastatic lung cancer with last chemo about a month ago.  She does appear to have a metabolic acidosis here today as well as a creatinine greater than 3-this has progressed over the course of May.  Potassium is normal.  Otherwise there is no evidence of focal infection.  The patient is still making urine.  She will be hydrated here and admitted for correction of acute kidney injury which may have a prerenal  component.    DISPOSITION:  Patient will be admitted to Dr. Shen, Hospitalist, in stable condition.     FINAL IMPRESSION  1. DIONTE (acute kidney injury) (HCC)         Migdalia RAM (Scribe), am scribing for, and in the presence of, Jose A Turpin M.D..  Electronically signed by: Migdalia Gupta (Scribe), 6/3/2019  Jose A RAM M.D. personally performed the services described in this documentation, as scribed by Migdalia Gupta in my presence, and it is both accurate and complete. C.     The note accurately reflects work and decisions made by me.  Jose A Turpin  6/3/2019  6:02 PM

## 2019-06-04 PROBLEM — R53.1 WEAKNESS: Status: ACTIVE | Noted: 2019-01-01

## 2019-06-04 NOTE — CARE PLAN
Problem: Safety  Goal: Will remain free from injury  Outcome: PROGRESSING AS EXPECTED  Safety maintained    Problem: Infection  Goal: Will remain free from infection  Outcome: PROGRESSING AS EXPECTED  Will monitor labs/vs

## 2019-06-04 NOTE — THERAPY
"Physical Therapy Evaluation completed.   Bed Mobility:  Supine to Sit: Supervised  Transfers: Sit to Stand: Supervised  Gait: Level Of Assist: Supervised with Front-Wheel Walker       Plan of Care: Patient with no further skilled PT needs in the acute care setting at this time  Discharge Recommendations: Equipment: No Equipment Needed. Post-acute therapy Discharge to home with outpatient or home health for additional skilled therapy services.    Pt w/ hx of CABG and metastatic lung cancer.  Pt and family report mets to LE bones.  She lives w/ her  in a single story house.  She is a household ambulator.  As the day wears on, her pain increases in her LE's, at which time, she uses her w/c.  Today, she is able to demonstrate ability to move in/out of bed and ambulate w/ a fww, w/o issue.  She is essentially at her most recent PLOF.  No acute PT needs. Will have family assist at d/c.    See \"Rehab Therapy-Acute\" Patient Summary Report for complete documentation.     "

## 2019-06-04 NOTE — DISCHARGE PLANNING
Anticipated Discharge Disposition: TBD    Action: Pt is new to the floor. LSW conducted initial chart review.    LSW attempted to met with pt bedside for assessment. Pt was not available. LSW will attempt at a later time.    Barriers to Discharge: Medical Clearance    Plan: LSW to f/u with pt

## 2019-06-04 NOTE — PROGRESS NOTES
[facility] Medicine Daily Progress Note    Date of Service  6/4/2019    Chief Complaint  84 y.o. female admitted 6/3/2019 with weakness and sharonda      Interval Problem Update  Cr elevated at 3.3    Patient states she is urinating    Afebrile    Patient states she is weak    Consultants/Specialty  none    Code Status  dnr    Disposition  home    Review of Systems  Review of Systems   Constitutional: Positive for malaise/fatigue.   HENT: Negative.    Eyes: Negative.    Respiratory: Negative.    Cardiovascular: Negative.    Gastrointestinal: Negative.    Genitourinary: Negative for flank pain and frequency.   Musculoskeletal: Negative.    Neurological: Positive for weakness.   Psychiatric/Behavioral: Negative.         Physical Exam  Temp:  [36 °C (96.8 °F)-36.5 °C (97.7 °F)] 36.4 °C (97.5 °F)  Pulse:  [59-75] 67  Resp:  [14-20] 16  BP: (119-131)/(55-64) 131/61  SpO2:  [97 %-100 %] 97 %    Physical Exam   Constitutional: She is oriented to person, place, and time. No distress.   HENT:   Head: Atraumatic.   Eyes: Left eye exhibits no discharge. No scleral icterus.   Neck: No JVD present. No tracheal deviation present. No thyromegaly present.   Cardiovascular: Normal rate, regular rhythm and intact distal pulses.  Exam reveals no gallop and no friction rub.    No murmur heard.  Pulmonary/Chest: Effort normal and breath sounds normal. No respiratory distress. She has no wheezes. She has no rales.   Abdominal: Soft. Bowel sounds are normal. She exhibits no distension. There is no tenderness. There is no rebound.   Musculoskeletal: Normal range of motion. She exhibits no edema or deformity.   Neurological: She is alert and oriented to person, place, and time. No cranial nerve deficit. Coordination normal.   Skin: No rash noted. She is not diaphoretic. No erythema.   Psychiatric: She has a normal mood and affect.       Fluids    Intake/Output Summary (Last 24 hours) at 06/04/19 1557  Last data filed at 06/03/19 2200   Gross per  24 hour   Intake             1240 ml   Output                0 ml   Net             1240 ml       Laboratory  Recent Labs      06/03/19   1638   WBC  5.8   RBC  2.85*   HEMOGLOBIN  9.2*   HEMATOCRIT  29.0*   MCV  101.8*   MCH  32.3   MCHC  31.7*   RDW  81.5*   PLATELETCT  166   MPV  9.7     Recent Labs      06/03/19   1638   SODIUM  137   POTASSIUM  5.1   CHLORIDE  110   CO2  16*   GLUCOSE  97   BUN  36*   CREATININE  3.31*   CALCIUM  9.2                   Imaging  US-RENAL   Final Result      No evidence of hydronephrosis.      DX-CHEST-PORTABLE (1 VIEW)   Final Result      No acute cardiopulmonary process is seen.      Atherosclerotic plaque.           Assessment/Plan  Weakness- (present on admission)   Assessment & Plan    Ambulate bid     Macrocytic anemia- (present on admission)   Assessment & Plan    Hemoglobin stable at baseline     Acute renal failure superimposed on stage 4 chronic kidney disease (HCC)- (present on admission)   Assessment & Plan    Likely prerenal  Avoid nephrotoxic drugs   renal ultrasound shows no acute findings    ivf saline T 150cc/hr      Check am bmp     Metastatic adenocarcinoma (HCC)- (present on admission)   Assessment & Plan    Follow with Dr. Ortiz as an outpatient  She is not getting any chemotherapy at this point  Using medical marijuana  Tylenol for pain only     S/P CABG x 3- (present on admission)   Assessment & Plan    History of     check am bmp     VTE prophylaxis: scd

## 2019-06-04 NOTE — THERAPY
"Occupational Therapy Evaluation completed.   Functional Status:  SPV for all ADLs and ADL txfs w/FWW  Plan of Care: Patient with no further skilled OT needs in the acute care setting at this time  Discharge Recommendations:  Equipment: Will Continue to Assess for Equipment Needs. Post-acute therapy Anticipate that the patient will have no further occupational therapy needs after discharge from the hospital.     See \"Rehab Therapy-Acute\" Patient Summary Report for complete documentation.    Pt is 83yo female with pmhx that includes CAD with CABG, metastatic lung CA, HTN, and DLD admitted 2/2 abnormal labs indicating kidney dysfunction. Pt presents to OT eval at her functional baseline of independence, using FWW for household distances and reports use of w/c for community mobility. Pt demonstrated bed mobility, LB dress, toilet txf, toileting, standing grooming, functional mobility of household spaces at SPV level with no LOB noted throughout. Pt has supportive  at home and four local sons who assist with IADLs as needed. Pt does not have deficits that indicate acute OT intervention at this time. Recommend d/c home with family once medically stable/cleared.   "

## 2019-06-04 NOTE — H&P
Hospital Medicine History & Physical Note    Date of Service  6/3/2019    Primary Care Physician  Lesli Ocasio    Consultants  none    Code Status  DNR/DNI    Chief Complaint  Abnormal labs    History of Presenting Illness  84 y.o. Female with past medical history of coronary artery disease with CABG, metastatic lung cancer, hypertension, dyslipidemia who presented 6/3/2019 with above.  Patient went to see Dr. Perez today and she was found to have worsening of her kidney function and she was referred to ER for further evaluation and management.  The patient stated that her last chemotherapy was about 6 weeks ago and because it is not working for her, her oncology stop chemotherapy.  Currently she is using medical marijuana which cannot help with her symptom.  But the patient has very poor appetite and she has been losing a lot of weight over the past few months.  Her creatinine today was 3.3 and her baseline is around 2.  She will be admitted to the floor for further evaluation and management.    Review of Systems  Review of Systems   Constitutional: Positive for malaise/fatigue. Negative for chills, fever and weight loss.   HENT: Negative for congestion and nosebleeds.    Eyes: Negative for blurred vision, pain, discharge and redness.   Respiratory: Negative for cough, sputum production, shortness of breath and stridor.    Cardiovascular: Negative for chest pain, palpitations and orthopnea.   Gastrointestinal: Negative for abdominal pain, diarrhea, heartburn, nausea and vomiting.   Genitourinary: Negative for dysuria, frequency and urgency.   Musculoskeletal: Negative for back pain, myalgias and neck pain.   Skin: Negative for itching and rash.   Neurological: Positive for weakness. Negative for dizziness, focal weakness, seizures and headaches.   Psychiatric/Behavioral: Negative for depression. The patient is not nervous/anxious and does not have insomnia.        Past Medical History   has a past medical  history of Arthritis; Back pain; Breath shortness; CAD (coronary artery disease); Cancer (HCC); Cataract; Gout; Heart attack (HCC); Heart burn; High cholesterol; Hypertension; Indigestion; MEDICAL HOME; Pain; and Stroke (HCC).    Surgical History   has a past surgical history that includes angioplasty (87, 97); other; cataract phaco with iol (8/20/2009); cataract phaco with iol (8/31/2009); gyn surgery; gyn surgery (1953); multiple coronary artery bypass endo vein harvest (11/6/2016); pollo (11/6/2016); zzz cardiac cath; primary c section; abdominal hysterectomy total; and hip nailing intramedullary (Right, 12/29/2018).     Family History  family history includes Arthritis in her mother and sister; Asthma in her mother; Cancer in her mother; Heart Disease in her brother and father; Hyperlipidemia in her brother and sister.     Social History   reports that she quit smoking about 30 years ago. Her smoking use included Cigarettes. She has a 16.00 pack-year smoking history. She has never used smokeless tobacco. She reports that she does not drink alcohol or use drugs.    Allergies  Allergies   Allergen Reactions   • Atorvastatin      Elevated LFT  RXN = long time ago        Medications  Prior to Admission Medications   Prescriptions Last Dose Informant Patient Reported? Taking?   FOLIC ACID PO   Yes No   Sig: Take 1 Tab by mouth every morning.   HYDROcodone-acetaminophen (NORCO) 5-325 MG Tab per tablet   Yes No   Sig: Take 1 Tab by mouth every 6 hours.   NON SPECIFIED   Yes No   Sig: Take 1 Tab by mouth every day. **ANTI DEPRESSANT**   allopurinol (ZYLOPRIM) 100 MG Tab  Patient Yes No   Sig: Take 200 mg by mouth every morning.   escitalopram (LEXAPRO) 5 MG tablet   Yes No   metoprolol (LOPRESSOR) 25 MG Tab   Yes No   metoprolol (LOPRESSOR) 50 MG Tab   Yes No   Sig: Take 25-50 mg by mouth 2 times a day. 25 mg in AM and 50 mg at bedtime   polyethylene glycol/lytes (MIRALAX) Pack   No No   Sig: Take 1 Packet by mouth every  day.   rosuvastatin (CRESTOR) 40 MG tablet  Patient Yes No   Sig: Take 40 mg by mouth every evening.   simethicone (MYLICON) 80 MG Chew Tab   No No   Sig: Take 1 Tab by mouth 3 times a day as needed.      Facility-Administered Medications: None       Physical Exam  Temp:  [36.6 °C (97.9 °F)] 36.6 °C (97.9 °F)  Pulse:  [66] 66  Resp:  [16-20] 20  BP: (125-149)/() 149/100  SpO2:  [94 %] 94 %    Physical Exam   Constitutional: She is oriented to person, place, and time. No distress.   HENT:   Head: Normocephalic and atraumatic.   Mouth/Throat: Oropharynx is clear and moist.   Eyes: Pupils are equal, round, and reactive to light. Conjunctivae and EOM are normal.   Neck: Normal range of motion. Neck supple. No tracheal deviation present. No thyromegaly present.   Cardiovascular: Normal rate and regular rhythm.    No murmur heard.  Pulmonary/Chest: Effort normal and breath sounds normal. No respiratory distress. She has no wheezes.   Abdominal: Soft. Bowel sounds are normal. She exhibits no distension. There is no tenderness.   Musculoskeletal: She exhibits no edema or tenderness.   Neurological: She is alert and oriented to person, place, and time. No cranial nerve deficit.   Skin: Skin is warm and dry. She is not diaphoretic. No erythema.   Psychiatric: She has a normal mood and affect. Her behavior is normal. Thought content normal.   Nursing note and vitals reviewed.      Laboratory:  Recent Labs      06/03/19   1638   WBC  5.8   RBC  2.85*   HEMOGLOBIN  9.2*   HEMATOCRIT  29.0*   MCV  101.8*   MCH  32.3   MCHC  31.7*   RDW  81.5*   PLATELETCT  166   MPV  9.7     Recent Labs      06/01/19   1103  06/03/19   1638   SODIUM  136  137   POTASSIUM  5.3  5.1   CHLORIDE  110  110   CO2  16*  16*   GLUCOSE  98  97   BUN  32*  36*   CREATININE  3.65*  3.31*   CALCIUM  8.9  9.2     Recent Labs      06/01/19   1103  06/03/19   1638   ALTSGPT  19  16   ASTSGOT  26  24   ALKPHOSPHAT  109*  110*   TBILIRUBIN  0.4  0.5    GLUCOSE  98  97                 Recent Labs      06/03/19   1638   TROPONINI  <0.01       Urinalysis:    No results found     Imaging:  DX-CHEST-PORTABLE (1 VIEW)   Final Result      No acute cardiopulmonary process is seen.      Atherosclerotic plaque.      US-RENAL    (Results Pending)         Assessment/Plan:  I anticipate this patient will require at least two midnights for appropriate medical management, necessitating inpatient admission.    Macrocytic anemia- (present on admission)   Assessment & Plan    Hemoglobin stable at baseline     Acute renal failure superimposed on stage 4 chronic kidney disease (HCC)- (present on admission)   Assessment & Plan    Likely prerenal  Avoid nephrotoxic drugs  I ordered urinalysis, urine electrolytes and renal ultrasound  Follow CMP in the morning  I started her on IV fluid  If creatinine continue to get worse, consider nephrology consult     Metastatic adenocarcinoma (HCC)- (present on admission)   Assessment & Plan    Follow with Dr. Ortiz as an outpatient  She is not getting any chemotherapy at this point  Using medical marijuana  Tylenol for pain only     S/P CABG x 3- (present on admission)   Assessment & Plan    History of         VTE prophylaxis: heparin

## 2019-06-04 NOTE — PROGRESS NOTES
Pt arrived on unit. Alert and oriented to bed, call bell and routine rounding. Pleasant affect.     2 RN Skin assessment completed. All skin dry and intact. No areas of concern at this time.  Denies pain, pt resting in bed, call bell within reach.

## 2019-06-04 NOTE — ASSESSMENT & PLAN NOTE
Follow with Dr. Ortiz as an outpatient  She is not getting any chemotherapy at this point  Using medical marijuana  Tylenol for pain only

## 2019-06-04 NOTE — ED NOTES
Received bedside report from Felicia YADAV    Patient in NAD, denies any needs, gave Dinner meal box,  at bedside call light within reach. Updated on POC

## 2019-06-04 NOTE — ED NOTES
Patient VSS, denies any needs at this time, will continue to monitor.       Called report to Neuro RN

## 2019-06-04 NOTE — CARE PLAN
Problem: Safety  Goal: Will remain free from falls  Outcome: PROGRESSING AS EXPECTED  Treaded socks in place. Bed alarm on. Bed saima and in lowest position. Call light within reach.     Problem: Venous Thromboembolism (VTW)/Deep Vein Thrombosis (DVT) Prevention:  Goal: Patient will participate in Venous Thrombosis (VTE)/Deep Vein Thrombosis (DVT)Prevention Measures  Outcome: PROGRESSING AS EXPECTED  Pt ambulating to bedside commode.

## 2019-06-04 NOTE — PROGRESS NOTES
Assumed care of pt at 0700.   Pt is A&Ox4.   Pt denies n/v, n/t, and pain at this time.   Pt is x1 assist with FWW.   Plan of care discussed.   Hourly rounding in place.

## 2019-06-04 NOTE — ASSESSMENT & PLAN NOTE
Likely prerenal  Avoid nephrotoxic drugs   renal ultrasound shows no acute findings    ivf saline T 150cc/hr      Check am bmp

## 2019-06-05 NOTE — PROGRESS NOTES
Pt AAOx4, ambulatory with FWW x1.   No acute changes, VSS.   No complaints of pain at this time.   Call bell within reach, will continue to monitor.

## 2019-06-05 NOTE — DISCHARGE PLANNING
Anticipated Discharge Disposition: Home    Action: LSW met with pt, Susie bedside. LSW verified address, phone# and PCP. Susie has been  to her spouse, Aristeo for 63 years and he will be home to help her at discharge. She is independent with all ADL's and has no other needs at this time.    Barriers to Discharge: None    Plan: Pt to dc home.    Care Transition Team Assessment    Information Source  Orientation : Oriented x 4  Information Given By: Patient  Informant's Name: Susie Craig  Who is responsible for making decisions for patient? : Patient    Readmission Evaluation  Is this a readmission?: No    Elopement Risk  Legal Hold: No  Ambulatory or Self Mobile in Wheelchair: Yes  Disoriented: No  Psychiatric Symptoms: None  History of Wandering: No  Elopement this Admit: No  Vocalizing Wanting to Leave: No  Displays Behaviors, Body Language Wanting to Leave: No-Not at Risk for Elopement  Elopement Risk: Not at Risk for Elopement    Interdisciplinary Discharge Planning  Does Admitting Nurse Feel This Could be a Complex Discharge?: No  Primary Care Physician: Dr. Ocasio  Lives with - Patient's Self Care Capacity: Spouse  Patient or legal guardian wants to designate a caregiver (see row info): No  Support Systems: Spouse / Significant Other  Housing / Facility: [address]  Do You Take your Prescribed Medications Regularly: Yes  Able to Return to Previous ADL's: Yes  Mobility Issues: No  Prior Services: Other (Comments) (hx of rehab stays and HH 2/2 chronic diseases)  Patient Expects to be Discharged to:: Home  Assistance Needed: No    Discharge Preparedness  What is your plan after discharge?: Home with help  What are your discharge supports?: Spouse  Prior Functional Level: Ambulatory, Independent with Activities of Daily Living, Independent with Medication Management    Functional Assesment  Prior Functional Level: Ambulatory, Independent with Activities of Daily Living, Independent with Medication  Management    Finances  Financial Barriers to Discharge: No  Prescription Coverage: Yes    Vision / Hearing Impairment  Right Eye Vision: Impaired, Wears Glasses  Left Eye Vision: Impaired, Wears Glasses    Advance Directive  Advance Directive?: DPOA for Health Care, POLST    Domestic Abuse  Have you ever been the victim of abuse or violence?: No    Psychological Assessment  History of Substance Abuse: None  History of Psychiatric Problems: No    Discharge Risks or Barriers  Discharge risks or barriers?: No    Anticipated Discharge Information  Anticipated discharge disposition: Home  Discharge Address: [address] [place] [address]  Discharge Contact Phone Number: [phone]

## 2019-06-05 NOTE — PROGRESS NOTES
[facility] Medicine Daily Progress Note    Date of Service  6/5/2019    Chief Complaint  84 y.o. female admitted 6/3/2019 with weakness and sharonda      Interval Problem Update  Cr elevated at 2.6    Receiving ivf    Afebrile    No pain    No obstruction seen on renal US    Hb drop to 7.6    Consultants/Specialty  none    Code Status  dnr    Disposition  home    Review of Systems  Review of Systems   Constitutional: Negative for malaise/fatigue.   HENT: Negative.    Eyes: Negative.    Respiratory: Negative.    Cardiovascular: Negative.    Gastrointestinal: Negative.    Genitourinary: Negative for flank pain and frequency.   Musculoskeletal: Negative.    Neurological: Negative for weakness.   Psychiatric/Behavioral: Negative.         Physical Exam  Temp:  [36.3 °C (97.3 °F)-36.6 °C (97.8 °F)] 36.4 °C (97.6 °F)  Pulse:  [61-75] 65  Resp:  [16] 16  BP: (113-130)/(49-67) 113/49  SpO2:  [95 %-100 %] 99 %    Physical Exam   Constitutional: She is oriented to person, place, and time. No distress.   HENT:   Head: Atraumatic.   Eyes: Left eye exhibits no discharge. No scleral icterus.   Neck: No JVD present. No tracheal deviation present. No thyromegaly present.   Cardiovascular: Normal rate, regular rhythm and intact distal pulses.  Exam reveals no gallop and no friction rub.    No murmur heard.  Pulmonary/Chest: Effort normal and breath sounds normal. No respiratory distress. She has no wheezes. She has no rales.   Abdominal: Soft. Bowel sounds are normal. She exhibits no distension. There is no tenderness. There is no rebound.   Musculoskeletal: Normal range of motion. She exhibits no edema or deformity.   Neurological: She is alert and oriented to person, place, and time. No cranial nerve deficit. Coordination normal.   Skin: No rash noted. She is not diaphoretic. No erythema.   Psychiatric: She has a normal mood and affect.       Fluids    Intake/Output Summary (Last 24 hours) at 06/05/19 1118  Last data filed at 06/05/19  0800   Gross per 24 hour   Intake             2160 ml   Output                0 ml   Net             2160 ml       Laboratory  Recent Labs      06/03/19   1638  06/05/19   1007   WBC  5.8  5.1   RBC  2.85*  2.39*   HEMOGLOBIN  9.2*  7.6*   HEMATOCRIT  29.0*  24.9*   MCV  101.8*  104.2*   MCH  32.3  31.8   MCHC  31.7*  30.5*   RDW  81.5*  82.4*   PLATELETCT  166  133*   MPV  9.7  9.3     Recent Labs      06/03/19   1638  06/05/19   1007   SODIUM  137  143   POTASSIUM  5.1  3.7   CHLORIDE  110  116*   CO2  16*  15*   GLUCOSE  97  90   BUN  36*  26*   CREATININE  3.31*  2.57*   CALCIUM  9.2  7.8*                   Imaging  US-RENAL   Final Result      No evidence of hydronephrosis.      DX-CHEST-PORTABLE (1 VIEW)   Final Result      No acute cardiopulmonary process is seen.      Atherosclerotic plaque.           Assessment/Plan  Weakness- (present on admission)   Assessment & Plan    Ambulate bid     Macrocytic anemia- (present on admission)   Assessment & Plan    Transfuse if hemoglobin is less than 7    Check vitamin B12 folate levels  Check iron studies     Acute renal failure superimposed on stage 4 chronic kidney disease (HCC)- (present on admission)   Assessment & Plan    Likely prerenal  Avoid nephrotoxic drugs   renal ultrasound shows no acute findings    ivf saline T 150cc/hr      Check am bmp     Metastatic adenocarcinoma (HCC)- (present on admission)   Assessment & Plan    Follow with Dr. Ortiz as an outpatient  She is not getting any chemotherapy at this point  Using medical marijuana  Tylenol for pain only     S/P CABG x 3- (present on admission)   Assessment & Plan    History of     check am bmp , cbc    VTE prophylaxis: scd

## 2019-06-05 NOTE — CARE PLAN
Problem: Safety  Goal: Will remain free from injury  Outcome: PROGRESSING AS EXPECTED  Safety maintained    Problem: Infection  Goal: Will remain free from infection  Outcome: PROGRESSING AS EXPECTED  No s/s infection

## 2019-06-05 NOTE — CARE PLAN
Problem: Communication  Goal: The ability to communicate needs accurately and effectively will improve  Outcome: PROGRESSING AS EXPECTED  Pt using call light appropriately. Voicing needs to staff with no complications.     Problem: Safety  Goal: Will remain free from injury  Outcome: PROGRESSING AS EXPECTED  Bed in lowest, locked position. Bedside rails up, bed alarm in place. Call bell within reach, will continue to monitor.

## 2019-06-06 PROBLEM — R53.1 WEAKNESS: Status: RESOLVED | Noted: 2019-01-01 | Resolved: 2019-01-01

## 2019-06-06 NOTE — CARE PLAN
Problem: Safety  Goal: Will remain free from injury    Intervention: Provide assistance with mobility  Pt instructed to use call light for assistance with ambulating      Problem: Pain Management  Goal: Pain level will decrease to patient's comfort goal    Intervention: Follow pain managment plan developed in collaboration with patient and Interdisciplinary Team  Pain assessments completed and medication administered per MAR.

## 2019-06-06 NOTE — DISCHARGE INSTRUCTIONS
Discharge Instructions    Discharged to home by car with relative. Discharged via wheelchair, hospital escort: Yes.  Special equipment needed: Not Applicable    Be sure to schedule a follow-up appointment with your primary care doctor or any specialists as instructed.     Discharge Plan:   Diet Plan: Discussed  Activity Level: Discussed  Confirmed Follow up Appointment: Appointment Scheduled  Confirmed Symptoms Management: Discussed  Medication Reconciliation Updated: Yes  Pneumococcal Vaccine Administered/Refused: Not given - Patient refused pneumococcal vaccine    I understand that a diet low in cholesterol, fat, and sodium is recommended for good health. Unless I have been given specific instructions below for another diet, I accept this instruction as my diet prescription.   Other diet: Renal diet    Special Instructions: None    · Is patient discharged on Warfarin / Coumadin?   No     Depression / Suicide Risk    As you are discharged from this [facility] facility, it is important to learn how to keep safe from harming yourself.    Recognize the warning signs:  · Abrupt changes in personality, positive or negative- including increase in energy   · Giving away possessions  · Change in eating patterns- significant weight changes-  positive or negative  · Change in sleeping patterns- unable to sleep or sleeping all the time   · Unwillingness or inability to communicate  · Depression  · Unusual sadness, discouragement and loneliness  · Talk of wanting to die  · Neglect of personal appearance   · Rebelliousness- reckless behavior  · Withdrawal from people/activities they love  · Confusion- inability to concentrate     If you or a loved one observes any of these behaviors or has concerns about self-harm, here's what you can do:  · Talk about it- your feelings and reasons for harming yourself  · Remove any means that you might use to hurt yourself (examples: pills, rope, extension cords, firearm)  · Get  professional help from the community (Mental Health, Substance Abuse, psychological counseling)  · Do not be alone:Call your Safe Contact- someone whom you trust who will be there for you.  · Call your local CRISIS HOTLINE 791-9146 or 492-896-0864  · Call your local Children's Mobile Crisis Response Team [place] (227) 239-8604 or www.Sn[employer]oteket  · Call the toll free National Suicide Prevention Hotlines   · National Suicide Prevention Lifeline 124-057-PFTX (5764)  · National Hope Line Network 800-SUICIDE (401-0320)

## 2019-06-07 NOTE — DISCHARGE SUMMARY
Discharge Summary    CHIEF COMPLAINT ON ADMISSION  Chief Complaint   Patient presents with   • Sent by MD     elevated BUN & creatinine    • Abnormal Labs       Reason for Admission  SENT BY MD     Admission Date  6/3/2019    CODE STATUS  Prior    HPI & HOSPITAL COURSE  This is a 84 y.o. female here with Past medical history of metastatic lung cancer who came in with acute kidney injury.  Based on the timing of his presentation we believe is related to chemotherapy and dehydration.  Patient was hospitalized.  Urinalysis showed no evidence of infection.  Ultrasound of the kidneys showed no evidence of obstruction.  Patient was given IV fluid hydration.  Patient's kidney function did improve but slowly.  We had nephrology Dr. Lamb evaluate the patient.  Patient was stable for discharge on 6/6/2019 to follow-up with outpatient nephrology        Therefore, she is discharged in fair and stable condition to home with close outpatient follow-up.    The patient met 2-midnight criteria for an inpatient stay at the time of discharge.    Discharge Date  6/6/2019    FOLLOW UP ITEMS POST DISCHARGE  [facility]    DISCHARGE DIAGNOSES  Active Problems:    S/P CABG x 3 POA: Yes      Overview: 1985 PTCA      1997 PTCA      7/11 EKG LBBB      9/30/14 echo normal LV function, EF 70%, mild MR, mild to moderate       tricuspid regurgitation, RVSP 43 to 48 mild to moderate pulmonary       hypertension      10/17/14 holter monitor normal sinus rhythm with episode of sinus       tachycardia, no symptoms documented, no arrhythmia or pauses, minimal       heart rate 48, maximum heart rate 135, one PVC, supraventricular ectopic       activity 20 beats, mostly all single PACs      11/29/15 echo normal LV function, septal hypokinesis and paradoxical       motion, grade 1 diastolic dysfunction, mild MR and TR      9/12/16 persantine thallium small area of ischemia inferolateral apex,       possible nontransmural distal anterior wall  myocardial infarct, normal       ejection fraction      10/25/16 cardiology note discussed medical therapy versus coronary       arteriography, patient elects to proceed with cardiac catheterization      11/2/16 cardiac catheterization proximal ostial LAD appears to have stent,       high-grade 95% stenosis, beyond stent bifurcation diagonal branch LAD       focal 95% stenosis, remainder widely patent, circumflex first marginal       branch diffuse 75% stenosis, RCA distal smooth eccentric 60-70% stenosis,       EF 68%, 3 vessel CAD with diffuse ostial, proximal and bifurcation LAD       disease, referred to cardiac surgery for consideration CABG      11/5/16  cardiovascular surgery, CABG ×3 LIMA to LAD, reverse       saphenous to second diagonal and first obtuse marginal      11/16/16 cardiac catheterization ejection fraction 56%, mid anterior wall       hypokinesis/akinesis, left main normal, LAD ostial 50% long calcific       stenosis, proximal LAD and diagonal bifurcation 95% stenosis, diagonal       ostium 95% stenosis, mid LAD has 2 serial 50% stenosis with kinking and       tenting from retraction of LIMA bypass graft anastomosis, complete MARCIN 2       antegrade flow in the distal LAD, circumflex mid OMB one insertion site       saphenous vein graft to this vessel 90% stenosis with MARCIN 3 antegrade       flow, second marginal widely patent, RCA distal 75% calcified       stenosis,MARCIN 3 flow, this vessel was not bypassed, LIMA bypass graft       angiography markedly tapering distal anastomosis, no additional stenosis;       saphenous vein bypass graft angiography widely patent; conservative       therapy recommended      11/21/16 cardiology note, CAD with recent CABG and SVG occlusion, continue       aspirin, plavix, crestor, metoprolol (increase to 25 mg qam and 50 mg apm,       lisinopril, Lasix as needed plus potassium for edema, referral cardiac       rehabilitation, follow-up 3 months       12/14/16 echo mildly reduced left ventricular systolic function, EF 50%,       grade 2 diastolic dysfunction      12/22/16 cardiology note stable on metoprolol and lisinopril, crestor,       aspirin and plavix follow-up 3 months       9/24/17 echo LV ejection fraction estimated 50-55%, normal diastolic       function      10/11/17 cardiology note no changes                                  Metastatic adenocarcinoma (HCC) POA: Yes    Acute renal failure superimposed on stage 4 chronic kidney disease (HCC) POA: Yes    Macrocytic anemia POA: Yes  Resolved Problems:    Weakness POA: Yes      FOLLOW UP  Future Appointments  Date Time Provider Department Center   6/10/2019 2:30 PM Abebe Edmond M.D. NEPH [address]   6/24/2019 10:15 AM Freddy Shah M.D. RADT None   10/1/2019 3:00 PM Carla Goode M.D. [facility] None     Lesli Aminbakh  [address]  [address]  [place] [address]  [phone]            MEDICATIONS ON DISCHARGE     Medication List      CHANGE how you take these medications      Instructions   escitalopram 5 MG tablet  What changed:  · how much to take  · how to take this  · when to take this  Commonly known as:  LEXAPRO   Take 1 Tab by mouth every day.  Dose:  5 mg        CONTINUE taking these medications      Instructions   CRESTOR 40 MG tablet  Generic drug:  rosuvastatin   Take 40 mg by mouth every evening.  Dose:  40 mg     FOLIC ACID PO   Take 1 Tab by mouth every morning.  Dose:  1 Tab     metoprolol 50 MG Tabs  Commonly known as:  LOPRESSOR   Take 25-50 mg by mouth 2 times a day. 25 mg every morning 50 mg every night  Dose:  25-50 mg        STOP taking these medications    potassium chloride 10 MEQ capsule  Commonly known as:  MICRO-K            Allergies  Allergies   Allergen Reactions   • Atorvastatin      Elevated LFT  RXN = long time ago   Tolerates rosuvastatin (06/04/19)       DIET  No orders of the defined types were placed in this encounter.      ACTIVITY  As tolerated.  Weight bearing  as tolerated    CONSULTATIONS  Dr haque renal md    PROCEDURES  [address] [address] Susie Craig  MRN: [id number], : 1935, Sex: F  Encounter date: 6/3/2019   Patient Information     Patient Name  Susie Craig ([id number]) Sex  Female   1935   Code Status   Prior   [facility] [[id number]]     Electronically signed by: Philip Shen M.D. on 19 Status: Completed   Ordering user: Philip Shen M.D. 19 Ordering provider: Philip Shen M.D.   Authorized by: Philip Shen M.D. Ordered during: ED to Hosp-Admission (Discharged) on 2019   Add Signature Requirement   Frequency: Once 19 - 1  Occurrences   Questionnaire     Question Answer   Reason For Exam: Abnormal Labs   Order comments: Teens and adults:  Drink 30 oz of water and be finished drinking 1 hour prior to exam.  Do not void; have a full bladder for the exam. Smaller children:  Drink 15 oz.   Screening Form     General Information     Patient Name: Susie Craig   YOB: 1935   Sex: Female    MRN: [id number]   Home Phone: [phone]   Mobile: [phone]          Procedure Ordering Provider Authorizing Provider Appointment Information   [facility] Philip Shen M.D.    [phone]    Philip Shen M.D.    [phone]    6/3/2019  6:40 PM  [facility]  ULTRASOUND [facility]   Screening Form Questions     No questions have been answered for this form.   LMP/OB Status     OB Status Last Menstrual Period   Hysterectomy 2011   Reprint Order Requisition     [facility] (Order #[id number]) on 6/3/19   Last Resulted Time   Mon Lenny 3, 2019  7:24 PM   Images     Show images for [facility]   Imaging Result Status     Status: Final result (Exam End: 6/3/2019  7:18 PM)   Imaging Previous Results     Open Hard Copy Result Report (Order #[id number] - [facility])   Narrative       6/3/2019 6:41 PM    HISTORY/REASON FOR EXAM:  Abnormal  Labs      TECHNIQUE/EXAM DESCRIPTION:  Renal ultrasound.    COMPARISON:  CT 12/27/2018    FINDINGS:  The right kidney measures 8.59 cm.  The left kidney measures 8.71 cm.    There is no hydronephrosis.  There are no abnormal calcifications.    The bladder is partially decompressed. Prevoid bladder volume is 17.2 mL.     Impression       No evidence of hydronephrosis.         LABORATORY  Lab Results   Component Value Date    SODIUM 143 06/06/2019    POTASSIUM 4.0 06/06/2019    CHLORIDE 119 (H) 06/06/2019    CO2 15 (L) 06/06/2019    GLUCOSE 92 06/06/2019    BUN 24 (H) 06/06/2019    CREATININE 2.55 (H) 06/06/2019        Lab Results   Component Value Date    WBC 4.4 (L) 06/06/2019    HEMOGLOBIN 7.7 (L) 06/06/2019    HEMATOCRIT 24.3 (L) 06/06/2019    PLATELETCT 130 (L) 06/06/2019        Total time of the discharge process exceeds 38 minutes.

## 2019-06-10 PROBLEM — N17.9 AKI (ACUTE KIDNEY INJURY) (HCC): Status: ACTIVE | Noted: 2019-01-01

## 2019-06-10 NOTE — PROGRESS NOTES
Chief Complaint   Patient presents with   • Follow-Up       Requesting Provider: Dr. Nicolás Porter    HPI:  Susie Craig is a 84 y.o. 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     She was recently called on 6/3/19 to go to the ER due to DIONTE. She was admitted and thought to be dehydrated. She was given IV fluids. She says prior to admission she drank a lot of water and continues to drink plenty of water daily.  She denies any history of kidney problems. She denies urinary troubles.     Re: Lung cancer, now spread to bones. She has bone pain. She takes Tylenol for pain. Prior to that, she was on Norco. She denies NSAIDs. She started chemo and immunotherapy (Keytruda) in March. She only did three sessions and then was taken off. Her last Keytruda was four weeks ago. Last chemo was 7 weeks ago. Her oncologist is Dr. Crispin Mccollum.    She does complain of weight loss.         Past Medical History:   Diagnosis Date   • Arthritis    • Back pain    • Breath shortness     with exertion   • CAD (coronary artery disease)     CABG X3 in '16 with graft occlusion post-op   • Cancer (HCC)     lung   • Cataract     bilat IOL    • Gout    • Heart attack (HCC)    • Heart burn    • High cholesterol    • Hypertension    • Indigestion    • MEDICAL HOME    • Pain     R leg/hip   • Stroke (HCC)     TIA        Past Surgical History:   Procedure Laterality Date   • HIP NAILING INTRAMEDULLARY Right 12/29/2018    Procedure: HIP NAILING INTRAMEDULLARY;  Surgeon: Alejo Savage M.D.;  Location: SURGERY [facility];  Service: Orthopedics   • MULTIPLE CORONARY ARTERY BYPASS ENDO VEIN HARVEST  11/6/2016    Procedure: MULTIPLE CORONARY ARTERY BYPASS ENDO VEIN HARVEST;  Surgeon: Jeff Naranjo M.D.;  Location: SURGERY [facility];  Service:    • ABIODUN  11/6/2016    Procedure: ABIODUN;  Surgeon: Jfef Naranjo M.D.;  Location: [facility];  Service:    •  CATARACT PHACO WITH IOL  8/31/2009    Performed by SOLOMON THOMAS at SURGERY [facility]   • CATARACT PHACO WITH IOL  8/20/2009    [facility]   • GYN SURGERY  1953    Hysterectomy during last C Section    • ABDOMINAL HYSTERECTOMY TOTAL     • ANGIOPLASTY  85, 97   • GYN SURGERY      C Section x4   • OTHER      c-sections x4   • PRIMARY C SECTION     • ZZZ CARDIAC CATH          Outpatient Encounter Prescriptions as of 6/10/2019   Medication Sig Dispense Refill   • ondansetron (ZOFRAN ODT) 8 MG TABLET DISPERSIBLE Take 8 mg by mouth every 8 hours as needed for Nausea.     • acetaminophen (TYLENOL) 500 MG Tab Take 500-1,000 mg by mouth every 6 hours as needed.     • FOLIC ACID PO Take 1 Tab by mouth every morning.     • metoprolol (LOPRESSOR) 50 MG Tab Take 25-50 mg by mouth 2 times a day. 25 mg every morning  50 mg every night     • rosuvastatin (CRESTOR) 40 MG tablet Take 40 mg by mouth every evening.     • escitalopram (LEXAPRO) 5 MG tablet Take 1 Tab by mouth every day. 30 Tab 3     No facility-administered encounter medications on file as of 6/10/2019.         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     Social History Main Topics   • Smoking status: Former Smoker     Packs/day: 0.50     Years: 32.00     Types: Cigarettes     Quit date: 8/31/1988   • Smokeless tobacco: Never Used      Comment: quit 1988, approx 30pyh   • Alcohol use No   • Drug use: No   • Sexual activity: No     Other Topics Concern   • Not on file     Social History Narrative   • No narrative on file       Family History   Problem Relation Age of Onset   • Cancer Mother         uterine   • Arthritis  Mother         gout   • Asthma Mother    • Heart Disease Father         MI age 50s   • Heart Disease Brother          40 MI   • Hyperlipidemia Brother    • Hyperlipidemia Sister    • Arthritis Sister        Review of Systems   Constitutional: Negative for fever.   Respiratory: Negative for shortness of breath.    Cardiovascular: Negative for chest pain.   Gastrointestinal: Negative for abdominal pain, nausea and vomiting.   Genitourinary: Negative for dysuria and hematuria.   Neurological: Negative for headaches.   All other systems reviewed and are negative.      /76 (BP Location: Right arm, Patient Position: Sitting)   Pulse 75   Temp 36.6 °C (97.8 °F)   Resp 16   Ht 1.524 m (5')   Wt 48.1 kg (106 lb)   LMP 2011   SpO2 98%   BMI 20.70 kg/m²      Physical Exam   Constitutional: She is oriented to person, place, and time and well-developed, well-nourished, and in no distress. No distress.   HENT:   Mouth/Throat: Oropharynx is clear and moist. No oropharyngeal exudate.   Eyes: EOM are normal. No scleral icterus.   Neck: Neck supple. No tracheal deviation present.   Cardiovascular: Normal rate, regular rhythm and normal heart sounds.    No murmur heard.  Pulmonary/Chest: Effort normal. No stridor. Wheezes: trace LE. She has rales (crackles on right).   Abdominal: Soft. Bowel sounds are normal. There is no tenderness.   Musculoskeletal: Normal range of motion. She exhibits edema.   Neurological: She is alert and oriented to person, place, and time.   Skin: Skin is warm and dry. No rash noted.   Psychiatric: Mood and affect normal.         Labs reviewed.    Recent Labs      10/01/18   0901   19   1506  19   1103  19   1638  19   1007  19   0430   ALBUMIN  4.3   < >  4.2  4.3  4.5   --    --    HDL  66   --    --    --    --    --    --    TRIGLYCERIDE  62   --    --    --    --    --    --    SODIUM  141   < >  143  136  137  143  143   POTASSIUM  4.3   < >  4.1  5.3   5.1  3.7  4.0   CHLORIDE  106   < >  112  110  110  116*  119*   CO2  26   < >  19*  16*  16*  15*  15*   BUN  21   < >  24*  32*  36*  26*  24*   CREATININE  1.09   < >  2.81*  3.65*  3.31*  2.57*  2.55*    < > = values in this interval not displayed.             URINALYSIS:    Lab Results  Component Value Date/Time   COLORURINE Yellow 06/03/2019 1835   CLARITY Clear 06/03/2019 1835   SPECGRAVITY 1.007 06/03/2019 1835   PHURINE 5.5 06/03/2019 1835   KETONES Negative 06/03/2019 1835   PROTEINURIN 30 (A) 06/03/2019 1835   BILIRUBINUR Negative 06/03/2019 1835   UROBILU 0.2 06/03/2019 1835   NITRITE Negative 06/03/2019 1835   LEUKESTERAS Trace (A) 06/03/2019 1835   OCCULTBLOOD Trace (A) 06/03/2019 1835     UPC  No results found for: TOTPROTUR   Lab Results  Component Value Date/Time   CREATININEU 27.30 06/03/2019 1835         Imaging reviewed  No orders to display         Assessment:  Susie Craig is a 84 y.o. 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     Plan:  1. DIONTE (acute kidney injury) (HCC)  - Patient's acute kidney injury seems to correlate well with her starting chemotherapy and immunotherapy.  I will evaluate the patient for secondary causes of glomerulonephritis.  If the work-up for GN is negative, will need to consider that her DIONTE is due to interaction from the immunotherapy/AIN.  Patient seems very hesitant to consider dialysis if her kidney function worsens, but plans to discuss this further with her family.    2. Malignant neoplasm of right lung, unspecified part of lung (HCC)  -Patient is currently getting immunotherapy.  Lung cancer metastatic to bones.    3. Acute blood loss anemia  -Check CBC, iron studies, ferritin with next labs.    4. Essential hypertension  -Blood pressure currently well controlled on metoprolol        RTC 6 weeks with pre-clinic labs    Abebe Edmond MD  Nephrology

## 2019-06-12 PROBLEM — R80.8 OTHER PROTEINURIA: Status: ACTIVE | Noted: 2019-01-01

## 2019-06-12 PROBLEM — R31.21 ASYMPTOMATIC MICROSCOPIC HEMATURIA: Status: ACTIVE | Noted: 2019-01-01

## 2019-06-12 PROBLEM — E87.20 METABOLIC ACIDOSIS: Status: ACTIVE | Noted: 2019-01-01

## 2019-06-12 NOTE — TELEPHONE ENCOUNTER
I spoke with the patient on the phone and reviewed her labs. Kidney function is worsening. She has approximately 2g of proteinuria on UPC as well as pyuria and hematuria. She has no symptoms of UTI.    I recommend kidney biopsy to which she agrees.     She says her chemotherapy was Carboplatin and alimta with keytruda.

## 2019-06-14 NOTE — TELEPHONE ENCOUNTER
Patient called to let you know that she will be getting her biopsy done on 6/18/19. Pt is now scheduled for an appointment with you on 6/28/19.

## 2019-06-15 NOTE — PROGRESS NOTES
Pt to [facility] for blood transfusion.  Pt's Hgb = 8.0, within treatable parameters for 1 unit PRBC.  Right chest wall port with Emla cream. Pt stated she wished to have Lidocaine to port site as well. Port accessed without difficulty. Flushed easily, nadine briskly.COD drawn and sent to lab. Pre-meds given as ordered. Blood transfusion administered as ordered. Tolerated well.  Port flushed with NS and Heparin per protocol, then de-accessed.  Pt left OPIC in NAD.

## 2019-06-18 NOTE — OR SURGEON
Immediate Post- Operative Note        PostOp Diagnosis: renal insufficiency      Procedure(s): CT guided RIGHT renal biopsy      Estimated Blood Loss: Less than 5 ml        Complications: None            6/18/2019     10:07 AM     Soy Butcher

## 2019-06-18 NOTE — OR NURSING
1004 Patient arrived to unit, awake and alert.  Access site to right flank clean, dry and soft.  Patient denies pain at this time.   at bedside, updated on plan of care, verbalized understanding.  1030 Patient sleeping at this time.  1100 Access site clean, dry and soft.  Patient denies pain.  1120 Access site clean, dry and soft.  All lines and monitors discontinued. Reviewed discharge paperwork with pt and . Discussed diet, activity, medications, follow up care and worsening symptoms. No questions at this time.   1135 Pt discharged home with  via wheelchair by RN.

## 2019-06-18 NOTE — PROGRESS NOTES
CT Nursing Note:    Patient consented by Dr. Butcher, all questions answered. Dr. Butcher performed non-targeted right renal biopsy.  Cores x 2 taken by Dr. Lakhani.  The pt tolerated the procedure well; ETCo2 baseline 31, with consistent waveform during the procedure.  Gauze and tegaderm applied to right flank, CDI and soft.  Pt alert  and verbally appropriate post procedure, vital signs stable during procedure  and transport, see flow sheet for vital signs.  Report given to LEIF Franco.  RN transported pt to TPPU with Sao2 monitor.

## 2019-06-18 NOTE — DISCHARGE INSTRUCTIONS
ACTIVITY: Rest and take it easy for the first 24 hours.  A responsible adult is recommended to remain with you during that time.  It is normal to feel sleepy.  We encourage you to not do anything that requires balance, judgment or coordination.    MILD FLU-LIKE SYMPTOMS ARE NORMAL. YOU MAY EXPERIENCE GENERALIZED MUSCLE ACHES, THROAT IRRITATION, HEADACHE AND/OR SOME NAUSEA.    FOR 24 HOURS DO NOT:  Drive, operate machinery or run household appliances.  Drink beer or alcoholic beverages.   Make important decisions or sign legal documents.    DIET: To avoid nausea, slowly advance diet as tolerated, avoiding spicy or greasy foods for the first day.  Add more substantial food to your diet according to your physician's instructions.  Babies can be fed formula or breast milk as soon as they are hungry.  INCREASE FLUIDS AND FIBER TO AVOID CONSTIPATION.    SURGICAL DRESSING/BATHING: Keep dressing and incision dry and intact for 24 hours, may remove dressing and shower after 10 AM on 6/19, do not need to replace    FOLLOW-UP APPOINTMENT:  A follow-up appointment should be arranged with your doctor Mayito [phone]; call to schedule.    You should CALL YOUR PHYSICIAN if you develop:  Fever greater than 101 degrees F.  Pain not relieved by medication, or persistent nausea or vomiting.  Excessive bleeding (blood soaking through dressing) or unexpected drainage from the wound.  Extreme redness or swelling around the incision site, drainage of pus or foul smelling drainage.  Inability to urinate or empty your bladder within 8 hours.  Problems with breathing or chest pain.    You should call 911 if you develop problems with breathing or chest pain.  If you are unable to contact your doctor or surgical center, you should go to the nearest emergency room or urgent care center.  Physician's telephone #: [phone]    If any questions arise, call your doctor.  If your doctor is not available, please feel free to call the Surgical Center  at [phone].  The Center is open Monday through Friday from 7AM to 7PM.  You can also call the HEALTH HOTLINE open 24 hours/day, 7 days/week and speak to a nurse at [phone], or toll free at [phone].    A registered nurse may call you a few days after your surgery to see how you are doing after your procedure.    MEDICATIONS: Resume taking daily medication.  Take prescribed pain medication with food.  If no medication is prescribed, you may take non-aspirin pain medication if needed.  PAIN MEDICATION CAN BE VERY CONSTIPATING.  Take a stool softener or laxative such as senokot, pericolace, or milk of magnesia if needed.    If your physician has prescribed pain medication that includes Acetaminophen (Tylenol), do not take additional Acetaminophen (Tylenol) while taking the prescribed medication.    Discharge Instructions for Kidney Biopsy  You had a procedure called a kidney biopsy. Your healthcare provider used a special needle to remove a small piece of tissue from your kidney to examine it for signs of damage and disease. A kidney biopsy is ordered after other tests have shown that there may be a problem with your kidney. Kidney biopsies are also performed when kidney disease is suspected and to rule out cancer.  Home care  · Rest for 24 hours to 48 hours. Get up only to use the bathroom.  · Don’t drive for 24 hours to 48 hours after the procedure.  · Don’t shower for 24 hours after the biopsy. If you wish, you may wash yourself with a sponge or washcloth. When you are able to shower, don’t scrub the site. Gently wash the area and pat it dry.  · Remove the bandage covering the biopsy site 24 hours to 48 hours after the procedure.  · Don’t lift anything heavier than 10 pounds for 3 days to 4 days after the procedure.  · Ask your healthcare provider when you can return to work. Be sure to tell your healthcare provider if your job involves heavy lifting.  · If you normally take blood thinner  medicines (anticoagulants or antiplatelet medicines) and you stopped taking them a few days before your procedure, ask your healthcare provider when to start taking them again.  When to call your healthcare provider  Call your healthcare provider right away if you have any of the following:  · Blood in your urine  · Exhaustion or extreme weakness  · Dizziness or lightheadedness  · Sudden or increased shortness of breath  · Sudden chest pain  · Fever of 100.4°F (38°C) or higher, or as directed by your healthcare provider  · Chills  · Increasing redness, tenderness, or swelling at the biopsy site  · Opening of or drainage or bleeding from the biopsy site  · Increasing pain, with or without activity       Depression / Suicide Risk    As you are discharged from this [facility] facility, it is important to learn how to keep safe from harming yourself.    Recognize the warning signs:  · Abrupt changes in personality, positive or negative- including increase in energy   · Giving away possessions  · Change in eating patterns- significant weight changes-  positive or negative  · Change in sleeping patterns- unable to sleep or sleeping all the time   · Unwillingness or inability to communicate  · Depression  · Unusual sadness, discouragement and loneliness  · Talk of wanting to die  · Neglect of personal appearance   · Rebelliousness- reckless behavior  · Withdrawal from people/activities they love  · Confusion- inability to concentrate     If you or a loved one observes any of these behaviors or has concerns about self-harm, here's what you can do:  · Talk about it- your feelings and reasons for harming yourself  · Remove any means that you might use to hurt yourself (examples: pills, rope, extension cords, firearm)  · Get professional help from the community (Mental Health, Substance Abuse, psychological counseling)  · Do not be alone:Call your Safe Contact- someone whom you trust who will be there for you.  · Call your  local CRISIS HOTLINE 059-0150 or 220-260-6561  · Call your local Children's Mobile Crisis Response Team [place] (923) 752-8270 or www.[employer]  · Call the toll free National Suicide Prevention Hotlines   · National Suicide Prevention Lifeline 583-040-XXUH (8657)  · [employer] Line Network 800-SUICIDE (863-3841)

## 2019-06-27 NOTE — TELEPHONE ENCOUNTER
Nutrition Services: Referral Received  Called pt to set up RD consultation. Pt's  answered and stated he does not think they want to meet with a dietitian. Explained nutrition goals and support services offered, he stated he would discuss with his wife and call me back to schedule.

## 2019-06-28 NOTE — PROGRESS NOTES
Chief Complaint   Patient presents with   • Follow-Up   • Chronic Kidney Disease           HPI:  Susie Craig is a 84 y.o. 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     Since last visit, she had a kidney biopsy on 6/18/19 that showed ATN. No AIN or autoimmune disease seen.     She says she has been dehydrated. She got some IV fluids when she went to the clinic the other day.     Today, she slept in. She does complain of some leg pain. She cannot walk far due to leg pain, so she is in a wheelchair today.   She denies NSAIDs. Takes Tylenol PRN.     She has no problems urinating.   She quit taking sodium bicarbonate 5 days ago due to changes in BM habits.  She denies feelings of light-headedness or dizziness.       Past Medical History:   Diagnosis Date   • Arthritis     fingers   • Breath shortness     with exertion   • CAD (coronary artery disease)     CABG X3 in '16 with graft occlusion post-op   • CAD (coronary artery disease)     Dr. KENZIE Goode, stents   • Cancer (HCC)     lung   • Cataract     bilat IOL    • Gout    • High cholesterol    • Hypertension 1998   • MEDICAL HOME    • Pain 06/14/2019    R leg/hip, 0/10   • Stroke (HCC)     TIA        Outpatient Encounter Prescriptions as of 6/28/2019   Medication Sig Dispense Refill   • acetaminophen (TYLENOL) 500 MG Tab Take 500-1,000 mg by mouth as needed.     • FOLIC ACID PO Take 1 Tab by mouth every morning.     • metoprolol (LOPRESSOR) 50 MG Tab Take 25-50 mg by mouth 2 times a day. 25 mg every morning  50 mg every night     • rosuvastatin (CRESTOR) 40 MG tablet Take 40 mg by mouth every evening.     • [DISCONTINUED] sodium bicarbonate (SODIUM BICARBONATE) 650 MG Tab Take 1 Tab by mouth 2 times a day. 60 Tab 6   • ondansetron (ZOFRAN ODT) 8 MG TABLET DISPERSIBLE Take 8 mg by mouth every 8 hours as needed for Nausea.       No facility-administered encounter medications on file as of 6/28/2019.          Allergies   Allergen Reactions   • Atorvastatin      Elevated LFT  RXN = long time ago   Tolerates rosuvastatin (06/04/19)       Review of Systems   Constitutional: Negative for fever.   Respiratory: Negative for shortness of breath.    Cardiovascular: Negative for chest pain.   Gastrointestinal: Negative for abdominal pain, nausea and vomiting.   Genitourinary: Negative for dysuria and hematuria.   Musculoskeletal: Positive for myalgias (leg pain).   Neurological: Negative for headaches.   All other systems reviewed and are negative.      /64 (BP Location: Right arm, Patient Position: Sitting, BP Cuff Size: Adult)   Pulse 65   Temp 36.8 °C (98.3 °F) (Temporal)   Resp 14   Ht 1.524 m (5')   Wt 46.7 kg (103 lb)   LMP  (LMP Unknown)   SpO2 98%   BMI 20.12 kg/m²     Physical Exam   Constitutional: She is oriented to person, place, and time and well-developed, well-nourished, and in no distress. No distress.   HENT:   Mouth/Throat: Oropharynx is clear and moist. No oropharyngeal exudate.   Eyes: EOM are normal. No scleral icterus.   Neck: Neck supple. No tracheal deviation present.   Cardiovascular: Normal rate, regular rhythm and normal heart sounds.    No murmur heard.  Pulmonary/Chest: Effort normal and breath sounds normal. No stridor. She has no rales.   Abdominal: Soft. Bowel sounds are normal. There is no tenderness.   Musculoskeletal: Normal range of motion. She exhibits no edema.   Neurological: She is alert and oriented to person, place, and time.   Skin: Skin is warm and dry. No rash noted.   Psychiatric: Mood and affect normal.         Labs reviewed.  Recent Labs      10/01/18   0901   06/11/19   1256  06/13/19   1511  06/24/19   1028   ALBUMIN  4.3   < >  4.13  4.2  3.9  4.0   HDL  66   --    --    --    --    TRIGLYCERIDE  62   --    --    --    --    SODIUM  141   < >  144  141  142   POTASSIUM  4.3   < >  3.5*  3.5*  3.3*   CHLORIDE  106   < >  111  110  107   CO2  26   < >  20  19*   23   BUN  21   < >  26*  27*  20   CREATININE  1.09   < >  3.07*  2.91*  2.39*   PHOSPHORUS   --    --   3.2   --    --     < > = values in this interval not displayed.               URINALYSIS:    Lab Results  Component Value Date/Time   COLORURINE Yellow 06/11/2019 1250   CLARITY Clear 06/11/2019 1250   SPECGRAVITY 1.008 06/11/2019 1250   PHURINE 6.0 06/11/2019 1250   KETONES Negative 06/11/2019 1250   PROTEINURIN 100 (A) 06/11/2019 1250   BILIRUBINUR Negative 06/11/2019 1250   UROBILU 0.2 06/11/2019 1250   NITRITE Negative 06/11/2019 1250   LEUKESTERAS Moderate (A) 06/11/2019 1250   OCCULTBLOOD Trace (A) 06/11/2019 1250     [facility] [place]  Component Value Date/Time   TOTPROTUR See Note 06/11/2019 1257      Lab Results  Component Value Date/Time   CREATININEU 44.20 06/11/2019 1250       Imaging reviewed  No orders to display       Kidney biopsy done, read 6/19/19  Acute tubular injury, with a background of moderate chronic injury, but is negative for interstitial nephritis and glomerulonephritis.      Assessment:  Susie Craig is a 84 y.o. 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     Plan:  1. Acute renal failure with acute tubular necrosis superimposed on stage 4 chronic kidney disease (HCC)  -Due to biopsy-proven ATN, from kidney biopsy on 6/18/2019. Unclear etiology of ATN. No AIN or autoimmune disease seen.  DIONTE seems to be improving.  I recommended continuing to avoid NSAIDs.  Continue serial kidney labs, unclear if patient will have residual chronic kidney disease from this episode, or if she will recover back to her baseline.    2. 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  Under the care of Dr. Crispin Anaya.  As there was no allergic interstitial nephritis, patient may continue Keytruda per oncology's decision.    3. Macrocytic anemia  -Check CBC, iron studies with next labs.    4.  Metabolic acidosis  -Improved  with improving kidney function.  Discontinue sodium bicarbonate pills.  Of note, patient did not tolerate sodium bicarbonate pills.  If acidosis returns, would recommend 1/2 teaspoon of baking soda dissolved in 4 ounces of water 1-2 times daily.      RTC 2 months with pre-clinic labs    Abebe Edmond MD  Nephrology

## 2019-07-02 NOTE — TELEPHONE ENCOUNTER
Nutrition Services: Brief Update  Pt returned phone call, RD consultation set up for Tuesday 7/9 at 3pm. RD following.

## 2019-07-03 NOTE — ED PROVIDER NOTES
ED Provider Note    CHIEF COMPLAINT  Chief Complaint   Patient presents with   • Leg Pain     r/t bone CA       HPI  Susie Craig is a 84 y.o. female who presents with weakness and leg pain.  She was recently back in December diagnosed with lung cancer with metastasis to the legs.  She did have a surgery with apparently an an intramedullary yessenia placed in the right.  She had 2 courses of chemotherapy and half of another course but could not tolerate it.  She does see radiation oncology.  They have asked her to use a walker.  She came off all of her pain medicines when on medical marijuana is been having dramatic improvement.  She in the past few days is felt weak had increased leg pain contacted her oncologist who wanted her to come in for evaluation.  No fever chills no chest pain shortness of breath nausea vomiting no cold symptoms all other systems negative    REVIEW OF SYSTEMS  See HPI for further details    PAST MEDICAL HISTORY  Past Medical History:   Diagnosis Date   • Arthritis     fingers   • Breath shortness     with exertion   • CAD (coronary artery disease)     CABG X3 in  with graft occlusion post-op   • CAD (coronary artery disease)     Dr. KENZIE Goode, stents   • Cancer (HCC)     lung   • Cataract     bilat IOL    • Gout    • High cholesterol    • Hypertension    • MEDICAL HOME    • Pain 2019    R leg/hip, 0/10   • Stroke (HCC)     TIA        FAMILY HISTORY  Family History   Problem Relation Age of Onset   • Cancer Mother         uterine   • Arthritis Mother         gout   • Asthma Mother    • Heart Disease Father         MI age 50s   • Heart Disease Brother          40 MI   • Hyperlipidemia Brother    • Hyperlipidemia Sister    • Arthritis Sister    • Kidney Disease Neg Hx        SOCIAL HISTORY  Social History     Social History   • Marital status:      Spouse name: N/A   • Number of children: N/A   • Years of education: N/A     Occupational History   • 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     Social History Main Topics   • Smoking status: Former Smoker     Packs/day: 0.50     Years: 32.00     Types: Cigarettes     Quit date: 8/31/1988   • Smokeless tobacco: Never Used      Comment: quit 1988, approx 30pyh   • Alcohol use No   • Drug use: No   • Sexual activity: No     Other Topics Concern   • Not on file     Social History Narrative   • No narrative on file       SURGICAL HISTORY  Past Surgical History:   Procedure Laterality Date   • HIP NAILING INTRAMEDULLARY Right 12/29/2018    Procedure: HIP NAILING INTRAMEDULLARY;  Surgeon: Alejo Savage M.D.;  Location: SURGERY [facility];  Service: Orthopedics   • MULTIPLE CORONARY ARTERY BYPASS ENDO VEIN HARVEST  11/6/2016    Procedure: MULTIPLE CORONARY ARTERY BYPASS ENDO VEIN HARVEST;  Surgeon: Jeff Naranjo M.D.;  Location: SURGERY [facility];  Service:    • ABIODUN  11/6/2016    Procedure: ABIODUN;  Surgeon: Jeff Naranjo M.D.;  Location: SURGERY [facility];  Service:    • CATARACT PHACO WITH IOL  8/31/2009    Performed by SOLOMON THOMAS at SURGERY SAME DAY [facility]   • CATARACT PHACO WITH IOL  8/20/2009    Performed by SOLOMON THOMAS at SURGERY SAME DAY [facility]   • GYN SURGERY  1953    Hysterectomy during last C Section    • ANGIOPLASTY  85, 97   • OTHER      c-sections x4       CURRENT MEDICATIONS  Home Medications    **Home medications have not yet been reviewed for this encounter**         ALLERGIES  Allergies   Allergen Reactions   • Atorvastatin      Elevated LFT  RXN = long time ago   Tolerates rosuvastatin (06/04/19)       PHYSICAL EXAM  VITAL SIGNS: /73   Pulse 81   Temp 36.5 °C (97.7 °F) (Temporal)   Resp 18   Wt 46.5 kg (102 lb 8.2 oz)   LMP  (LMP Unknown)   SpO2 99%   BMI 20.02 kg/m²     Constitutional: Patient is alert and oriented x3 in mild distress   HENT: Moist mucous membranes  Eyes:   No conjunctivitis or icterus  Neck: Trach is midline  Lymphatic: Negative anterior  cervical lymphadenopathy  Cardiovascular: Normal heart rate    Thorax & Lungs: Clear to auscultation  Back:   No CVA tenderness  Abdomen: Soft nontender  Neurologic: Normal motor sensation  Extremities: No edema  Psychiatric: Affect normal, Judgment normal, Mood normal.     Results for orders placed or performed during the hospital encounter of 07/03/19   CBC WITH DIFFERENTIAL   Result Value Ref Range    WBC 3.2 (L) 4.8 - 10.8 K/uL    RBC 2.71 (L) 4.20 - 5.40 M/uL    Hemoglobin 8.7 (L) 12.0 - 16.0 g/dL    Hematocrit 27.2 (L) 37.0 - 47.0 %    .4 (H) 81.4 - 97.8 fL    MCH 32.1 27.0 - 33.0 pg    MCHC 32.0 (L) 33.6 - 35.0 g/dL    RDW 63.0 (H) 35.9 - 50.0 fL    Platelet Count 85 (L) 164 - 446 K/uL    MPV 9.4 9.0 - 12.9 fL    Neutrophils-Polys 72.60 (H) 44.00 - 72.00 %    Lymphocytes 15.00 (L) 22.00 - 41.00 %    Monocytes 9.00 0.00 - 13.40 %    Eosinophils 2.80 0.00 - 6.90 %    Basophils 0.30 0.00 - 1.80 %    Immature Granulocytes 0.30 0.00 - 0.90 %    Nucleated RBC 0.00 /100 WBC    Neutrophils (Absolute) 2.33 2.00 - 7.15 K/uL    Lymphs (Absolute) 0.48 (L) 1.00 - 4.80 K/uL    Monos (Absolute) 0.29 0.00 - 0.85 K/uL    Eos (Absolute) 0.09 0.00 - 0.51 K/uL    Baso (Absolute) 0.01 0.00 - 0.12 K/uL    Immature Granulocytes (abs) 0.01 0.00 - 0.11 K/uL    NRBC (Absolute) 0.00 K/uL   COMP METABOLIC PANEL   Result Value Ref Range    Sodium 142 135 - 145 mmol/L    Potassium 3.6 3.6 - 5.5 mmol/L    Chloride 109 96 - 112 mmol/L    Co2 20 20 - 33 mmol/L    Anion Gap 13.0 (H) 0.0 - 11.9    Glucose 124 (H) 65 - 99 mg/dL    Bun 24 (H) 8 - 22 mg/dL    Creatinine 2.18 (H) 0.50 - 1.40 mg/dL    Calcium 9.1 8.4 - 10.2 mg/dL    AST(SGOT) 32 12 - 45 U/L    ALT(SGPT) 23 2 - 50 U/L    Alkaline Phosphatase 122 (H) 30 - 99 U/L    Total Bilirubin 0.5 0.1 - 1.5 mg/dL    Albumin 3.8 3.2 - 4.9 g/dL    Total Protein 7.1 6.0 - 8.2 g/dL    Globulin 3.3 1.9 - 3.5 g/dL    A-G Ratio 1.2 g/dL   ESTIMATED GFR   Result Value Ref Range    GFR If   American 26 (A) >60 mL/min/1.73 m 2    GFR If Non African American 22 (A) >60 mL/min/1.73 m 2      DX-PELVIS-1 OR 2 VIEWS   Final Result         Interval enlargement of the expansile lytic lesion in the right ilium/acetabulum, measuring 4.7 x 6.3 cm, previously 3.1 x 4.6 cm.      The expansile lesion in the right inferior pubic ramus appears bigger and more sclerosis.      Very limited evaluation of the sacrum due to overlying bowel content.      DX-FEMUR-2+ RIGHT   Final Result            Interval enlargement of the expansile lytic lesion in the right ilium/acetabulum, measuring 4.7 x 6.3 cm, previously 3.1 x 4.6 cm.      The expansile lesion in the right inferior pubic ramus appears bigger and more sclerosis.      No acute fracture seen.      DX-FEMUR-2+ LEFT   Final Result            No acute osseous abnormality.              COURSE & MEDICAL DECISION MAKING  Pertinent Labs & Imaging studies reviewed. (See chart for details)  The patient's labs are not significantly changed from prior.  She has a chronic anemia and renal insufficiency.  Her x-ray does show increased growth of the tumor mass on the right ilium and in the ramus.  There is no sign of fracture.    I did talk to Dr. Rosario the patient's oncologist.  He will follow the patient up for further evaluation patient is being discharged with return precautions    FINAL IMPRESSION  1.   1. Pain in extremity, unspecified extremity        2.   3.         Electronically signed by: Jean Claude Michelle, 7/3/2019 10:07 AM

## 2019-07-03 NOTE — ED NOTES
Medication Reconciliation updated and complete per pt at bedside  Allergies have been verified and updated   No oral ABX within the last 14 days  Pt Home Pharmacy:[facility]

## 2019-07-03 NOTE — ED TRIAGE NOTES
Pt bib w/c; c/o glenna leg pain x3d; pt has hx bone CA. Pt states pain incr when ambulating; pt using cane and w/c for mobility

## 2019-07-03 NOTE — ED NOTES
"Pt reports pain has been getting progressively worse in bilat LE x 3 days, c/o increased difficulty ambulating.  Pt reports PCP concerned \"may have some hip fractures in there.\"    Blood drawn and sent to lab.  Pt and spouse updated on POC including pending tests.  "

## 2019-07-09 NOTE — TELEPHONE ENCOUNTER
Nutrition Services: Brief Update  Patient called to cancel appt with RD, pt stated she is no longer pursuing tx and no longer wishes for this support services. RD available PRN [phone]

## 2019-07-15 NOTE — PROGRESS NOTES
An 84-year-old female was an emergent admission to [facility] from 6/3/2019 to 6/6/2019 to treat Acute kidney failure, unspecified. IHD visited the patient bedside. The patient was discharged Home. The patient's medical conditions included: Cancer, Musculoskeletal Disease (Arthritis, Degenerative Disk Disease, Chronic Low Back Pain), and Other. The patient was not under clinical case management.     The patient was ordered to follow-up with PCP, and Specialist. The patient was scheduled to follow-up with:     1) 6/10/2019 @ 2:30 Abebe Edmond, nephrology - Confirmed as Kept     2) 6/24/2019 @ 10:15 Freddy Shah, radiation therapy - Patient Cancelled  The patient has a future appointment scheduled for:     1) 10/1/2019 @ 3:00 Carla Goode, cardiology - Scheduled    IHD communicated with the patient/caregiver via phone successfully throughout the case, however the patient was not always receptive with assistance. PPS Screening conducted, patient scored above 50%.

## 2021-12-25 NOTE — DISCHARGE PLANNING
Received Transport Form @ 1256 from Coleen (LEIF)  Spoke to Lori @ [facility]    Transport is scheduled for 1/8 @1700 going to [facility] [address] [facility] [place] [id number].    [facility] will be providing transport via [employer].     Alison (RNCM) notified of transport time.     Transport time changed to 1600 per Lori ([facility]) request.    Coleen (SHIRLEY) notified.    Discharged

## 2022-02-20 NOTE — ADDENDUM NOTE
Encounter addended by: Kyndra L. Holstein, R.N. on: 2/20/2022 3:06 AM   Actions taken: Care Plan modified, Delete clinical note

## 2022-04-12 NOTE — ED NOTES
Lidocaine applied to port    Malnutrition Findings:           BMI of 16 21, nutrition consult requested, clear liquid diet, will hopefully advance tomorrow, PPI twice daily, follow-up CT chest abdomen pelvis as noted above  BMI Findings: Body mass index is 16 21 kg/m²

## 2023-04-11 NOTE — OP THERAPY DAILY TREATMENT
Outpatient Physical Therapy  DAILY TREATMENT     [facility] [address]  [address]  [place] [address]  Phone:  [phone]  Fax:  [phone]    Date: 01/18/2018    Patient: Susie Craig  YOB: 1935  MRN: [id number]     Time Calculation  Start time: 1030  Stop time: 1120 Time Calculation (min): 50 minutes     Chief Complaint: No chief complaint on file.    Visit #: 2    SUBJECTIVE:  Patient has not had time to do home program; no change in symtoms    OBJECTIVE:  Current objective measures:          Therapeutic Exercises (CPT 37874):     Total treatment time spent on Therapeutic Exercises: 20 minutes.      1. REIL, 4 x 10    2. CRISTIAN, 4 x 10    3. Sit to stand hip hinge, 1 x 10    4. Neutral spine sit/stand    5. Row review, 1 x 10 green TB    6. Lat pull down review, 1 x 10 green TB    Therapeutic Treatments and Modalities:     1. Manual Therapy (CPT 61096), 15 minutes, CPA, UPA L4-S1 GR 3, REIL with clinician overpressure, Sacral float mobilization prone, MFR to thoracolumbar fasci    2. E Stim Unattended (CPT 35692), 15 minutes, IFC  to lumbar paraspinals with MHP      Pain rating before treatment: 3  Pain rating after treatment: 1    ASSESSMENT:   Response to treatment: Increased right lumbar pain but abolished right groin pain with repeated extension prone/clinician overpressure and CPA/UPA L5/S1 bilaterally. TTP Bilateral TP and SP L5 SI.    PLAN/RECOMMENDATIONS:   Plan for treatment: therapy treatment to continue next visit.  Planned interventions for next visit: continue with current treatment:  Lumbar extension ROM, REIL, CRISTIAN; Manual therapy lumbar spine.  IFC with heat in prone extension if patient can tolerate.      
None

## (undated) DEVICE — SLEEVE, VASO, THIGH, MED

## (undated) DEVICE — MASK ANESTHESIA ADULT  - (100/CA)

## (undated) DEVICE — HEAD HOLDER JUNIOR/ADULT

## (undated) DEVICE — DRAPE 36X28IN RAD CARM BND BG - (25/CA) O

## (undated) DEVICE — DRAPE SURGICAL U 77X120 - (10/CA)

## (undated) DEVICE — PACK MAJOR ORTHO - (2EA/CA)

## (undated) DEVICE — GLOVE BIOGEL INDICATOR SZ 8 SURGICAL PF LTX - (50/BX 4BX/CA)

## (undated) DEVICE — SET LEADWIRE 5 LEAD BEDSIDE DISPOSABLE ECG (1SET OF 5/EA)

## (undated) DEVICE — DRESSING TRANSPARENT FILM TEGADERM 4 X 4.75" (50EA/BX)"

## (undated) DEVICE — KIT ANESTHESIA W/CIRCUIT & 3/LT BAG W/FILTER (20EA/CA)

## (undated) DEVICE — BIT DRILL SHORT 4.2MM X 155MM (4TX2=8)

## (undated) DEVICE — SUTURE GENERAL

## (undated) DEVICE — ROD GUIDE BALL TIP 3.0MM X 1000MM

## (undated) DEVICE — TOWELS CLOTH SURGICAL - (4/PK 20PK/CA)

## (undated) DEVICE — CHLORAPREP 26 ML APPLICATOR - ORANGE TINT(25/CA)

## (undated) DEVICE — GLOVE, BIOGEL ECLIPSE, SZ 7.0, PF LTX (50/BX)

## (undated) DEVICE — PROTECTOR ULNA NERVE - (36PR/CA)

## (undated) DEVICE — TUBING CLEARLINK DUO-VENT - C-FLO (48EA/CA)

## (undated) DEVICE — CANISTER SUCTION 3000ML MECHANICAL FILTER AUTO SHUTOFF MEDI-VAC NONSTERILE LF DISP  (40EA/CA)

## (undated) DEVICE — DRAPE C-ARM LARGE 41IN X 74 IN - (10/BX 2BX/CA)

## (undated) DEVICE — KIT ROOM DECONTAMINATION

## (undated) DEVICE — ELECTRODE 850 FOAM ADHESIVE - HYDROGEL RADIOTRNSPRNT (50/PK)

## (undated) DEVICE — GLOVE BIOGEL PI INDICATOR SZ 7.0 SURGICAL PF LF - (50/BX 4BX/CA)

## (undated) DEVICE — DRAPE U ORTHOPEDIC - (10/BX)

## (undated) DEVICE — GOWN WARMING STANDARD FLEX - (30/CA)

## (undated) DEVICE — SUCTION INSTRUMENT YANKAUER BULBOUS TIP W/O VENT (50EA/CA)

## (undated) DEVICE — SUTURE 2-0 VICRYL PLUS CT-1 - 8 X 18 INCH(12/BX)

## (undated) DEVICE — SET EXTENSION WITH 2 PORTS (48EA/CA) ***PART #2C8610 IS A SUBSTITUTE*****

## (undated) DEVICE — LACTATED RINGERS INJ 1000 ML - (14EA/CA 60CA/PF)

## (undated) DEVICE — SENSOR SPO2 NEO LNCS ADHESIVE (20/BX) SEE USER NOTES

## (undated) DEVICE — DRAPE LARGE 3 QUARTER - (20/CA)

## (undated) DEVICE — NEPTUNE 4 PORT MANIFOLD - (20/PK)

## (undated) DEVICE — GLOVE BIOGEL SZ 7.5 SURGICAL PF LTX - (50PR/BX 4BX/CA)